# Patient Record
Sex: FEMALE | Race: WHITE | NOT HISPANIC OR LATINO | Employment: OTHER | URBAN - METROPOLITAN AREA
[De-identification: names, ages, dates, MRNs, and addresses within clinical notes are randomized per-mention and may not be internally consistent; named-entity substitution may affect disease eponyms.]

---

## 2017-01-01 ENCOUNTER — APPOINTMENT (INPATIENT)
Dept: RADIOLOGY | Facility: HOSPITAL | Age: 82
DRG: 330 | End: 2017-01-01
Payer: COMMERCIAL

## 2017-01-01 ENCOUNTER — ANESTHESIA (INPATIENT)
Dept: PERIOP | Facility: HOSPITAL | Age: 82
DRG: 330 | End: 2017-01-01
Payer: COMMERCIAL

## 2017-01-01 ENCOUNTER — HOSPITAL ENCOUNTER (INPATIENT)
Facility: HOSPITAL | Age: 82
LOS: 9 days | Discharge: HOME HEALTH | DRG: 330 | End: 2017-01-10
Attending: SURGERY | Admitting: SURGERY
Payer: COMMERCIAL

## 2017-01-01 ENCOUNTER — ANESTHESIA EVENT (INPATIENT)
Dept: PERIOP | Facility: HOSPITAL | Age: 82
DRG: 330 | End: 2017-01-01
Payer: COMMERCIAL

## 2017-01-01 DIAGNOSIS — K55.9 MESENTERIC ISCHEMIA (HCC): Primary | ICD-10-CM

## 2017-01-01 PROBLEM — K26.5 PERFORATED DUODENAL ULCER (HCC): Status: ACTIVE | Noted: 2017-01-01

## 2017-01-01 PROBLEM — K55.1 CHRONIC MESENTERIC ISCHEMIA (HCC): Status: ACTIVE | Noted: 2017-01-01

## 2017-01-01 LAB
ABO GROUP BLD: NORMAL
ALBUMIN SERPL BCP-MCNC: 3.1 G/DL (ref 3.5–5)
ALP SERPL-CCNC: 72 U/L (ref 46–116)
ALT SERPL W P-5'-P-CCNC: 22 U/L (ref 12–78)
ANION GAP SERPL CALCULATED.3IONS-SCNC: 10 MMOL/L (ref 4–13)
ANION GAP SERPL CALCULATED.3IONS-SCNC: 13 MMOL/L (ref 4–13)
ANION GAP SERPL CALCULATED.3IONS-SCNC: 9 MMOL/L (ref 4–13)
APTT PPP: 28 SECONDS (ref 24–36)
AST SERPL W P-5'-P-CCNC: 17 U/L (ref 5–45)
BASE EXCESS BLDA CALC-SCNC: -4 MMOL/L (ref -2–3)
BASE EXCESS BLDA CALC-SCNC: -4.8 MMOL/L
BASE EXCESS BLDA CALC-SCNC: -7.9 MMOL/L
BASOPHILS # BLD AUTO: 0.01 THOUSANDS/ΜL (ref 0–0.1)
BASOPHILS # BLD AUTO: 0.01 THOUSANDS/ΜL (ref 0–0.1)
BASOPHILS # BLD MANUAL: 0.15 THOUSAND/UL (ref 0–0.1)
BASOPHILS NFR BLD AUTO: 0 % (ref 0–1)
BASOPHILS NFR BLD AUTO: 0 % (ref 0–1)
BASOPHILS NFR MAR MANUAL: 1 % (ref 0–1)
BILIRUB SERPL-MCNC: 0.78 MG/DL (ref 0.2–1)
BLD GP AB SCN SERPL QL: NEGATIVE
BODY TEMPERATURE: 97.8 DEGREES FEHRENHEIT
BUN SERPL-MCNC: 13 MG/DL (ref 5–25)
BUN SERPL-MCNC: 14 MG/DL (ref 5–25)
BUN SERPL-MCNC: 16 MG/DL (ref 5–25)
CA-I BLD-SCNC: 1.12 MMOL/L (ref 1.12–1.32)
CALCIUM SERPL-MCNC: 6.6 MG/DL (ref 8.3–10.1)
CALCIUM SERPL-MCNC: 7.1 MG/DL (ref 8.3–10.1)
CALCIUM SERPL-MCNC: 7.7 MG/DL (ref 8.3–10.1)
CHLORIDE SERPL-SCNC: 105 MMOL/L (ref 100–108)
CHLORIDE SERPL-SCNC: 109 MMOL/L (ref 100–108)
CHLORIDE SERPL-SCNC: 111 MMOL/L (ref 100–108)
CO2 SERPL-SCNC: 19 MMOL/L (ref 21–32)
CO2 SERPL-SCNC: 23 MMOL/L (ref 21–32)
CO2 SERPL-SCNC: 24 MMOL/L (ref 21–32)
CREAT SERPL-MCNC: 0.58 MG/DL (ref 0.6–1.3)
CREAT SERPL-MCNC: 0.64 MG/DL (ref 0.6–1.3)
CREAT SERPL-MCNC: 0.65 MG/DL (ref 0.6–1.3)
EOSINOPHIL # BLD AUTO: 0 THOUSAND/ΜL (ref 0–0.61)
EOSINOPHIL # BLD AUTO: 0 THOUSAND/ΜL (ref 0–0.61)
EOSINOPHIL # BLD MANUAL: 0 THOUSAND/UL (ref 0–0.4)
EOSINOPHIL NFR BLD AUTO: 0 % (ref 0–6)
EOSINOPHIL NFR BLD AUTO: 0 % (ref 0–6)
EOSINOPHIL NFR BLD MANUAL: 0 % (ref 0–6)
ERYTHROCYTE [DISTWIDTH] IN BLOOD BY AUTOMATED COUNT: 13.7 % (ref 11.6–15.1)
ERYTHROCYTE [DISTWIDTH] IN BLOOD BY AUTOMATED COUNT: 13.7 % (ref 11.6–15.1)
ERYTHROCYTE [DISTWIDTH] IN BLOOD BY AUTOMATED COUNT: 13.8 % (ref 11.6–15.1)
GFR SERPL CREATININE-BSD FRML MDRD: >60 ML/MIN/1.73SQ M
GLUCOSE SERPL-MCNC: 111 MG/DL (ref 65–140)
GLUCOSE SERPL-MCNC: 113 MG/DL (ref 65–140)
GLUCOSE SERPL-MCNC: 124 MG/DL (ref 65–140)
GLUCOSE SERPL-MCNC: 127 MG/DL (ref 65–140)
GLUCOSE SERPL-MCNC: 99 MG/DL (ref 65–140)
HCO3 BLDA-SCNC: 17 MMOL/L (ref 22–28)
HCO3 BLDA-SCNC: 21.2 MMOL/L (ref 22–28)
HCO3 BLDA-SCNC: 22.3 MMOL/L (ref 24–30)
HCT VFR BLD AUTO: 30.6 % (ref 34.8–46.1)
HCT VFR BLD AUTO: 32.1 % (ref 34.8–46.1)
HCT VFR BLD AUTO: 37.3 % (ref 34.8–46.1)
HCT VFR BLD CALC: 32 % (ref 34.8–46.1)
HGB BLD-MCNC: 10.4 G/DL (ref 11.5–15.4)
HGB BLD-MCNC: 10.7 G/DL (ref 11.5–15.4)
HGB BLD-MCNC: 12.7 G/DL (ref 11.5–15.4)
HGB BLDA-MCNC: 10.9 G/DL (ref 11.5–15.4)
INR PPP: 1.11 (ref 0.86–1.16)
LACTATE SERPL-SCNC: 1.7 MMOL/L (ref 0.5–2)
LACTATE SERPL-SCNC: 1.8 MMOL/L (ref 0.5–2)
LACTATE SERPL-SCNC: 2.7 MMOL/L (ref 0.5–2)
LYMPHOCYTES # BLD AUTO: 0.15 THOUSAND/UL (ref 0.6–4.47)
LYMPHOCYTES # BLD AUTO: 0.62 THOUSANDS/ΜL (ref 0.6–4.47)
LYMPHOCYTES # BLD AUTO: 0.68 THOUSANDS/ΜL (ref 0.6–4.47)
LYMPHOCYTES # BLD AUTO: 1 % (ref 14–44)
LYMPHOCYTES NFR BLD AUTO: 6 % (ref 14–44)
LYMPHOCYTES NFR BLD AUTO: 6 % (ref 14–44)
MAGNESIUM SERPL-MCNC: 1.5 MG/DL (ref 1.6–2.6)
MAGNESIUM SERPL-MCNC: 1.5 MG/DL (ref 1.6–2.6)
MCH RBC QN AUTO: 32.7 PG (ref 26.8–34.3)
MCH RBC QN AUTO: 32.8 PG (ref 26.8–34.3)
MCH RBC QN AUTO: 33 PG (ref 26.8–34.3)
MCHC RBC AUTO-ENTMCNC: 33.3 G/DL (ref 31.4–37.4)
MCHC RBC AUTO-ENTMCNC: 34 G/DL (ref 31.4–37.4)
MCHC RBC AUTO-ENTMCNC: 34 G/DL (ref 31.4–37.4)
MCV RBC AUTO: 97 FL (ref 82–98)
MCV RBC AUTO: 97 FL (ref 82–98)
MCV RBC AUTO: 98 FL (ref 82–98)
MONOCYTES # BLD AUTO: 0.46 THOUSAND/UL (ref 0–1.22)
MONOCYTES # BLD AUTO: 0.6 THOUSAND/ΜL (ref 0.17–1.22)
MONOCYTES # BLD AUTO: 0.71 THOUSAND/ΜL (ref 0.17–1.22)
MONOCYTES NFR BLD AUTO: 6 % (ref 4–12)
MONOCYTES NFR BLD AUTO: 6 % (ref 4–12)
MONOCYTES NFR BLD: 3 % (ref 4–12)
NASAL CANNULA: 4
NEUTROPHILS # BLD AUTO: 8.65 THOUSANDS/ΜL (ref 1.85–7.62)
NEUTROPHILS # BLD AUTO: 9.99 THOUSANDS/ΜL (ref 1.85–7.62)
NEUTROPHILS # BLD MANUAL: 14.69 THOUSAND/UL (ref 1.85–7.62)
NEUTS BAND NFR BLD MANUAL: 1 % (ref 0–8)
NEUTS SEG NFR BLD AUTO: 88 % (ref 43–75)
NEUTS SEG NFR BLD AUTO: 88 % (ref 43–75)
NEUTS SEG NFR BLD AUTO: 94 % (ref 43–75)
NRBC BLD AUTO-RTO: 0 /100 WBCS
O2 CT BLDA-SCNC: 15 ML/DL (ref 16–23)
O2 CT BLDA-SCNC: 15.6 ML/DL (ref 16–23)
OTHER FIO2: ABNORMAL %
OXYHGB MFR BLDA: 94.4 % (ref 94–97)
OXYHGB MFR BLDA: 95.7 % (ref 94–97)
PCO2 BLD: 24 MMOL/L (ref 21–32)
PCO2 BLD: 42.1 MM HG (ref 42–50)
PCO2 BLDA: 32.6 MM HG (ref 36–44)
PCO2 BLDA: 42.5 MM HG (ref 36–44)
PCO2 TEMP ADJ BLDA: 41.8 MM HG (ref 36–44)
PH BLD: 7.32 [PH] (ref 7.35–7.45)
PH BLD: 7.33 [PH] (ref 7.3–7.4)
PH BLDA: 7.32 [PH] (ref 7.35–7.45)
PH BLDA: 7.33 [PH] (ref 7.35–7.45)
PHOSPHATE SERPL-MCNC: 3.7 MG/DL (ref 2.3–4.1)
PLATELET # BLD AUTO: 218 THOUSANDS/UL (ref 149–390)
PLATELET # BLD AUTO: 249 THOUSANDS/UL (ref 149–390)
PLATELET # BLD AUTO: 315 THOUSANDS/UL (ref 149–390)
PLATELET BLD QL SMEAR: ADEQUATE
PMV BLD AUTO: 10 FL (ref 8.9–12.7)
PMV BLD AUTO: 9.5 FL (ref 8.9–12.7)
PMV BLD AUTO: 9.9 FL (ref 8.9–12.7)
PO2 BLD: 433 MM HG (ref 35–45)
PO2 BLD: 86.6 MM HG (ref 75–129)
PO2 BLDA: 88.8 MM HG (ref 75–129)
PO2 BLDA: 96.8 MM HG (ref 75–129)
POTASSIUM BLD-SCNC: 3.2 MMOL/L (ref 3.5–5.3)
POTASSIUM SERPL-SCNC: 3.2 MMOL/L (ref 3.5–5.3)
POTASSIUM SERPL-SCNC: 3.3 MMOL/L (ref 3.5–5.3)
POTASSIUM SERPL-SCNC: 3.3 MMOL/L (ref 3.5–5.3)
PROT SERPL-MCNC: 6.1 G/DL (ref 6.4–8.2)
PROTHROMBIN TIME: 14.4 SECONDS (ref 12–14.3)
RBC # BLD AUTO: 3.17 MILLION/UL (ref 3.81–5.12)
RBC # BLD AUTO: 3.27 MILLION/UL (ref 3.81–5.12)
RBC # BLD AUTO: 3.85 MILLION/UL (ref 3.81–5.12)
RH BLD: POSITIVE
SAO2 % BLD FROM PO2: 100 % (ref 95–98)
SODIUM BLD-SCNC: 137 MMOL/L (ref 136–145)
SODIUM SERPL-SCNC: 138 MMOL/L (ref 136–145)
SODIUM SERPL-SCNC: 142 MMOL/L (ref 136–145)
SODIUM SERPL-SCNC: 143 MMOL/L (ref 136–145)
SPECIMEN SOURCE: ABNORMAL
WBC # BLD AUTO: 11.43 THOUSAND/UL (ref 4.31–10.16)
WBC # BLD AUTO: 15.46 THOUSAND/UL (ref 4.31–10.16)
WBC # BLD AUTO: 9.91 THOUSAND/UL (ref 4.31–10.16)

## 2017-01-01 PROCEDURE — 0DU907Z SUPPLEMENT DUODENUM WITH AUTOLOGOUS TISSUE SUBSTITUTE, OPEN APPROACH: ICD-10-PCS | Performed by: SURGERY

## 2017-01-01 PROCEDURE — B414YZZ FLUOROSCOPY OF SUPERIOR MESENTERIC ARTERY USING OTHER CONTRAST: ICD-10-PCS | Performed by: SURGERY

## 2017-01-01 PROCEDURE — 80048 BASIC METABOLIC PNL TOTAL CA: CPT | Performed by: SURGERY

## 2017-01-01 PROCEDURE — 85014 HEMATOCRIT: CPT

## 2017-01-01 PROCEDURE — 82803 BLOOD GASES ANY COMBINATION: CPT

## 2017-01-01 PROCEDURE — 04753ZZ DILATION OF SUPERIOR MESENTERIC ARTERY, PERCUTANEOUS APPROACH: ICD-10-PCS | Performed by: SURGERY

## 2017-01-01 PROCEDURE — C1725 CATH, TRANSLUMIN NON-LASER: HCPCS | Performed by: SURGERY

## 2017-01-01 PROCEDURE — C1769 GUIDE WIRE: HCPCS | Performed by: SURGERY

## 2017-01-01 PROCEDURE — 94760 N-INVAS EAR/PLS OXIMETRY 1: CPT

## 2017-01-01 PROCEDURE — 80053 COMPREHEN METABOLIC PANEL: CPT | Performed by: SURGERY

## 2017-01-01 PROCEDURE — 83735 ASSAY OF MAGNESIUM: CPT | Performed by: SURGERY

## 2017-01-01 PROCEDURE — 82805 BLOOD GASES W/O2 SATURATION: CPT | Performed by: SURGERY

## 2017-01-01 PROCEDURE — C1894 INTRO/SHEATH, NON-LASER: HCPCS | Performed by: SURGERY

## 2017-01-01 PROCEDURE — 85025 COMPLETE CBC W/AUTO DIFF WBC: CPT | Performed by: SURGERY

## 2017-01-01 PROCEDURE — 82948 REAGENT STRIP/BLOOD GLUCOSE: CPT

## 2017-01-01 PROCEDURE — 84132 ASSAY OF SERUM POTASSIUM: CPT

## 2017-01-01 PROCEDURE — A9270 NON-COVERED ITEM OR SERVICE: HCPCS | Performed by: EMERGENCY MEDICINE

## 2017-01-01 PROCEDURE — 85007 BL SMEAR W/DIFF WBC COUNT: CPT | Performed by: SURGERY

## 2017-01-01 PROCEDURE — 99285 EMERGENCY DEPT VISIT HI MDM: CPT

## 2017-01-01 PROCEDURE — A9270 NON-COVERED ITEM OR SERVICE: HCPCS | Performed by: SURGERY

## 2017-01-01 PROCEDURE — C9113 INJ PANTOPRAZOLE SODIUM, VIA: HCPCS | Performed by: EMERGENCY MEDICINE

## 2017-01-01 PROCEDURE — 83605 ASSAY OF LACTIC ACID: CPT | Performed by: SURGERY

## 2017-01-01 PROCEDURE — 82947 ASSAY GLUCOSE BLOOD QUANT: CPT

## 2017-01-01 PROCEDURE — 86901 BLOOD TYPING SEROLOGIC RH(D): CPT | Performed by: SURGERY

## 2017-01-01 PROCEDURE — 84295 ASSAY OF SERUM SODIUM: CPT

## 2017-01-01 PROCEDURE — 86850 RBC ANTIBODY SCREEN: CPT | Performed by: SURGERY

## 2017-01-01 PROCEDURE — 85610 PROTHROMBIN TIME: CPT | Performed by: SURGERY

## 2017-01-01 PROCEDURE — 82330 ASSAY OF CALCIUM: CPT

## 2017-01-01 PROCEDURE — 86920 COMPATIBILITY TEST SPIN: CPT

## 2017-01-01 PROCEDURE — 84100 ASSAY OF PHOSPHORUS: CPT | Performed by: SURGERY

## 2017-01-01 PROCEDURE — 85730 THROMBOPLASTIN TIME PARTIAL: CPT | Performed by: SURGERY

## 2017-01-01 PROCEDURE — 76937 US GUIDE VASCULAR ACCESS: CPT

## 2017-01-01 PROCEDURE — 85027 COMPLETE CBC AUTOMATED: CPT | Performed by: SURGERY

## 2017-01-01 PROCEDURE — 75726 ARTERY X-RAYS ABDOMEN: CPT

## 2017-01-01 PROCEDURE — 86900 BLOOD TYPING SEROLOGIC ABO: CPT | Performed by: SURGERY

## 2017-01-01 RX ORDER — HYDRALAZINE HYDROCHLORIDE 20 MG/ML
10 INJECTION INTRAMUSCULAR; INTRAVENOUS ONCE
Status: COMPLETED | OUTPATIENT
Start: 2017-01-01 | End: 2017-01-01

## 2017-01-01 RX ORDER — FENTANYL CITRATE/PF 50 MCG/ML
25 SYRINGE (ML) INJECTION
Status: DISCONTINUED | OUTPATIENT
Start: 2017-01-01 | End: 2017-01-01 | Stop reason: HOSPADM

## 2017-01-01 RX ORDER — PROTAMINE SULFATE 10 MG/ML
INJECTION, SOLUTION INTRAVENOUS AS NEEDED
Status: DISCONTINUED | OUTPATIENT
Start: 2017-01-01 | End: 2017-01-01 | Stop reason: SURG

## 2017-01-01 RX ORDER — HEPARIN SODIUM 5000 [USP'U]/ML
5000 INJECTION, SOLUTION INTRAVENOUS; SUBCUTANEOUS EVERY 8 HOURS SCHEDULED
Status: DISCONTINUED | OUTPATIENT
Start: 2017-01-01 | End: 2017-01-01

## 2017-01-01 RX ORDER — PROPOFOL 10 MG/ML
INJECTION, EMULSION INTRAVENOUS AS NEEDED
Status: DISCONTINUED | OUTPATIENT
Start: 2017-01-01 | End: 2017-01-01 | Stop reason: SURG

## 2017-01-01 RX ORDER — ONDANSETRON 2 MG/ML
INJECTION INTRAMUSCULAR; INTRAVENOUS AS NEEDED
Status: DISCONTINUED | OUTPATIENT
Start: 2017-01-01 | End: 2017-01-01 | Stop reason: SURG

## 2017-01-01 RX ORDER — POTASSIUM CHLORIDE 14.9 MG/ML
20 INJECTION INTRAVENOUS ONCE
Status: COMPLETED | OUTPATIENT
Start: 2017-01-01 | End: 2017-01-01

## 2017-01-01 RX ORDER — SODIUM CHLORIDE, SODIUM GLUCONATE, SODIUM ACETATE, POTASSIUM CHLORIDE, MAGNESIUM CHLORIDE, SODIUM PHOSPHATE, DIBASIC, AND POTASSIUM PHOSPHATE .53; .5; .37; .037; .03; .012; .00082 G/100ML; G/100ML; G/100ML; G/100ML; G/100ML; G/100ML; G/100ML
1000 INJECTION, SOLUTION INTRAVENOUS ONCE
Status: COMPLETED | OUTPATIENT
Start: 2017-01-01 | End: 2017-01-01

## 2017-01-01 RX ORDER — PANTOPRAZOLE SODIUM 40 MG/1
40 INJECTION, POWDER, FOR SOLUTION INTRAVENOUS
Status: DISCONTINUED | OUTPATIENT
Start: 2017-01-01 | End: 2017-01-01

## 2017-01-01 RX ORDER — CLOPIDOGREL BISULFATE 75 MG/1
75 TABLET ORAL DAILY
Status: DISCONTINUED | OUTPATIENT
Start: 2017-01-01 | End: 2017-01-01

## 2017-01-01 RX ORDER — SODIUM CHLORIDE, SODIUM LACTATE, POTASSIUM CHLORIDE, CALCIUM CHLORIDE 600; 310; 30; 20 MG/100ML; MG/100ML; MG/100ML; MG/100ML
125 INJECTION, SOLUTION INTRAVENOUS CONTINUOUS
Status: DISCONTINUED | OUTPATIENT
Start: 2017-01-01 | End: 2017-01-02

## 2017-01-01 RX ORDER — SODIUM CHLORIDE, SODIUM GLUCONATE, SODIUM ACETATE, POTASSIUM CHLORIDE, MAGNESIUM CHLORIDE, SODIUM PHOSPHATE, DIBASIC, AND POTASSIUM PHOSPHATE .53; .5; .37; .037; .03; .012; .00082 G/100ML; G/100ML; G/100ML; G/100ML; G/100ML; G/100ML; G/100ML
1000 INJECTION, SOLUTION INTRAVENOUS ONCE
Status: DISCONTINUED | OUTPATIENT
Start: 2017-01-01 | End: 2017-01-10 | Stop reason: HOSPADM

## 2017-01-01 RX ORDER — ASPIRIN 300 MG/1
300 SUPPOSITORY RECTAL DAILY
Status: DISCONTINUED | OUTPATIENT
Start: 2017-01-01 | End: 2017-01-08

## 2017-01-01 RX ORDER — EPHEDRINE SULFATE 50 MG/ML
INJECTION, SOLUTION INTRAVENOUS AS NEEDED
Status: DISCONTINUED | OUTPATIENT
Start: 2017-01-01 | End: 2017-01-01 | Stop reason: SURG

## 2017-01-01 RX ORDER — FENTANYL CITRATE 50 UG/ML
INJECTION, SOLUTION INTRAMUSCULAR; INTRAVENOUS AS NEEDED
Status: DISCONTINUED | OUTPATIENT
Start: 2017-01-01 | End: 2017-01-01 | Stop reason: SURG

## 2017-01-01 RX ORDER — SODIUM CHLORIDE, SODIUM LACTATE, POTASSIUM CHLORIDE, CALCIUM CHLORIDE 600; 310; 30; 20 MG/100ML; MG/100ML; MG/100ML; MG/100ML
INJECTION, SOLUTION INTRAVENOUS CONTINUOUS PRN
Status: DISCONTINUED | OUTPATIENT
Start: 2017-01-01 | End: 2017-01-01 | Stop reason: SURG

## 2017-01-01 RX ORDER — HEPARIN SODIUM 1000 [USP'U]/ML
INJECTION, SOLUTION INTRAVENOUS; SUBCUTANEOUS AS NEEDED
Status: DISCONTINUED | OUTPATIENT
Start: 2017-01-01 | End: 2017-01-01 | Stop reason: SURG

## 2017-01-01 RX ORDER — FENTANYL CITRATE/PF 50 MCG/ML
25 SYRINGE (ML) INJECTION
Status: DISCONTINUED | OUTPATIENT
Start: 2017-01-01 | End: 2017-01-01 | Stop reason: SDUPTHER

## 2017-01-01 RX ORDER — POTASSIUM CHLORIDE 14.9 MG/ML
20 INJECTION INTRAVENOUS
Status: DISCONTINUED | OUTPATIENT
Start: 2017-01-01 | End: 2017-01-01

## 2017-01-01 RX ORDER — CLARITHROMYCIN 250 MG/5ML
500 FOR SUSPENSION ORAL EVERY 12 HOURS SCHEDULED
Status: DISCONTINUED | OUTPATIENT
Start: 2017-01-01 | End: 2017-01-06

## 2017-01-01 RX ORDER — PANTOPRAZOLE SODIUM 40 MG/1
40 INJECTION, POWDER, FOR SOLUTION INTRAVENOUS EVERY 12 HOURS SCHEDULED
Status: DISCONTINUED | OUTPATIENT
Start: 2017-01-01 | End: 2017-01-08 | Stop reason: CLARIF

## 2017-01-01 RX ORDER — ASPIRIN 81 MG/1
81 TABLET, CHEWABLE ORAL DAILY
Status: DISCONTINUED | OUTPATIENT
Start: 2017-01-01 | End: 2017-01-01

## 2017-01-01 RX ORDER — ONDANSETRON 2 MG/ML
4 INJECTION INTRAMUSCULAR; INTRAVENOUS ONCE
Status: DISCONTINUED | OUTPATIENT
Start: 2017-01-01 | End: 2017-01-01 | Stop reason: HOSPADM

## 2017-01-01 RX ORDER — SUCCINYLCHOLINE CHLORIDE 20 MG/ML
INJECTION INTRAMUSCULAR; INTRAVENOUS AS NEEDED
Status: DISCONTINUED | OUTPATIENT
Start: 2017-01-01 | End: 2017-01-01 | Stop reason: SURG

## 2017-01-01 RX ORDER — LABETALOL HYDROCHLORIDE 5 MG/ML
INJECTION, SOLUTION INTRAVENOUS AS NEEDED
Status: DISCONTINUED | OUTPATIENT
Start: 2017-01-01 | End: 2017-01-01 | Stop reason: SURG

## 2017-01-01 RX ORDER — ONDANSETRON 2 MG/ML
4 INJECTION INTRAMUSCULAR; INTRAVENOUS EVERY 4 HOURS PRN
Status: DISCONTINUED | OUTPATIENT
Start: 2017-01-01 | End: 2017-01-10 | Stop reason: HOSPADM

## 2017-01-01 RX ORDER — MAGNESIUM HYDROXIDE 1200 MG/15ML
LIQUID ORAL AS NEEDED
Status: DISCONTINUED | OUTPATIENT
Start: 2017-01-01 | End: 2017-01-01 | Stop reason: HOSPADM

## 2017-01-01 RX ORDER — GLYCOPYRROLATE 0.2 MG/ML
INJECTION INTRAMUSCULAR; INTRAVENOUS AS NEEDED
Status: DISCONTINUED | OUTPATIENT
Start: 2017-01-01 | End: 2017-01-01 | Stop reason: SURG

## 2017-01-01 RX ORDER — LIDOCAINE HYDROCHLORIDE 10 MG/ML
INJECTION, SOLUTION INFILTRATION; PERINEURAL AS NEEDED
Status: DISCONTINUED | OUTPATIENT
Start: 2017-01-01 | End: 2017-01-01 | Stop reason: SURG

## 2017-01-01 RX ORDER — HEPARIN SODIUM 5000 [USP'U]/ML
5000 INJECTION, SOLUTION INTRAVENOUS; SUBCUTANEOUS EVERY 8 HOURS SCHEDULED
Status: DISCONTINUED | OUTPATIENT
Start: 2017-01-01 | End: 2017-01-10 | Stop reason: HOSPADM

## 2017-01-01 RX ORDER — MAGNESIUM SULFATE HEPTAHYDRATE 40 MG/ML
2 INJECTION, SOLUTION INTRAVENOUS ONCE
Status: COMPLETED | OUTPATIENT
Start: 2017-01-01 | End: 2017-01-01

## 2017-01-01 RX ORDER — SODIUM CHLORIDE 9 MG/ML
75 INJECTION, SOLUTION INTRAVENOUS CONTINUOUS
Status: DISCONTINUED | OUTPATIENT
Start: 2017-01-01 | End: 2017-01-01

## 2017-01-01 RX ADMIN — PROTAMINE SULFATE 30 MG: 10 INJECTION, SOLUTION INTRAVENOUS at 05:40

## 2017-01-01 RX ADMIN — METRONIDAZOLE 500 MG: 500 INJECTION, SOLUTION INTRAVENOUS at 12:00

## 2017-01-01 RX ADMIN — MAGNESIUM SULFATE HEPTAHYDRATE 2 G: 40 INJECTION, SOLUTION INTRAVENOUS at 10:17

## 2017-01-01 RX ADMIN — EPHEDRINE SULFATE 5 MG: 50 INJECTION, SOLUTION INTRAMUSCULAR; INTRAVENOUS; SUBCUTANEOUS at 05:09

## 2017-01-01 RX ADMIN — GLYCOPYRROLATE 0.4 MG: 0.2 INJECTION INTRAMUSCULAR; INTRAVENOUS at 05:48

## 2017-01-01 RX ADMIN — HYDRALAZINE HYDROCHLORIDE 10 MG: 20 INJECTION INTRAMUSCULAR; INTRAVENOUS at 20:57

## 2017-01-01 RX ADMIN — ASPIRIN 300 MG: 300 SUPPOSITORY RECTAL at 12:53

## 2017-01-01 RX ADMIN — PROPOFOL 70 MG: 10 INJECTION, EMULSION INTRAVENOUS at 03:20

## 2017-01-01 RX ADMIN — SODIUM CHLORIDE 4.5 G: 900 INJECTION INTRAVENOUS at 03:42

## 2017-01-01 RX ADMIN — SUCCINYLCHOLINE CHLORIDE 40 MG: 20 INJECTION, SOLUTION INTRAMUSCULAR; INTRAVENOUS at 03:30

## 2017-01-01 RX ADMIN — CLARITHROMYCIN 500 MG: 250 FOR SUSPENSION ORAL at 11:59

## 2017-01-01 RX ADMIN — FENTANYL CITRATE 25 MCG: 50 INJECTION, SOLUTION INTRAMUSCULAR; INTRAVENOUS at 06:57

## 2017-01-01 RX ADMIN — HYDROMORPHONE HYDROCHLORIDE 1 MG: 1 INJECTION, SOLUTION INTRAMUSCULAR; INTRAVENOUS; SUBCUTANEOUS at 13:10

## 2017-01-01 RX ADMIN — NEOSTIGMINE METHYLSULFATE 3 MG: 1 INJECTION INTRAMUSCULAR; INTRAVENOUS; SUBCUTANEOUS at 05:48

## 2017-01-01 RX ADMIN — HEPARIN SODIUM 5000 UNITS: 5000 INJECTION, SOLUTION INTRAVENOUS; SUBCUTANEOUS at 14:09

## 2017-01-01 RX ADMIN — LIDOCAINE HYDROCHLORIDE 30 MG: 10 INJECTION, SOLUTION INFILTRATION; PERINEURAL at 03:20

## 2017-01-01 RX ADMIN — Medication 4.5 G: at 20:58

## 2017-01-01 RX ADMIN — CLARITHROMYCIN 500 MG: 250 FOR SUSPENSION ORAL at 20:19

## 2017-01-01 RX ADMIN — PANTOPRAZOLE SODIUM 40 MG: 40 INJECTION, POWDER, FOR SOLUTION INTRAVENOUS at 20:19

## 2017-01-01 RX ADMIN — POTASSIUM CHLORIDE 20 MEQ: 200 INJECTION, SOLUTION INTRAVENOUS at 10:17

## 2017-01-01 RX ADMIN — SUCCINYLCHOLINE CHLORIDE 60 MG: 20 INJECTION, SOLUTION INTRAMUSCULAR; INTRAVENOUS at 03:20

## 2017-01-01 RX ADMIN — SODIUM CHLORIDE, SODIUM LACTATE, POTASSIUM CHLORIDE, AND CALCIUM CHLORIDE 125 ML/HR: .6; .31; .03; .02 INJECTION, SOLUTION INTRAVENOUS at 22:51

## 2017-01-01 RX ADMIN — ONDANSETRON 4 MG: 2 INJECTION INTRAMUSCULAR; INTRAVENOUS at 10:17

## 2017-01-01 RX ADMIN — HYDROMORPHONE HYDROCHLORIDE 1 MG: 1 INJECTION, SOLUTION INTRAMUSCULAR; INTRAVENOUS; SUBCUTANEOUS at 07:57

## 2017-01-01 RX ADMIN — FENTANYL CITRATE 25 MCG: 50 INJECTION, SOLUTION INTRAMUSCULAR; INTRAVENOUS at 06:38

## 2017-01-01 RX ADMIN — HEPARIN SODIUM 5000 UNITS: 5000 INJECTION, SOLUTION INTRAVENOUS; SUBCUTANEOUS at 22:50

## 2017-01-01 RX ADMIN — EPHEDRINE SULFATE 5 MG: 50 INJECTION, SOLUTION INTRAMUSCULAR; INTRAVENOUS; SUBCUTANEOUS at 05:25

## 2017-01-01 RX ADMIN — LABETALOL HYDROCHLORIDE 10 MG: 5 INJECTION, SOLUTION INTRAVENOUS at 03:53

## 2017-01-01 RX ADMIN — FENTANYL CITRATE 50 MCG: 50 INJECTION, SOLUTION INTRAMUSCULAR; INTRAVENOUS at 04:30

## 2017-01-01 RX ADMIN — EPHEDRINE SULFATE 5 MG: 50 INJECTION, SOLUTION INTRAMUSCULAR; INTRAVENOUS; SUBCUTANEOUS at 04:54

## 2017-01-01 RX ADMIN — FENTANYL CITRATE 25 MCG: 50 INJECTION, SOLUTION INTRAMUSCULAR; INTRAVENOUS at 06:53

## 2017-01-01 RX ADMIN — FENTANYL CITRATE 50 MCG: 50 INJECTION, SOLUTION INTRAMUSCULAR; INTRAVENOUS at 04:31

## 2017-01-01 RX ADMIN — FENTANYL CITRATE 25 MCG: 50 INJECTION, SOLUTION INTRAMUSCULAR; INTRAVENOUS at 06:47

## 2017-01-01 RX ADMIN — ONDANSETRON 4 MG: 2 INJECTION INTRAMUSCULAR; INTRAVENOUS at 04:35

## 2017-01-01 RX ADMIN — HEPARIN SODIUM 5000 UNITS: 1000 INJECTION INTRAVENOUS; SUBCUTANEOUS at 05:18

## 2017-01-01 RX ADMIN — METOPROLOL TARTRATE 5 MG: 5 INJECTION INTRAVENOUS at 20:18

## 2017-01-01 RX ADMIN — POTASSIUM CHLORIDE 20 MEQ: 200 INJECTION, SOLUTION INTRAVENOUS at 14:56

## 2017-01-01 RX ADMIN — SODIUM CHLORIDE, SODIUM LACTATE, POTASSIUM CHLORIDE, AND CALCIUM CHLORIDE: .6; .31; .03; .02 INJECTION, SOLUTION INTRAVENOUS at 06:16

## 2017-01-01 RX ADMIN — METRONIDAZOLE 500 MG: 500 INJECTION, SOLUTION INTRAVENOUS at 20:19

## 2017-01-01 RX ADMIN — SODIUM CHLORIDE: 0.9 INJECTION, SOLUTION INTRAVENOUS at 03:00

## 2017-01-01 RX ADMIN — SODIUM CHLORIDE, SODIUM GLUCONATE, SODIUM ACETATE, POTASSIUM CHLORIDE AND MAGNESIUM CHLORIDE 1000 ML: 526; 502; 368; 37; 30 INJECTION, SOLUTION INTRAVENOUS at 07:57

## 2017-01-01 RX ADMIN — POTASSIUM CHLORIDE 20 MEQ: 200 INJECTION, SOLUTION INTRAVENOUS at 16:21

## 2017-01-01 RX ADMIN — HEPARIN SODIUM 5000 UNITS: 5000 INJECTION, SOLUTION INTRAVENOUS; SUBCUTANEOUS at 10:16

## 2017-01-01 RX ADMIN — Medication 4.5 G: at 14:09

## 2017-01-01 RX ADMIN — FENTANYL CITRATE 50 MCG: 50 INJECTION, SOLUTION INTRAMUSCULAR; INTRAVENOUS at 03:30

## 2017-01-01 RX ADMIN — FENTANYL CITRATE 50 MCG: 50 INJECTION, SOLUTION INTRAMUSCULAR; INTRAVENOUS at 03:20

## 2017-01-01 RX ADMIN — SODIUM CHLORIDE, SODIUM LACTATE, POTASSIUM CHLORIDE, AND CALCIUM CHLORIDE: .6; .31; .03; .02 INJECTION, SOLUTION INTRAVENOUS at 03:29

## 2017-01-01 RX ADMIN — HYDROMORPHONE HYDROCHLORIDE 0.5 MG: 1 INJECTION, SOLUTION INTRAMUSCULAR; INTRAVENOUS; SUBCUTANEOUS at 10:18

## 2017-01-01 RX ADMIN — PANTOPRAZOLE SODIUM 40 MG: 40 INJECTION, POWDER, FOR SOLUTION INTRAVENOUS at 10:17

## 2017-01-02 LAB
ANION GAP SERPL CALCULATED.3IONS-SCNC: 9 MMOL/L (ref 4–13)
BASOPHILS # BLD AUTO: 0.01 THOUSANDS/ΜL (ref 0–0.1)
BASOPHILS NFR BLD AUTO: 0 % (ref 0–1)
BUN SERPL-MCNC: 14 MG/DL (ref 5–25)
CALCIUM SERPL-MCNC: 8.2 MG/DL (ref 8.3–10.1)
CHLORIDE SERPL-SCNC: 111 MMOL/L (ref 100–108)
CO2 SERPL-SCNC: 21 MMOL/L (ref 21–32)
CREAT SERPL-MCNC: 0.67 MG/DL (ref 0.6–1.3)
EOSINOPHIL # BLD AUTO: 0 THOUSAND/ΜL (ref 0–0.61)
EOSINOPHIL NFR BLD AUTO: 0 % (ref 0–6)
ERYTHROCYTE [DISTWIDTH] IN BLOOD BY AUTOMATED COUNT: 14.2 % (ref 11.6–15.1)
GFR SERPL CREATININE-BSD FRML MDRD: >60 ML/MIN/1.73SQ M
GLUCOSE SERPL-MCNC: 102 MG/DL (ref 65–140)
GLUCOSE SERPL-MCNC: 106 MG/DL (ref 65–140)
HCT VFR BLD AUTO: 33.5 % (ref 34.8–46.1)
HGB BLD-MCNC: 11.3 G/DL (ref 11.5–15.4)
LYMPHOCYTES # BLD AUTO: 0.76 THOUSANDS/ΜL (ref 0.6–4.47)
LYMPHOCYTES NFR BLD AUTO: 9 % (ref 14–44)
MAGNESIUM SERPL-MCNC: 2.5 MG/DL (ref 1.6–2.6)
MCH RBC QN AUTO: 32.7 PG (ref 26.8–34.3)
MCHC RBC AUTO-ENTMCNC: 33.7 G/DL (ref 31.4–37.4)
MCV RBC AUTO: 97 FL (ref 82–98)
MONOCYTES # BLD AUTO: 0.74 THOUSAND/ΜL (ref 0.17–1.22)
MONOCYTES NFR BLD AUTO: 9 % (ref 4–12)
NEUTROPHILS # BLD AUTO: 6.7 THOUSANDS/ΜL (ref 1.85–7.62)
NEUTS SEG NFR BLD AUTO: 82 % (ref 43–75)
NRBC BLD AUTO-RTO: 0 /100 WBCS
PHOSPHATE SERPL-MCNC: 2.7 MG/DL (ref 2.3–4.1)
PLATELET # BLD AUTO: 247 THOUSANDS/UL (ref 149–390)
PMV BLD AUTO: 9.9 FL (ref 8.9–12.7)
POTASSIUM SERPL-SCNC: 4.1 MMOL/L (ref 3.5–5.3)
PREALB SERPL-MCNC: 14.2 MG/DL (ref 18–40)
RBC # BLD AUTO: 3.46 MILLION/UL (ref 3.81–5.12)
SODIUM SERPL-SCNC: 141 MMOL/L (ref 136–145)
WBC # BLD AUTO: 8.23 THOUSAND/UL (ref 4.31–10.16)

## 2017-01-02 PROCEDURE — A9270 NON-COVERED ITEM OR SERVICE: HCPCS | Performed by: SURGERY

## 2017-01-02 PROCEDURE — 82948 REAGENT STRIP/BLOOD GLUCOSE: CPT

## 2017-01-02 PROCEDURE — 80048 BASIC METABOLIC PNL TOTAL CA: CPT | Performed by: PHYSICIAN ASSISTANT

## 2017-01-02 PROCEDURE — C9113 INJ PANTOPRAZOLE SODIUM, VIA: HCPCS | Performed by: EMERGENCY MEDICINE

## 2017-01-02 PROCEDURE — 83735 ASSAY OF MAGNESIUM: CPT | Performed by: PHYSICIAN ASSISTANT

## 2017-01-02 PROCEDURE — 84134 ASSAY OF PREALBUMIN: CPT | Performed by: PHYSICIAN ASSISTANT

## 2017-01-02 PROCEDURE — 85025 COMPLETE CBC W/AUTO DIFF WBC: CPT | Performed by: PHYSICIAN ASSISTANT

## 2017-01-02 PROCEDURE — 84100 ASSAY OF PHOSPHORUS: CPT | Performed by: PHYSICIAN ASSISTANT

## 2017-01-02 RX ORDER — OXYCODONE HCL 5 MG/5 ML
10 SOLUTION, ORAL ORAL EVERY 4 HOURS PRN
Status: DISCONTINUED | OUTPATIENT
Start: 2017-01-02 | End: 2017-01-07

## 2017-01-02 RX ORDER — OXYCODONE HCL 5 MG/5 ML
5 SOLUTION, ORAL ORAL EVERY 4 HOURS PRN
Status: DISCONTINUED | OUTPATIENT
Start: 2017-01-02 | End: 2017-01-07

## 2017-01-02 RX ORDER — LABETALOL HYDROCHLORIDE 5 MG/ML
10 INJECTION, SOLUTION INTRAVENOUS EVERY 4 HOURS PRN
Status: DISCONTINUED | OUTPATIENT
Start: 2017-01-02 | End: 2017-01-03

## 2017-01-02 RX ORDER — SODIUM CHLORIDE, SODIUM LACTATE, POTASSIUM CHLORIDE, CALCIUM CHLORIDE 600; 310; 30; 20 MG/100ML; MG/100ML; MG/100ML; MG/100ML
84 INJECTION, SOLUTION INTRAVENOUS CONTINUOUS
Status: DISCONTINUED | OUTPATIENT
Start: 2017-01-02 | End: 2017-01-03

## 2017-01-02 RX ORDER — HYDRALAZINE HYDROCHLORIDE 20 MG/ML
10 INJECTION INTRAMUSCULAR; INTRAVENOUS EVERY 6 HOURS PRN
Status: DISCONTINUED | OUTPATIENT
Start: 2017-01-02 | End: 2017-01-02

## 2017-01-02 RX ADMIN — SODIUM CHLORIDE, SODIUM LACTATE, POTASSIUM CHLORIDE, AND CALCIUM CHLORIDE 100 ML/HR: .6; .31; .03; .02 INJECTION, SOLUTION INTRAVENOUS at 08:56

## 2017-01-02 RX ADMIN — HEPARIN SODIUM 5000 UNITS: 5000 INJECTION, SOLUTION INTRAVENOUS; SUBCUTANEOUS at 14:26

## 2017-01-02 RX ADMIN — HEPARIN SODIUM 5000 UNITS: 5000 INJECTION, SOLUTION INTRAVENOUS; SUBCUTANEOUS at 21:47

## 2017-01-02 RX ADMIN — PANTOPRAZOLE SODIUM 40 MG: 40 INJECTION, POWDER, FOR SOLUTION INTRAVENOUS at 08:13

## 2017-01-02 RX ADMIN — Medication 4.5 G: at 20:38

## 2017-01-02 RX ADMIN — SODIUM CHLORIDE, SODIUM LACTATE, POTASSIUM CHLORIDE, AND CALCIUM CHLORIDE 84 ML/HR: .6; .31; .03; .02 INJECTION, SOLUTION INTRAVENOUS at 22:53

## 2017-01-02 RX ADMIN — HYDROMORPHONE HYDROCHLORIDE 1 MG: 1 INJECTION, SOLUTION INTRAMUSCULAR; INTRAVENOUS; SUBCUTANEOUS at 00:02

## 2017-01-02 RX ADMIN — Medication 1 SPRAY: at 21:47

## 2017-01-02 RX ADMIN — PANTOPRAZOLE SODIUM 40 MG: 40 INJECTION, POWDER, FOR SOLUTION INTRAVENOUS at 20:46

## 2017-01-02 RX ADMIN — CLARITHROMYCIN 500 MG: 250 FOR SUSPENSION ORAL at 20:47

## 2017-01-02 RX ADMIN — ONDANSETRON 4 MG: 2 INJECTION INTRAMUSCULAR; INTRAVENOUS at 08:14

## 2017-01-02 RX ADMIN — SODIUM CHLORIDE, SODIUM LACTATE, POTASSIUM CHLORIDE, AND CALCIUM CHLORIDE 84 ML/HR: .6; .31; .03; .02 INJECTION, SOLUTION INTRAVENOUS at 17:40

## 2017-01-02 RX ADMIN — Medication 4.5 G: at 02:00

## 2017-01-02 RX ADMIN — ASPIRIN 300 MG: 300 SUPPOSITORY RECTAL at 08:13

## 2017-01-02 RX ADMIN — HEPARIN SODIUM 5000 UNITS: 5000 INJECTION, SOLUTION INTRAVENOUS; SUBCUTANEOUS at 06:11

## 2017-01-02 RX ADMIN — HYDRALAZINE HYDROCHLORIDE 10 MG: 20 INJECTION INTRAMUSCULAR; INTRAVENOUS at 03:48

## 2017-01-02 RX ADMIN — METRONIDAZOLE 500 MG: 500 INJECTION, SOLUTION INTRAVENOUS at 12:00

## 2017-01-02 RX ADMIN — METRONIDAZOLE 500 MG: 500 INJECTION, SOLUTION INTRAVENOUS at 21:47

## 2017-01-02 RX ADMIN — HYDROMORPHONE HYDROCHLORIDE 1 MG: 1 INJECTION, SOLUTION INTRAMUSCULAR; INTRAVENOUS; SUBCUTANEOUS at 08:14

## 2017-01-02 RX ADMIN — HYDROMORPHONE HYDROCHLORIDE 1 MG: 1 INJECTION, SOLUTION INTRAMUSCULAR; INTRAVENOUS; SUBCUTANEOUS at 11:59

## 2017-01-02 RX ADMIN — Medication 4.5 G: at 14:00

## 2017-01-02 RX ADMIN — HYDRALAZINE HYDROCHLORIDE 10 MG: 20 INJECTION INTRAMUSCULAR; INTRAVENOUS at 14:39

## 2017-01-02 RX ADMIN — Medication 4.5 G: at 08:13

## 2017-01-02 RX ADMIN — METOPROLOL TARTRATE 5 MG: 5 INJECTION INTRAVENOUS at 21:47

## 2017-01-02 RX ADMIN — HYDRALAZINE HYDROCHLORIDE 10 MG: 20 INJECTION INTRAMUSCULAR; INTRAVENOUS at 08:14

## 2017-01-02 RX ADMIN — METOPROLOL TARTRATE 5 MG: 5 INJECTION INTRAVENOUS at 17:39

## 2017-01-02 RX ADMIN — CLARITHROMYCIN 500 MG: 250 FOR SUSPENSION ORAL at 08:55

## 2017-01-02 RX ADMIN — METRONIDAZOLE 500 MG: 500 INJECTION, SOLUTION INTRAVENOUS at 04:52

## 2017-01-02 RX ADMIN — METOPROLOL TARTRATE 5 MG: 5 INJECTION INTRAVENOUS at 11:57

## 2017-01-03 LAB
ANION GAP SERPL CALCULATED.3IONS-SCNC: 12 MMOL/L (ref 4–13)
BASOPHILS # BLD AUTO: 0 THOUSANDS/ΜL (ref 0–0.1)
BASOPHILS NFR BLD AUTO: 0 % (ref 0–1)
BUN SERPL-MCNC: 18 MG/DL (ref 5–25)
CALCIUM SERPL-MCNC: 8.4 MG/DL (ref 8.3–10.1)
CHLORIDE SERPL-SCNC: 112 MMOL/L (ref 100–108)
CO2 SERPL-SCNC: 20 MMOL/L (ref 21–32)
CREAT SERPL-MCNC: 0.76 MG/DL (ref 0.6–1.3)
EOSINOPHIL # BLD AUTO: 0 THOUSAND/ΜL (ref 0–0.61)
EOSINOPHIL NFR BLD AUTO: 0 % (ref 0–6)
ERYTHROCYTE [DISTWIDTH] IN BLOOD BY AUTOMATED COUNT: 14.4 % (ref 11.6–15.1)
GFR SERPL CREATININE-BSD FRML MDRD: >60 ML/MIN/1.73SQ M
GLUCOSE SERPL-MCNC: 98 MG/DL (ref 65–140)
HCT VFR BLD AUTO: 31.8 % (ref 34.8–46.1)
HGB BLD-MCNC: 10.4 G/DL (ref 11.5–15.4)
LYMPHOCYTES # BLD AUTO: 0.57 THOUSANDS/ΜL (ref 0.6–4.47)
LYMPHOCYTES NFR BLD AUTO: 7 % (ref 14–44)
MAGNESIUM SERPL-MCNC: 2.3 MG/DL (ref 1.6–2.6)
MCH RBC QN AUTO: 32.4 PG (ref 26.8–34.3)
MCHC RBC AUTO-ENTMCNC: 32.7 G/DL (ref 31.4–37.4)
MCV RBC AUTO: 99 FL (ref 82–98)
MONOCYTES # BLD AUTO: 0.65 THOUSAND/ΜL (ref 0.17–1.22)
MONOCYTES NFR BLD AUTO: 8 % (ref 4–12)
NEUTROPHILS # BLD AUTO: 6.95 THOUSANDS/ΜL (ref 1.85–7.62)
NEUTS SEG NFR BLD AUTO: 85 % (ref 43–75)
NRBC BLD AUTO-RTO: 0 /100 WBCS
PLATELET # BLD AUTO: 227 THOUSANDS/UL (ref 149–390)
PMV BLD AUTO: 9.9 FL (ref 8.9–12.7)
POTASSIUM SERPL-SCNC: 3.3 MMOL/L (ref 3.5–5.3)
RBC # BLD AUTO: 3.21 MILLION/UL (ref 3.81–5.12)
SODIUM SERPL-SCNC: 144 MMOL/L (ref 136–145)
WBC # BLD AUTO: 8.19 THOUSAND/UL (ref 4.31–10.16)

## 2017-01-03 PROCEDURE — 85025 COMPLETE CBC W/AUTO DIFF WBC: CPT | Performed by: SURGERY

## 2017-01-03 PROCEDURE — C9113 INJ PANTOPRAZOLE SODIUM, VIA: HCPCS | Performed by: EMERGENCY MEDICINE

## 2017-01-03 PROCEDURE — A9270 NON-COVERED ITEM OR SERVICE: HCPCS | Performed by: SURGERY

## 2017-01-03 PROCEDURE — A9270 NON-COVERED ITEM OR SERVICE: HCPCS | Performed by: PHYSICIAN ASSISTANT

## 2017-01-03 PROCEDURE — 80048 BASIC METABOLIC PNL TOTAL CA: CPT | Performed by: SURGERY

## 2017-01-03 PROCEDURE — 83735 ASSAY OF MAGNESIUM: CPT | Performed by: SURGERY

## 2017-01-03 RX ORDER — POTASSIUM CHLORIDE 14.9 MG/ML
20 INJECTION INTRAVENOUS
Status: COMPLETED | OUTPATIENT
Start: 2017-01-03 | End: 2017-01-03

## 2017-01-03 RX ORDER — DEXTROSE, SODIUM CHLORIDE, AND POTASSIUM CHLORIDE 5; .45; .15 G/100ML; G/100ML; G/100ML
125 INJECTION INTRAVENOUS CONTINUOUS
Status: DISCONTINUED | OUTPATIENT
Start: 2017-01-03 | End: 2017-01-06

## 2017-01-03 RX ADMIN — POTASSIUM CHLORIDE 20 MEQ: 200 INJECTION, SOLUTION INTRAVENOUS at 14:16

## 2017-01-03 RX ADMIN — Medication 4.5 G: at 09:22

## 2017-01-03 RX ADMIN — Medication 4.5 G: at 02:48

## 2017-01-03 RX ADMIN — CLARITHROMYCIN 500 MG: 250 FOR SUSPENSION ORAL at 09:22

## 2017-01-03 RX ADMIN — METRONIDAZOLE 500 MG: 500 INJECTION, SOLUTION INTRAVENOUS at 03:49

## 2017-01-03 RX ADMIN — CLARITHROMYCIN 500 MG: 250 FOR SUSPENSION ORAL at 21:15

## 2017-01-03 RX ADMIN — HEPARIN SODIUM 5000 UNITS: 5000 INJECTION, SOLUTION INTRAVENOUS; SUBCUTANEOUS at 05:49

## 2017-01-03 RX ADMIN — METRONIDAZOLE 500 MG: 500 INJECTION, SOLUTION INTRAVENOUS at 13:10

## 2017-01-03 RX ADMIN — PANTOPRAZOLE SODIUM 40 MG: 40 INJECTION, POWDER, FOR SOLUTION INTRAVENOUS at 21:14

## 2017-01-03 RX ADMIN — METRONIDAZOLE 500 MG: 500 INJECTION, SOLUTION INTRAVENOUS at 21:06

## 2017-01-03 RX ADMIN — METOPROLOL TARTRATE 5 MG: 5 INJECTION INTRAVENOUS at 08:00

## 2017-01-03 RX ADMIN — POTASSIUM CHLORIDE 20 MEQ: 200 INJECTION, SOLUTION INTRAVENOUS at 17:48

## 2017-01-03 RX ADMIN — HEPARIN SODIUM 5000 UNITS: 5000 INJECTION, SOLUTION INTRAVENOUS; SUBCUTANEOUS at 21:14

## 2017-01-03 RX ADMIN — LABETALOL HYDROCHLORIDE 10 MG: 5 INJECTION, SOLUTION INTRAVENOUS at 10:04

## 2017-01-03 RX ADMIN — METOPROLOL TARTRATE 10 MG: 5 INJECTION INTRAVENOUS at 23:52

## 2017-01-03 RX ADMIN — DEXTROSE, SODIUM CHLORIDE, AND POTASSIUM CHLORIDE 125 ML/HR: 5; .45; .15 INJECTION INTRAVENOUS at 09:32

## 2017-01-03 RX ADMIN — DEXTROSE, SODIUM CHLORIDE, AND POTASSIUM CHLORIDE 125 ML/HR: 5; .45; .15 INJECTION INTRAVENOUS at 19:23

## 2017-01-03 RX ADMIN — METOPROLOL TARTRATE 10 MG: 5 INJECTION INTRAVENOUS at 16:47

## 2017-01-03 RX ADMIN — OXYCODONE HYDROCHLORIDE 5 MG: 5 SOLUTION ORAL at 16:37

## 2017-01-03 RX ADMIN — Medication 4.5 G: at 21:06

## 2017-01-03 RX ADMIN — Medication 4.5 G: at 14:39

## 2017-01-03 RX ADMIN — METOPROLOL TARTRATE 5 MG: 5 INJECTION INTRAVENOUS at 03:47

## 2017-01-03 RX ADMIN — ASPIRIN 300 MG: 300 SUPPOSITORY RECTAL at 09:22

## 2017-01-03 RX ADMIN — HEPARIN SODIUM 5000 UNITS: 5000 INJECTION, SOLUTION INTRAVENOUS; SUBCUTANEOUS at 14:36

## 2017-01-03 RX ADMIN — METOPROLOL TARTRATE 5 MG: 5 INJECTION INTRAVENOUS at 15:26

## 2017-01-03 RX ADMIN — POTASSIUM CHLORIDE 20 MEQ: 200 INJECTION, SOLUTION INTRAVENOUS at 10:56

## 2017-01-03 RX ADMIN — PANTOPRAZOLE SODIUM 40 MG: 40 INJECTION, POWDER, FOR SOLUTION INTRAVENOUS at 09:22

## 2017-01-04 LAB
ABO GROUP BLD BPU: NORMAL
ABO GROUP BLD BPU: NORMAL
ANION GAP SERPL CALCULATED.3IONS-SCNC: 9 MMOL/L (ref 4–13)
BPU ID: NORMAL
BPU ID: NORMAL
BUN SERPL-MCNC: 17 MG/DL (ref 5–25)
CALCIUM SERPL-MCNC: 8.1 MG/DL (ref 8.3–10.1)
CHLORIDE SERPL-SCNC: 111 MMOL/L (ref 100–108)
CO2 SERPL-SCNC: 22 MMOL/L (ref 21–32)
CREAT SERPL-MCNC: 0.91 MG/DL (ref 0.6–1.3)
CROSSMATCH: NORMAL
CROSSMATCH: NORMAL
GFR SERPL CREATININE-BSD FRML MDRD: 58.6 ML/MIN/1.73SQ M
GLUCOSE SERPL-MCNC: 173 MG/DL (ref 65–140)
MAGNESIUM SERPL-MCNC: 2 MG/DL (ref 1.6–2.6)
POTASSIUM SERPL-SCNC: 3.8 MMOL/L (ref 3.5–5.3)
SODIUM SERPL-SCNC: 142 MMOL/L (ref 136–145)
UNIT DISPENSE STATUS: NORMAL
UNIT DISPENSE STATUS: NORMAL
UNIT PRODUCT CODE: NORMAL
UNIT PRODUCT CODE: NORMAL
UNIT RH: NORMAL
UNIT RH: NORMAL

## 2017-01-04 PROCEDURE — A9270 NON-COVERED ITEM OR SERVICE: HCPCS | Performed by: SURGERY

## 2017-01-04 PROCEDURE — 83014 H PYLORI DRUG ADMIN: CPT | Performed by: SURGERY

## 2017-01-04 PROCEDURE — 83735 ASSAY OF MAGNESIUM: CPT | Performed by: PHYSICIAN ASSISTANT

## 2017-01-04 PROCEDURE — 86677 HELICOBACTER PYLORI ANTIBODY: CPT | Performed by: SURGERY

## 2017-01-04 PROCEDURE — 83013 H PYLORI (C-13) BREATH: CPT | Performed by: SURGERY

## 2017-01-04 PROCEDURE — A9270 NON-COVERED ITEM OR SERVICE: HCPCS | Performed by: PHYSICIAN ASSISTANT

## 2017-01-04 PROCEDURE — C9113 INJ PANTOPRAZOLE SODIUM, VIA: HCPCS | Performed by: EMERGENCY MEDICINE

## 2017-01-04 PROCEDURE — 80048 BASIC METABOLIC PNL TOTAL CA: CPT | Performed by: SURGERY

## 2017-01-04 RX ORDER — CLOPIDOGREL BISULFATE 75 MG/1
75 TABLET ORAL DAILY
Status: DISCONTINUED | OUTPATIENT
Start: 2017-01-04 | End: 2017-01-10 | Stop reason: HOSPADM

## 2017-01-04 RX ORDER — POTASSIUM CHLORIDE 14.9 MG/ML
20 INJECTION INTRAVENOUS ONCE
Status: COMPLETED | OUTPATIENT
Start: 2017-01-04 | End: 2017-01-05

## 2017-01-04 RX ORDER — CLOPIDOGREL BISULFATE 75 MG/1
75 TABLET ORAL DAILY
Status: DISCONTINUED | OUTPATIENT
Start: 2017-01-04 | End: 2017-01-04

## 2017-01-04 RX ADMIN — Medication 4.5 G: at 15:09

## 2017-01-04 RX ADMIN — Medication 4.5 G: at 08:34

## 2017-01-04 RX ADMIN — DEXTROSE, SODIUM CHLORIDE, AND POTASSIUM CHLORIDE 125 ML/HR: 5; .45; .15 INJECTION INTRAVENOUS at 05:11

## 2017-01-04 RX ADMIN — CLARITHROMYCIN 500 MG: 250 FOR SUSPENSION ORAL at 08:34

## 2017-01-04 RX ADMIN — HEPARIN SODIUM 5000 UNITS: 5000 INJECTION, SOLUTION INTRAVENOUS; SUBCUTANEOUS at 15:08

## 2017-01-04 RX ADMIN — HEPARIN SODIUM 5000 UNITS: 5000 INJECTION, SOLUTION INTRAVENOUS; SUBCUTANEOUS at 05:12

## 2017-01-04 RX ADMIN — CLARITHROMYCIN 500 MG: 250 FOR SUSPENSION ORAL at 21:56

## 2017-01-04 RX ADMIN — CLOPIDOGREL BISULFATE 75 MG: 75 TABLET ORAL at 15:09

## 2017-01-04 RX ADMIN — METOPROLOL TARTRATE 10 MG: 5 INJECTION INTRAVENOUS at 16:22

## 2017-01-04 RX ADMIN — Medication 4.5 G: at 19:51

## 2017-01-04 RX ADMIN — ASPIRIN 300 MG: 300 SUPPOSITORY RECTAL at 08:35

## 2017-01-04 RX ADMIN — PANTOPRAZOLE SODIUM 40 MG: 40 INJECTION, POWDER, FOR SOLUTION INTRAVENOUS at 21:56

## 2017-01-04 RX ADMIN — METRONIDAZOLE 500 MG: 500 INJECTION, SOLUTION INTRAVENOUS at 12:17

## 2017-01-04 RX ADMIN — OXYCODONE HYDROCHLORIDE 5 MG: 5 SOLUTION ORAL at 22:22

## 2017-01-04 RX ADMIN — OXYCODONE HYDROCHLORIDE 10 MG: 5 SOLUTION ORAL at 03:25

## 2017-01-04 RX ADMIN — PANTOPRAZOLE SODIUM 40 MG: 40 INJECTION, POWDER, FOR SOLUTION INTRAVENOUS at 08:35

## 2017-01-04 RX ADMIN — OXYCODONE HYDROCHLORIDE 5 MG: 5 SOLUTION ORAL at 18:10

## 2017-01-04 RX ADMIN — HEPARIN SODIUM 5000 UNITS: 5000 INJECTION, SOLUTION INTRAVENOUS; SUBCUTANEOUS at 21:56

## 2017-01-04 RX ADMIN — POTASSIUM CHLORIDE 20 MEQ: 200 INJECTION, SOLUTION INTRAVENOUS at 12:16

## 2017-01-04 RX ADMIN — METRONIDAZOLE 500 MG: 500 INJECTION, SOLUTION INTRAVENOUS at 04:16

## 2017-01-04 RX ADMIN — DEXTROSE, SODIUM CHLORIDE, AND POTASSIUM CHLORIDE 125 ML/HR: 5; .45; .15 INJECTION INTRAVENOUS at 16:50

## 2017-01-04 RX ADMIN — METRONIDAZOLE 500 MG: 500 INJECTION, SOLUTION INTRAVENOUS at 20:41

## 2017-01-04 RX ADMIN — Medication 4.5 G: at 02:52

## 2017-01-05 LAB — H PYLORI IGG SER IA-ACNC: <0.9 U/ML (ref 0–0.8)

## 2017-01-05 PROCEDURE — A9270 NON-COVERED ITEM OR SERVICE: HCPCS | Performed by: SURGERY

## 2017-01-05 PROCEDURE — C9113 INJ PANTOPRAZOLE SODIUM, VIA: HCPCS | Performed by: EMERGENCY MEDICINE

## 2017-01-05 PROCEDURE — A9270 NON-COVERED ITEM OR SERVICE: HCPCS | Performed by: PHYSICIAN ASSISTANT

## 2017-01-05 RX ADMIN — OXYCODONE HYDROCHLORIDE 5 MG: 5 SOLUTION ORAL at 03:18

## 2017-01-05 RX ADMIN — DEXTROSE, SODIUM CHLORIDE, AND POTASSIUM CHLORIDE 125 ML/HR: 5; .45; .15 INJECTION INTRAVENOUS at 00:40

## 2017-01-05 RX ADMIN — HEPARIN SODIUM 5000 UNITS: 5000 INJECTION, SOLUTION INTRAVENOUS; SUBCUTANEOUS at 06:44

## 2017-01-05 RX ADMIN — ASPIRIN 300 MG: 300 SUPPOSITORY RECTAL at 09:45

## 2017-01-05 RX ADMIN — METRONIDAZOLE 500 MG: 500 INJECTION, SOLUTION INTRAVENOUS at 11:52

## 2017-01-05 RX ADMIN — METRONIDAZOLE 500 MG: 500 INJECTION, SOLUTION INTRAVENOUS at 20:25

## 2017-01-05 RX ADMIN — METRONIDAZOLE 500 MG: 500 INJECTION, SOLUTION INTRAVENOUS at 03:21

## 2017-01-05 RX ADMIN — OXYCODONE HYDROCHLORIDE 5 MG: 5 SOLUTION ORAL at 11:52

## 2017-01-05 RX ADMIN — PANTOPRAZOLE SODIUM 40 MG: 40 INJECTION, POWDER, FOR SOLUTION INTRAVENOUS at 20:25

## 2017-01-05 RX ADMIN — HEPARIN SODIUM 5000 UNITS: 5000 INJECTION, SOLUTION INTRAVENOUS; SUBCUTANEOUS at 17:45

## 2017-01-05 RX ADMIN — OXYCODONE HYDROCHLORIDE 5 MG: 5 SOLUTION ORAL at 23:39

## 2017-01-05 RX ADMIN — CLOPIDOGREL BISULFATE 75 MG: 75 TABLET ORAL at 09:46

## 2017-01-05 RX ADMIN — DEXTROSE, SODIUM CHLORIDE, AND POTASSIUM CHLORIDE 125 ML/HR: 5; .45; .15 INJECTION INTRAVENOUS at 23:45

## 2017-01-05 RX ADMIN — CLARITHROMYCIN 500 MG: 250 FOR SUSPENSION ORAL at 09:46

## 2017-01-05 RX ADMIN — Medication 4.5 G: at 17:44

## 2017-01-05 RX ADMIN — Medication 4.5 G: at 09:45

## 2017-01-05 RX ADMIN — Medication 4.5 G: at 02:16

## 2017-01-05 RX ADMIN — Medication 4.5 G: at 21:13

## 2017-01-05 RX ADMIN — PANTOPRAZOLE SODIUM 40 MG: 40 INJECTION, POWDER, FOR SOLUTION INTRAVENOUS at 09:45

## 2017-01-05 RX ADMIN — HEPARIN SODIUM 5000 UNITS: 5000 INJECTION, SOLUTION INTRAVENOUS; SUBCUTANEOUS at 21:10

## 2017-01-05 RX ADMIN — CLARITHROMYCIN 500 MG: 250 FOR SUSPENSION ORAL at 21:13

## 2017-01-06 ENCOUNTER — APPOINTMENT (INPATIENT)
Dept: RADIOLOGY | Facility: HOSPITAL | Age: 82
DRG: 330 | End: 2017-01-06
Payer: COMMERCIAL

## 2017-01-06 LAB
ANION GAP SERPL CALCULATED.3IONS-SCNC: 7 MMOL/L (ref 4–13)
BUN SERPL-MCNC: 11 MG/DL (ref 5–25)
CALCIUM SERPL-MCNC: 8.2 MG/DL (ref 8.3–10.1)
CHLORIDE SERPL-SCNC: 108 MMOL/L (ref 100–108)
CO2 SERPL-SCNC: 21 MMOL/L (ref 21–32)
CREAT SERPL-MCNC: 0.99 MG/DL (ref 0.6–1.3)
ERYTHROCYTE [DISTWIDTH] IN BLOOD BY AUTOMATED COUNT: 14 % (ref 11.6–15.1)
GFR SERPL CREATININE-BSD FRML MDRD: 53.2 ML/MIN/1.73SQ M
GLUCOSE SERPL-MCNC: 155 MG/DL (ref 65–140)
HCT VFR BLD AUTO: 37.7 % (ref 34.8–46.1)
HGB BLD-MCNC: 12.5 G/DL (ref 11.5–15.4)
MCH RBC QN AUTO: 32.3 PG (ref 26.8–34.3)
MCHC RBC AUTO-ENTMCNC: 33.2 G/DL (ref 31.4–37.4)
MCV RBC AUTO: 97 FL (ref 82–98)
PLATELET # BLD AUTO: 325 THOUSANDS/UL (ref 149–390)
PMV BLD AUTO: 9.8 FL (ref 8.9–12.7)
POTASSIUM SERPL-SCNC: 3.9 MMOL/L (ref 3.5–5.3)
RBC # BLD AUTO: 3.87 MILLION/UL (ref 3.81–5.12)
SODIUM SERPL-SCNC: 136 MMOL/L (ref 136–145)
WBC # BLD AUTO: 7.77 THOUSAND/UL (ref 4.31–10.16)

## 2017-01-06 PROCEDURE — 85027 COMPLETE CBC AUTOMATED: CPT | Performed by: PHYSICIAN ASSISTANT

## 2017-01-06 PROCEDURE — A9270 NON-COVERED ITEM OR SERVICE: HCPCS | Performed by: SURGERY

## 2017-01-06 PROCEDURE — 74240 X-RAY XM UPR GI TRC 1CNTRST: CPT

## 2017-01-06 PROCEDURE — 80048 BASIC METABOLIC PNL TOTAL CA: CPT | Performed by: PHYSICIAN ASSISTANT

## 2017-01-06 PROCEDURE — C9113 INJ PANTOPRAZOLE SODIUM, VIA: HCPCS | Performed by: EMERGENCY MEDICINE

## 2017-01-06 RX ORDER — LABETALOL HYDROCHLORIDE 5 MG/ML
INJECTION, SOLUTION INTRAVENOUS
Status: COMPLETED
Start: 2017-01-06 | End: 2017-01-06

## 2017-01-06 RX ORDER — DEXTROSE, SODIUM CHLORIDE, AND POTASSIUM CHLORIDE 5; .45; .15 G/100ML; G/100ML; G/100ML
50 INJECTION INTRAVENOUS CONTINUOUS
Status: DISCONTINUED | OUTPATIENT
Start: 2017-01-06 | End: 2017-01-07

## 2017-01-06 RX ORDER — LABETALOL HYDROCHLORIDE 5 MG/ML
10 INJECTION, SOLUTION INTRAVENOUS ONCE
Status: COMPLETED | OUTPATIENT
Start: 2017-01-06 | End: 2017-01-06

## 2017-01-06 RX ADMIN — IOHEXOL 260 ML: 350 INJECTION, SOLUTION INTRAVENOUS at 09:30

## 2017-01-06 RX ADMIN — Medication 4.5 G: at 02:13

## 2017-01-06 RX ADMIN — HEPARIN SODIUM 5000 UNITS: 5000 INJECTION, SOLUTION INTRAVENOUS; SUBCUTANEOUS at 21:30

## 2017-01-06 RX ADMIN — HEPARIN SODIUM 5000 UNITS: 5000 INJECTION, SOLUTION INTRAVENOUS; SUBCUTANEOUS at 06:04

## 2017-01-06 RX ADMIN — PANTOPRAZOLE SODIUM 40 MG: 40 INJECTION, POWDER, FOR SOLUTION INTRAVENOUS at 20:01

## 2017-01-06 RX ADMIN — PANTOPRAZOLE SODIUM 40 MG: 40 INJECTION, POWDER, FOR SOLUTION INTRAVENOUS at 08:32

## 2017-01-06 RX ADMIN — LABETALOL HYDROCHLORIDE 10 MG: 5 INJECTION, SOLUTION INTRAVENOUS at 06:31

## 2017-01-06 RX ADMIN — METOPROLOL TARTRATE 10 MG: 5 INJECTION INTRAVENOUS at 15:48

## 2017-01-06 RX ADMIN — HEPARIN SODIUM 5000 UNITS: 5000 INJECTION, SOLUTION INTRAVENOUS; SUBCUTANEOUS at 14:36

## 2017-01-06 RX ADMIN — METOPROLOL TARTRATE 10 MG: 5 INJECTION INTRAVENOUS at 04:16

## 2017-01-06 RX ADMIN — DEXTROSE, SODIUM CHLORIDE, AND POTASSIUM CHLORIDE 125 ML/HR: 5; .45; .15 INJECTION INTRAVENOUS at 08:32

## 2017-01-06 RX ADMIN — METRONIDAZOLE 500 MG: 500 INJECTION, SOLUTION INTRAVENOUS at 06:05

## 2017-01-06 RX ADMIN — CLOPIDOGREL BISULFATE 75 MG: 75 TABLET ORAL at 08:33

## 2017-01-07 LAB
ANION GAP SERPL CALCULATED.3IONS-SCNC: 8 MMOL/L (ref 4–13)
BUN SERPL-MCNC: 11 MG/DL (ref 5–25)
CALCIUM SERPL-MCNC: 8.5 MG/DL (ref 8.3–10.1)
CHLORIDE SERPL-SCNC: 107 MMOL/L (ref 100–108)
CO2 SERPL-SCNC: 23 MMOL/L (ref 21–32)
CREAT SERPL-MCNC: 0.96 MG/DL (ref 0.6–1.3)
GFR SERPL CREATININE-BSD FRML MDRD: 55.1 ML/MIN/1.73SQ M
GLUCOSE SERPL-MCNC: 113 MG/DL (ref 65–140)
POTASSIUM SERPL-SCNC: 3.3 MMOL/L (ref 3.5–5.3)
SODIUM SERPL-SCNC: 138 MMOL/L (ref 136–145)

## 2017-01-07 PROCEDURE — G8988 SELF CARE GOAL STATUS: HCPCS

## 2017-01-07 PROCEDURE — C9113 INJ PANTOPRAZOLE SODIUM, VIA: HCPCS | Performed by: EMERGENCY MEDICINE

## 2017-01-07 PROCEDURE — G8987 SELF CARE CURRENT STATUS: HCPCS

## 2017-01-07 PROCEDURE — 80048 BASIC METABOLIC PNL TOTAL CA: CPT | Performed by: SURGERY

## 2017-01-07 PROCEDURE — A9270 NON-COVERED ITEM OR SERVICE: HCPCS | Performed by: SURGERY

## 2017-01-07 PROCEDURE — 97166 OT EVAL MOD COMPLEX 45 MIN: CPT

## 2017-01-07 RX ADMIN — HEPARIN SODIUM 5000 UNITS: 5000 INJECTION, SOLUTION INTRAVENOUS; SUBCUTANEOUS at 14:47

## 2017-01-07 RX ADMIN — PANTOPRAZOLE SODIUM 40 MG: 40 INJECTION, POWDER, FOR SOLUTION INTRAVENOUS at 21:28

## 2017-01-07 RX ADMIN — PANTOPRAZOLE SODIUM 40 MG: 40 INJECTION, POWDER, FOR SOLUTION INTRAVENOUS at 09:49

## 2017-01-07 RX ADMIN — METOPROLOL TARTRATE 10 MG: 5 INJECTION INTRAVENOUS at 01:10

## 2017-01-07 RX ADMIN — HEPARIN SODIUM 5000 UNITS: 5000 INJECTION, SOLUTION INTRAVENOUS; SUBCUTANEOUS at 05:00

## 2017-01-07 RX ADMIN — METOPROLOL TARTRATE 10 MG: 5 INJECTION INTRAVENOUS at 09:49

## 2017-01-07 RX ADMIN — DEXTROSE, SODIUM CHLORIDE, AND POTASSIUM CHLORIDE 50 ML/HR: 5; .45; .15 INJECTION INTRAVENOUS at 01:14

## 2017-01-07 RX ADMIN — CLOPIDOGREL BISULFATE 75 MG: 75 TABLET ORAL at 09:49

## 2017-01-07 RX ADMIN — HEPARIN SODIUM 5000 UNITS: 5000 INJECTION, SOLUTION INTRAVENOUS; SUBCUTANEOUS at 21:27

## 2017-01-08 LAB
ANION GAP SERPL CALCULATED.3IONS-SCNC: 10 MMOL/L (ref 4–13)
BUN SERPL-MCNC: 13 MG/DL (ref 5–25)
CALCIUM SERPL-MCNC: 6.9 MG/DL (ref 8.3–10.1)
CHLORIDE SERPL-SCNC: 111 MMOL/L (ref 100–108)
CO2 SERPL-SCNC: 20 MMOL/L (ref 21–32)
CREAT SERPL-MCNC: 0.72 MG/DL (ref 0.6–1.3)
GFR SERPL CREATININE-BSD FRML MDRD: >60 ML/MIN/1.73SQ M
GLUCOSE SERPL-MCNC: 109 MG/DL (ref 65–140)
POTASSIUM SERPL-SCNC: 2.8 MMOL/L (ref 3.5–5.3)
SODIUM SERPL-SCNC: 141 MMOL/L (ref 136–145)

## 2017-01-08 PROCEDURE — A9270 NON-COVERED ITEM OR SERVICE: HCPCS | Performed by: SURGERY

## 2017-01-08 PROCEDURE — G8978 MOBILITY CURRENT STATUS: HCPCS | Performed by: PHYSICAL THERAPIST

## 2017-01-08 PROCEDURE — 97162 PT EVAL MOD COMPLEX 30 MIN: CPT | Performed by: PHYSICAL THERAPIST

## 2017-01-08 PROCEDURE — 80048 BASIC METABOLIC PNL TOTAL CA: CPT | Performed by: SURGERY

## 2017-01-08 PROCEDURE — G8979 MOBILITY GOAL STATUS: HCPCS | Performed by: PHYSICAL THERAPIST

## 2017-01-08 RX ORDER — PANTOPRAZOLE SODIUM 40 MG/1
40 TABLET, DELAYED RELEASE ORAL
Status: DISCONTINUED | OUTPATIENT
Start: 2017-01-08 | End: 2017-01-10 | Stop reason: HOSPADM

## 2017-01-08 RX ORDER — METOPROLOL TARTRATE 50 MG/1
100 TABLET, FILM COATED ORAL
Status: DISCONTINUED | OUTPATIENT
Start: 2017-01-08 | End: 2017-01-10 | Stop reason: HOSPADM

## 2017-01-08 RX ORDER — POTASSIUM CHLORIDE 20 MEQ/1
40 TABLET, EXTENDED RELEASE ORAL ONCE
Status: COMPLETED | OUTPATIENT
Start: 2017-01-08 | End: 2017-01-08

## 2017-01-08 RX ORDER — MAGNESIUM SULFATE HEPTAHYDRATE 40 MG/ML
2 INJECTION, SOLUTION INTRAVENOUS ONCE
Status: COMPLETED | OUTPATIENT
Start: 2017-01-08 | End: 2017-01-09

## 2017-01-08 RX ORDER — ASPIRIN 81 MG/1
81 TABLET, CHEWABLE ORAL DAILY
Status: DISCONTINUED | OUTPATIENT
Start: 2017-01-08 | End: 2017-01-10 | Stop reason: HOSPADM

## 2017-01-08 RX ADMIN — METOPROLOL TARTRATE 10 MG: 5 INJECTION INTRAVENOUS at 16:10

## 2017-01-08 RX ADMIN — METOPROLOL TARTRATE 100 MG: 50 TABLET ORAL at 22:38

## 2017-01-08 RX ADMIN — CLOPIDOGREL BISULFATE 75 MG: 75 TABLET ORAL at 08:24

## 2017-01-08 RX ADMIN — POTASSIUM CHLORIDE 40 MEQ: 1500 TABLET, EXTENDED RELEASE ORAL at 14:28

## 2017-01-08 RX ADMIN — HEPARIN SODIUM 5000 UNITS: 5000 INJECTION, SOLUTION INTRAVENOUS; SUBCUTANEOUS at 22:38

## 2017-01-08 RX ADMIN — ASPIRIN 81 MG 81 MG: 81 TABLET ORAL at 12:12

## 2017-01-08 RX ADMIN — POTASSIUM CHLORIDE 40 MEQ: 1500 TABLET, EXTENDED RELEASE ORAL at 12:12

## 2017-01-08 RX ADMIN — POTASSIUM CHLORIDE 40 MEQ: 1500 TABLET, EXTENDED RELEASE ORAL at 05:34

## 2017-01-08 RX ADMIN — HEPARIN SODIUM 5000 UNITS: 5000 INJECTION, SOLUTION INTRAVENOUS; SUBCUTANEOUS at 05:33

## 2017-01-08 RX ADMIN — PANTOPRAZOLE SODIUM 40 MG: 40 TABLET, DELAYED RELEASE ORAL at 08:24

## 2017-01-08 RX ADMIN — HEPARIN SODIUM 5000 UNITS: 5000 INJECTION, SOLUTION INTRAVENOUS; SUBCUTANEOUS at 14:28

## 2017-01-08 RX ADMIN — PANTOPRAZOLE SODIUM 40 MG: 40 TABLET, DELAYED RELEASE ORAL at 15:39

## 2017-01-08 RX ADMIN — MAGNESIUM SULFATE HEPTAHYDRATE 2 G: 40 INJECTION, SOLUTION INTRAVENOUS at 14:28

## 2017-01-09 LAB
ANION GAP SERPL CALCULATED.3IONS-SCNC: 9 MMOL/L (ref 4–13)
BUN SERPL-MCNC: 21 MG/DL (ref 5–25)
CALCIUM SERPL-MCNC: 8.2 MG/DL (ref 8.3–10.1)
CHLORIDE SERPL-SCNC: 108 MMOL/L (ref 100–108)
CO2 SERPL-SCNC: 22 MMOL/L (ref 21–32)
CREAT SERPL-MCNC: 1 MG/DL (ref 0.6–1.3)
GFR SERPL CREATININE-BSD FRML MDRD: 52.6 ML/MIN/1.73SQ M
GLUCOSE SERPL-MCNC: 96 MG/DL (ref 65–140)
POTASSIUM SERPL-SCNC: 4.3 MMOL/L (ref 3.5–5.3)
SODIUM SERPL-SCNC: 139 MMOL/L (ref 136–145)

## 2017-01-09 PROCEDURE — A9270 NON-COVERED ITEM OR SERVICE: HCPCS | Performed by: SURGERY

## 2017-01-09 PROCEDURE — 97535 SELF CARE MNGMENT TRAINING: CPT | Performed by: STUDENT IN AN ORGANIZED HEALTH CARE EDUCATION/TRAINING PROGRAM

## 2017-01-09 PROCEDURE — 80048 BASIC METABOLIC PNL TOTAL CA: CPT | Performed by: SURGERY

## 2017-01-09 RX ORDER — ACETAMINOPHEN 325 MG/1
650 TABLET ORAL EVERY 4 HOURS PRN
Status: DISCONTINUED | OUTPATIENT
Start: 2017-01-09 | End: 2017-01-10 | Stop reason: HOSPADM

## 2017-01-09 RX ORDER — PANTOPRAZOLE SODIUM 40 MG/1
40 TABLET, DELAYED RELEASE ORAL DAILY
Qty: 30 TABLET | Refills: 0 | Status: SHIPPED | OUTPATIENT
Start: 2017-01-17 | End: 2018-01-01

## 2017-01-09 RX ORDER — PANTOPRAZOLE SODIUM 40 MG/1
40 TABLET, DELAYED RELEASE ORAL
Qty: 14 TABLET | Refills: 0 | Status: SHIPPED | OUTPATIENT
Start: 2017-01-09 | End: 2017-01-21 | Stop reason: HOSPADM

## 2017-01-09 RX ORDER — ACETAMINOPHEN 325 MG/1
650 TABLET ORAL EVERY 4 HOURS PRN
Qty: 30 TABLET | Refills: 0
Start: 2017-01-09 | End: 2017-01-17 | Stop reason: CLARIF

## 2017-01-09 RX ORDER — ASPIRIN 81 MG/1
81 TABLET, CHEWABLE ORAL DAILY
Qty: 30 TABLET | Refills: 0 | Status: SHIPPED | OUTPATIENT
Start: 2017-01-09 | End: 2018-01-01

## 2017-01-09 RX ADMIN — HEPARIN SODIUM 5000 UNITS: 5000 INJECTION, SOLUTION INTRAVENOUS; SUBCUTANEOUS at 05:41

## 2017-01-09 RX ADMIN — METOPROLOL TARTRATE 100 MG: 50 TABLET ORAL at 22:26

## 2017-01-09 RX ADMIN — CLOPIDOGREL BISULFATE 75 MG: 75 TABLET ORAL at 09:21

## 2017-01-09 RX ADMIN — HEPARIN SODIUM 5000 UNITS: 5000 INJECTION, SOLUTION INTRAVENOUS; SUBCUTANEOUS at 22:26

## 2017-01-09 RX ADMIN — HEPARIN SODIUM 5000 UNITS: 5000 INJECTION, SOLUTION INTRAVENOUS; SUBCUTANEOUS at 14:47

## 2017-01-09 RX ADMIN — PANTOPRAZOLE SODIUM 40 MG: 40 TABLET, DELAYED RELEASE ORAL at 18:01

## 2017-01-09 RX ADMIN — PANTOPRAZOLE SODIUM 40 MG: 40 TABLET, DELAYED RELEASE ORAL at 05:42

## 2017-01-09 RX ADMIN — ASPIRIN 81 MG 81 MG: 81 TABLET ORAL at 09:21

## 2017-01-10 VITALS
TEMPERATURE: 97.8 F | OXYGEN SATURATION: 96 % | HEART RATE: 76 BPM | DIASTOLIC BLOOD PRESSURE: 78 MMHG | WEIGHT: 110.23 LBS | BODY MASS INDEX: 20.81 KG/M2 | SYSTOLIC BLOOD PRESSURE: 160 MMHG | RESPIRATION RATE: 18 BRPM | HEIGHT: 61 IN

## 2017-01-10 PROCEDURE — A9270 NON-COVERED ITEM OR SERVICE: HCPCS | Performed by: SURGERY

## 2017-01-10 PROCEDURE — 97535 SELF CARE MNGMENT TRAINING: CPT

## 2017-01-10 PROCEDURE — 97530 THERAPEUTIC ACTIVITIES: CPT

## 2017-01-10 RX ADMIN — PANTOPRAZOLE SODIUM 40 MG: 40 TABLET, DELAYED RELEASE ORAL at 06:01

## 2017-01-10 RX ADMIN — CLOPIDOGREL BISULFATE 75 MG: 75 TABLET ORAL at 08:00

## 2017-01-10 RX ADMIN — ASPIRIN 81 MG 81 MG: 81 TABLET ORAL at 08:00

## 2017-01-10 RX ADMIN — HEPARIN SODIUM 5000 UNITS: 5000 INJECTION, SOLUTION INTRAVENOUS; SUBCUTANEOUS at 06:01

## 2017-01-14 LAB — SCAN RESULT: NORMAL

## 2017-01-17 ENCOUNTER — HOSPITAL ENCOUNTER (INPATIENT)
Facility: HOSPITAL | Age: 82
LOS: 4 days | Discharge: HOME HEALTH | DRG: 602 | End: 2017-01-21
Attending: FAMILY MEDICINE | Admitting: INTERNAL MEDICINE
Payer: COMMERCIAL

## 2017-01-17 ENCOUNTER — APPOINTMENT (EMERGENCY)
Dept: RADIOLOGY | Facility: HOSPITAL | Age: 82
DRG: 602 | End: 2017-01-17
Payer: COMMERCIAL

## 2017-01-17 DIAGNOSIS — R60.9 PERIPHERAL EDEMA: Primary | ICD-10-CM

## 2017-01-17 DIAGNOSIS — E46 PROTEIN CALORIE MALNUTRITION (HCC): ICD-10-CM

## 2017-01-17 DIAGNOSIS — T14.8XXA BLISTER: ICD-10-CM

## 2017-01-17 DIAGNOSIS — L03.116 BILATERAL CELLULITIS OF LOWER LEG: ICD-10-CM

## 2017-01-17 DIAGNOSIS — J90 BILATERAL PLEURAL EFFUSION: ICD-10-CM

## 2017-01-17 DIAGNOSIS — L97.909 LEG ULCER (HCC): ICD-10-CM

## 2017-01-17 DIAGNOSIS — L03.115 BILATERAL CELLULITIS OF LOWER LEG: ICD-10-CM

## 2017-01-17 PROBLEM — E88.09 HYPOALBUMINEMIA DUE TO PROTEIN-CALORIE MALNUTRITION (HCC): Status: ACTIVE | Noted: 2017-01-17

## 2017-01-17 PROBLEM — R79.89 ELEVATED BRAIN NATRIURETIC PEPTIDE (BNP) LEVEL: Status: ACTIVE | Noted: 2017-01-17

## 2017-01-17 LAB
ALBUMIN SERPL BCP-MCNC: 2.5 G/DL (ref 3.5–5)
ALP SERPL-CCNC: 101 U/L (ref 46–116)
ALT SERPL W P-5'-P-CCNC: 24 U/L (ref 12–78)
ANION GAP SERPL CALCULATED.3IONS-SCNC: 8 MMOL/L (ref 4–13)
APTT PPP: 26 SECONDS (ref 24–36)
AST SERPL W P-5'-P-CCNC: 30 U/L (ref 5–45)
BASOPHILS # BLD AUTO: 0.1 THOUSANDS/ΜL (ref 0–0.1)
BASOPHILS NFR BLD AUTO: 2 % (ref 0–1)
BILIRUB SERPL-MCNC: 0.4 MG/DL (ref 0.2–1)
BUN SERPL-MCNC: 23 MG/DL (ref 5–25)
CALCIUM SERPL-MCNC: 7.9 MG/DL (ref 8.3–10.1)
CHLORIDE SERPL-SCNC: 105 MMOL/L (ref 100–108)
CK SERPL-CCNC: 63 U/L (ref 26–192)
CO2 SERPL-SCNC: 27 MMOL/L (ref 21–32)
CREAT SERPL-MCNC: 0.84 MG/DL (ref 0.6–1.3)
CRP SERPL HS-MCNC: 14.4 MG/L
EOSINOPHIL # BLD AUTO: 0.1 THOUSAND/ΜL (ref 0–0.61)
EOSINOPHIL NFR BLD AUTO: 2 % (ref 0–6)
ERYTHROCYTE [DISTWIDTH] IN BLOOD BY AUTOMATED COUNT: 14 % (ref 11.6–15.1)
ERYTHROCYTE [SEDIMENTATION RATE] IN BLOOD: 40 MM/HOUR (ref 2–25)
GFR SERPL CREATININE-BSD FRML MDRD: >60 ML/MIN/1.73SQ M
GLUCOSE SERPL-MCNC: 99 MG/DL (ref 65–140)
HCT VFR BLD AUTO: 34.3 % (ref 37–47)
HGB BLD-MCNC: 11.3 G/DL (ref 12–16)
INR PPP: 0.98 (ref 0.86–1.16)
LYMPHOCYTES # BLD AUTO: 1 THOUSANDS/ΜL (ref 0.6–4.47)
LYMPHOCYTES NFR BLD AUTO: 14 % (ref 14–44)
MCH RBC QN AUTO: 32 PG (ref 27–31)
MCHC RBC AUTO-ENTMCNC: 32.9 G/DL (ref 31.4–37.4)
MCV RBC AUTO: 97 FL (ref 82–98)
MONOCYTES # BLD AUTO: 0.8 THOUSAND/ΜL (ref 0.17–1.22)
MONOCYTES NFR BLD AUTO: 11 % (ref 4–12)
NEUTROPHILS # BLD AUTO: 5.2 THOUSANDS/ΜL (ref 1.85–7.62)
NEUTS SEG NFR BLD AUTO: 72 % (ref 43–75)
NT-PROBNP SERPL-MCNC: 5648 PG/ML
PLATELET # BLD AUTO: 314 THOUSANDS/UL (ref 130–400)
PLATELET # BLD AUTO: 387 THOUSANDS/UL (ref 130–400)
PMV BLD AUTO: 7.2 FL (ref 8.9–12.7)
PMV BLD AUTO: 7.7 FL (ref 8.9–12.7)
POTASSIUM SERPL-SCNC: 4 MMOL/L (ref 3.5–5.3)
PROT SERPL-MCNC: 5.8 G/DL (ref 6.4–8.2)
PROTHROMBIN TIME: 10.3 SECONDS (ref 9.4–11.7)
RBC # BLD AUTO: 3.53 MILLION/UL (ref 4.2–5.4)
SODIUM SERPL-SCNC: 140 MMOL/L (ref 136–145)
TROPONIN I SERPL-MCNC: <0.02 NG/ML
WBC # BLD AUTO: 7.2 THOUSAND/UL (ref 4.8–10.8)

## 2017-01-17 PROCEDURE — 96367 TX/PROPH/DG ADDL SEQ IV INF: CPT

## 2017-01-17 PROCEDURE — 87040 BLOOD CULTURE FOR BACTERIA: CPT | Performed by: PHYSICIAN ASSISTANT

## 2017-01-17 PROCEDURE — 87077 CULTURE AEROBIC IDENTIFY: CPT | Performed by: NURSE PRACTITIONER

## 2017-01-17 PROCEDURE — 85025 COMPLETE CBC W/AUTO DIFF WBC: CPT | Performed by: PHYSICIAN ASSISTANT

## 2017-01-17 PROCEDURE — 85652 RBC SED RATE AUTOMATED: CPT | Performed by: PHYSICIAN ASSISTANT

## 2017-01-17 PROCEDURE — 84484 ASSAY OF TROPONIN QUANT: CPT | Performed by: PHYSICIAN ASSISTANT

## 2017-01-17 PROCEDURE — 96365 THER/PROPH/DIAG IV INF INIT: CPT

## 2017-01-17 PROCEDURE — 71020 HB CHEST X-RAY 2VW FRONTAL&LATL: CPT

## 2017-01-17 PROCEDURE — 36415 COLL VENOUS BLD VENIPUNCTURE: CPT | Performed by: PHYSICIAN ASSISTANT

## 2017-01-17 PROCEDURE — 87081 CULTURE SCREEN ONLY: CPT | Performed by: FAMILY MEDICINE

## 2017-01-17 PROCEDURE — 86141 C-REACTIVE PROTEIN HS: CPT | Performed by: PHYSICIAN ASSISTANT

## 2017-01-17 PROCEDURE — 85049 AUTOMATED PLATELET COUNT: CPT | Performed by: NURSE PRACTITIONER

## 2017-01-17 PROCEDURE — 80053 COMPREHEN METABOLIC PANEL: CPT | Performed by: PHYSICIAN ASSISTANT

## 2017-01-17 PROCEDURE — 93005 ELECTROCARDIOGRAM TRACING: CPT | Performed by: PHYSICIAN ASSISTANT

## 2017-01-17 PROCEDURE — 85610 PROTHROMBIN TIME: CPT | Performed by: PHYSICIAN ASSISTANT

## 2017-01-17 PROCEDURE — 87070 CULTURE OTHR SPECIMN AEROBIC: CPT | Performed by: NURSE PRACTITIONER

## 2017-01-17 PROCEDURE — 85730 THROMBOPLASTIN TIME PARTIAL: CPT | Performed by: PHYSICIAN ASSISTANT

## 2017-01-17 PROCEDURE — 87186 SC STD MICRODIL/AGAR DIL: CPT | Performed by: NURSE PRACTITIONER

## 2017-01-17 PROCEDURE — 99285 EMERGENCY DEPT VISIT HI MDM: CPT

## 2017-01-17 PROCEDURE — 87205 SMEAR GRAM STAIN: CPT | Performed by: NURSE PRACTITIONER

## 2017-01-17 PROCEDURE — 82550 ASSAY OF CK (CPK): CPT | Performed by: PHYSICIAN ASSISTANT

## 2017-01-17 PROCEDURE — 83880 ASSAY OF NATRIURETIC PEPTIDE: CPT | Performed by: PHYSICIAN ASSISTANT

## 2017-01-17 RX ORDER — MAGNESIUM HYDROXIDE/ALUMINUM HYDROXICE/SIMETHICONE 120; 1200; 1200 MG/30ML; MG/30ML; MG/30ML
30 SUSPENSION ORAL EVERY 6 HOURS PRN
Status: DISCONTINUED | OUTPATIENT
Start: 2017-01-17 | End: 2017-01-21 | Stop reason: HOSPADM

## 2017-01-17 RX ORDER — VANCOMYCIN HYDROCHLORIDE 500 MG/100ML
15 INJECTION, SOLUTION INTRAVENOUS ONCE
Status: DISCONTINUED | OUTPATIENT
Start: 2017-01-17 | End: 2017-01-17 | Stop reason: SDUPTHER

## 2017-01-17 RX ORDER — FUROSEMIDE 10 MG/ML
20 INJECTION INTRAMUSCULAR; INTRAVENOUS DAILY
Status: DISCONTINUED | OUTPATIENT
Start: 2017-01-18 | End: 2017-01-19

## 2017-01-17 RX ORDER — CLOPIDOGREL BISULFATE 75 MG/1
75 TABLET ORAL DAILY
Status: DISCONTINUED | OUTPATIENT
Start: 2017-01-18 | End: 2017-01-21 | Stop reason: HOSPADM

## 2017-01-17 RX ORDER — METOPROLOL SUCCINATE 100 MG/1
100 TABLET, EXTENDED RELEASE ORAL
COMMUNITY
End: 2019-01-01 | Stop reason: HOSPADM

## 2017-01-17 RX ORDER — PANTOPRAZOLE SODIUM 40 MG/1
40 TABLET, DELAYED RELEASE ORAL
Status: DISCONTINUED | OUTPATIENT
Start: 2017-01-18 | End: 2017-01-18

## 2017-01-17 RX ORDER — METOPROLOL SUCCINATE 100 MG/1
100 TABLET, EXTENDED RELEASE ORAL
Status: DISCONTINUED | OUTPATIENT
Start: 2017-01-17 | End: 2017-01-21 | Stop reason: HOSPADM

## 2017-01-17 RX ORDER — ACETAMINOPHEN 325 MG/1
650 TABLET ORAL EVERY 6 HOURS PRN
Status: DISCONTINUED | OUTPATIENT
Start: 2017-01-17 | End: 2017-01-21 | Stop reason: HOSPADM

## 2017-01-17 RX ORDER — ASPIRIN 81 MG/1
81 TABLET, CHEWABLE ORAL DAILY
Status: DISCONTINUED | OUTPATIENT
Start: 2017-01-18 | End: 2017-01-21 | Stop reason: HOSPADM

## 2017-01-17 RX ORDER — MELATONIN
2000 DAILY
Status: DISCONTINUED | OUTPATIENT
Start: 2017-01-18 | End: 2017-01-21 | Stop reason: HOSPADM

## 2017-01-17 RX ORDER — VANCOMYCIN HYDROCHLORIDE 500 MG/100ML
15 INJECTION, SOLUTION INTRAVENOUS EVERY 12 HOURS
Status: DISCONTINUED | OUTPATIENT
Start: 2017-01-17 | End: 2017-01-17 | Stop reason: SDUPTHER

## 2017-01-17 RX ORDER — ONDANSETRON 2 MG/ML
4 INJECTION INTRAMUSCULAR; INTRAVENOUS EVERY 6 HOURS PRN
Status: DISCONTINUED | OUTPATIENT
Start: 2017-01-17 | End: 2017-01-21 | Stop reason: HOSPADM

## 2017-01-17 RX ORDER — MULTIVITAMIN
1 CAPSULE ORAL DAILY
Status: DISCONTINUED | OUTPATIENT
Start: 2017-01-18 | End: 2017-01-17 | Stop reason: CLARIF

## 2017-01-17 RX ADMIN — VANCOMYCIN HYDROCHLORIDE: 500 INJECTION, POWDER, LYOPHILIZED, FOR SOLUTION INTRAVENOUS at 16:07

## 2017-01-17 RX ADMIN — PIPERACILLIN SODIUM,TAZOBACTAM SODIUM 2.25 G: 2; .25 INJECTION, POWDER, FOR SOLUTION INTRAVENOUS at 22:17

## 2017-01-17 RX ADMIN — PIPERACILLIN SODIUM AND TAZOBACTAM SODIUM 4.5 G: 36; 4.5 INJECTION, POWDER, FOR SOLUTION INTRAVENOUS at 15:31

## 2017-01-17 RX ADMIN — METOPROLOL SUCCINATE 100 MG: 100 TABLET, FILM COATED, EXTENDED RELEASE ORAL at 22:17

## 2017-01-18 ENCOUNTER — APPOINTMENT (INPATIENT)
Dept: RADIOLOGY | Facility: HOSPITAL | Age: 82
DRG: 602 | End: 2017-01-18
Payer: COMMERCIAL

## 2017-01-18 ENCOUNTER — APPOINTMENT (INPATIENT)
Dept: NON INVASIVE DIAGNOSTICS | Facility: HOSPITAL | Age: 82
DRG: 602 | End: 2017-01-18
Payer: COMMERCIAL

## 2017-01-18 LAB
ANION GAP SERPL CALCULATED.3IONS-SCNC: 9 MMOL/L (ref 4–13)
BASOPHILS # BLD AUTO: 0 THOUSANDS/ΜL (ref 0–0.1)
BASOPHILS NFR BLD AUTO: 1 % (ref 0–1)
BUN SERPL-MCNC: 18 MG/DL (ref 5–25)
CALCIUM SERPL-MCNC: 8.3 MG/DL (ref 8.3–10.1)
CHLORIDE SERPL-SCNC: 106 MMOL/L (ref 100–108)
CO2 SERPL-SCNC: 27 MMOL/L (ref 21–32)
CREAT SERPL-MCNC: 0.74 MG/DL (ref 0.6–1.3)
EOSINOPHIL # BLD AUTO: 0.2 THOUSAND/ΜL (ref 0–0.61)
EOSINOPHIL NFR BLD AUTO: 3 % (ref 0–6)
ERYTHROCYTE [DISTWIDTH] IN BLOOD BY AUTOMATED COUNT: 14.8 % (ref 11.6–15.1)
FERRITIN SERPL-MCNC: 71 NG/ML (ref 8–388)
FOLATE SERPL-MCNC: 9.9 NG/ML (ref 3.1–17.5)
GFR SERPL CREATININE-BSD FRML MDRD: >60 ML/MIN/1.73SQ M
GLUCOSE SERPL-MCNC: 87 MG/DL (ref 65–140)
HCT VFR BLD AUTO: 29.3 % (ref 37–47)
HGB BLD-MCNC: 9.7 G/DL (ref 12–16)
IRON SERPL-MCNC: 37 UG/DL (ref 50–170)
LYMPHOCYTES # BLD AUTO: 0.6 THOUSANDS/ΜL (ref 0.6–4.47)
LYMPHOCYTES NFR BLD AUTO: 9 % (ref 14–44)
MAGNESIUM SERPL-MCNC: 2 MG/DL (ref 1.6–2.6)
MCH RBC QN AUTO: 32 PG (ref 27–31)
MCHC RBC AUTO-ENTMCNC: 33 G/DL (ref 31.4–37.4)
MCV RBC AUTO: 97 FL (ref 82–98)
MONOCYTES # BLD AUTO: 0.6 THOUSAND/ΜL (ref 0.17–1.22)
MONOCYTES NFR BLD AUTO: 11 % (ref 4–12)
NEUTROPHILS # BLD AUTO: 4.5 THOUSANDS/ΜL (ref 1.85–7.62)
NEUTS SEG NFR BLD AUTO: 77 % (ref 43–75)
NRBC BLD AUTO-RTO: 0 /100 WBCS
PLATELET # BLD AUTO: 293 THOUSANDS/UL (ref 130–400)
PMV BLD AUTO: 7.7 FL (ref 8.9–12.7)
POTASSIUM SERPL-SCNC: 3.5 MMOL/L (ref 3.5–5.3)
RBC # BLD AUTO: 3.02 MILLION/UL (ref 4.2–5.4)
SODIUM SERPL-SCNC: 142 MMOL/L (ref 136–145)
TIBC SERPL-MCNC: 291 UG/DL (ref 250–450)
VIT B12 SERPL-MCNC: 420 PG/ML (ref 100–900)
WBC # BLD AUTO: 5.9 THOUSAND/UL (ref 4.8–10.8)

## 2017-01-18 PROCEDURE — 85025 COMPLETE CBC W/AUTO DIFF WBC: CPT | Performed by: NURSE PRACTITIONER

## 2017-01-18 PROCEDURE — 80048 BASIC METABOLIC PNL TOTAL CA: CPT | Performed by: NURSE PRACTITIONER

## 2017-01-18 PROCEDURE — 82607 VITAMIN B-12: CPT | Performed by: FAMILY MEDICINE

## 2017-01-18 PROCEDURE — 83540 ASSAY OF IRON: CPT | Performed by: FAMILY MEDICINE

## 2017-01-18 PROCEDURE — G8988 SELF CARE GOAL STATUS: HCPCS

## 2017-01-18 PROCEDURE — 97161 PT EVAL LOW COMPLEX 20 MIN: CPT

## 2017-01-18 PROCEDURE — 82728 ASSAY OF FERRITIN: CPT | Performed by: FAMILY MEDICINE

## 2017-01-18 PROCEDURE — 93970 EXTREMITY STUDY: CPT

## 2017-01-18 PROCEDURE — 83735 ASSAY OF MAGNESIUM: CPT | Performed by: NURSE PRACTITIONER

## 2017-01-18 PROCEDURE — G8978 MOBILITY CURRENT STATUS: HCPCS

## 2017-01-18 PROCEDURE — 83550 IRON BINDING TEST: CPT | Performed by: FAMILY MEDICINE

## 2017-01-18 PROCEDURE — 82746 ASSAY OF FOLIC ACID SERUM: CPT | Performed by: FAMILY MEDICINE

## 2017-01-18 PROCEDURE — 93306 TTE W/DOPPLER COMPLETE: CPT

## 2017-01-18 PROCEDURE — 97165 OT EVAL LOW COMPLEX 30 MIN: CPT

## 2017-01-18 PROCEDURE — G8979 MOBILITY GOAL STATUS: HCPCS

## 2017-01-18 PROCEDURE — G8987 SELF CARE CURRENT STATUS: HCPCS

## 2017-01-18 RX ORDER — PANTOPRAZOLE SODIUM 40 MG/1
40 TABLET, DELAYED RELEASE ORAL
Status: DISCONTINUED | OUTPATIENT
Start: 2017-01-18 | End: 2017-01-21 | Stop reason: HOSPADM

## 2017-01-18 RX ORDER — CLOTRIMAZOLE AND BETAMETHASONE DIPROPIONATE 10; .64 MG/G; MG/G
1 CREAM TOPICAL 2 TIMES DAILY
Status: DISCONTINUED | OUTPATIENT
Start: 2017-01-18 | End: 2017-01-21 | Stop reason: HOSPADM

## 2017-01-18 RX ADMIN — ENOXAPARIN SODIUM 30 MG: 30 INJECTION SUBCUTANEOUS at 08:16

## 2017-01-18 RX ADMIN — PIPERACILLIN SODIUM,TAZOBACTAM SODIUM 2.25 G: 2; .25 INJECTION, POWDER, FOR SOLUTION INTRAVENOUS at 23:13

## 2017-01-18 RX ADMIN — VITAMIN D, TAB 1000IU (100/BT) 2000 UNITS: 25 TAB at 08:16

## 2017-01-18 RX ADMIN — CLOTRIMAZOLE AND BETAMETHASONE DIPROPIONATE 1 APPLICATION: 10; .5 CREAM TOPICAL at 15:00

## 2017-01-18 RX ADMIN — PANTOPRAZOLE SODIUM 40 MG: 40 TABLET, DELAYED RELEASE ORAL at 04:53

## 2017-01-18 RX ADMIN — PIPERACILLIN SODIUM,TAZOBACTAM SODIUM 2.25 G: 2; .25 INJECTION, POWDER, FOR SOLUTION INTRAVENOUS at 04:53

## 2017-01-18 RX ADMIN — PIPERACILLIN SODIUM,TAZOBACTAM SODIUM 2.25 G: 2; .25 INJECTION, POWDER, FOR SOLUTION INTRAVENOUS at 11:11

## 2017-01-18 RX ADMIN — Medication 1 TABLET: at 08:16

## 2017-01-18 RX ADMIN — VANCOMYCIN HYDROCHLORIDE: 500 INJECTION, POWDER, LYOPHILIZED, FOR SOLUTION INTRAVENOUS at 16:05

## 2017-01-18 RX ADMIN — CLOTRIMAZOLE AND BETAMETHASONE DIPROPIONATE 1 APPLICATION: 10; .5 CREAM TOPICAL at 21:47

## 2017-01-18 RX ADMIN — PIPERACILLIN SODIUM,TAZOBACTAM SODIUM 2.25 G: 2; .25 INJECTION, POWDER, FOR SOLUTION INTRAVENOUS at 17:19

## 2017-01-18 RX ADMIN — FUROSEMIDE 20 MG: 10 INJECTION, SOLUTION INTRAMUSCULAR; INTRAVENOUS at 08:16

## 2017-01-18 RX ADMIN — ASPIRIN 81 MG 81 MG: 81 TABLET ORAL at 08:16

## 2017-01-18 RX ADMIN — METOPROLOL SUCCINATE 100 MG: 100 TABLET, FILM COATED, EXTENDED RELEASE ORAL at 21:55

## 2017-01-18 RX ADMIN — CLOPIDOGREL BISULFATE 75 MG: 75 TABLET ORAL at 08:16

## 2017-01-19 LAB
ALBUMIN SERPL BCP-MCNC: 2.4 G/DL (ref 3.5–5)
ALP SERPL-CCNC: 89 U/L (ref 46–116)
ALT SERPL W P-5'-P-CCNC: 23 U/L (ref 12–78)
ANION GAP SERPL CALCULATED.3IONS-SCNC: 10 MMOL/L (ref 4–13)
AST SERPL W P-5'-P-CCNC: 14 U/L (ref 5–45)
BILIRUB SERPL-MCNC: 0.3 MG/DL (ref 0.2–1)
BUN SERPL-MCNC: 22 MG/DL (ref 5–25)
CALCIUM SERPL-MCNC: 8.3 MG/DL (ref 8.3–10.1)
CHLORIDE SERPL-SCNC: 106 MMOL/L (ref 100–108)
CO2 SERPL-SCNC: 26 MMOL/L (ref 21–32)
CREAT SERPL-MCNC: 0.8 MG/DL (ref 0.6–1.3)
ERYTHROCYTE [DISTWIDTH] IN BLOOD BY AUTOMATED COUNT: 14.9 % (ref 11.6–15.1)
GFR SERPL CREATININE-BSD FRML MDRD: >60 ML/MIN/1.73SQ M
GLUCOSE SERPL-MCNC: 85 MG/DL (ref 65–140)
HCT VFR BLD AUTO: 29.3 % (ref 37–47)
HGB BLD-MCNC: 9.4 G/DL (ref 12–16)
MAGNESIUM SERPL-MCNC: 2 MG/DL (ref 1.6–2.6)
MCH RBC QN AUTO: 31.3 PG (ref 27–31)
MCHC RBC AUTO-ENTMCNC: 32.2 G/DL (ref 31.4–37.4)
MCV RBC AUTO: 97 FL (ref 82–98)
MRSA NOSE QL CULT: NORMAL
PHOSPHATE SERPL-MCNC: 3.3 MG/DL (ref 2.3–4.1)
PLATELET # BLD AUTO: 257 THOUSANDS/UL (ref 130–400)
PMV BLD AUTO: 7.8 FL (ref 8.9–12.7)
POTASSIUM SERPL-SCNC: 3.4 MMOL/L (ref 3.5–5.3)
PROT SERPL-MCNC: 5.3 G/DL (ref 6.4–8.2)
RBC # BLD AUTO: 3.01 MILLION/UL (ref 4.2–5.4)
SODIUM SERPL-SCNC: 142 MMOL/L (ref 136–145)
WBC # BLD AUTO: 5 THOUSAND/UL (ref 4.8–10.8)

## 2017-01-19 PROCEDURE — 97110 THERAPEUTIC EXERCISES: CPT

## 2017-01-19 PROCEDURE — 85027 COMPLETE CBC AUTOMATED: CPT | Performed by: FAMILY MEDICINE

## 2017-01-19 PROCEDURE — 83735 ASSAY OF MAGNESIUM: CPT | Performed by: FAMILY MEDICINE

## 2017-01-19 PROCEDURE — 93005 ELECTROCARDIOGRAM TRACING: CPT | Performed by: INTERNAL MEDICINE

## 2017-01-19 PROCEDURE — 80053 COMPREHEN METABOLIC PANEL: CPT | Performed by: FAMILY MEDICINE

## 2017-01-19 PROCEDURE — 84100 ASSAY OF PHOSPHORUS: CPT | Performed by: FAMILY MEDICINE

## 2017-01-19 RX ORDER — SPIRONOLACTONE 25 MG/1
12.5 TABLET ORAL DAILY
Status: DISCONTINUED | OUTPATIENT
Start: 2017-01-19 | End: 2017-01-20

## 2017-01-19 RX ORDER — POTASSIUM CHLORIDE 20 MEQ/1
20 TABLET, EXTENDED RELEASE ORAL 2 TIMES DAILY
Status: DISCONTINUED | OUTPATIENT
Start: 2017-01-19 | End: 2017-01-20

## 2017-01-19 RX ORDER — CLINDAMYCIN PHOSPHATE 600 MG/50ML
600 INJECTION INTRAVENOUS EVERY 8 HOURS
Status: DISCONTINUED | OUTPATIENT
Start: 2017-01-19 | End: 2017-01-21 | Stop reason: HOSPADM

## 2017-01-19 RX ORDER — FUROSEMIDE 10 MG/ML
20 INJECTION INTRAMUSCULAR; INTRAVENOUS
Status: DISCONTINUED | OUTPATIENT
Start: 2017-01-19 | End: 2017-01-20

## 2017-01-19 RX ORDER — POTASSIUM CHLORIDE 20 MEQ/1
40 TABLET, EXTENDED RELEASE ORAL ONCE
Status: COMPLETED | OUTPATIENT
Start: 2017-01-19 | End: 2017-01-19

## 2017-01-19 RX ORDER — LOSARTAN POTASSIUM 25 MG/1
25 TABLET ORAL DAILY
Status: DISCONTINUED | OUTPATIENT
Start: 2017-01-19 | End: 2017-01-20

## 2017-01-19 RX ADMIN — FUROSEMIDE 20 MG: 10 INJECTION, SOLUTION INTRAMUSCULAR; INTRAVENOUS at 08:55

## 2017-01-19 RX ADMIN — POTASSIUM CHLORIDE 40 MEQ: 1500 TABLET, EXTENDED RELEASE ORAL at 18:06

## 2017-01-19 RX ADMIN — VANCOMYCIN HYDROCHLORIDE: 500 INJECTION, POWDER, LYOPHILIZED, FOR SOLUTION INTRAVENOUS at 17:09

## 2017-01-19 RX ADMIN — ENOXAPARIN SODIUM 30 MG: 30 INJECTION SUBCUTANEOUS at 08:55

## 2017-01-19 RX ADMIN — PANTOPRAZOLE SODIUM 40 MG: 40 TABLET, DELAYED RELEASE ORAL at 05:20

## 2017-01-19 RX ADMIN — SPIRONOLACTONE 12.5 MG: 25 TABLET, FILM COATED ORAL at 15:38

## 2017-01-19 RX ADMIN — LOSARTAN POTASSIUM 25 MG: 25 TABLET, FILM COATED ORAL at 15:38

## 2017-01-19 RX ADMIN — METOPROLOL SUCCINATE 100 MG: 100 TABLET, FILM COATED, EXTENDED RELEASE ORAL at 22:26

## 2017-01-19 RX ADMIN — VITAMIN D, TAB 1000IU (100/BT) 2000 UNITS: 25 TAB at 08:55

## 2017-01-19 RX ADMIN — PIPERACILLIN SODIUM,TAZOBACTAM SODIUM 2.25 G: 2; .25 INJECTION, POWDER, FOR SOLUTION INTRAVENOUS at 10:18

## 2017-01-19 RX ADMIN — CLOTRIMAZOLE AND BETAMETHASONE DIPROPIONATE 1 APPLICATION: 10; .5 CREAM TOPICAL at 20:54

## 2017-01-19 RX ADMIN — ASPIRIN 81 MG 81 MG: 81 TABLET ORAL at 08:55

## 2017-01-19 RX ADMIN — CLOTRIMAZOLE AND BETAMETHASONE DIPROPIONATE 1 APPLICATION: 10; .5 CREAM TOPICAL at 08:58

## 2017-01-19 RX ADMIN — FUROSEMIDE 20 MG: 10 INJECTION, SOLUTION INTRAMUSCULAR; INTRAVENOUS at 15:36

## 2017-01-19 RX ADMIN — Medication 1 TABLET: at 08:55

## 2017-01-19 RX ADMIN — CLINDAMYCIN PHOSPHATE 600 MG: 12 INJECTION, SOLUTION INTRAMUSCULAR; INTRAVENOUS at 20:52

## 2017-01-19 RX ADMIN — POTASSIUM CHLORIDE 20 MEQ: 1500 TABLET, EXTENDED RELEASE ORAL at 15:36

## 2017-01-19 RX ADMIN — PIPERACILLIN SODIUM,TAZOBACTAM SODIUM 2.25 G: 2; .25 INJECTION, POWDER, FOR SOLUTION INTRAVENOUS at 05:20

## 2017-01-19 RX ADMIN — CLOPIDOGREL BISULFATE 75 MG: 75 TABLET ORAL at 08:55

## 2017-01-20 LAB
ANION GAP SERPL CALCULATED.3IONS-SCNC: 9 MMOL/L (ref 4–13)
BASOPHILS # BLD AUTO: 0.1 THOUSANDS/ΜL (ref 0–0.1)
BASOPHILS NFR BLD AUTO: 1 % (ref 0–1)
BUN SERPL-MCNC: 29 MG/DL (ref 5–25)
CALCIUM SERPL-MCNC: 8.6 MG/DL (ref 8.3–10.1)
CHLORIDE SERPL-SCNC: 102 MMOL/L (ref 100–108)
CO2 SERPL-SCNC: 28 MMOL/L (ref 21–32)
CREAT SERPL-MCNC: 0.84 MG/DL (ref 0.6–1.3)
EOSINOPHIL # BLD AUTO: 0.3 THOUSAND/ΜL (ref 0–0.61)
EOSINOPHIL NFR BLD AUTO: 6 % (ref 0–6)
ERYTHROCYTE [DISTWIDTH] IN BLOOD BY AUTOMATED COUNT: 13.8 % (ref 11.6–15.1)
GFR SERPL CREATININE-BSD FRML MDRD: >60 ML/MIN/1.73SQ M
GLUCOSE SERPL-MCNC: 103 MG/DL (ref 65–140)
HCT VFR BLD AUTO: 31 % (ref 37–47)
HGB BLD-MCNC: 10 G/DL (ref 12–16)
LYMPHOCYTES # BLD AUTO: 0.9 THOUSANDS/ΜL (ref 0.6–4.47)
LYMPHOCYTES NFR BLD AUTO: 20 % (ref 14–44)
MCH RBC QN AUTO: 31.4 PG (ref 27–31)
MCHC RBC AUTO-ENTMCNC: 32.4 G/DL (ref 31.4–37.4)
MCV RBC AUTO: 97 FL (ref 82–98)
MONOCYTES # BLD AUTO: 0.6 THOUSAND/ΜL (ref 0.17–1.22)
MONOCYTES NFR BLD AUTO: 13 % (ref 4–12)
NEUTROPHILS # BLD AUTO: 2.8 THOUSANDS/ΜL (ref 1.85–7.62)
NEUTS SEG NFR BLD AUTO: 60 % (ref 43–75)
PLATELET # BLD AUTO: 244 THOUSANDS/UL (ref 130–400)
PMV BLD AUTO: 8 FL (ref 8.9–12.7)
POTASSIUM SERPL-SCNC: 4 MMOL/L (ref 3.5–5.3)
RBC # BLD AUTO: 3.19 MILLION/UL (ref 4.2–5.4)
SODIUM SERPL-SCNC: 139 MMOL/L (ref 136–145)
WBC # BLD AUTO: 4.7 THOUSAND/UL (ref 4.8–10.8)

## 2017-01-20 PROCEDURE — 80048 BASIC METABOLIC PNL TOTAL CA: CPT | Performed by: INTERNAL MEDICINE

## 2017-01-20 PROCEDURE — 85025 COMPLETE CBC W/AUTO DIFF WBC: CPT | Performed by: INTERNAL MEDICINE

## 2017-01-20 PROCEDURE — 97110 THERAPEUTIC EXERCISES: CPT

## 2017-01-20 RX ORDER — FUROSEMIDE 10 MG/ML
20 INJECTION INTRAMUSCULAR; INTRAVENOUS DAILY
Status: DISCONTINUED | OUTPATIENT
Start: 2017-01-21 | End: 2017-01-21 | Stop reason: HOSPADM

## 2017-01-20 RX ORDER — SPIRONOLACTONE 25 MG/1
25 TABLET ORAL DAILY
Status: DISCONTINUED | OUTPATIENT
Start: 2017-01-21 | End: 2017-01-21 | Stop reason: HOSPADM

## 2017-01-20 RX ORDER — LOSARTAN POTASSIUM 50 MG/1
50 TABLET ORAL DAILY
Status: DISCONTINUED | OUTPATIENT
Start: 2017-01-21 | End: 2017-01-21 | Stop reason: HOSPADM

## 2017-01-20 RX ADMIN — VITAMIN D, TAB 1000IU (100/BT) 2000 UNITS: 25 TAB at 08:18

## 2017-01-20 RX ADMIN — CLINDAMYCIN PHOSPHATE 600 MG: 12 INJECTION, SOLUTION INTRAMUSCULAR; INTRAVENOUS at 11:22

## 2017-01-20 RX ADMIN — METOPROLOL SUCCINATE 100 MG: 100 TABLET, FILM COATED, EXTENDED RELEASE ORAL at 22:26

## 2017-01-20 RX ADMIN — ONDANSETRON 4 MG: 2 INJECTION INTRAMUSCULAR; INTRAVENOUS at 17:13

## 2017-01-20 RX ADMIN — CLOTRIMAZOLE AND BETAMETHASONE DIPROPIONATE 1 APPLICATION: 10; .5 CREAM TOPICAL at 08:22

## 2017-01-20 RX ADMIN — POTASSIUM CHLORIDE 20 MEQ: 1500 TABLET, EXTENDED RELEASE ORAL at 08:19

## 2017-01-20 RX ADMIN — CLINDAMYCIN PHOSPHATE 600 MG: 12 INJECTION, SOLUTION INTRAMUSCULAR; INTRAVENOUS at 20:03

## 2017-01-20 RX ADMIN — PANTOPRAZOLE SODIUM 40 MG: 40 TABLET, DELAYED RELEASE ORAL at 05:37

## 2017-01-20 RX ADMIN — Medication 1 TABLET: at 08:19

## 2017-01-20 RX ADMIN — ALUMINUM HYDROXIDE, MAGNESIUM HYDROXIDE, AND SIMETHICONE 30 ML: 200; 200; 20 SUSPENSION ORAL at 17:13

## 2017-01-20 RX ADMIN — SPIRONOLACTONE 12.5 MG: 25 TABLET, FILM COATED ORAL at 08:19

## 2017-01-20 RX ADMIN — ASPIRIN 81 MG 81 MG: 81 TABLET ORAL at 08:19

## 2017-01-20 RX ADMIN — CLOTRIMAZOLE AND BETAMETHASONE DIPROPIONATE 1 APPLICATION: 10; .5 CREAM TOPICAL at 22:23

## 2017-01-20 RX ADMIN — CLOPIDOGREL BISULFATE 75 MG: 75 TABLET ORAL at 08:19

## 2017-01-20 RX ADMIN — ENOXAPARIN SODIUM 30 MG: 30 INJECTION SUBCUTANEOUS at 08:18

## 2017-01-20 RX ADMIN — LOSARTAN POTASSIUM 25 MG: 25 TABLET, FILM COATED ORAL at 08:19

## 2017-01-20 RX ADMIN — FUROSEMIDE 20 MG: 10 INJECTION, SOLUTION INTRAMUSCULAR; INTRAVENOUS at 08:19

## 2017-01-20 RX ADMIN — CLINDAMYCIN PHOSPHATE 600 MG: 12 INJECTION, SOLUTION INTRAMUSCULAR; INTRAVENOUS at 04:10

## 2017-01-21 VITALS
HEART RATE: 67 BPM | OXYGEN SATURATION: 94 % | BODY MASS INDEX: 16.44 KG/M2 | TEMPERATURE: 97.8 F | SYSTOLIC BLOOD PRESSURE: 150 MMHG | DIASTOLIC BLOOD PRESSURE: 68 MMHG | HEIGHT: 61 IN | RESPIRATION RATE: 18 BRPM | WEIGHT: 87.08 LBS

## 2017-01-21 LAB
ANION GAP SERPL CALCULATED.3IONS-SCNC: 6 MMOL/L (ref 4–13)
BACTERIA WND AEROBE CULT: ABNORMAL
BASOPHILS # BLD AUTO: 0 THOUSANDS/ΜL (ref 0–0.1)
BASOPHILS NFR BLD AUTO: 1 % (ref 0–1)
BUN SERPL-MCNC: 28 MG/DL (ref 5–25)
CALCIUM SERPL-MCNC: 8.7 MG/DL (ref 8.3–10.1)
CHLORIDE SERPL-SCNC: 103 MMOL/L (ref 100–108)
CO2 SERPL-SCNC: 30 MMOL/L (ref 21–32)
CREAT SERPL-MCNC: 0.88 MG/DL (ref 0.6–1.3)
EOSINOPHIL # BLD AUTO: 0.4 THOUSAND/ΜL (ref 0–0.61)
EOSINOPHIL NFR BLD AUTO: 6 % (ref 0–6)
ERYTHROCYTE [DISTWIDTH] IN BLOOD BY AUTOMATED COUNT: 13.6 % (ref 11.6–15.1)
GFR SERPL CREATININE-BSD FRML MDRD: >60 ML/MIN/1.73SQ M
GLUCOSE SERPL-MCNC: 90 MG/DL (ref 65–140)
GRAM STN SPEC: ABNORMAL
HCT VFR BLD AUTO: 31.8 % (ref 37–47)
HGB BLD-MCNC: 10.2 G/DL (ref 12–16)
LYMPHOCYTES # BLD AUTO: 0.9 THOUSANDS/ΜL (ref 0.6–4.47)
LYMPHOCYTES NFR BLD AUTO: 15 % (ref 14–44)
MCH RBC QN AUTO: 31.3 PG (ref 27–31)
MCHC RBC AUTO-ENTMCNC: 32.1 G/DL (ref 31.4–37.4)
MCV RBC AUTO: 98 FL (ref 82–98)
MONOCYTES # BLD AUTO: 0.6 THOUSAND/ΜL (ref 0.17–1.22)
MONOCYTES NFR BLD AUTO: 10 % (ref 4–12)
NEUTROPHILS # BLD AUTO: 4.2 THOUSANDS/ΜL (ref 1.85–7.62)
NEUTS SEG NFR BLD AUTO: 68 % (ref 43–75)
PLATELET # BLD AUTO: 255 THOUSANDS/UL (ref 130–400)
PMV BLD AUTO: 8 FL (ref 8.9–12.7)
POTASSIUM SERPL-SCNC: 4.3 MMOL/L (ref 3.5–5.3)
RBC # BLD AUTO: 3.26 MILLION/UL (ref 4.2–5.4)
SODIUM SERPL-SCNC: 139 MMOL/L (ref 136–145)
WBC # BLD AUTO: 6.1 THOUSAND/UL (ref 4.8–10.8)

## 2017-01-21 PROCEDURE — 85025 COMPLETE CBC W/AUTO DIFF WBC: CPT | Performed by: INTERNAL MEDICINE

## 2017-01-21 PROCEDURE — 80048 BASIC METABOLIC PNL TOTAL CA: CPT | Performed by: INTERNAL MEDICINE

## 2017-01-21 RX ORDER — LOSARTAN POTASSIUM 50 MG/1
50 TABLET ORAL DAILY
Qty: 30 TABLET | Refills: 0 | Status: SHIPPED | OUTPATIENT
Start: 2017-01-21 | End: 2019-01-01 | Stop reason: HOSPADM

## 2017-01-21 RX ORDER — LINEZOLID 600 MG/1
600 TABLET, FILM COATED ORAL 2 TIMES DAILY
Qty: 10 TABLET | Refills: 0 | Status: SHIPPED | OUTPATIENT
Start: 2017-01-21 | End: 2017-01-26

## 2017-01-21 RX ORDER — SPIRONOLACTONE 25 MG/1
25 TABLET ORAL DAILY
Qty: 30 TABLET | Refills: 0 | Status: SHIPPED | OUTPATIENT
Start: 2017-01-21 | End: 2018-01-01

## 2017-01-21 RX ORDER — FUROSEMIDE 20 MG/1
20 TABLET ORAL DAILY
Qty: 30 TABLET | Refills: 0 | Status: SHIPPED | OUTPATIENT
Start: 2017-01-21 | End: 2018-01-01

## 2017-01-21 RX ADMIN — Medication 1 TABLET: at 09:11

## 2017-01-21 RX ADMIN — PANTOPRAZOLE SODIUM 40 MG: 40 TABLET, DELAYED RELEASE ORAL at 05:26

## 2017-01-21 RX ADMIN — ASPIRIN 81 MG 81 MG: 81 TABLET ORAL at 09:11

## 2017-01-21 RX ADMIN — CLINDAMYCIN PHOSPHATE 600 MG: 12 INJECTION, SOLUTION INTRAMUSCULAR; INTRAVENOUS at 12:51

## 2017-01-21 RX ADMIN — VITAMIN D, TAB 1000IU (100/BT) 2000 UNITS: 25 TAB at 09:11

## 2017-01-21 RX ADMIN — CLOTRIMAZOLE AND BETAMETHASONE DIPROPIONATE 1 APPLICATION: 10; .5 CREAM TOPICAL at 09:12

## 2017-01-21 RX ADMIN — LOSARTAN POTASSIUM 50 MG: 50 TABLET, FILM COATED ORAL at 09:11

## 2017-01-21 RX ADMIN — SPIRONOLACTONE 25 MG: 25 TABLET, FILM COATED ORAL at 09:11

## 2017-01-21 RX ADMIN — ENOXAPARIN SODIUM 30 MG: 30 INJECTION SUBCUTANEOUS at 09:11

## 2017-01-21 RX ADMIN — FUROSEMIDE 20 MG: 10 INJECTION, SOLUTION INTRAMUSCULAR; INTRAVENOUS at 09:12

## 2017-01-21 RX ADMIN — CLOPIDOGREL BISULFATE 75 MG: 75 TABLET ORAL at 09:11

## 2017-01-21 RX ADMIN — CLINDAMYCIN PHOSPHATE 600 MG: 12 INJECTION, SOLUTION INTRAMUSCULAR; INTRAVENOUS at 04:07

## 2017-01-22 LAB
ATRIAL RATE: 76 BPM
BACTERIA BLD CULT: NORMAL
BACTERIA BLD CULT: NORMAL
P AXIS: 96 DEGREES
PR INTERVAL: 174 MS
QRS AXIS: 47 DEGREES
QRSD INTERVAL: 88 MS
QT INTERVAL: 412 MS
QTC INTERVAL: 463 MS
T WAVE AXIS: 59 DEGREES
VENTRICULAR RATE: 76 BPM

## 2017-01-24 ENCOUNTER — ALLSCRIPTS OFFICE VISIT (OUTPATIENT)
Dept: OTHER | Facility: OTHER | Age: 82
End: 2017-01-24

## 2017-01-24 LAB
ATRIAL RATE: 84 BPM
P AXIS: 82 DEGREES
PR INTERVAL: 158 MS
QRS AXIS: 58 DEGREES
QRSD INTERVAL: 86 MS
QT INTERVAL: 392 MS
QTC INTERVAL: 463 MS
T WAVE AXIS: 89 DEGREES
VENTRICULAR RATE: 84 BPM

## 2017-01-30 ENCOUNTER — APPOINTMENT (EMERGENCY)
Dept: RADIOLOGY | Facility: HOSPITAL | Age: 82
End: 2017-01-30
Payer: COMMERCIAL

## 2017-01-30 ENCOUNTER — HOSPITAL ENCOUNTER (EMERGENCY)
Facility: HOSPITAL | Age: 82
Discharge: HOME | End: 2017-01-30
Attending: EMERGENCY MEDICINE
Payer: COMMERCIAL

## 2017-01-30 ENCOUNTER — GENERIC CONVERSION - ENCOUNTER (OUTPATIENT)
Dept: OTHER | Facility: OTHER | Age: 82
End: 2017-01-30

## 2017-01-30 VITALS
TEMPERATURE: 98.8 F | OXYGEN SATURATION: 98 % | HEIGHT: 61 IN | BODY MASS INDEX: 14.54 KG/M2 | HEART RATE: 75 BPM | DIASTOLIC BLOOD PRESSURE: 70 MMHG | WEIGHT: 77 LBS | SYSTOLIC BLOOD PRESSURE: 165 MMHG | RESPIRATION RATE: 16 BRPM

## 2017-01-30 DIAGNOSIS — D64.9 ANEMIA: ICD-10-CM

## 2017-01-30 DIAGNOSIS — K57.90 DIVERTICULOSIS: ICD-10-CM

## 2017-01-30 DIAGNOSIS — E87.1 HYPONATREMIA: Primary | ICD-10-CM

## 2017-01-30 LAB
ABO GROUP BLD: NORMAL
ANION GAP SERPL CALCULATED.3IONS-SCNC: 11 MMOL/L (ref 4–13)
APTT PPP: 25 SECONDS (ref 24–36)
BASOPHILS # BLD AUTO: 0.2 THOUSANDS/ΜL (ref 0–0.1)
BASOPHILS NFR BLD AUTO: 2 % (ref 0–1)
BLD GP AB SCN SERPL QL: NEGATIVE
BUN SERPL-MCNC: 47 MG/DL (ref 5–25)
CALCIUM SERPL-MCNC: 9.3 MG/DL (ref 8.3–10.1)
CHLORIDE SERPL-SCNC: 91 MMOL/L (ref 100–108)
CO2 SERPL-SCNC: 22 MMOL/L (ref 21–32)
CREAT SERPL-MCNC: 1.55 MG/DL (ref 0.6–1.3)
EOSINOPHIL # BLD AUTO: 0.2 THOUSAND/ΜL (ref 0–0.61)
EOSINOPHIL NFR BLD AUTO: 2 % (ref 0–6)
ERYTHROCYTE [DISTWIDTH] IN BLOOD BY AUTOMATED COUNT: 14.1 % (ref 11.6–15.1)
GFR SERPL CREATININE-BSD FRML MDRD: 31.7 ML/MIN/1.73SQ M
GLUCOSE SERPL-MCNC: 113 MG/DL (ref 65–140)
HCT VFR BLD AUTO: 30.6 % (ref 37–47)
HGB BLD-MCNC: 10.1 G/DL (ref 12–16)
INR PPP: 1.02 (ref 0.86–1.16)
LYMPHOCYTES # BLD AUTO: 1.8 THOUSANDS/ΜL (ref 0.6–4.47)
LYMPHOCYTES NFR BLD AUTO: 16 % (ref 14–44)
MCH RBC QN AUTO: 31.9 PG (ref 27–31)
MCHC RBC AUTO-ENTMCNC: 33.1 G/DL (ref 31.4–37.4)
MCV RBC AUTO: 96 FL (ref 82–98)
MONOCYTES # BLD AUTO: 0.9 THOUSAND/ΜL (ref 0.17–1.22)
MONOCYTES NFR BLD AUTO: 8 % (ref 4–12)
NEUTROPHILS # BLD AUTO: 8.1 THOUSANDS/ΜL (ref 1.85–7.62)
NEUTS SEG NFR BLD AUTO: 72 % (ref 43–75)
PLATELET # BLD AUTO: 342 THOUSANDS/UL (ref 130–400)
PMV BLD AUTO: 7.2 FL (ref 8.9–12.7)
POTASSIUM SERPL-SCNC: 3.6 MMOL/L (ref 3.5–5.3)
PROTHROMBIN TIME: 10.7 SECONDS (ref 9.4–11.7)
RBC # BLD AUTO: 3.18 MILLION/UL (ref 4.2–5.4)
RH BLD: POSITIVE
SODIUM SERPL-SCNC: 124 MMOL/L (ref 136–145)
WBC # BLD AUTO: 11.2 THOUSAND/UL (ref 4.8–10.8)

## 2017-01-30 PROCEDURE — 85025 COMPLETE CBC W/AUTO DIFF WBC: CPT | Performed by: EMERGENCY MEDICINE

## 2017-01-30 PROCEDURE — 85730 THROMBOPLASTIN TIME PARTIAL: CPT | Performed by: EMERGENCY MEDICINE

## 2017-01-30 PROCEDURE — 74176 CT ABD & PELVIS W/O CONTRAST: CPT

## 2017-01-30 PROCEDURE — 86900 BLOOD TYPING SEROLOGIC ABO: CPT | Performed by: EMERGENCY MEDICINE

## 2017-01-30 PROCEDURE — 96360 HYDRATION IV INFUSION INIT: CPT

## 2017-01-30 PROCEDURE — 36415 COLL VENOUS BLD VENIPUNCTURE: CPT | Performed by: EMERGENCY MEDICINE

## 2017-01-30 PROCEDURE — 86901 BLOOD TYPING SEROLOGIC RH(D): CPT | Performed by: EMERGENCY MEDICINE

## 2017-01-30 PROCEDURE — 85610 PROTHROMBIN TIME: CPT | Performed by: EMERGENCY MEDICINE

## 2017-01-30 PROCEDURE — 99284 EMERGENCY DEPT VISIT MOD MDM: CPT

## 2017-01-30 PROCEDURE — 96361 HYDRATE IV INFUSION ADD-ON: CPT

## 2017-01-30 PROCEDURE — 86850 RBC ANTIBODY SCREEN: CPT | Performed by: EMERGENCY MEDICINE

## 2017-01-30 PROCEDURE — 80048 BASIC METABOLIC PNL TOTAL CA: CPT | Performed by: EMERGENCY MEDICINE

## 2017-01-30 RX ADMIN — SODIUM CHLORIDE 1000 ML: 0.9 INJECTION, SOLUTION INTRAVENOUS at 14:59

## 2017-02-02 ENCOUNTER — ALLSCRIPTS OFFICE VISIT (OUTPATIENT)
Dept: OTHER | Facility: OTHER | Age: 82
End: 2017-02-02

## 2017-02-21 ENCOUNTER — ALLSCRIPTS OFFICE VISIT (OUTPATIENT)
Dept: OTHER | Facility: OTHER | Age: 82
End: 2017-02-21

## 2017-04-02 DIAGNOSIS — I70.1 ATHEROSCLEROSIS OF RENAL ARTERY (HCC): ICD-10-CM

## 2017-04-19 ENCOUNTER — HOSPITAL ENCOUNTER (OUTPATIENT)
Dept: RADIOLOGY | Facility: HOSPITAL | Age: 82
Discharge: HOME | End: 2017-04-19
Attending: SURGERY
Payer: COMMERCIAL

## 2017-04-19 DIAGNOSIS — I73.9 PERIPHERAL ARTERIAL DISEASE (HCC): ICD-10-CM

## 2017-04-19 DIAGNOSIS — I70.1 ATHEROSCLEROSIS OF RENAL ARTERY (HCC): ICD-10-CM

## 2017-04-19 PROCEDURE — 93925 LOWER EXTREMITY STUDY: CPT

## 2017-04-19 PROCEDURE — 93923 UPR/LXTR ART STDY 3+ LVLS: CPT

## 2017-04-19 PROCEDURE — 93975 VASCULAR STUDY: CPT

## 2017-05-18 ENCOUNTER — ALLSCRIPTS OFFICE VISIT (OUTPATIENT)
Dept: OTHER | Facility: OTHER | Age: 82
End: 2017-05-18

## 2017-11-13 ENCOUNTER — GENERIC CONVERSION - ENCOUNTER (OUTPATIENT)
Dept: OTHER | Facility: OTHER | Age: 82
End: 2017-11-13

## 2017-11-17 ENCOUNTER — ALLSCRIPTS OFFICE VISIT (OUTPATIENT)
Dept: OTHER | Facility: OTHER | Age: 82
End: 2017-11-17

## 2018-01-01 ENCOUNTER — ANESTHESIA (INPATIENT)
Dept: PERIOP | Facility: HOSPITAL | Age: 83
DRG: 659 | End: 2018-01-01
Payer: COMMERCIAL

## 2018-01-01 ENCOUNTER — OFFICE VISIT (OUTPATIENT)
Dept: NEPHROLOGY | Facility: CLINIC | Age: 83
End: 2018-01-01
Payer: COMMERCIAL

## 2018-01-01 ENCOUNTER — TELEPHONE (OUTPATIENT)
Dept: NEPHROLOGY | Facility: CLINIC | Age: 83
End: 2018-01-01

## 2018-01-01 ENCOUNTER — APPOINTMENT (INPATIENT)
Dept: RADIOLOGY | Facility: HOSPITAL | Age: 83
DRG: 659 | End: 2018-01-01
Payer: COMMERCIAL

## 2018-01-01 ENCOUNTER — HOSPITAL ENCOUNTER (OUTPATIENT)
Facility: AMBULARY SURGERY CENTER | Age: 83
Setting detail: OUTPATIENT SURGERY
Discharge: HOME | End: 2018-11-28
Attending: INTERNAL MEDICINE | Admitting: INTERNAL MEDICINE
Payer: COMMERCIAL

## 2018-01-01 ENCOUNTER — HOSPITAL ENCOUNTER (OUTPATIENT)
Dept: RADIOLOGY | Facility: HOSPITAL | Age: 83
Discharge: HOME | End: 2018-12-26
Attending: SURGERY
Payer: COMMERCIAL

## 2018-01-01 ENCOUNTER — HOSPITAL ENCOUNTER (OUTPATIENT)
Dept: RADIOLOGY | Facility: HOSPITAL | Age: 83
Discharge: HOME | End: 2018-05-09
Attending: SURGERY
Payer: COMMERCIAL

## 2018-01-01 ENCOUNTER — HOSPITAL ENCOUNTER (OUTPATIENT)
Dept: NON INVASIVE DIAGNOSTICS | Facility: HOSPITAL | Age: 83
Discharge: HOME | End: 2018-12-17
Attending: INTERNAL MEDICINE
Payer: COMMERCIAL

## 2018-01-01 ENCOUNTER — OFFICE VISIT (OUTPATIENT)
Dept: VASCULAR SURGERY | Facility: CLINIC | Age: 83
End: 2018-01-01
Payer: COMMERCIAL

## 2018-01-01 ENCOUNTER — TELEPHONE (OUTPATIENT)
Dept: CARDIOLOGY CLINIC | Facility: CLINIC | Age: 83
End: 2018-01-01

## 2018-01-01 ENCOUNTER — ANESTHESIA EVENT (OUTPATIENT)
Dept: GASTROENTEROLOGY | Facility: AMBULARY SURGERY CENTER | Age: 83
End: 2018-01-01
Payer: COMMERCIAL

## 2018-01-01 ENCOUNTER — APPOINTMENT (INPATIENT)
Dept: NON INVASIVE DIAGNOSTICS | Facility: HOSPITAL | Age: 83
DRG: 659 | End: 2018-01-01
Payer: COMMERCIAL

## 2018-01-01 ENCOUNTER — APPOINTMENT (EMERGENCY)
Dept: RADIOLOGY | Facility: HOSPITAL | Age: 83
End: 2018-01-01
Payer: COMMERCIAL

## 2018-01-01 ENCOUNTER — HOSPITAL ENCOUNTER (OUTPATIENT)
Dept: RADIOLOGY | Facility: HOSPITAL | Age: 83
Discharge: HOME | End: 2018-06-11
Payer: COMMERCIAL

## 2018-01-01 ENCOUNTER — TRANSCRIBE ORDERS (OUTPATIENT)
Dept: ADMINISTRATIVE | Facility: HOSPITAL | Age: 83
End: 2018-01-01

## 2018-01-01 ENCOUNTER — APPOINTMENT (OUTPATIENT)
Dept: NON INVASIVE DIAGNOSTICS | Facility: HOSPITAL | Age: 83
End: 2018-01-01
Payer: COMMERCIAL

## 2018-01-01 ENCOUNTER — ANESTHESIA EVENT (INPATIENT)
Dept: PERIOP | Facility: HOSPITAL | Age: 83
DRG: 659 | End: 2018-01-01
Payer: COMMERCIAL

## 2018-01-01 ENCOUNTER — HOSPITAL ENCOUNTER (INPATIENT)
Facility: HOSPITAL | Age: 83
LOS: 8 days | Discharge: HOME | DRG: 659 | End: 2018-10-09
Attending: EMERGENCY MEDICINE | Admitting: INTERNAL MEDICINE
Payer: COMMERCIAL

## 2018-01-01 ENCOUNTER — HOSPITAL ENCOUNTER (OUTPATIENT)
Facility: HOSPITAL | Age: 83
Setting detail: OBSERVATION
Discharge: HOME | End: 2018-06-12
Attending: EMERGENCY MEDICINE | Admitting: INTERNAL MEDICINE
Payer: COMMERCIAL

## 2018-01-01 ENCOUNTER — TELEPHONE (OUTPATIENT)
Dept: VASCULAR SURGERY | Facility: CLINIC | Age: 83
End: 2018-01-01

## 2018-01-01 ENCOUNTER — HOSPITAL ENCOUNTER (INPATIENT)
Dept: RADIOLOGY | Facility: HOSPITAL | Age: 83
Discharge: HOME | DRG: 659 | End: 2018-10-02
Payer: COMMERCIAL

## 2018-01-01 ENCOUNTER — OFFICE VISIT (OUTPATIENT)
Dept: CARDIOLOGY CLINIC | Facility: CLINIC | Age: 83
End: 2018-01-01
Payer: COMMERCIAL

## 2018-01-01 ENCOUNTER — APPOINTMENT (EMERGENCY)
Dept: RADIOLOGY | Facility: HOSPITAL | Age: 83
DRG: 659 | End: 2018-01-01
Payer: COMMERCIAL

## 2018-01-01 ENCOUNTER — APPOINTMENT (OUTPATIENT)
Dept: RADIOLOGY | Facility: HOSPITAL | Age: 83
End: 2018-01-01
Payer: COMMERCIAL

## 2018-01-01 VITALS
DIASTOLIC BLOOD PRESSURE: 60 MMHG | HEART RATE: 80 BPM | RESPIRATION RATE: 18 BRPM | BODY MASS INDEX: 17.36 KG/M2 | OXYGEN SATURATION: 96 % | WEIGHT: 94.36 LBS | HEIGHT: 62 IN | SYSTOLIC BLOOD PRESSURE: 184 MMHG | TEMPERATURE: 98.1 F

## 2018-01-01 VITALS
WEIGHT: 90.17 LBS | DIASTOLIC BLOOD PRESSURE: 65 MMHG | TEMPERATURE: 98.1 F | HEIGHT: 62 IN | SYSTOLIC BLOOD PRESSURE: 146 MMHG | OXYGEN SATURATION: 97 % | HEART RATE: 71 BPM | BODY MASS INDEX: 16.59 KG/M2 | RESPIRATION RATE: 18 BRPM

## 2018-01-01 VITALS
HEIGHT: 61 IN | HEART RATE: 84 BPM | BODY MASS INDEX: 17.94 KG/M2 | DIASTOLIC BLOOD PRESSURE: 90 MMHG | WEIGHT: 95 LBS | SYSTOLIC BLOOD PRESSURE: 200 MMHG

## 2018-01-01 VITALS
BODY MASS INDEX: 17.75 KG/M2 | DIASTOLIC BLOOD PRESSURE: 80 MMHG | SYSTOLIC BLOOD PRESSURE: 180 MMHG | HEIGHT: 61 IN | WEIGHT: 94 LBS

## 2018-01-01 VITALS
OXYGEN SATURATION: 98 % | DIASTOLIC BLOOD PRESSURE: 77 MMHG | WEIGHT: 93 LBS | TEMPERATURE: 98 F | RESPIRATION RATE: 16 BRPM | BODY MASS INDEX: 17.01 KG/M2 | HEART RATE: 55 BPM | SYSTOLIC BLOOD PRESSURE: 181 MMHG

## 2018-01-01 VITALS
DIASTOLIC BLOOD PRESSURE: 72 MMHG | HEART RATE: 78 BPM | SYSTOLIC BLOOD PRESSURE: 150 MMHG | WEIGHT: 94.4 LBS | BODY MASS INDEX: 17.82 KG/M2 | OXYGEN SATURATION: 98 % | HEIGHT: 61 IN

## 2018-01-01 DIAGNOSIS — I50.43 ACUTE ON CHRONIC COMBINED SYSTOLIC AND DIASTOLIC HEART FAILURE (HCC): ICD-10-CM

## 2018-01-01 DIAGNOSIS — I73.9 PVD (PERIPHERAL VASCULAR DISEASE) (HCC): ICD-10-CM

## 2018-01-01 DIAGNOSIS — I10 ESSENTIAL HYPERTENSION: ICD-10-CM

## 2018-01-01 DIAGNOSIS — I73.9 PAD (PERIPHERAL ARTERY DISEASE) (HCC): ICD-10-CM

## 2018-01-01 DIAGNOSIS — I48.91 NEW ONSET ATRIAL FIBRILLATION (HCC): ICD-10-CM

## 2018-01-01 DIAGNOSIS — K21.9 ACID REFLUX: ICD-10-CM

## 2018-01-01 DIAGNOSIS — I65.22 OCCLUSION OF LEFT CAROTID ARTERY: ICD-10-CM

## 2018-01-01 DIAGNOSIS — I50.9 CHF (CONGESTIVE HEART FAILURE) (HCC): ICD-10-CM

## 2018-01-01 DIAGNOSIS — I73.9 PVD (PERIPHERAL VASCULAR DISEASE) (HCC): Primary | ICD-10-CM

## 2018-01-01 DIAGNOSIS — I42.0 DILATED CARDIOMYOPATHY (HCC): ICD-10-CM

## 2018-01-01 DIAGNOSIS — K26.5 PERFORATED DUODENAL ULCER (HCC): ICD-10-CM

## 2018-01-01 DIAGNOSIS — K85.00 IDIOPATHIC ACUTE PANCREATITIS, UNSPECIFIED COMPLICATION STATUS: ICD-10-CM

## 2018-01-01 DIAGNOSIS — I70.1 LEFT RENAL ARTERY STENOSIS (HCC): ICD-10-CM

## 2018-01-01 DIAGNOSIS — I65.21 OCCLUSION OF RIGHT CAROTID ARTERY: ICD-10-CM

## 2018-01-01 DIAGNOSIS — I73.9 PERIPHERAL VASCULAR DISEASE (HCC): ICD-10-CM

## 2018-01-01 DIAGNOSIS — I65.21 STENOSIS OF RIGHT INTERNAL CAROTID ARTERY: ICD-10-CM

## 2018-01-01 DIAGNOSIS — K25.3 ACUTE GASTRIC ULCER WITHOUT HEMORRHAGE OR PERFORATION: ICD-10-CM

## 2018-01-01 DIAGNOSIS — I77.9 PERIPHERAL ARTERIAL OCCLUSIVE DISEASE (HCC): ICD-10-CM

## 2018-01-01 DIAGNOSIS — Z79.01 LONG TERM (CURRENT) USE OF ANTICOAGULANTS: Primary | ICD-10-CM

## 2018-01-01 DIAGNOSIS — I10 ESSENTIAL HYPERTENSION: Primary | ICD-10-CM

## 2018-01-01 DIAGNOSIS — I16.0 HYPERTENSIVE URGENCY: Primary | ICD-10-CM

## 2018-01-01 DIAGNOSIS — K55.1 CHRONIC MESENTERIC ISCHEMIA (HCC): ICD-10-CM

## 2018-01-01 DIAGNOSIS — R10.84 GENERALIZED ABDOMINAL PAIN: ICD-10-CM

## 2018-01-01 DIAGNOSIS — D64.9 ANEMIA: ICD-10-CM

## 2018-01-01 DIAGNOSIS — R63.4 WEIGHT LOSS, UNINTENTIONAL: Primary | ICD-10-CM

## 2018-01-01 DIAGNOSIS — I15.0 RENOVASCULAR HYPERTENSION: ICD-10-CM

## 2018-01-01 DIAGNOSIS — I65.21 STENOSIS OF RIGHT INTERNAL CAROTID ARTERY: Primary | ICD-10-CM

## 2018-01-01 DIAGNOSIS — I65.21 OCCLUSION OF RIGHT CAROTID ARTERY: Primary | ICD-10-CM

## 2018-01-01 DIAGNOSIS — I65.29 CAROTID STENOSIS: ICD-10-CM

## 2018-01-01 DIAGNOSIS — E87.1 HYPONATREMIA: ICD-10-CM

## 2018-01-01 DIAGNOSIS — K25.9 GASTRIC ULCER: ICD-10-CM

## 2018-01-01 DIAGNOSIS — I34.0 MODERATE TO SEVERE MITRAL REGURGITATION: ICD-10-CM

## 2018-01-01 DIAGNOSIS — N20.1 LEFT URETERAL STONE: ICD-10-CM

## 2018-01-01 DIAGNOSIS — E87.8 LOW BICARBONATE LEVEL: Primary | ICD-10-CM

## 2018-01-01 DIAGNOSIS — R74.8 ELEVATED LIVER ENZYMES: ICD-10-CM

## 2018-01-01 LAB
ABO GROUP BLD BPU: NORMAL
ABO GROUP BLD BPU: NORMAL
ABO GROUP BLD: NORMAL
ALBUMIN SERPL BCP-MCNC: 2.7 G/DL (ref 3.5–5)
ALBUMIN SERPL BCP-MCNC: 2.7 G/DL (ref 3.5–5)
ALBUMIN SERPL BCP-MCNC: 2.8 G/DL (ref 3.5–5)
ALBUMIN SERPL BCP-MCNC: 2.9 G/DL (ref 3.5–5)
ALBUMIN SERPL BCP-MCNC: 2.9 G/DL (ref 3.5–5)
ALBUMIN SERPL BCP-MCNC: 3.3 G/DL (ref 3.5–5)
ALBUMIN SERPL BCP-MCNC: 3.9 G/DL (ref 3.5–5)
ALBUMIN SERPL BCP-MCNC: 4.2 G/DL (ref 3.5–5)
ALP SERPL-CCNC: 57 U/L (ref 46–116)
ALP SERPL-CCNC: 57 U/L (ref 46–116)
ALP SERPL-CCNC: 64 U/L (ref 46–116)
ALP SERPL-CCNC: 65 U/L (ref 46–116)
ALP SERPL-CCNC: 68 U/L (ref 46–116)
ALP SERPL-CCNC: 69 U/L (ref 46–116)
ALP SERPL-CCNC: 69 U/L (ref 46–116)
ALP SERPL-CCNC: 70 U/L (ref 46–116)
ALT SERPL W P-5'-P-CCNC: 1124 U/L (ref 12–78)
ALT SERPL W P-5'-P-CCNC: 22 U/L (ref 12–78)
ALT SERPL W P-5'-P-CCNC: 28 U/L (ref 12–78)
ALT SERPL W P-5'-P-CCNC: 334 U/L (ref 12–78)
ALT SERPL W P-5'-P-CCNC: 478 U/L (ref 12–78)
ALT SERPL W P-5'-P-CCNC: 553 U/L (ref 12–78)
ALT SERPL W P-5'-P-CCNC: 741 U/L (ref 12–78)
ALT SERPL W P-5'-P-CCNC: 754 U/L (ref 12–78)
AMYLASE SERPL-CCNC: 91 IU/L (ref 25–115)
ANION GAP SERPL CALCULATED.3IONS-SCNC: 10 MMOL/L (ref 4–13)
ANION GAP SERPL CALCULATED.3IONS-SCNC: 11 MMOL/L (ref 4–13)
ANION GAP SERPL CALCULATED.3IONS-SCNC: 11 MMOL/L (ref 4–13)
ANION GAP SERPL CALCULATED.3IONS-SCNC: 12 MMOL/L (ref 4–13)
ANION GAP SERPL CALCULATED.3IONS-SCNC: 13 MMOL/L (ref 4–13)
ANION GAP SERPL CALCULATED.3IONS-SCNC: 13 MMOL/L (ref 4–13)
ANION GAP SERPL CALCULATED.3IONS-SCNC: 14 MMOL/L (ref 4–13)
ANION GAP SERPL CALCULATED.3IONS-SCNC: 15 MMOL/L (ref 4–13)
APAP SERPL-MCNC: <2 UG/ML (ref 10–30)
APTT PPP: 24 SECONDS (ref 24–33)
APTT PPP: 26 SECONDS (ref 24–33)
AST SERPL W P-5'-P-CCNC: 1129 U/L (ref 5–45)
AST SERPL W P-5'-P-CCNC: 1788 U/L (ref 5–45)
AST SERPL W P-5'-P-CCNC: 18 U/L (ref 5–45)
AST SERPL W P-5'-P-CCNC: 194 U/L (ref 5–45)
AST SERPL W P-5'-P-CCNC: 22 U/L (ref 5–45)
AST SERPL W P-5'-P-CCNC: 303 U/L (ref 5–45)
AST SERPL W P-5'-P-CCNC: 708 U/L (ref 5–45)
AST SERPL W P-5'-P-CCNC: 86 U/L (ref 5–45)
ATRIAL RATE: 138 BPM
ATRIAL RATE: 147 BPM
ATRIAL RATE: 69 BPM
ATRIAL RATE: 85 BPM
BACTERIA BLD CULT: NORMAL
BACTERIA BLD CULT: NORMAL
BACTERIA UR QL AUTO: ABNORMAL /HPF
BACTERIA UR QL AUTO: ABNORMAL /HPF
BASE EXCESS BLDA CALC-SCNC: -2.1 MMOL/L
BASOPHILS # BLD AUTO: 0.01 THOUSANDS/ΜL (ref 0–0.1)
BASOPHILS # BLD AUTO: 0.02 THOUSANDS/ΜL (ref 0–0.1)
BASOPHILS # BLD AUTO: 0.03 THOUSANDS/ΜL (ref 0–0.1)
BASOPHILS # BLD AUTO: 0.03 THOUSANDS/ΜL (ref 0–0.1)
BASOPHILS # BLD AUTO: 0.04 THOUSANDS/ΜL (ref 0–0.1)
BASOPHILS # BLD AUTO: 0.05 THOUSANDS/ΜL (ref 0–0.1)
BASOPHILS NFR BLD AUTO: 0 % (ref 0–1)
BASOPHILS NFR BLD AUTO: 1 % (ref 0–1)
BASOPHILS NFR BLD AUTO: 1 % (ref 0–1)
BILIRUB DIRECT SERPL-MCNC: 0.5 MG/DL (ref 0–0.2)
BILIRUB SERPL-MCNC: 0.4 MG/DL (ref 0.2–1)
BILIRUB SERPL-MCNC: 0.5 MG/DL (ref 0.2–1)
BILIRUB SERPL-MCNC: 0.5 MG/DL (ref 0.2–1)
BILIRUB SERPL-MCNC: 0.9 MG/DL (ref 0.2–1)
BILIRUB SERPL-MCNC: 0.9 MG/DL (ref 0.2–1)
BILIRUB SERPL-MCNC: 1 MG/DL (ref 0.2–1)
BILIRUB SERPL-MCNC: 1.9 MG/DL (ref 0.2–1)
BILIRUB SERPL-MCNC: 2 MG/DL (ref 0.2–1)
BILIRUB UR QL STRIP: NEGATIVE
BILIRUB UR QL STRIP: NEGATIVE
BLD GP AB SCN SERPL QL: NEGATIVE
BODY TEMPERATURE: 98.6 DEGREES FEHRENHEIT
BPU ID: NORMAL
BPU ID: NORMAL
BUN SERPL-MCNC: 20 MG/DL (ref 5–25)
BUN SERPL-MCNC: 20 MG/DL (ref 5–25)
BUN SERPL-MCNC: 21 MG/DL (ref 5–25)
BUN SERPL-MCNC: 22 MG/DL (ref 5–25)
BUN SERPL-MCNC: 22 MG/DL (ref 5–25)
BUN SERPL-MCNC: 23 MG/DL (ref 5–25)
BUN SERPL-MCNC: 24 MG/DL (ref 5–25)
BUN SERPL-MCNC: 26 MG/DL (ref 5–25)
BUN SERPL-MCNC: 28 MG/DL (ref 5–25)
BUN SERPL-MCNC: 28 MG/DL (ref 5–25)
BUN SERPL-MCNC: 29 MG/DL (ref 5–25)
BUN SERPL-MCNC: 30 MG/DL (ref 5–25)
BUN SERPL-MCNC: 33 MG/DL (ref 5–25)
CA PHOS MFR STONE: 5 %
CALCIUM SERPL-MCNC: 8.1 MG/DL (ref 8.3–10.1)
CALCIUM SERPL-MCNC: 8.2 MG/DL (ref 8.3–10.1)
CALCIUM SERPL-MCNC: 8.4 MG/DL (ref 8.3–10.1)
CALCIUM SERPL-MCNC: 8.5 MG/DL (ref 8.3–10.1)
CALCIUM SERPL-MCNC: 8.5 MG/DL (ref 8.3–10.1)
CALCIUM SERPL-MCNC: 8.8 MG/DL (ref 8.3–10.1)
CALCIUM SERPL-MCNC: 9.1 MG/DL (ref 8.3–10.1)
CALCIUM SERPL-MCNC: 9.2 MG/DL (ref 8.3–10.1)
CALCIUM SERPL-MCNC: 9.2 MG/DL (ref 8.3–10.1)
CHEST PAIN STATEMENT: NORMAL
CHLORIDE SERPL-SCNC: 100 MMOL/L (ref 100–108)
CHLORIDE SERPL-SCNC: 101 MMOL/L (ref 100–108)
CHLORIDE SERPL-SCNC: 103 MMOL/L (ref 100–108)
CHLORIDE SERPL-SCNC: 104 MMOL/L (ref 100–108)
CHLORIDE SERPL-SCNC: 104 MMOL/L (ref 100–108)
CHLORIDE SERPL-SCNC: 105 MMOL/L (ref 100–108)
CHLORIDE SERPL-SCNC: 95 MMOL/L (ref 100–108)
CHLORIDE SERPL-SCNC: 98 MMOL/L (ref 100–108)
CHOLEST SERPL-MCNC: 234 MG/DL (ref 50–200)
CLARITY UR: CLEAR
CLARITY UR: CLEAR
CO2 SERPL-SCNC: 15 MMOL/L (ref 21–32)
CO2 SERPL-SCNC: 16 MMOL/L (ref 21–32)
CO2 SERPL-SCNC: 18 MMOL/L (ref 21–32)
CO2 SERPL-SCNC: 20 MMOL/L (ref 21–32)
CO2 SERPL-SCNC: 21 MMOL/L (ref 21–32)
CO2 SERPL-SCNC: 22 MMOL/L (ref 21–32)
CO2 SERPL-SCNC: 23 MMOL/L (ref 21–32)
CO2 SERPL-SCNC: 24 MMOL/L (ref 21–32)
COLOR STONE: NORMAL
COLOR UR: ABNORMAL
COLOR UR: YELLOW
COM MFR STONE: 30 %
COMMENT-STONE3: NORMAL
COMPOSITION: NORMAL
CREAT SERPL-MCNC: 0.86 MG/DL (ref 0.6–1.3)
CREAT SERPL-MCNC: 0.89 MG/DL (ref 0.6–1.3)
CREAT SERPL-MCNC: 0.95 MG/DL (ref 0.6–1.3)
CREAT SERPL-MCNC: 0.97 MG/DL (ref 0.6–1.3)
CREAT SERPL-MCNC: 0.99 MG/DL (ref 0.6–1.3)
CREAT SERPL-MCNC: 1.1 MG/DL (ref 0.6–1.3)
CREAT SERPL-MCNC: 1.1 MG/DL (ref 0.6–1.3)
CREAT SERPL-MCNC: 1.12 MG/DL (ref 0.6–1.3)
CREAT SERPL-MCNC: 1.26 MG/DL (ref 0.6–1.3)
CREAT SERPL-MCNC: 1.37 MG/DL (ref 0.6–1.3)
CREAT SERPL-MCNC: 1.52 MG/DL (ref 0.6–1.3)
CREAT SERPL-MCNC: 1.53 MG/DL (ref 0.6–1.3)
CREAT SERPL-MCNC: 1.65 MG/DL (ref 0.6–1.3)
CROSSMATCH: NORMAL
CROSSMATCH: NORMAL
EOSINOPHIL # BLD AUTO: 0 THOUSAND/ΜL (ref 0–0.61)
EOSINOPHIL # BLD AUTO: 0.01 THOUSAND/ΜL (ref 0–0.61)
EOSINOPHIL # BLD AUTO: 0.01 THOUSAND/ΜL (ref 0–0.61)
EOSINOPHIL # BLD AUTO: 0.05 THOUSAND/ΜL (ref 0–0.61)
EOSINOPHIL # BLD AUTO: 0.07 THOUSAND/ΜL (ref 0–0.61)
EOSINOPHIL # BLD AUTO: 0.11 THOUSAND/ΜL (ref 0–0.61)
EOSINOPHIL # BLD AUTO: 0.13 THOUSAND/ΜL (ref 0–0.61)
EOSINOPHIL # BLD AUTO: 0.23 THOUSAND/ΜL (ref 0–0.61)
EOSINOPHIL NFR BLD AUTO: 0 % (ref 0–6)
EOSINOPHIL NFR BLD AUTO: 1 % (ref 0–6)
EOSINOPHIL NFR BLD AUTO: 2 % (ref 0–6)
EOSINOPHIL NFR BLD AUTO: 3 % (ref 0–6)
ERYTHROCYTE [DISTWIDTH] IN BLOOD BY AUTOMATED COUNT: 13.6 % (ref 11.6–15.1)
ERYTHROCYTE [DISTWIDTH] IN BLOOD BY AUTOMATED COUNT: 13.7 % (ref 11.6–15.1)
ERYTHROCYTE [DISTWIDTH] IN BLOOD BY AUTOMATED COUNT: 17.8 % (ref 11.6–15.1)
ERYTHROCYTE [DISTWIDTH] IN BLOOD BY AUTOMATED COUNT: 17.8 % (ref 11.6–15.1)
ERYTHROCYTE [DISTWIDTH] IN BLOOD BY AUTOMATED COUNT: 17.9 % (ref 11.6–15.1)
ERYTHROCYTE [DISTWIDTH] IN BLOOD BY AUTOMATED COUNT: 18.2 % (ref 11.6–15.1)
ERYTHROCYTE [DISTWIDTH] IN BLOOD BY AUTOMATED COUNT: 18.4 % (ref 11.6–15.1)
ERYTHROCYTE [DISTWIDTH] IN BLOOD BY AUTOMATED COUNT: 18.7 % (ref 11.6–15.1)
ERYTHROCYTE [DISTWIDTH] IN BLOOD BY AUTOMATED COUNT: 18.8 % (ref 11.6–15.1)
ERYTHROCYTE [DISTWIDTH] IN BLOOD BY AUTOMATED COUNT: 20.3 % (ref 11.6–15.1)
ERYTHROCYTE [DISTWIDTH] IN BLOOD BY AUTOMATED COUNT: 21 % (ref 11.6–15.1)
ERYTHROCYTE [DISTWIDTH] IN BLOOD BY AUTOMATED COUNT: 21.4 % (ref 11.6–15.1)
EST. AVERAGE GLUCOSE BLD GHB EST-MCNC: 120 MG/DL
EXT GLUCOSE BLD: 98
EXTERNAL ALBUMIN: 4.6
EXTERNAL ALK PHOS: 78
EXTERNAL ALT: 22
EXTERNAL AST: 25
EXTERNAL BICARBONATE: 19
EXTERNAL BUN: 27
EXTERNAL CALCIUM: 9.8
EXTERNAL CHLORIDE: 101
EXTERNAL CREATININE: 1.02
EXTERNAL EGFR: 50
EXTERNAL POTASSIUM: 4.4
EXTERNAL SODIUM: 139
FERRITIN SERPL-MCNC: 15 NG/ML (ref 8–388)
FOLATE SERPL-MCNC: >20 NG/ML (ref 3.1–17.5)
GFR SERPL CREATININE-BSD FRML MDRD: 28 ML/MIN/1.73SQ M
GFR SERPL CREATININE-BSD FRML MDRD: 30 ML/MIN/1.73SQ M
GFR SERPL CREATININE-BSD FRML MDRD: 30 ML/MIN/1.73SQ M
GFR SERPL CREATININE-BSD FRML MDRD: 34 ML/MIN/1.73SQ M
GFR SERPL CREATININE-BSD FRML MDRD: 38 ML/MIN/1.73SQ M
GFR SERPL CREATININE-BSD FRML MDRD: 44 ML/MIN/1.73SQ M
GFR SERPL CREATININE-BSD FRML MDRD: 45 ML/MIN/1.73SQ M
GFR SERPL CREATININE-BSD FRML MDRD: 45 ML/MIN/1.73SQ M
GFR SERPL CREATININE-BSD FRML MDRD: 51 ML/MIN/1.73SQ M
GFR SERPL CREATININE-BSD FRML MDRD: 52 ML/MIN/1.73SQ M
GFR SERPL CREATININE-BSD FRML MDRD: 54 ML/MIN/1.73SQ M
GFR SERPL CREATININE-BSD FRML MDRD: 58 ML/MIN/1.73SQ M
GFR SERPL CREATININE-BSD FRML MDRD: 61 ML/MIN/1.73SQ M
GLUCOSE P FAST SERPL-MCNC: 113 MG/DL (ref 65–99)
GLUCOSE SERPL-MCNC: 102 MG/DL (ref 65–140)
GLUCOSE SERPL-MCNC: 106 MG/DL (ref 65–140)
GLUCOSE SERPL-MCNC: 109 MG/DL (ref 65–140)
GLUCOSE SERPL-MCNC: 112 MG/DL (ref 65–140)
GLUCOSE SERPL-MCNC: 113 MG/DL (ref 65–140)
GLUCOSE SERPL-MCNC: 122 MG/DL (ref 65–140)
GLUCOSE SERPL-MCNC: 129 MG/DL (ref 65–140)
GLUCOSE SERPL-MCNC: 129 MG/DL (ref 65–140)
GLUCOSE SERPL-MCNC: 148 MG/DL (ref 65–140)
GLUCOSE SERPL-MCNC: 74 MG/DL (ref 65–140)
GLUCOSE SERPL-MCNC: 75 MG/DL (ref 65–140)
GLUCOSE SERPL-MCNC: 89 MG/DL (ref 65–140)
GLUCOSE SERPL-MCNC: 92 MG/DL (ref 65–140)
GLUCOSE SERPL-MCNC: 95 MG/DL (ref 65–140)
GLUCOSE UR STRIP-MCNC: NEGATIVE MG/DL
GLUCOSE UR STRIP-MCNC: NEGATIVE MG/DL
HBA1C MFR BLD: 5.8 % (ref 4.2–6.3)
HCO3 BLDA-SCNC: 21 MMOL/L (ref 22–28)
HCT VFR BLD AUTO: 24.4 % (ref 34.8–46.1)
HCT VFR BLD AUTO: 26 % (ref 34.8–46.1)
HCT VFR BLD AUTO: 26.1 % (ref 34.8–46.1)
HCT VFR BLD AUTO: 26.5 % (ref 34.8–46.1)
HCT VFR BLD AUTO: 27.5 % (ref 34.8–46.1)
HCT VFR BLD AUTO: 30.1 % (ref 34.8–46.1)
HCT VFR BLD AUTO: 30.3 % (ref 34.8–46.1)
HCT VFR BLD AUTO: 31.5 % (ref 34.8–46.1)
HCT VFR BLD AUTO: 32.5 % (ref 34.8–46.1)
HCT VFR BLD AUTO: 32.7 % (ref 34.8–46.1)
HCT VFR BLD AUTO: 34.1 % (ref 34.8–46.1)
HCT VFR BLD AUTO: 36.1 % (ref 34.8–46.1)
HCT VFR BLD AUTO: 38.9 % (ref 34.8–46.1)
HCT VFR BLD AUTO: 41.2 % (ref 34.8–46.1)
HDLC SERPL-MCNC: 86 MG/DL (ref 40–60)
HGB BLD-MCNC: 10.1 G/DL (ref 11.5–15.4)
HGB BLD-MCNC: 10.4 G/DL (ref 11.5–15.4)
HGB BLD-MCNC: 10.5 G/DL (ref 11.5–15.4)
HGB BLD-MCNC: 11.2 G/DL (ref 11.5–15.4)
HGB BLD-MCNC: 12.4 G/DL (ref 11.5–15.4)
HGB BLD-MCNC: 13 G/DL (ref 11.5–15.4)
HGB BLD-MCNC: 6.9 G/DL (ref 11.5–15.4)
HGB BLD-MCNC: 7.7 G/DL (ref 11.5–15.4)
HGB BLD-MCNC: 8 G/DL (ref 11.5–15.4)
HGB BLD-MCNC: 8.2 G/DL (ref 11.5–15.4)
HGB BLD-MCNC: 8.6 G/DL (ref 11.5–15.4)
HGB BLD-MCNC: 9.1 G/DL (ref 11.5–15.4)
HGB BLD-MCNC: 9.5 G/DL (ref 11.5–15.4)
HGB BLD-MCNC: 9.8 G/DL (ref 11.5–15.4)
HGB UR QL STRIP.AUTO: ABNORMAL
HGB UR QL STRIP.AUTO: NEGATIVE
HYALINE CASTS #/AREA URNS LPF: ABNORMAL /LPF
IMM GRANULOCYTES # BLD AUTO: 0.02 THOUSAND/UL (ref 0–0.2)
IMM GRANULOCYTES # BLD AUTO: 0.03 THOUSAND/UL (ref 0–0.2)
IMM GRANULOCYTES # BLD AUTO: 0.04 THOUSAND/UL (ref 0–0.2)
IMM GRANULOCYTES # BLD AUTO: 0.05 THOUSAND/UL (ref 0–0.2)
IMM GRANULOCYTES # BLD AUTO: 0.06 THOUSAND/UL (ref 0–0.2)
IMM GRANULOCYTES # BLD AUTO: 0.08 THOUSAND/UL (ref 0–0.2)
IMM GRANULOCYTES # BLD AUTO: 0.1 THOUSAND/UL (ref 0–0.2)
IMM GRANULOCYTES NFR BLD AUTO: 0 % (ref 0–2)
IMM GRANULOCYTES NFR BLD AUTO: 1 % (ref 0–2)
INR PPP: 1.03 (ref 0.86–1.16)
INR PPP: 1.11 (ref 0.86–1.16)
INR PPP: 1.22 (ref 0.86–1.16)
INR PPP: 1.3 (ref 0.86–1.16)
INR PPP: 1.33 (ref 0.86–1.16)
IRON SATN MFR SERPL: 3 %
IRON SERPL-MCNC: 13 UG/DL (ref 50–170)
KETONES UR STRIP-MCNC: ABNORMAL MG/DL
KETONES UR STRIP-MCNC: NEGATIVE MG/DL
LABORATORY COMMENT REPORT: NORMAL
LACTATE SERPL-SCNC: 0.7 MMOL/L (ref 0.5–2)
LACTATE SERPL-SCNC: 1.1 MMOL/L (ref 0.5–2)
LACTATE SERPL-SCNC: 1.5 MMOL/L (ref 0.5–2)
LACTATE SERPL-SCNC: 1.5 MMOL/L (ref 0.5–2)
LDH SERPL-CCNC: 950 U/L (ref 81–234)
LDLC SERPL CALC-MCNC: 134 MG/DL (ref 0–100)
LEUKOCYTE ESTERASE UR QL STRIP: ABNORMAL
LEUKOCYTE ESTERASE UR QL STRIP: NEGATIVE
LIPASE SERPL-CCNC: 350 U/L (ref 73–393)
LIPASE SERPL-CCNC: 596 U/L (ref 73–393)
LIPASE SERPL-CCNC: 617 U/L (ref 73–393)
LYMPHOCYTES # BLD AUTO: 0.65 THOUSAND/UL (ref 0.6–4.47)
LYMPHOCYTES # BLD AUTO: 0.69 THOUSANDS/ΜL (ref 0.6–4.47)
LYMPHOCYTES # BLD AUTO: 0.73 THOUSANDS/ΜL (ref 0.6–4.47)
LYMPHOCYTES # BLD AUTO: 0.81 THOUSANDS/ΜL (ref 0.6–4.47)
LYMPHOCYTES # BLD AUTO: 0.94 THOUSANDS/ΜL (ref 0.6–4.47)
LYMPHOCYTES # BLD AUTO: 1.05 THOUSANDS/ΜL (ref 0.6–4.47)
LYMPHOCYTES # BLD AUTO: 1.09 THOUSANDS/ΜL (ref 0.6–4.47)
LYMPHOCYTES # BLD AUTO: 1.24 THOUSANDS/ΜL (ref 0.6–4.47)
LYMPHOCYTES # BLD AUTO: 1.52 THOUSANDS/ΜL (ref 0.6–4.47)
LYMPHOCYTES # BLD AUTO: 3 %
LYMPHOCYTES NFR BLD AUTO: 12 % (ref 14–44)
LYMPHOCYTES NFR BLD AUTO: 12 % (ref 14–44)
LYMPHOCYTES NFR BLD AUTO: 13 % (ref 14–44)
LYMPHOCYTES NFR BLD AUTO: 15 % (ref 14–44)
LYMPHOCYTES NFR BLD AUTO: 17 % (ref 14–44)
LYMPHOCYTES NFR BLD AUTO: 6 % (ref 14–44)
LYMPHOCYTES NFR BLD AUTO: 6 % (ref 14–44)
LYMPHOCYTES NFR BLD AUTO: 8 % (ref 14–44)
MAGNESIUM SERPL-MCNC: 1.5 MG/DL (ref 1.6–2.6)
MAGNESIUM SERPL-MCNC: 1.6 MG/DL (ref 1.6–2.6)
MAGNESIUM SERPL-MCNC: 1.7 MG/DL (ref 1.6–2.6)
MAGNESIUM SERPL-MCNC: 2 MG/DL (ref 1.6–2.6)
MAGNESIUM SERPL-MCNC: 2 MG/DL (ref 1.6–2.6)
MAGNESIUM SERPL-MCNC: 2.1 MG/DL (ref 1.6–2.6)
MAX DIASTOLIC BP: 70 MMHG
MAX HEART RATE: 93 BPM
MAX PREDICTED HEART RATE: 132 BPM
MAX. SYSTOLIC BP: 180 MMHG
MCH RBC QN AUTO: 22.3 PG (ref 26.8–34.3)
MCH RBC QN AUTO: 22.4 PG (ref 26.8–34.3)
MCH RBC QN AUTO: 22.8 PG (ref 26.8–34.3)
MCH RBC QN AUTO: 22.8 PG (ref 26.8–34.3)
MCH RBC QN AUTO: 23.5 PG (ref 26.8–34.3)
MCH RBC QN AUTO: 23.8 PG (ref 26.8–34.3)
MCH RBC QN AUTO: 24 PG (ref 26.8–34.3)
MCH RBC QN AUTO: 24.3 PG (ref 26.8–34.3)
MCH RBC QN AUTO: 24.5 PG (ref 26.8–34.3)
MCH RBC QN AUTO: 24.6 PG (ref 26.8–34.3)
MCH RBC QN AUTO: 29.6 PG (ref 26.8–34.3)
MCH RBC QN AUTO: 29.7 PG (ref 26.8–34.3)
MCHC RBC AUTO-ENTMCNC: 28.3 G/DL (ref 31.4–37.4)
MCHC RBC AUTO-ENTMCNC: 29.5 G/DL (ref 31.4–37.4)
MCHC RBC AUTO-ENTMCNC: 29.8 G/DL (ref 31.4–37.4)
MCHC RBC AUTO-ENTMCNC: 30.2 G/DL (ref 31.4–37.4)
MCHC RBC AUTO-ENTMCNC: 30.2 G/DL (ref 31.4–37.4)
MCHC RBC AUTO-ENTMCNC: 30.5 G/DL (ref 31.4–37.4)
MCHC RBC AUTO-ENTMCNC: 30.8 G/DL (ref 31.4–37.4)
MCHC RBC AUTO-ENTMCNC: 31 G/DL (ref 31.4–37.4)
MCHC RBC AUTO-ENTMCNC: 31.6 G/DL (ref 31.4–37.4)
MCHC RBC AUTO-ENTMCNC: 31.9 G/DL (ref 31.4–37.4)
MCHC RBC AUTO-ENTMCNC: 32.1 G/DL (ref 31.4–37.4)
MCHC RBC AUTO-ENTMCNC: 32.5 G/DL (ref 31.4–37.4)
MCV RBC AUTO: 73 FL (ref 82–98)
MCV RBC AUTO: 75 FL (ref 82–98)
MCV RBC AUTO: 76 FL (ref 82–98)
MCV RBC AUTO: 76 FL (ref 82–98)
MCV RBC AUTO: 77 FL (ref 82–98)
MCV RBC AUTO: 79 FL (ref 82–98)
MCV RBC AUTO: 80 FL (ref 82–98)
MCV RBC AUTO: 81 FL (ref 82–98)
MCV RBC AUTO: 93 FL (ref 82–98)
MCV RBC AUTO: 94 FL (ref 82–98)
MONOCYTES # BLD AUTO: 0.71 THOUSAND/ΜL (ref 0.17–1.22)
MONOCYTES # BLD AUTO: 0.76 THOUSAND/ΜL (ref 0.17–1.22)
MONOCYTES # BLD AUTO: 0.78 THOUSAND/ΜL (ref 0.17–1.22)
MONOCYTES # BLD AUTO: 0.84 THOUSAND/ΜL (ref 0.17–1.22)
MONOCYTES # BLD AUTO: 0.87 THOUSAND/UL (ref 0–1.22)
MONOCYTES # BLD AUTO: 1 THOUSAND/ΜL (ref 0.17–1.22)
MONOCYTES # BLD AUTO: 1.03 THOUSAND/ΜL (ref 0.17–1.22)
MONOCYTES # BLD AUTO: 1.04 THOUSAND/ΜL (ref 0.17–1.22)
MONOCYTES # BLD AUTO: 1.28 THOUSAND/ΜL (ref 0.17–1.22)
MONOCYTES NFR BLD AUTO: 10 % (ref 4–12)
MONOCYTES NFR BLD AUTO: 12 % (ref 4–12)
MONOCYTES NFR BLD AUTO: 4 % (ref 4–12)
MONOCYTES NFR BLD AUTO: 7 % (ref 4–12)
MONOCYTES NFR BLD AUTO: 8 % (ref 4–12)
MONOCYTES NFR BLD AUTO: 9 % (ref 4–12)
MONOCYTES NFR BLD AUTO: 9 % (ref 4–12)
MRSA NOSE QL CULT: NORMAL
MRSA NOSE QL CULT: NORMAL
NASAL CANNULA: 3
NEUTROPHILS # BLD AUTO: 10.7 THOUSANDS/ΜL (ref 1.85–7.62)
NEUTROPHILS # BLD AUTO: 11.01 THOUSANDS/ΜL (ref 1.85–7.62)
NEUTROPHILS # BLD AUTO: 5.67 THOUSANDS/ΜL (ref 1.85–7.62)
NEUTROPHILS # BLD AUTO: 6.29 THOUSANDS/ΜL (ref 1.85–7.62)
NEUTROPHILS # BLD AUTO: 6.33 THOUSANDS/ΜL (ref 1.85–7.62)
NEUTROPHILS # BLD AUTO: 6.34 THOUSANDS/ΜL (ref 1.85–7.62)
NEUTROPHILS # BLD AUTO: 6.38 THOUSANDS/ΜL (ref 1.85–7.62)
NEUTROPHILS # BLD AUTO: 7.44 THOUSANDS/ΜL (ref 1.85–7.62)
NEUTS BAND NFR BLD MANUAL: 4 % (ref 0–8)
NEUTS SEG # BLD: 20.26 THOUSAND/UL (ref 1.81–6.82)
NEUTS SEG NFR BLD AUTO: 70 % (ref 43–75)
NEUTS SEG NFR BLD AUTO: 71 % (ref 43–75)
NEUTS SEG NFR BLD AUTO: 73 % (ref 43–75)
NEUTS SEG NFR BLD AUTO: 73 % (ref 43–75)
NEUTS SEG NFR BLD AUTO: 78 % (ref 43–75)
NEUTS SEG NFR BLD AUTO: 82 % (ref 43–75)
NEUTS SEG NFR BLD AUTO: 84 % (ref 43–75)
NEUTS SEG NFR BLD AUTO: 87 % (ref 43–75)
NEUTS SEG NFR BLD AUTO: 89 %
NIDUS STONE QL: NORMAL
NITRITE UR QL STRIP: NEGATIVE
NITRITE UR QL STRIP: NEGATIVE
NON-SQ EPI CELLS URNS QL MICRO: ABNORMAL /HPF
NON-SQ EPI CELLS URNS QL MICRO: ABNORMAL /HPF
NRBC BLD AUTO-RTO: 0 /100 WBCS
NT-PROBNP SERPL-MCNC: ABNORMAL PG/ML
NT-PROBNP SERPL-MCNC: ABNORMAL PG/ML
O2 CT BLDA-SCNC: 16.4 ML/DL (ref 16–23)
OSMOLALITY UR/SERPL-RTO: 273 MMOL/KG (ref 282–298)
OSMOLALITY UR: 404 MMOL/KG
OXYHGB MFR BLDA: 98 % (ref 94–97)
P AXIS: 102 DEGREES
P AXIS: 71 DEGREES
PCO2 BLDA: 30.5 MM HG (ref 36–44)
PCO2 TEMP ADJ BLDA: 30.5 MM HG (ref 36–44)
PH BLD: 7.46 [PH] (ref 7.35–7.45)
PH BLDA: 7.46 [PH] (ref 7.35–7.45)
PH UR STRIP.AUTO: 5 [PH] (ref 5–9)
PH UR STRIP.AUTO: 5.5 [PH] (ref 5–9)
PHOSPHATE SERPL-MCNC: 2.9 MG/DL (ref 2.3–4.1)
PHOSPHATE SERPL-MCNC: 3.8 MG/DL (ref 2.3–4.1)
PHOSPHATE SERPL-MCNC: 4 MG/DL (ref 2.3–4.1)
PHOTO: NORMAL
PLATELET # BLD AUTO: 255 THOUSANDS/UL (ref 149–390)
PLATELET # BLD AUTO: 260 THOUSANDS/UL (ref 149–390)
PLATELET # BLD AUTO: 265 THOUSANDS/UL (ref 149–390)
PLATELET # BLD AUTO: 277 THOUSANDS/UL (ref 149–390)
PLATELET # BLD AUTO: 280 THOUSANDS/UL (ref 149–390)
PLATELET # BLD AUTO: 294 THOUSANDS/UL (ref 149–390)
PLATELET # BLD AUTO: 300 THOUSANDS/UL (ref 149–390)
PLATELET # BLD AUTO: 309 THOUSANDS/UL (ref 149–390)
PLATELET # BLD AUTO: 316 THOUSANDS/UL (ref 149–390)
PLATELET # BLD AUTO: 318 THOUSANDS/UL (ref 149–390)
PLATELET # BLD AUTO: 347 THOUSANDS/UL (ref 149–390)
PLATELET # BLD AUTO: 370 THOUSANDS/UL (ref 149–390)
PLATELET # BLD AUTO: 404 THOUSANDS/UL (ref 149–390)
PLATELET # BLD AUTO: 426 THOUSANDS/UL (ref 149–390)
PMV BLD AUTO: 10 FL (ref 8.9–12.7)
PMV BLD AUTO: 10.2 FL (ref 8.9–12.7)
PMV BLD AUTO: 10.2 FL (ref 8.9–12.7)
PMV BLD AUTO: 9.2 FL (ref 8.9–12.7)
PMV BLD AUTO: 9.2 FL (ref 8.9–12.7)
PMV BLD AUTO: 9.4 FL (ref 8.9–12.7)
PMV BLD AUTO: 9.5 FL (ref 8.9–12.7)
PMV BLD AUTO: 9.6 FL (ref 8.9–12.7)
PMV BLD AUTO: 9.6 FL (ref 8.9–12.7)
PMV BLD AUTO: 9.9 FL (ref 8.9–12.7)
PMV BLD AUTO: 9.9 FL (ref 8.9–12.7)
PO2 BLD: 162.8 MM HG (ref 75–129)
PO2 BLDA: 162.8 MM HG (ref 75–129)
POTASSIUM SERPL-SCNC: 2.8 MMOL/L (ref 3.5–5.3)
POTASSIUM SERPL-SCNC: 3.7 MMOL/L (ref 3.5–5.3)
POTASSIUM SERPL-SCNC: 4 MMOL/L (ref 3.5–5.3)
POTASSIUM SERPL-SCNC: 4 MMOL/L (ref 3.5–5.3)
POTASSIUM SERPL-SCNC: 4.2 MMOL/L (ref 3.5–5.3)
POTASSIUM SERPL-SCNC: 4.4 MMOL/L (ref 3.5–5.3)
POTASSIUM SERPL-SCNC: 4.5 MMOL/L (ref 3.5–5.3)
POTASSIUM SERPL-SCNC: 4.6 MMOL/L (ref 3.5–5.3)
POTASSIUM SERPL-SCNC: 5.2 MMOL/L (ref 3.5–5.3)
PR INTERVAL: 152 MS
PR INTERVAL: 216 MS
PROCALCITONIN SERPL-MCNC: 0.15 NG/ML
PROCALCITONIN SERPL-MCNC: 0.24 NG/ML
PROCALCITONIN SERPL-MCNC: 0.27 NG/ML
PROCALCITONIN SERPL-MCNC: 0.43 NG/ML
PROCALCITONIN SERPL-MCNC: 0.44 NG/ML
PROT SERPL-MCNC: 5.3 G/DL (ref 6.4–8.2)
PROT SERPL-MCNC: 5.4 G/DL (ref 6.4–8.2)
PROT SERPL-MCNC: 5.5 G/DL (ref 6.4–8.2)
PROT SERPL-MCNC: 5.6 G/DL (ref 6.4–8.2)
PROT SERPL-MCNC: 5.7 G/DL (ref 6.4–8.2)
PROT SERPL-MCNC: 6.5 G/DL (ref 6.4–8.2)
PROT SERPL-MCNC: 7.1 G/DL (ref 6.4–8.2)
PROT SERPL-MCNC: 7.7 G/DL (ref 6.4–8.2)
PROT UR STRIP-MCNC: ABNORMAL MG/DL
PROT UR STRIP-MCNC: NEGATIVE MG/DL
PROTHROMBIN TIME: 10.8 SECONDS (ref 9.4–11.7)
PROTHROMBIN TIME: 11.6 SECONDS (ref 9.4–11.7)
PROTHROMBIN TIME: 12.7 SECONDS (ref 9.4–11.7)
PROTHROMBIN TIME: 13.4 SECONDS (ref 9.4–11.7)
PROTHROMBIN TIME: 13.7 SECONDS (ref 9.4–11.7)
PROTOCOL NAME: NORMAL
QRS AXIS: 38 DEGREES
QRS AXIS: 61 DEGREES
QRS AXIS: 62 DEGREES
QRS AXIS: 75 DEGREES
QRSD INTERVAL: 104 MS
QRSD INTERVAL: 90 MS
QRSD INTERVAL: 92 MS
QRSD INTERVAL: 98 MS
QT INTERVAL: 326 MS
QT INTERVAL: 366 MS
QT INTERVAL: 406 MS
QT INTERVAL: 454 MS
QTC INTERVAL: 472 MS
QTC INTERVAL: 483 MS
QTC INTERVAL: 486 MS
QTC INTERVAL: 513 MS
RBC # BLD AUTO: 3.1 MILLION/UL (ref 3.81–5.12)
RBC # BLD AUTO: 3.4 MILLION/UL (ref 3.81–5.12)
RBC # BLD AUTO: 3.43 MILLION/UL (ref 3.81–5.12)
RBC # BLD AUTO: 3.6 MILLION/UL (ref 3.81–5.12)
RBC # BLD AUTO: 3.62 MILLION/UL (ref 3.81–5.12)
RBC # BLD AUTO: 4 MILLION/UL (ref 3.81–5.12)
RBC # BLD AUTO: 4.03 MILLION/UL (ref 3.81–5.12)
RBC # BLD AUTO: 4.11 MILLION/UL (ref 3.81–5.12)
RBC # BLD AUTO: 4.17 MILLION/UL (ref 3.81–5.12)
RBC # BLD AUTO: 4.24 MILLION/UL (ref 3.81–5.12)
RBC # BLD AUTO: 4.39 MILLION/UL (ref 3.81–5.12)
RBC # BLD AUTO: 4.67 MILLION/UL (ref 3.81–5.12)
RBC #/AREA URNS AUTO: ABNORMAL /HPF
RBC #/AREA URNS AUTO: ABNORMAL /HPF
REASON FOR TERMINATION: NORMAL
RH BLD: POSITIVE
SIZE STONE: NORMAL MM
SODIUM 24H UR-SCNC: 108 MOL/L
SODIUM SERPL-SCNC: 128 MMOL/L (ref 136–145)
SODIUM SERPL-SCNC: 128 MMOL/L (ref 136–145)
SODIUM SERPL-SCNC: 130 MMOL/L (ref 136–145)
SODIUM SERPL-SCNC: 131 MMOL/L (ref 136–145)
SODIUM SERPL-SCNC: 132 MMOL/L (ref 136–145)
SODIUM SERPL-SCNC: 132 MMOL/L (ref 136–145)
SODIUM SERPL-SCNC: 135 MMOL/L (ref 136–145)
SODIUM SERPL-SCNC: 136 MMOL/L (ref 136–145)
SODIUM SERPL-SCNC: 136 MMOL/L (ref 136–145)
SODIUM SERPL-SCNC: 137 MMOL/L (ref 136–145)
SODIUM SERPL-SCNC: 138 MMOL/L (ref 136–145)
SODIUM SERPL-SCNC: 140 MMOL/L (ref 136–145)
SP GR UR STRIP.AUTO: 1.01 (ref 1–1.03)
SP GR UR STRIP.AUTO: 1.02 (ref 1–1.03)
SPECIMEN EXPIRATION DATE: NORMAL
SPECIMEN SOURCE: ABNORMAL
STONE ANALYSIS-IMP: NORMAL
STONE ANALYSIS-IMP: NORMAL
T WAVE AXIS: -66 DEGREES
T WAVE AXIS: 0 DEGREES
T WAVE AXIS: 266 DEGREES
T WAVE AXIS: 32 DEGREES
T4 FREE SERPL-MCNC: 1.59 NG/DL (ref 0.76–1.46)
TARGET HR FORMULA: NORMAL
TEST INDICATION: NORMAL
TIBC SERPL-MCNC: 406 UG/DL (ref 250–450)
TIME IN EXERCISE PHASE: NORMAL
TOTAL CELLS COUNTED SPEC: 100
TRIGL SERPL-MCNC: 69 MG/DL
TROPONIN I SERPL-MCNC: 1.27 NG/ML
TROPONIN I SERPL-MCNC: 1.46 NG/ML
TROPONIN I SERPL-MCNC: 1.59 NG/ML
TROPONIN I SERPL-MCNC: <0.02 NG/ML
TSH SERPL DL<=0.05 MIU/L-ACNC: 2.23 UIU/ML (ref 0.36–3.74)
TSH SERPL DL<=0.05 MIU/L-ACNC: 4.46 UIU/ML (ref 0.36–3.74)
UNIT DISPENSE STATUS: NORMAL
UNIT DISPENSE STATUS: NORMAL
UNIT PRODUCT CODE: NORMAL
UNIT PRODUCT CODE: NORMAL
UNIT RH: NORMAL
UNIT RH: NORMAL
URATE MFR STONE: 65 %
URATE SERPL-MCNC: 3.2 MG/DL (ref 2–6.8)
UROBILINOGEN UR QL STRIP.AUTO: 0.2 E.U./DL
UROBILINOGEN UR QL STRIP.AUTO: 0.2 E.U./DL
VENTRICULAR RATE: 118 BPM
VENTRICULAR RATE: 126 BPM
VENTRICULAR RATE: 69 BPM
VENTRICULAR RATE: 85 BPM
VIT B12 SERPL-MCNC: 966 PG/ML (ref 100–900)
WBC # BLD AUTO: 12.26 THOUSAND/UL (ref 4.31–10.16)
WBC # BLD AUTO: 13.17 THOUSAND/UL (ref 4.31–10.16)
WBC # BLD AUTO: 21.79 THOUSAND/UL (ref 4.31–10.16)
WBC # BLD AUTO: 6.8 THOUSAND/UL
WBC # BLD AUTO: 7.3 THOUSAND/UL (ref 4.31–10.16)
WBC # BLD AUTO: 8.17 THOUSAND/UL (ref 4.31–10.16)
WBC # BLD AUTO: 8.2 THOUSAND/UL (ref 4.31–10.16)
WBC # BLD AUTO: 8.53 THOUSAND/UL (ref 4.31–10.16)
WBC # BLD AUTO: 8.53 THOUSAND/UL (ref 4.31–10.16)
WBC # BLD AUTO: 8.76 THOUSAND/UL (ref 4.31–10.16)
WBC # BLD AUTO: 8.76 THOUSAND/UL (ref 4.31–10.16)
WBC # BLD AUTO: 9.03 THOUSAND/UL (ref 4.31–10.16)
WBC # BLD AUTO: 9.77 THOUSAND/UL (ref 4.31–10.16)
WBC #/AREA URNS AUTO: ABNORMAL /HPF
WBC #/AREA URNS AUTO: ABNORMAL /HPF
WT STONE: 8.8 MG

## 2018-01-01 PROCEDURE — 83735 ASSAY OF MAGNESIUM: CPT | Performed by: NURSE PRACTITIONER

## 2018-01-01 PROCEDURE — 93010 ELECTROCARDIOGRAM REPORT: CPT | Performed by: INTERNAL MEDICINE

## 2018-01-01 PROCEDURE — 74176 CT ABD & PELVIS W/O CONTRAST: CPT

## 2018-01-01 PROCEDURE — 80053 COMPREHEN METABOLIC PANEL: CPT | Performed by: NURSE PRACTITIONER

## 2018-01-01 PROCEDURE — 85049 AUTOMATED PLATELET COUNT: CPT | Performed by: NURSE PRACTITIONER

## 2018-01-01 PROCEDURE — 99285 EMERGENCY DEPT VISIT HI MDM: CPT

## 2018-01-01 PROCEDURE — 99233 SBSQ HOSP IP/OBS HIGH 50: CPT | Performed by: NURSE PRACTITIONER

## 2018-01-01 PROCEDURE — 82607 VITAMIN B-12: CPT | Performed by: NURSE PRACTITIONER

## 2018-01-01 PROCEDURE — 80329 ANALGESICS NON-OPIOID 1 OR 2: CPT | Performed by: NURSE PRACTITIONER

## 2018-01-01 PROCEDURE — 99232 SBSQ HOSP IP/OBS MODERATE 35: CPT | Performed by: INTERNAL MEDICINE

## 2018-01-01 PROCEDURE — 0DJ08ZZ INSPECTION OF UPPER INTESTINAL TRACT, VIA NATURAL OR ARTIFICIAL OPENING ENDOSCOPIC: ICD-10-PCS | Performed by: INTERNAL MEDICINE

## 2018-01-01 PROCEDURE — 93923 UPR/LXTR ART STDY 3+ LVLS: CPT

## 2018-01-01 PROCEDURE — 96361 HYDRATE IV INFUSION ADD-ON: CPT

## 2018-01-01 PROCEDURE — 99217 PR OBSERVATION CARE DISCHARGE MANAGEMENT: CPT | Performed by: INTERNAL MEDICINE

## 2018-01-01 PROCEDURE — 93000 ELECTROCARDIOGRAM COMPLETE: CPT | Performed by: INTERNAL MEDICINE

## 2018-01-01 PROCEDURE — 84145 PROCALCITONIN (PCT): CPT | Performed by: NURSE PRACTITIONER

## 2018-01-01 PROCEDURE — 36415 COLL VENOUS BLD VENIPUNCTURE: CPT | Performed by: EMERGENCY MEDICINE

## 2018-01-01 PROCEDURE — 99220 PR INITIAL OBSERVATION CARE/DAY 70 MINUTES: CPT | Performed by: NURSE PRACTITIONER

## 2018-01-01 PROCEDURE — 84484 ASSAY OF TROPONIN QUANT: CPT | Performed by: NURSE PRACTITIONER

## 2018-01-01 PROCEDURE — 85027 COMPLETE CBC AUTOMATED: CPT | Performed by: NURSE PRACTITIONER

## 2018-01-01 PROCEDURE — 78452 HT MUSCLE IMAGE SPECT MULT: CPT | Performed by: INTERNAL MEDICINE

## 2018-01-01 PROCEDURE — 83036 HEMOGLOBIN GLYCOSYLATED A1C: CPT | Performed by: NURSE PRACTITIONER

## 2018-01-01 PROCEDURE — 86900 BLOOD TYPING SEROLOGIC ABO: CPT | Performed by: NURSE PRACTITIONER

## 2018-01-01 PROCEDURE — 93975 VASCULAR STUDY: CPT

## 2018-01-01 PROCEDURE — 93225 XTRNL ECG REC<48 HRS REC: CPT

## 2018-01-01 PROCEDURE — 36600 WITHDRAWAL OF ARTERIAL BLOOD: CPT

## 2018-01-01 PROCEDURE — 82728 ASSAY OF FERRITIN: CPT | Performed by: NURSE PRACTITIONER

## 2018-01-01 PROCEDURE — 85025 COMPLETE CBC W/AUTO DIFF WBC: CPT | Performed by: EMERGENCY MEDICINE

## 2018-01-01 PROCEDURE — 88313 SPECIAL STAINS GROUP 2: CPT | Performed by: PATHOLOGY

## 2018-01-01 PROCEDURE — C1769 GUIDE WIRE: HCPCS | Performed by: UROLOGY

## 2018-01-01 PROCEDURE — 93925 LOWER EXTREMITY STUDY: CPT | Performed by: SURGERY

## 2018-01-01 PROCEDURE — 99214 OFFICE O/P EST MOD 30 MIN: CPT | Performed by: INTERNAL MEDICINE

## 2018-01-01 PROCEDURE — 83690 ASSAY OF LIPASE: CPT | Performed by: NURSE PRACTITIONER

## 2018-01-01 PROCEDURE — C9113 INJ PANTOPRAZOLE SODIUM, VIA: HCPCS | Performed by: NURSE PRACTITIONER

## 2018-01-01 PROCEDURE — 83605 ASSAY OF LACTIC ACID: CPT | Performed by: NURSE PRACTITIONER

## 2018-01-01 PROCEDURE — 82150 ASSAY OF AMYLASE: CPT | Performed by: EMERGENCY MEDICINE

## 2018-01-01 PROCEDURE — 70496 CT ANGIOGRAPHY HEAD: CPT

## 2018-01-01 PROCEDURE — 84550 ASSAY OF BLOOD/URIC ACID: CPT | Performed by: NURSE PRACTITIONER

## 2018-01-01 PROCEDURE — 88342 IMHCHEM/IMCYTCHM 1ST ANTB: CPT | Performed by: PATHOLOGY

## 2018-01-01 PROCEDURE — 88300 SURGICAL PATH GROSS: CPT | Performed by: PATHOLOGY

## 2018-01-01 PROCEDURE — 80048 BASIC METABOLIC PNL TOTAL CA: CPT | Performed by: NURSE PRACTITIONER

## 2018-01-01 PROCEDURE — 78452 HT MUSCLE IMAGE SPECT MULT: CPT

## 2018-01-01 PROCEDURE — 99239 HOSP IP/OBS DSCHRG MGMT >30: CPT | Performed by: INTERNAL MEDICINE

## 2018-01-01 PROCEDURE — 82805 BLOOD GASES W/O2 SATURATION: CPT | Performed by: NURSE PRACTITIONER

## 2018-01-01 PROCEDURE — 83615 LACTATE (LD) (LDH) ENZYME: CPT | Performed by: NURSE PRACTITIONER

## 2018-01-01 PROCEDURE — 99222 1ST HOSP IP/OBS MODERATE 55: CPT | Performed by: INTERNAL MEDICINE

## 2018-01-01 PROCEDURE — 97162 PT EVAL MOD COMPLEX 30 MIN: CPT

## 2018-01-01 PROCEDURE — 86901 BLOOD TYPING SEROLOGIC RH(D): CPT | Performed by: NURSE PRACTITIONER

## 2018-01-01 PROCEDURE — G8979 MOBILITY GOAL STATUS: HCPCS

## 2018-01-01 PROCEDURE — 87081 CULTURE SCREEN ONLY: CPT | Performed by: INTERNAL MEDICINE

## 2018-01-01 PROCEDURE — 88305 TISSUE EXAM BY PATHOLOGIST: CPT | Performed by: PATHOLOGY

## 2018-01-01 PROCEDURE — 97161 PT EVAL LOW COMPLEX 20 MIN: CPT

## 2018-01-01 PROCEDURE — 83880 ASSAY OF NATRIURETIC PEPTIDE: CPT | Performed by: NURSE PRACTITIONER

## 2018-01-01 PROCEDURE — 83690 ASSAY OF LIPASE: CPT | Performed by: EMERGENCY MEDICINE

## 2018-01-01 PROCEDURE — 71045 X-RAY EXAM CHEST 1 VIEW: CPT

## 2018-01-01 PROCEDURE — BT1F1ZZ FLUOROSCOPY OF LEFT KIDNEY, URETER AND BLADDER USING LOW OSMOLAR CONTRAST: ICD-10-PCS | Performed by: UROLOGY

## 2018-01-01 PROCEDURE — 87081 CULTURE SCREEN ONLY: CPT | Performed by: NURSE PRACTITIONER

## 2018-01-01 PROCEDURE — G0480 DRUG TEST DEF 1-7 CLASSES: HCPCS | Performed by: NURSE PRACTITIONER

## 2018-01-01 PROCEDURE — 85730 THROMBOPLASTIN TIME PARTIAL: CPT | Performed by: EMERGENCY MEDICINE

## 2018-01-01 PROCEDURE — 93306 TTE W/DOPPLER COMPLETE: CPT | Performed by: INTERNAL MEDICINE

## 2018-01-01 PROCEDURE — 93017 CV STRESS TEST TRACING ONLY: CPT

## 2018-01-01 PROCEDURE — 81001 URINALYSIS AUTO W/SCOPE: CPT | Performed by: EMERGENCY MEDICINE

## 2018-01-01 PROCEDURE — 76705 ECHO EXAM OF ABDOMEN: CPT

## 2018-01-01 PROCEDURE — 85025 COMPLETE CBC W/AUTO DIFF WBC: CPT | Performed by: NURSE PRACTITIONER

## 2018-01-01 PROCEDURE — 99214 OFFICE O/P EST MOD 30 MIN: CPT | Performed by: SURGERY

## 2018-01-01 PROCEDURE — 70498 CT ANGIOGRAPHY NECK: CPT

## 2018-01-01 PROCEDURE — 96374 THER/PROPH/DIAG INJ IV PUSH: CPT

## 2018-01-01 PROCEDURE — 84443 ASSAY THYROID STIM HORMONE: CPT | Performed by: NURSE PRACTITIONER

## 2018-01-01 PROCEDURE — 84484 ASSAY OF TROPONIN QUANT: CPT | Performed by: EMERGENCY MEDICINE

## 2018-01-01 PROCEDURE — 82360 CALCULUS ASSAY QUANT: CPT | Performed by: UROLOGY

## 2018-01-01 PROCEDURE — 85610 PROTHROMBIN TIME: CPT | Performed by: NURSE PRACTITIONER

## 2018-01-01 PROCEDURE — 99233 SBSQ HOSP IP/OBS HIGH 50: CPT | Performed by: STUDENT IN AN ORGANIZED HEALTH CARE EDUCATION/TRAINING PROGRAM

## 2018-01-01 PROCEDURE — P9021 RED BLOOD CELLS UNIT: HCPCS

## 2018-01-01 PROCEDURE — 83935 ASSAY OF URINE OSMOLALITY: CPT | Performed by: NURSE PRACTITIONER

## 2018-01-01 PROCEDURE — 97110 THERAPEUTIC EXERCISES: CPT

## 2018-01-01 PROCEDURE — 86920 COMPATIBILITY TEST SPIN: CPT

## 2018-01-01 PROCEDURE — 99232 SBSQ HOSP IP/OBS MODERATE 35: CPT | Performed by: NURSE PRACTITIONER

## 2018-01-01 PROCEDURE — 85610 PROTHROMBIN TIME: CPT | Performed by: EMERGENCY MEDICINE

## 2018-01-01 PROCEDURE — 93880 EXTRACRANIAL BILAT STUDY: CPT | Performed by: SURGERY

## 2018-01-01 PROCEDURE — A9502 TC99M TETROFOSMIN: HCPCS

## 2018-01-01 PROCEDURE — 80053 COMPREHEN METABOLIC PANEL: CPT | Performed by: EMERGENCY MEDICINE

## 2018-01-01 PROCEDURE — 93880 EXTRACRANIAL BILAT STUDY: CPT

## 2018-01-01 PROCEDURE — 93005 ELECTROCARDIOGRAM TRACING: CPT

## 2018-01-01 PROCEDURE — 84439 ASSAY OF FREE THYROXINE: CPT | Performed by: NURSE PRACTITIONER

## 2018-01-01 PROCEDURE — 82948 REAGENT STRIP/BLOOD GLUCOSE: CPT

## 2018-01-01 PROCEDURE — 85007 BL SMEAR W/DIFF WBC COUNT: CPT | Performed by: NURSE PRACTITIONER

## 2018-01-01 PROCEDURE — C2617 STENT, NON-COR, TEM W/O DEL: HCPCS | Performed by: UROLOGY

## 2018-01-01 PROCEDURE — 99291 CRITICAL CARE FIRST HOUR: CPT | Performed by: NURSE PRACTITIONER

## 2018-01-01 PROCEDURE — 93306 TTE W/DOPPLER COMPLETE: CPT

## 2018-01-01 PROCEDURE — 0TC78ZZ EXTIRPATION OF MATTER FROM LEFT URETER, VIA NATURAL OR ARTIFICIAL OPENING ENDOSCOPIC: ICD-10-PCS | Performed by: UROLOGY

## 2018-01-01 PROCEDURE — 80061 LIPID PANEL: CPT | Performed by: INTERNAL MEDICINE

## 2018-01-01 PROCEDURE — 84295 ASSAY OF SERUM SODIUM: CPT | Performed by: NURSE PRACTITIONER

## 2018-01-01 PROCEDURE — G8978 MOBILITY CURRENT STATUS: HCPCS

## 2018-01-01 PROCEDURE — 30233N1 TRANSFUSION OF NONAUTOLOGOUS RED BLOOD CELLS INTO PERIPHERAL VEIN, PERCUTANEOUS APPROACH: ICD-10-PCS | Performed by: STUDENT IN AN ORGANIZED HEALTH CARE EDUCATION/TRAINING PROGRAM

## 2018-01-01 PROCEDURE — 99223 1ST HOSP IP/OBS HIGH 75: CPT | Performed by: INTERNAL MEDICINE

## 2018-01-01 PROCEDURE — 74181 MRI ABDOMEN W/O CONTRAST: CPT

## 2018-01-01 PROCEDURE — 93925 LOWER EXTREMITY STUDY: CPT

## 2018-01-01 PROCEDURE — 93975 VASCULAR STUDY: CPT | Performed by: SURGERY

## 2018-01-01 PROCEDURE — 85730 THROMBOPLASTIN TIME PARTIAL: CPT | Performed by: NURSE PRACTITIONER

## 2018-01-01 PROCEDURE — 85014 HEMATOCRIT: CPT | Performed by: NURSE PRACTITIONER

## 2018-01-01 PROCEDURE — 94760 N-INVAS EAR/PLS OXIMETRY 1: CPT

## 2018-01-01 PROCEDURE — 93018 CV STRESS TEST I&R ONLY: CPT | Performed by: INTERNAL MEDICINE

## 2018-01-01 PROCEDURE — 84100 ASSAY OF PHOSPHORUS: CPT | Performed by: NURSE PRACTITIONER

## 2018-01-01 PROCEDURE — 87040 BLOOD CULTURE FOR BACTERIA: CPT | Performed by: NURSE PRACTITIONER

## 2018-01-01 PROCEDURE — 86850 RBC ANTIBODY SCREEN: CPT | Performed by: NURSE PRACTITIONER

## 2018-01-01 PROCEDURE — 99222 1ST HOSP IP/OBS MODERATE 55: CPT | Performed by: NURSE PRACTITIONER

## 2018-01-01 PROCEDURE — 93922 UPR/L XTREMITY ART 2 LEVELS: CPT | Performed by: SURGERY

## 2018-01-01 PROCEDURE — 99233 SBSQ HOSP IP/OBS HIGH 50: CPT | Performed by: INTERNAL MEDICINE

## 2018-01-01 PROCEDURE — 82746 ASSAY OF FOLIC ACID SERUM: CPT | Performed by: NURSE PRACTITIONER

## 2018-01-01 PROCEDURE — 85018 HEMOGLOBIN: CPT | Performed by: NURSE PRACTITIONER

## 2018-01-01 PROCEDURE — G8980 MOBILITY D/C STATUS: HCPCS

## 2018-01-01 PROCEDURE — 74450 X-RAY URETHRA/BLADDER: CPT

## 2018-01-01 PROCEDURE — 83550 IRON BINDING TEST: CPT | Performed by: NURSE PRACTITIONER

## 2018-01-01 PROCEDURE — 93226 XTRNL ECG REC<48 HR SCAN A/R: CPT

## 2018-01-01 PROCEDURE — 80076 HEPATIC FUNCTION PANEL: CPT | Performed by: NURSE PRACTITIONER

## 2018-01-01 PROCEDURE — 0T778DZ DILATION OF LEFT URETER WITH INTRALUMINAL DEVICE, VIA NATURAL OR ARTIFICIAL OPENING ENDOSCOPIC: ICD-10-PCS | Performed by: UROLOGY

## 2018-01-01 PROCEDURE — 84300 ASSAY OF URINE SODIUM: CPT | Performed by: NURSE PRACTITIONER

## 2018-01-01 PROCEDURE — 70450 CT HEAD/BRAIN W/O DYE: CPT

## 2018-01-01 PROCEDURE — 96375 TX/PRO/DX INJ NEW DRUG ADDON: CPT

## 2018-01-01 PROCEDURE — 83930 ASSAY OF BLOOD OSMOLALITY: CPT | Performed by: NURSE PRACTITIONER

## 2018-01-01 PROCEDURE — 83540 ASSAY OF IRON: CPT | Performed by: NURSE PRACTITIONER

## 2018-01-01 PROCEDURE — 93016 CV STRESS TEST SUPVJ ONLY: CPT | Performed by: INTERNAL MEDICINE

## 2018-01-01 DEVICE — INLAY URETERAL STENT W/O GUIDEWIRE
Type: IMPLANTABLE DEVICE | Site: URETER | Status: FUNCTIONAL
Brand: BARD® INLAY® URETERAL STENT

## 2018-01-01 RX ORDER — LEVOFLOXACIN 250 MG/1
250 TABLET ORAL EVERY 24 HOURS
Status: DISCONTINUED | OUTPATIENT
Start: 2018-01-01 | End: 2018-01-01

## 2018-01-01 RX ORDER — PANTOPRAZOLE SODIUM 40 MG/1
40 INJECTION, POWDER, FOR SOLUTION INTRAVENOUS EVERY 12 HOURS SCHEDULED
Status: DISCONTINUED | OUTPATIENT
Start: 2018-01-01 | End: 2018-01-01 | Stop reason: SDUPTHER

## 2018-01-01 RX ORDER — SODIUM CHLORIDE, SODIUM LACTATE, POTASSIUM CHLORIDE, CALCIUM CHLORIDE 600; 310; 30; 20 MG/100ML; MG/100ML; MG/100ML; MG/100ML
100 INJECTION, SOLUTION INTRAVENOUS CONTINUOUS
Status: DISCONTINUED | OUTPATIENT
Start: 2018-01-01 | End: 2018-01-01 | Stop reason: HOSPADM

## 2018-01-01 RX ORDER — AMIODARONE HYDROCHLORIDE 200 MG/1
200 TABLET ORAL
Status: DISCONTINUED | OUTPATIENT
Start: 2018-01-01 | End: 2018-01-01 | Stop reason: HOSPADM

## 2018-01-01 RX ORDER — CLOPIDOGREL BISULFATE 75 MG/1
75 TABLET ORAL DAILY
Qty: 30 TABLET | Refills: 2 | Status: SHIPPED | OUTPATIENT
Start: 2018-01-01 | End: 2019-01-01 | Stop reason: HOSPADM

## 2018-01-01 RX ORDER — CLOPIDOGREL BISULFATE 75 MG/1
75 TABLET ORAL DAILY
Status: DISCONTINUED | OUTPATIENT
Start: 2018-01-01 | End: 2018-01-01 | Stop reason: HOSPADM

## 2018-01-01 RX ORDER — ATORVASTATIN CALCIUM 20 MG/1
20 TABLET, FILM COATED ORAL
Qty: 30 TABLET | Refills: 0 | Status: SHIPPED | OUTPATIENT
Start: 2018-01-01 | End: 2019-01-01

## 2018-01-01 RX ORDER — POTASSIUM CHLORIDE 14.9 MG/ML
20 INJECTION INTRAVENOUS ONCE
Status: COMPLETED | OUTPATIENT
Start: 2018-01-01 | End: 2018-01-01

## 2018-01-01 RX ORDER — PROPOFOL 10 MG/ML
INJECTION, EMULSION INTRAVENOUS AS NEEDED
Status: DISCONTINUED | OUTPATIENT
Start: 2018-01-01 | End: 2018-01-01 | Stop reason: SURG

## 2018-01-01 RX ORDER — METOPROLOL SUCCINATE 50 MG/1
50 TABLET, EXTENDED RELEASE ORAL
Status: DISCONTINUED | OUTPATIENT
Start: 2018-01-01 | End: 2018-01-01

## 2018-01-01 RX ORDER — HYDRALAZINE HYDROCHLORIDE 20 MG/ML
5 INJECTION INTRAMUSCULAR; INTRAVENOUS ONCE
Status: DISCONTINUED | OUTPATIENT
Start: 2018-01-01 | End: 2018-01-01 | Stop reason: SDUPTHER

## 2018-01-01 RX ORDER — ISOSORBIDE MONONITRATE 30 MG/1
30 TABLET, EXTENDED RELEASE ORAL DAILY
Status: DISCONTINUED | OUTPATIENT
Start: 2018-01-01 | End: 2018-01-01 | Stop reason: HOSPADM

## 2018-01-01 RX ORDER — POLYETHYLENE GLYCOL 3350 17 G/17G
17 POWDER, FOR SOLUTION ORAL DAILY PRN
Status: DISCONTINUED | OUTPATIENT
Start: 2018-01-01 | End: 2018-01-01 | Stop reason: HOSPADM

## 2018-01-01 RX ORDER — HYDRALAZINE HYDROCHLORIDE 25 MG/1
25 TABLET, FILM COATED ORAL 3 TIMES DAILY
Status: DISCONTINUED | OUTPATIENT
Start: 2018-01-01 | End: 2018-01-01

## 2018-01-01 RX ORDER — DILTIAZEM HYDROCHLORIDE 5 MG/ML
7.5 INJECTION INTRAVENOUS ONCE
Status: DISCONTINUED | OUTPATIENT
Start: 2018-01-01 | End: 2018-01-01

## 2018-01-01 RX ORDER — MORPHINE SULFATE 4 MG/ML
2 INJECTION, SOLUTION INTRAMUSCULAR; INTRAVENOUS
Status: DISCONTINUED | OUTPATIENT
Start: 2018-01-01 | End: 2018-01-01

## 2018-01-01 RX ORDER — DILTIAZEM HYDROCHLORIDE 5 MG/ML
INJECTION INTRAVENOUS
Status: DISPENSED
Start: 2018-01-01 | End: 2018-01-01

## 2018-01-01 RX ORDER — LIDOCAINE HYDROCHLORIDE 10 MG/ML
INJECTION, SOLUTION INFILTRATION; PERINEURAL AS NEEDED
Status: DISCONTINUED | OUTPATIENT
Start: 2018-01-01 | End: 2018-01-01 | Stop reason: SURG

## 2018-01-01 RX ORDER — AMIODARONE HYDROCHLORIDE 200 MG/1
TABLET ORAL
Qty: 60 TABLET | Refills: 0 | Status: SHIPPED | OUTPATIENT
Start: 2018-01-01 | End: 2018-01-01

## 2018-01-01 RX ORDER — ASPIRIN 81 MG/1
81 TABLET, CHEWABLE ORAL DAILY
Status: DISCONTINUED | OUTPATIENT
Start: 2018-01-01 | End: 2018-01-01 | Stop reason: HOSPADM

## 2018-01-01 RX ORDER — LEVOFLOXACIN 5 MG/ML
250 INJECTION, SOLUTION INTRAVENOUS
Status: DISCONTINUED | OUTPATIENT
Start: 2018-01-01 | End: 2018-01-01

## 2018-01-01 RX ORDER — MORPHINE SULFATE 4 MG/ML
4 INJECTION, SOLUTION INTRAMUSCULAR; INTRAVENOUS ONCE
Status: COMPLETED | OUTPATIENT
Start: 2018-01-01 | End: 2018-01-01

## 2018-01-01 RX ORDER — AMLODIPINE BESYLATE 5 MG/1
5 TABLET ORAL DAILY
Status: DISCONTINUED | OUTPATIENT
Start: 2018-01-01 | End: 2018-01-01

## 2018-01-01 RX ORDER — METOPROLOL TARTRATE 5 MG/5ML
5 INJECTION INTRAVENOUS ONCE
Status: COMPLETED | OUTPATIENT
Start: 2018-01-01 | End: 2018-01-01

## 2018-01-01 RX ORDER — ATORVASTATIN CALCIUM 20 MG/1
20 TABLET, FILM COATED ORAL
Status: DISCONTINUED | OUTPATIENT
Start: 2018-01-01 | End: 2018-01-01 | Stop reason: HOSPADM

## 2018-01-01 RX ORDER — EPHEDRINE SULFATE 50 MG/ML
INJECTION, SOLUTION INTRAVENOUS AS NEEDED
Status: DISCONTINUED | OUTPATIENT
Start: 2018-01-01 | End: 2018-01-01 | Stop reason: SURG

## 2018-01-01 RX ORDER — HYDROMORPHONE HCL/PF 1 MG/ML
0.2 SYRINGE (ML) INJECTION
Status: DISCONTINUED | OUTPATIENT
Start: 2018-01-01 | End: 2018-01-01

## 2018-01-01 RX ORDER — PANTOPRAZOLE SODIUM 40 MG/1
40 TABLET, DELAYED RELEASE ORAL
Qty: 56 TABLET | Refills: 0 | Status: SHIPPED | OUTPATIENT
Start: 2018-01-01 | End: 2019-01-01

## 2018-01-01 RX ORDER — OXYCODONE HYDROCHLORIDE 5 MG/1
5 TABLET ORAL EVERY 4 HOURS PRN
Status: DISCONTINUED | OUTPATIENT
Start: 2018-01-01 | End: 2018-01-01 | Stop reason: HOSPADM

## 2018-01-01 RX ORDER — CLINDAMYCIN PHOSPHATE 150 MG/ML
INJECTION, SOLUTION INTRAVENOUS AS NEEDED
Status: DISCONTINUED | OUTPATIENT
Start: 2018-01-01 | End: 2018-01-01 | Stop reason: SURG

## 2018-01-01 RX ORDER — FUROSEMIDE 10 MG/ML
40 INJECTION INTRAMUSCULAR; INTRAVENOUS ONCE
Status: COMPLETED | OUTPATIENT
Start: 2018-01-01 | End: 2018-01-01

## 2018-01-01 RX ORDER — OXYCODONE HYDROCHLORIDE 5 MG/1
2.5 TABLET ORAL EVERY 4 HOURS PRN
Status: DISCONTINUED | OUTPATIENT
Start: 2018-01-01 | End: 2018-01-01 | Stop reason: HOSPADM

## 2018-01-01 RX ORDER — HYDRALAZINE HYDROCHLORIDE 20 MG/ML
10 INJECTION INTRAMUSCULAR; INTRAVENOUS EVERY 6 HOURS PRN
Status: DISCONTINUED | OUTPATIENT
Start: 2018-01-01 | End: 2018-01-01 | Stop reason: HOSPADM

## 2018-01-01 RX ORDER — OMEPRAZOLE 40 MG/1
40 CAPSULE, DELAYED RELEASE ORAL DAILY
COMMUNITY
End: 2018-01-01 | Stop reason: HOSPADM

## 2018-01-01 RX ORDER — HYDRALAZINE HYDROCHLORIDE 20 MG/ML
10 INJECTION INTRAMUSCULAR; INTRAVENOUS ONCE
Status: COMPLETED | OUTPATIENT
Start: 2018-01-01 | End: 2018-01-01

## 2018-01-01 RX ORDER — LEVOFLOXACIN 5 MG/ML
500 INJECTION, SOLUTION INTRAVENOUS ONCE
Status: COMPLETED | OUTPATIENT
Start: 2018-01-01 | End: 2018-01-01

## 2018-01-01 RX ORDER — HYDRALAZINE HYDROCHLORIDE 25 MG/1
50 TABLET, FILM COATED ORAL 3 TIMES DAILY
Status: DISCONTINUED | OUTPATIENT
Start: 2018-01-01 | End: 2018-01-01

## 2018-01-01 RX ORDER — LOSARTAN POTASSIUM 50 MG/1
50 TABLET ORAL DAILY
Status: DISCONTINUED | OUTPATIENT
Start: 2018-01-01 | End: 2018-01-01

## 2018-01-01 RX ORDER — HYDRALAZINE HYDROCHLORIDE 20 MG/ML
5 INJECTION INTRAMUSCULAR; INTRAVENOUS ONCE
Status: DISCONTINUED | OUTPATIENT
Start: 2018-01-01 | End: 2018-01-01

## 2018-01-01 RX ORDER — SODIUM CHLORIDE, SODIUM LACTATE, POTASSIUM CHLORIDE, CALCIUM CHLORIDE 600; 310; 30; 20 MG/100ML; MG/100ML; MG/100ML; MG/100ML
75 INJECTION, SOLUTION INTRAVENOUS CONTINUOUS
Status: DISCONTINUED | OUTPATIENT
Start: 2018-01-01 | End: 2018-01-01 | Stop reason: SDUPTHER

## 2018-01-01 RX ORDER — METOPROLOL SUCCINATE 100 MG/1
100 TABLET, EXTENDED RELEASE ORAL
Status: DISCONTINUED | OUTPATIENT
Start: 2018-01-01 | End: 2018-01-01

## 2018-01-01 RX ORDER — LANOLIN ALCOHOL/MO/W.PET/CERES
3 CREAM (GRAM) TOPICAL
Status: DISCONTINUED | OUTPATIENT
Start: 2018-01-01 | End: 2018-01-01 | Stop reason: HOSPADM

## 2018-01-01 RX ORDER — AMIODARONE HYDROCHLORIDE 200 MG/1
200 TABLET ORAL 2 TIMES DAILY WITH MEALS
Status: DISCONTINUED | OUTPATIENT
Start: 2018-01-01 | End: 2018-01-01 | Stop reason: HOSPADM

## 2018-01-01 RX ORDER — PROPOFOL 10 MG/ML
INJECTION, EMULSION INTRAVENOUS CONTINUOUS PRN
Status: DISCONTINUED | OUTPATIENT
Start: 2018-01-01 | End: 2018-01-01 | Stop reason: SURG

## 2018-01-01 RX ORDER — POTASSIUM CHLORIDE 20 MEQ/1
40 TABLET, EXTENDED RELEASE ORAL ONCE
Status: COMPLETED | OUTPATIENT
Start: 2018-01-01 | End: 2018-01-01

## 2018-01-01 RX ORDER — MORPHINE SULFATE 4 MG/ML
2 INJECTION, SOLUTION INTRAMUSCULAR; INTRAVENOUS EVERY 4 HOURS PRN
Status: DISCONTINUED | OUTPATIENT
Start: 2018-01-01 | End: 2018-01-01

## 2018-01-01 RX ORDER — SODIUM CHLORIDE 9 MG/ML
75 INJECTION, SOLUTION INTRAVENOUS CONTINUOUS
Status: DISCONTINUED | OUTPATIENT
Start: 2018-01-01 | End: 2018-01-01

## 2018-01-01 RX ORDER — METOPROLOL TARTRATE 50 MG/1
50 TABLET, FILM COATED ORAL EVERY 12 HOURS SCHEDULED
Status: DISCONTINUED | OUTPATIENT
Start: 2018-01-01 | End: 2018-01-01 | Stop reason: HOSPADM

## 2018-01-01 RX ORDER — FENTANYL CITRATE/PF 50 MCG/ML
25 SYRINGE (ML) INJECTION
Status: DISCONTINUED | OUTPATIENT
Start: 2018-01-01 | End: 2018-01-01 | Stop reason: HOSPADM

## 2018-01-01 RX ORDER — MAGNESIUM SULFATE HEPTAHYDRATE 40 MG/ML
2 INJECTION, SOLUTION INTRAVENOUS ONCE
Status: COMPLETED | OUTPATIENT
Start: 2018-01-01 | End: 2018-01-01

## 2018-01-01 RX ORDER — SPIRONOLACTONE 25 MG/1
12.5 TABLET ORAL DAILY
Status: DISCONTINUED | OUTPATIENT
Start: 2018-01-01 | End: 2018-01-01

## 2018-01-01 RX ORDER — ACETAMINOPHEN 325 MG/1
650 TABLET ORAL EVERY 6 HOURS PRN
Status: DISCONTINUED | OUTPATIENT
Start: 2018-01-01 | End: 2018-01-01

## 2018-01-01 RX ORDER — METOPROLOL TARTRATE 5 MG/5ML
2.5 INJECTION INTRAVENOUS ONCE
Status: COMPLETED | OUTPATIENT
Start: 2018-01-01 | End: 2018-01-01

## 2018-01-01 RX ORDER — DOCUSATE SODIUM 100 MG/1
100 CAPSULE, LIQUID FILLED ORAL DAILY
Status: DISCONTINUED | OUTPATIENT
Start: 2018-01-01 | End: 2018-01-01 | Stop reason: HOSPADM

## 2018-01-01 RX ORDER — FENTANYL CITRATE 50 UG/ML
INJECTION, SOLUTION INTRAMUSCULAR; INTRAVENOUS AS NEEDED
Status: DISCONTINUED | OUTPATIENT
Start: 2018-01-01 | End: 2018-01-01 | Stop reason: SURG

## 2018-01-01 RX ORDER — ONDANSETRON 2 MG/ML
4 INJECTION INTRAMUSCULAR; INTRAVENOUS ONCE
Status: COMPLETED | OUTPATIENT
Start: 2018-01-01 | End: 2018-01-01

## 2018-01-01 RX ORDER — ONDANSETRON 2 MG/ML
4 INJECTION INTRAMUSCULAR; INTRAVENOUS ONCE AS NEEDED
Status: DISCONTINUED | OUTPATIENT
Start: 2018-01-01 | End: 2018-01-01 | Stop reason: HOSPADM

## 2018-01-01 RX ORDER — ASPIRIN 81 MG/1
81 TABLET, CHEWABLE ORAL DAILY
Qty: 100 TABLET | Refills: 0 | Status: SHIPPED | OUTPATIENT
Start: 2018-01-01 | End: 2018-01-01 | Stop reason: HOSPADM

## 2018-01-01 RX ORDER — MAGNESIUM HYDROXIDE 1200 MG/15ML
LIQUID ORAL AS NEEDED
Status: DISCONTINUED | OUTPATIENT
Start: 2018-01-01 | End: 2018-01-01 | Stop reason: HOSPADM

## 2018-01-01 RX ORDER — LIDOCAINE HYDROCHLORIDE 10 MG/ML
0.5 INJECTION, SOLUTION INFILTRATION; PERINEURAL ONCE AS NEEDED
Status: DISCONTINUED | OUTPATIENT
Start: 2018-01-01 | End: 2018-01-01

## 2018-01-01 RX ORDER — ONDANSETRON 2 MG/ML
4 INJECTION INTRAMUSCULAR; INTRAVENOUS EVERY 6 HOURS PRN
Status: DISCONTINUED | OUTPATIENT
Start: 2018-01-01 | End: 2018-01-01 | Stop reason: HOSPADM

## 2018-01-01 RX ORDER — PANTOPRAZOLE SODIUM 40 MG/1
40 TABLET, DELAYED RELEASE ORAL
Status: DISCONTINUED | OUTPATIENT
Start: 2018-01-01 | End: 2018-01-01 | Stop reason: HOSPADM

## 2018-01-01 RX ORDER — ACETAMINOPHEN 325 MG/1
650 TABLET ORAL EVERY 6 HOURS PRN
Status: DISCONTINUED | OUTPATIENT
Start: 2018-01-01 | End: 2018-01-01 | Stop reason: HOSPADM

## 2018-01-01 RX ORDER — METOPROLOL SUCCINATE 100 MG/1
100 TABLET, EXTENDED RELEASE ORAL
Status: DISCONTINUED | OUTPATIENT
Start: 2018-01-01 | End: 2018-01-01 | Stop reason: HOSPADM

## 2018-01-01 RX ORDER — HYDRALAZINE HYDROCHLORIDE 25 MG/1
25 TABLET, FILM COATED ORAL 3 TIMES DAILY
Qty: 90 TABLET | Refills: 0 | Status: SHIPPED | OUTPATIENT
Start: 2018-01-01 | End: 2018-01-01 | Stop reason: HOSPADM

## 2018-01-01 RX ORDER — FUROSEMIDE 10 MG/ML
20 INJECTION INTRAMUSCULAR; INTRAVENOUS ONCE
Status: COMPLETED | OUTPATIENT
Start: 2018-01-01 | End: 2018-01-01

## 2018-01-01 RX ORDER — SUCRALFATE 1 G/1
1 TABLET ORAL 4 TIMES DAILY
Qty: 120 TABLET | Refills: 0 | Status: SHIPPED | OUTPATIENT
Start: 2018-01-01 | End: 2019-01-01

## 2018-01-01 RX ORDER — LOSARTAN POTASSIUM 50 MG/1
50 TABLET ORAL 2 TIMES DAILY
Status: DISCONTINUED | OUTPATIENT
Start: 2018-01-01 | End: 2018-01-01 | Stop reason: HOSPADM

## 2018-01-01 RX ORDER — ISOSORBIDE MONONITRATE 30 MG/1
30 TABLET, EXTENDED RELEASE ORAL DAILY
Qty: 30 TABLET | Refills: 0 | Status: SHIPPED | OUTPATIENT
Start: 2018-01-01 | End: 2019-01-01 | Stop reason: HOSPADM

## 2018-01-01 RX ORDER — LABETALOL HYDROCHLORIDE 5 MG/ML
10 INJECTION, SOLUTION INTRAVENOUS ONCE
Status: DISCONTINUED | OUTPATIENT
Start: 2018-01-01 | End: 2018-01-01

## 2018-01-01 RX ORDER — HEPARIN SODIUM 5000 [USP'U]/ML
5000 INJECTION, SOLUTION INTRAVENOUS; SUBCUTANEOUS EVERY 8 HOURS SCHEDULED
Status: DISCONTINUED | OUTPATIENT
Start: 2018-01-01 | End: 2018-01-01

## 2018-01-01 RX ORDER — SODIUM CHLORIDE, SODIUM LACTATE, POTASSIUM CHLORIDE, CALCIUM CHLORIDE 600; 310; 30; 20 MG/100ML; MG/100ML; MG/100ML; MG/100ML
75 INJECTION, SOLUTION INTRAVENOUS CONTINUOUS
Status: DISCONTINUED | OUTPATIENT
Start: 2018-01-01 | End: 2018-01-01

## 2018-01-01 RX ORDER — LOSARTAN POTASSIUM 50 MG/1
50 TABLET ORAL DAILY
Status: DISCONTINUED | OUTPATIENT
Start: 2018-01-01 | End: 2018-01-01 | Stop reason: HOSPADM

## 2018-01-01 RX ORDER — SPIRONOLACTONE 25 MG/1
25 TABLET ORAL DAILY
Qty: 30 TABLET | Refills: 3 | Status: SHIPPED | OUTPATIENT
Start: 2018-01-01 | End: 2019-01-01 | Stop reason: HOSPADM

## 2018-01-01 RX ORDER — METOPROLOL TARTRATE 5 MG/5ML
INJECTION INTRAVENOUS CODE/TRAUMA/SEDATION MEDICATION
Status: COMPLETED | OUTPATIENT
Start: 2018-01-01 | End: 2018-01-01

## 2018-01-01 RX ORDER — POTASSIUM CHLORIDE 29.8 MG/ML
40 INJECTION INTRAVENOUS ONCE
Status: DISCONTINUED | OUTPATIENT
Start: 2018-01-01 | End: 2018-01-01

## 2018-01-01 RX ORDER — DIPHENHYDRAMINE HCL 25 MG
12.5 TABLET ORAL ONCE
Status: COMPLETED | OUTPATIENT
Start: 2018-01-01 | End: 2018-01-01

## 2018-01-01 RX ADMIN — CLOPIDOGREL 75 MG: 75 TABLET, FILM COATED ORAL at 09:14

## 2018-01-01 RX ADMIN — HYDRALAZINE HYDROCHLORIDE 25 MG: 25 TABLET, FILM COATED ORAL at 21:53

## 2018-01-01 RX ADMIN — SODIUM CHLORIDE, SODIUM LACTATE, POTASSIUM CHLORIDE, AND CALCIUM CHLORIDE 75 ML/HR: .6; .31; .03; .02 INJECTION, SOLUTION INTRAVENOUS at 20:12

## 2018-01-01 RX ADMIN — METRONIDAZOLE 500 MG: 500 SOLUTION INTRAVENOUS at 17:16

## 2018-01-01 RX ADMIN — DOCUSATE SODIUM 100 MG: 100 CAPSULE, LIQUID FILLED ORAL at 08:03

## 2018-01-01 RX ADMIN — DOCUSATE SODIUM 100 MG: 100 CAPSULE, LIQUID FILLED ORAL at 08:58

## 2018-01-01 RX ADMIN — SODIUM CHLORIDE 50 ML/HR: 0.9 INJECTION, SOLUTION INTRAVENOUS at 01:23

## 2018-01-01 RX ADMIN — LOSARTAN POTASSIUM 50 MG: 50 TABLET, FILM COATED ORAL at 19:24

## 2018-01-01 RX ADMIN — HEPARIN SODIUM 5000 UNITS: 5000 INJECTION, SOLUTION INTRAVENOUS; SUBCUTANEOUS at 05:27

## 2018-01-01 RX ADMIN — METOROPROLOL TARTRATE 5 MG: 5 INJECTION, SOLUTION INTRAVENOUS at 14:46

## 2018-01-01 RX ADMIN — AMIODARONE HYDROCHLORIDE 200 MG: 200 TABLET ORAL at 08:25

## 2018-01-01 RX ADMIN — METOPROLOL TARTRATE 50 MG: 50 TABLET ORAL at 20:25

## 2018-01-01 RX ADMIN — METOPROLOL TARTRATE 2.5 MG: 5 INJECTION INTRAVENOUS at 04:02

## 2018-01-01 RX ADMIN — FUROSEMIDE 20 MG: 10 INJECTION, SOLUTION INTRAMUSCULAR; INTRAVENOUS at 15:14

## 2018-01-01 RX ADMIN — LEVOFLOXACIN 250 MG: 5 INJECTION, SOLUTION INTRAVENOUS at 22:41

## 2018-01-01 RX ADMIN — ISOSORBIDE MONONITRATE 30 MG: 30 TABLET, EXTENDED RELEASE ORAL at 16:15

## 2018-01-01 RX ADMIN — FENTANYL CITRATE 50 MCG: 50 INJECTION, SOLUTION INTRAMUSCULAR; INTRAVENOUS at 18:16

## 2018-01-01 RX ADMIN — HYDRALAZINE HYDROCHLORIDE 10 MG: 20 INJECTION INTRAMUSCULAR; INTRAVENOUS at 04:20

## 2018-01-01 RX ADMIN — PANTOPRAZOLE SODIUM 40 MG: 40 INJECTION, POWDER, FOR SOLUTION INTRAVENOUS at 08:26

## 2018-01-01 RX ADMIN — DOCUSATE SODIUM 100 MG: 100 CAPSULE, LIQUID FILLED ORAL at 09:39

## 2018-01-01 RX ADMIN — IOHEXOL 85 ML: 350 INJECTION, SOLUTION INTRAVENOUS at 15:46

## 2018-01-01 RX ADMIN — HYDRALAZINE HYDROCHLORIDE 10 MG: 20 INJECTION INTRAMUSCULAR; INTRAVENOUS at 23:20

## 2018-01-01 RX ADMIN — PANTOPRAZOLE SODIUM 40 MG: 40 INJECTION, POWDER, FOR SOLUTION INTRAVENOUS at 22:12

## 2018-01-01 RX ADMIN — HYDRALAZINE HYDROCHLORIDE 50 MG: 25 TABLET, FILM COATED ORAL at 09:51

## 2018-01-01 RX ADMIN — PANTOPRAZOLE SODIUM 40 MG: 40 INJECTION, POWDER, FOR SOLUTION INTRAVENOUS at 21:26

## 2018-01-01 RX ADMIN — HYDRALAZINE HYDROCHLORIDE 10 MG: 20 INJECTION INTRAMUSCULAR; INTRAVENOUS at 16:17

## 2018-01-01 RX ADMIN — Medication 1 TABLET: at 09:14

## 2018-01-01 RX ADMIN — PROPOFOL 50 MG: 10 INJECTION, EMULSION INTRAVENOUS at 14:17

## 2018-01-01 RX ADMIN — METOPROLOL TARTRATE 2.5 MG: 1 INJECTION, SOLUTION INTRAVENOUS at 03:58

## 2018-01-01 RX ADMIN — METOPROLOL TARTRATE 25 MG: 25 TABLET ORAL at 09:14

## 2018-01-01 RX ADMIN — HYDRALAZINE HYDROCHLORIDE 50 MG: 25 TABLET, FILM COATED ORAL at 22:17

## 2018-01-01 RX ADMIN — PANTOPRAZOLE SODIUM 40 MG: 40 TABLET, DELAYED RELEASE ORAL at 06:09

## 2018-01-01 RX ADMIN — DOCUSATE SODIUM 100 MG: 100 CAPSULE, LIQUID FILLED ORAL at 09:50

## 2018-01-01 RX ADMIN — METOPROLOL SUCCINATE 100 MG: 100 TABLET, FILM COATED, EXTENDED RELEASE ORAL at 21:54

## 2018-01-01 RX ADMIN — METOPROLOL TARTRATE 50 MG: 50 TABLET ORAL at 09:34

## 2018-01-01 RX ADMIN — ISOSORBIDE MONONITRATE 30 MG: 30 TABLET, EXTENDED RELEASE ORAL at 09:50

## 2018-01-01 RX ADMIN — AMIODARONE HYDROCHLORIDE 200 MG: 200 TABLET ORAL at 09:34

## 2018-01-01 RX ADMIN — MAGNESIUM SULFATE HEPTAHYDRATE 2 G: 40 INJECTION, SOLUTION INTRAVENOUS at 08:57

## 2018-01-01 RX ADMIN — LOSARTAN POTASSIUM 50 MG: 50 TABLET, FILM COATED ORAL at 09:50

## 2018-01-01 RX ADMIN — HEPARIN SODIUM 5000 UNITS: 5000 INJECTION, SOLUTION INTRAVENOUS; SUBCUTANEOUS at 23:07

## 2018-01-01 RX ADMIN — MORPHINE SULFATE 2 MG: 4 INJECTION INTRAVENOUS at 00:38

## 2018-01-01 RX ADMIN — SODIUM CHLORIDE 1000 ML: 0.9 INJECTION, SOLUTION INTRAVENOUS at 19:05

## 2018-01-01 RX ADMIN — METRONIDAZOLE 500 MG: 500 SOLUTION INTRAVENOUS at 00:51

## 2018-01-01 RX ADMIN — SODIUM CHLORIDE 100 ML/HR: 0.9 INJECTION, SOLUTION INTRAVENOUS at 20:58

## 2018-01-01 RX ADMIN — POTASSIUM CHLORIDE 20 MEQ: 200 INJECTION, SOLUTION INTRAVENOUS at 08:57

## 2018-01-01 RX ADMIN — MORPHINE SULFATE 4 MG: 4 INJECTION INTRAVENOUS at 20:16

## 2018-01-01 RX ADMIN — DOCUSATE SODIUM 100 MG: 100 CAPSULE, LIQUID FILLED ORAL at 09:14

## 2018-01-01 RX ADMIN — Medication 1 TABLET: at 09:50

## 2018-01-01 RX ADMIN — SODIUM CHLORIDE: 0.9 INJECTION, SOLUTION INTRAVENOUS at 18:33

## 2018-01-01 RX ADMIN — ISOSORBIDE MONONITRATE 30 MG: 30 TABLET, EXTENDED RELEASE ORAL at 08:03

## 2018-01-01 RX ADMIN — LOSARTAN POTASSIUM 50 MG: 50 TABLET ORAL at 10:41

## 2018-01-01 RX ADMIN — DOCUSATE SODIUM 100 MG: 100 CAPSULE, LIQUID FILLED ORAL at 09:34

## 2018-01-01 RX ADMIN — AMIODARONE HYDROCHLORIDE 200 MG: 200 TABLET ORAL at 17:09

## 2018-01-01 RX ADMIN — POTASSIUM CHLORIDE 40 MEQ: 1500 TABLET, EXTENDED RELEASE ORAL at 17:50

## 2018-01-01 RX ADMIN — LEVOFLOXACIN 500 MG: 5 INJECTION, SOLUTION INTRAVENOUS at 22:19

## 2018-01-01 RX ADMIN — FUROSEMIDE 40 MG: 10 INJECTION, SOLUTION INTRAMUSCULAR; INTRAVENOUS at 07:38

## 2018-01-01 RX ADMIN — PROPOFOL 50 MG: 10 INJECTION, EMULSION INTRAVENOUS at 13:02

## 2018-01-01 RX ADMIN — METOPROLOL SUCCINATE 100 MG: 100 TABLET, FILM COATED, EXTENDED RELEASE ORAL at 21:57

## 2018-01-01 RX ADMIN — DIPHENHYDRAMINE HCL 12.5 MG: 25 TABLET ORAL at 09:40

## 2018-01-01 RX ADMIN — MORPHINE SULFATE 2 MG: 4 INJECTION INTRAVENOUS at 21:24

## 2018-01-01 RX ADMIN — HYDRALAZINE HYDROCHLORIDE 25 MG: 25 TABLET, FILM COATED ORAL at 23:06

## 2018-01-01 RX ADMIN — Medication 1 TABLET: at 09:34

## 2018-01-01 RX ADMIN — CLOPIDOGREL 75 MG: 75 TABLET, FILM COATED ORAL at 09:51

## 2018-01-01 RX ADMIN — PANTOPRAZOLE SODIUM 40 MG: 40 INJECTION, POWDER, FOR SOLUTION INTRAVENOUS at 09:35

## 2018-01-01 RX ADMIN — METOPROLOL TARTRATE 25 MG: 25 TABLET ORAL at 21:26

## 2018-01-01 RX ADMIN — FENTANYL CITRATE 25 MCG: 50 INJECTION, SOLUTION INTRAMUSCULAR; INTRAVENOUS at 18:21

## 2018-01-01 RX ADMIN — PANTOPRAZOLE SODIUM 40 MG: 40 INJECTION, POWDER, FOR SOLUTION INTRAVENOUS at 08:03

## 2018-01-01 RX ADMIN — AMIODARONE HYDROCHLORIDE 200 MG: 200 TABLET ORAL at 12:10

## 2018-01-01 RX ADMIN — Medication 1 TABLET: at 09:39

## 2018-01-01 RX ADMIN — AMIODARONE HYDROCHLORIDE 200 MG: 200 TABLET ORAL at 16:41

## 2018-01-01 RX ADMIN — PANTOPRAZOLE SODIUM 40 MG: 40 INJECTION, POWDER, FOR SOLUTION INTRAVENOUS at 20:25

## 2018-01-01 RX ADMIN — PANTOPRAZOLE SODIUM 40 MG: 40 INJECTION, POWDER, FOR SOLUTION INTRAVENOUS at 20:05

## 2018-01-01 RX ADMIN — HYDRALAZINE HYDROCHLORIDE 25 MG: 25 TABLET, FILM COATED ORAL at 16:15

## 2018-01-01 RX ADMIN — AMIODARONE HYDROCHLORIDE 200 MG: 200 TABLET ORAL at 16:26

## 2018-01-01 RX ADMIN — ONDANSETRON 4 MG: 2 INJECTION INTRAMUSCULAR; INTRAVENOUS at 21:24

## 2018-01-01 RX ADMIN — ATORVASTATIN CALCIUM 20 MG: 20 TABLET, FILM COATED ORAL at 21:55

## 2018-01-01 RX ADMIN — AMIODARONE HYDROCHLORIDE 200 MG: 200 TABLET ORAL at 12:16

## 2018-01-01 RX ADMIN — LEVOFLOXACIN 250 MG: 250 TABLET, FILM COATED ORAL at 11:10

## 2018-01-01 RX ADMIN — METOPROLOL TARTRATE 50 MG: 50 TABLET ORAL at 09:50

## 2018-01-01 RX ADMIN — Medication 1 TABLET: at 08:02

## 2018-01-01 RX ADMIN — METOPROLOL TARTRATE 50 MG: 50 TABLET ORAL at 08:03

## 2018-01-01 RX ADMIN — Medication 1 TABLET: at 08:58

## 2018-01-01 RX ADMIN — HYDRALAZINE HYDROCHLORIDE 25 MG: 25 TABLET, FILM COATED ORAL at 22:11

## 2018-01-01 RX ADMIN — PANTOPRAZOLE SODIUM 40 MG: 40 INJECTION, POWDER, FOR SOLUTION INTRAVENOUS at 09:15

## 2018-01-01 RX ADMIN — SODIUM CHLORIDE: 0.9 INJECTION, SOLUTION INTRAVENOUS at 12:59

## 2018-01-01 RX ADMIN — METOROPROLOL TARTRATE 5 MG: 5 INJECTION, SOLUTION INTRAVENOUS at 11:05

## 2018-01-01 RX ADMIN — PROPOFOL 100 MG: 10 INJECTION, EMULSION INTRAVENOUS at 18:16

## 2018-01-01 RX ADMIN — DOCUSATE SODIUM 100 MG: 100 CAPSULE, LIQUID FILLED ORAL at 09:52

## 2018-01-01 RX ADMIN — LIDOCAINE HYDROCHLORIDE 100 MG: 10 INJECTION, SOLUTION INFILTRATION; PERINEURAL at 13:02

## 2018-01-01 RX ADMIN — FUROSEMIDE 40 MG: 10 INJECTION, SOLUTION INTRAMUSCULAR; INTRAVENOUS at 09:38

## 2018-01-01 RX ADMIN — METOPROLOL SUCCINATE 100 MG: 100 TABLET, FILM COATED, EXTENDED RELEASE ORAL at 22:17

## 2018-01-01 RX ADMIN — ISOSORBIDE MONONITRATE 30 MG: 30 TABLET, EXTENDED RELEASE ORAL at 09:35

## 2018-01-01 RX ADMIN — REGADENOSON 0.4 MG: 0.08 INJECTION, SOLUTION INTRAVENOUS at 14:16

## 2018-01-01 RX ADMIN — CLOPIDOGREL 75 MG: 75 TABLET, FILM COATED ORAL at 08:03

## 2018-01-01 RX ADMIN — ISOSORBIDE MONONITRATE 30 MG: 30 TABLET, EXTENDED RELEASE ORAL at 09:14

## 2018-01-01 RX ADMIN — METOPROLOL TARTRATE 50 MG: 50 TABLET ORAL at 20:05

## 2018-01-01 RX ADMIN — PANTOPRAZOLE SODIUM 40 MG: 40 INJECTION, POWDER, FOR SOLUTION INTRAVENOUS at 21:47

## 2018-01-01 RX ADMIN — PANTOPRAZOLE SODIUM 40 MG: 40 INJECTION, POWDER, FOR SOLUTION INTRAVENOUS at 08:59

## 2018-01-01 RX ADMIN — PROPOFOL 120 MCG/KG/MIN: 10 INJECTION, EMULSION INTRAVENOUS at 14:17

## 2018-01-01 RX ADMIN — AMIODARONE HYDROCHLORIDE 200 MG: 200 TABLET ORAL at 11:29

## 2018-01-01 RX ADMIN — METOPROLOL SUCCINATE 50 MG: 50 TABLET, EXTENDED RELEASE ORAL at 22:12

## 2018-01-01 RX ADMIN — EPHEDRINE SULFATE 10 MG: 50 INJECTION, SOLUTION INTRAMUSCULAR; INTRAVENOUS; SUBCUTANEOUS at 18:33

## 2018-01-01 RX ADMIN — FAMOTIDINE 20 MG: 10 INJECTION, SOLUTION INTRAVENOUS at 19:05

## 2018-01-01 RX ADMIN — CLOPIDOGREL 75 MG: 75 TABLET, FILM COATED ORAL at 10:41

## 2018-01-01 RX ADMIN — MELATONIN 3 MG: at 00:07

## 2018-01-01 RX ADMIN — Medication 1 TABLET: at 09:51

## 2018-01-01 RX ADMIN — FENTANYL CITRATE 25 MCG: 50 INJECTION, SOLUTION INTRAMUSCULAR; INTRAVENOUS at 18:13

## 2018-01-01 RX ADMIN — Medication 2 MG/HR: at 04:55

## 2018-01-01 RX ADMIN — LOSARTAN POTASSIUM 50 MG: 50 TABLET, FILM COATED ORAL at 09:35

## 2018-01-01 RX ADMIN — ATORVASTATIN CALCIUM 20 MG: 20 TABLET, FILM COATED ORAL at 16:17

## 2018-01-01 RX ADMIN — SODIUM CHLORIDE 1000 ML: 0.9 INJECTION, SOLUTION INTRAVENOUS at 04:04

## 2018-01-01 RX ADMIN — LEVOFLOXACIN 250 MG: 250 TABLET, FILM COATED ORAL at 11:07

## 2018-01-01 RX ADMIN — POTASSIUM CHLORIDE 40 MEQ: 1500 TABLET, EXTENDED RELEASE ORAL at 07:38

## 2018-01-01 RX ADMIN — HYDRALAZINE HYDROCHLORIDE 25 MG: 25 TABLET, FILM COATED ORAL at 17:13

## 2018-01-01 RX ADMIN — AMIODARONE HYDROCHLORIDE 200 MG: 200 TABLET ORAL at 08:03

## 2018-01-01 RX ADMIN — CLINDAMYCIN PHOSPHATE 600 MG: 150 INJECTION, SOLUTION INTRAMUSCULAR; INTRAVENOUS at 18:19

## 2018-01-01 RX ADMIN — HYDRALAZINE HYDROCHLORIDE 50 MG: 25 TABLET, FILM COATED ORAL at 15:15

## 2018-01-01 RX ADMIN — AMIODARONE HYDROCHLORIDE 200 MG: 200 TABLET ORAL at 11:18

## 2018-01-01 RX ADMIN — CLOPIDOGREL 75 MG: 75 TABLET, FILM COATED ORAL at 09:50

## 2018-01-01 RX ADMIN — LOSARTAN POTASSIUM 50 MG: 50 TABLET, FILM COATED ORAL at 11:09

## 2018-01-01 RX ADMIN — AMLODIPINE BESYLATE 5 MG: 5 TABLET ORAL at 09:52

## 2018-01-01 RX ADMIN — METRONIDAZOLE 500 MG: 500 SOLUTION INTRAVENOUS at 09:51

## 2018-01-01 RX ADMIN — HYDRALAZINE HYDROCHLORIDE 25 MG: 25 TABLET, FILM COATED ORAL at 08:58

## 2018-01-01 RX ADMIN — MORPHINE SULFATE 2 MG: 4 INJECTION INTRAVENOUS at 04:15

## 2018-01-01 RX ADMIN — ENOXAPARIN SODIUM 30 MG: 30 INJECTION SUBCUTANEOUS at 10:18

## 2018-01-01 RX ADMIN — SODIUM CHLORIDE, SODIUM LACTATE, POTASSIUM CHLORIDE, AND CALCIUM CHLORIDE 100 ML/HR: .6; .31; .03; .02 INJECTION, SOLUTION INTRAVENOUS at 14:00

## 2018-01-01 RX ADMIN — CLOPIDOGREL BISULFATE 75 MG: 75 TABLET ORAL at 10:18

## 2018-01-01 RX ADMIN — HYDRALAZINE HYDROCHLORIDE 10 MG: 20 INJECTION INTRAMUSCULAR; INTRAVENOUS at 16:47

## 2018-01-01 RX ADMIN — MORPHINE SULFATE 2 MG: 4 INJECTION INTRAVENOUS at 07:47

## 2018-01-01 RX ADMIN — ASPIRIN 81 MG 81 MG: 81 TABLET ORAL at 17:06

## 2018-01-01 RX ADMIN — METOPROLOL SUCCINATE 100 MG: 100 TABLET, FILM COATED, EXTENDED RELEASE ORAL at 23:06

## 2018-01-01 RX ADMIN — LEVOFLOXACIN 250 MG: 5 INJECTION, SOLUTION INTRAVENOUS at 21:26

## 2018-01-01 RX ADMIN — CLOPIDOGREL 75 MG: 75 TABLET, FILM COATED ORAL at 09:34

## 2018-01-01 RX ADMIN — POTASSIUM CHLORIDE 20 MEQ: 200 INJECTION, SOLUTION INTRAVENOUS at 16:28

## 2018-01-01 RX ADMIN — HYDRALAZINE HYDROCHLORIDE 25 MG: 25 TABLET, FILM COATED ORAL at 10:41

## 2018-01-01 RX ADMIN — LOSARTAN POTASSIUM 50 MG: 50 TABLET, FILM COATED ORAL at 10:18

## 2018-01-01 RX ADMIN — CLOPIDOGREL 75 MG: 75 TABLET, FILM COATED ORAL at 08:58

## 2018-01-01 RX ADMIN — CLOPIDOGREL 75 MG: 75 TABLET, FILM COATED ORAL at 09:41

## 2018-01-01 RX ADMIN — PANTOPRAZOLE SODIUM 40 MG: 40 INJECTION, POWDER, FOR SOLUTION INTRAVENOUS at 09:41

## 2018-01-01 RX ADMIN — FAMOTIDINE 20 MG: 10 INJECTION, SOLUTION INTRAVENOUS at 15:14

## 2018-01-10 NOTE — CONSULTS
I am referring Garret James to you for further evaluation  My most recent evaluation follows:      Reason For Visit  Hypertension      History of Present Illness  I had the pleasure of seeing Savanna today in the renal clinic for the continued management of her resistant hypertension  She was last seen by me back in November of 2016  Her hypertension had been at that time very difficult to manage due to her her intolerance to many medications at the time I was seeing her she could only tolerate metoprolol after I attempted to give her many different medications which she said gave her upset stomach  Since our last visit in January she had developed a perforated duodenal ulcer and underwent surgery for it  Since that time her blood pressure appears to be at better control with the addition of losartan for which she is currently tolerating well  She reports her blood pressure when she goes to her primary care physician is around 760 systolic which given her chronic mesenteric ischemia would be goal blood pressure for her  She would also been on spironolactone which was discontinued but she supported she tolerated just fine  She has unilateral renal artery stenosis and peripheral vascular disease for which she follows with vascular surgery  Her most recent blood work from November 8 was reviewed with the patient  Her creatinine was 0 8 which is her baseline with a normal serum sodium level  Review of Systems    Constitutional: no fever and no chills  Integumentary: no rashes  Gastrointestinal: no nausea, no diarrhea and no vomiting  Respiratory: no shortness of breath  Cardiovascular: no chest pain and no lower extremity edema  Musculoskeletal: no joint pain and no joint swelling  Neurological: no headache, no lightheadedness and no dizziness  Genitourinary: no dysuria  ROS reviewed  Past Medical History    The active problems and past medical history were reviewed and updated today  Surgical History    The surgical history was reviewed and updated today  Family History    The family history was reviewed and updated today  Social History  The social history was reviewed and updated today  The social history was reviewed and is unchanged  Current Meds   1  Aspirin 81 MG Oral Tablet Delayed Release; Therapy: (Recorded:24Jan2017) to Recorded   2  Clopidogrel Bisulfate 75 MG Oral Tablet; TAKE ONE TABLET BY MOUTH ONCE DAILY; Therapy: 93Maj8271 to (Denita Goltz)  Requested for: 09KXH2629; Last   Rx:13Nov2017; Status: ACTIVE - Retrospective By Protocol Authorization Ordered   3  Cozaar 100 MG Oral Tablet; TAKE 1 TABLET DAILY; Therapy: (Recorded:17Nov2017) to Recorded   4  Metoprolol Succinate  MG Oral Tablet Extended Release 24 Hour; TAKE 1 TABLET   DAILY; Therapy: 86MVL9587 to (Pippa Chinchilla)  Requested for: 53PIC4806; Last   Rx:07Nov2016 Ordered   5  Multivitamins TABS; Therapy: (Recorded:11Jun2015) to Recorded   6  Vitamin D3 2000 UNIT Oral Capsule; PATIENT TAKES VITAMIN D DROPS DAILY; Therapy: ([phone]) to Recorded    The medication list was reviewed and updated today  Allergies    1  Cipro TABS   2  Penicillins   3  Sulfa Drugs    Vitals   Recorded: 15EON3377 02:42PM Recorded: 00FRP8456 33:77LC   Systolic 530    Diastolic 80    Height  5 ft 1 in   Weight  91 lb    BMI Calculated  17 19   BSA Calculated  1 35     Physical Exam    Constitutional: General appearance: No acute distress, well appearing and well nourished  ENT: External ears and nose appear normal      Eyes: Anicteric sclerae  Neck: No bruit heard over either carotid  JVD:  No JVD present  Pulmonary: Respiratory effort: No increased work of breathing or signs of respiratory distress  Auscultation of lungs: Clear to auscultation  Cardiovascular: Auscultation of heart: Normal rate and rhythm, normal S1 and S2, without murmurs      Abdomen: Non-tender, no masses  Extremities: No cyanosis, clubbing or edema  Pulses: Dorsalis Pedis and Posterior Tibial pulses normal    Rash: No rash present  Neurologic: Non Focal      Psychiatric: Orientation to person, place, and time: Normal   and Mood and affect: Normal     Back: No CVA tenderness  Results/Data  I personally reviewed the films/images/results in the office today  My interpretation follows  Assessment    1  Hyponatremia (276 1) (E87 1)   2  Renal artery stenosis (440 1) (I70 1)   3  Essential hypertension (401 9) (I10)   4  Chronic mesenteric ischemia (557 1) (K55 1)    Plan  Essential hypertension    · (1) CBC/ PLT (NO DIFF); Status:Active; Requested NPJ:32YEI8537; Perform:[facility]; DOE:66PNP5026;EGQOXYV;  For:Essential hypertension; Ordered By:Anastacia Argueta;   · (1) COMPREHENSIVE METABOLIC PANEL; Status:Active; Requested JMA:27GND3036; Perform:[facility]; BKH:86RGS4419;VPJQVLA;  For:Essential hypertension; Ordered By:Anastacia Argueta;   · Do not take anti-inflammatory medicines other than aspirin ; Status:Complete;   Done:  03PHA4422   Ordered;  For:Essential hypertension; Ordered By:Anastacia Argueta;   · Restrict the salt in your diet by avoiding highly salted foods ; Status:Complete;   Done:  09NKV5078   Ordered;  For:Essential hypertension; Ordered By:Anastacia Argueta; Discussion/Summary    1 ) Hypertension  - Secondary to left renal artery -high LISA state  - Blood pressure has been especially difficult to control given her intolerance to multiple medications  - continue metoprolol 100 mg daily, and increase losartan to 100 mg daily for better blood pressure control  -aim to keep her systolic blood pressure around 140-160 given her chronic mesenteric ischemia  -Angiotensin II dependent  - Blood pressure is not at goal  -if blood pressure still elevated with the increase in losartan, can start spironolactone 25 mg    2 ) Renal artery stenosis  -left   No evidence of significant renal artery stenosis on the right side  -no contraindications to Ace inhibitors or arbs given that this is a unilateral renal artery stenosis    3 ) Chronic mesenteric ischemia  - Status post SMA stent  - Follows with vascular surgery  - We'll discuss with vascular surgery if CAT scan would be helpful in this situation    4 ) Hyponatremia  -resolved  The patient was counseled regarding diagnostic results, instructions for management, risk factor reductions, prognosis, patient and family education, impressions, risks and benefits of treatment options, importance of compliance with treatment  total time of encounter was 30 minutes  Possible side effects of new medications were reviewed with the patient/guardian today  The treatment plan was reviewed with the patient/guardian  The patient/guardian understands and agrees with the treatment plan   She has no barriers to learning  CKD Teaching includes NFK Guidelines, hypertension management, Avoid nephrotoxic medication, dietician counseling and sodium restriction  Current Patient Status: no worsening of renal function  She needs a follow up session in Six months   Discussed with the patient      Thank you very much for seeing this patient  If you have any questions, please do not hesitate to contact me        Signatures   Electronically signed by : MERE Coats ; Nov 17 2017  2:46PM EST                       (Author)

## 2018-01-11 NOTE — MISCELLANEOUS
Message  Patient was by Dr Dejuan Sparrow for duodenal ulcer f/u  The visiting nurse called today from [facility] regarding her having more frequent black stools  Patient is c/o of weakness and acute abdominal pain  The VN called the PCP and instructed patient to go to ED  The patient refused  Per Dr Dejuan Sparrow the patient needs to go to the ED  (The daughter also called stating that her mother is bleeding, She expressed dissatifaction with the services her mother was given after being discharged from the hospital )      Active Problems    1  Chronic mesenteric ischemia (557 1) (K55 1)   2  Edema (782 3) (R60 9)   3  Essential hypertension (401 9) (I10)   4  Hyponatremia (276 1) (E87 1)   5  Peripheral arterial disease (443 9) (I73 9)   6  Postoperative examination (V67 00) (Z09)   7  Renal artery stenosis (440 1) (I70 1)    Current Meds   1  Aldactone 25 MG Oral Tablet; Therapy: (Recorded:24Jan2017) to Recorded   2  Aspirin 81 MG Oral Tablet Delayed Release; Therapy: (Recorded:24Jan2017) to Recorded   3  Calcium TABS; TAKE 3 TABLET Twice daily; Therapy: (LDQEQRLN:71BEG4356) to Recorded   4  Clopidogrel Bisulfate 75 MG Oral Tablet; TAKE 1 TABLET BY MOUTH DAILY; Therapy: 14Fdg8212 to (Evaluate:23Apr2017)  Requested for: 25Oct2016; Last   Rx:25Oct2016 Ordered   5  Cozaar 50 MG Oral Tablet; Therapy: (BWVKAMDF:69CMB0616) to Recorded   6  Furosemide 20 MG Oral Tablet; Therapy: (YIAVDLXE:76EHD0109) to Recorded   7  Metoprolol Succinate  MG Oral Tablet Extended Release 24 Hour; TAKE 1   TABLET DAILY; Therapy: 71VLT0952 to (Janki Puls)  Requested for: 89PVE8090; Last   Rx:07Nov2016 Ordered   8  Multivitamins TABS; Therapy: (Recorded:11Jun2015) to Recorded   9  Vitamin D3 2000 UNIT Oral Capsule; PATIENT TAKES VITAMIN D DROPS DAILY; Therapy: (Recorded:07Oct2015) to Recorded    Allergies    1  Cipro TABS   2  Penicillins   3   Sulfa Drugs    Signatures   Electronically signed by : Jeremy Dolan OM; Jan 30 2017  2:37PM EST                       (Author)

## 2018-01-11 NOTE — MISCELLANEOUS
Message   Recorded as Task   Date: 12/09/2015 02:24 PM, Created By: Hillary Álvarez   Task Name: Med Renewal   Assigned To: Josie Buckner   Regarding Patient: LORI WATERS, Status: Complete   CommentDanniel Said - 09 Dec 2015 2:24 PM     TASK CREATED  pt is calling for a refill on Plavix  Ok to refill? How long should she remain on it? Cecelia Dash - 10 Dec 2015 3:42 PM     TASK REPLIED TO: Previously Assigned To Cecelia Dash  ok to refill- she should remain on it indefintely    Thanks  Chanell Seth - 10 Dec 2015 4:47 PM     TASK COMPLETED        Active Problems    1  Chronic mesenteric ischemia (557 1) (K55 1)   2  Edema (782 3) (R60 9)   3  Essential hypertension (401 9) (I10)   4  Hyponatremia (276 1) (E87 1)   5  Peripheral arterial disease (443 9) (I73 9)   6  Postoperative examination (V67 00) (Z09)   7  Renal artery stenosis (440 1) (I70 1)    Current Meds   1  Aldactone 25 MG Oral Tablet (Spironolactone); Therapy: (Recorded:24Jan2017) to Recorded   2  Aspirin 81 MG Oral Tablet Delayed Release; Therapy: (Recorded:24Jan2017) to Recorded   3  Calcium TABS; TAKE 3 TABLET Twice daily; Therapy: (CASEOEMN:23SNJ0766) to Recorded   4  Clopidogrel Bisulfate 75 MG Oral Tablet; TAKE 1 TABLET BY MOUTH DAILY; Therapy: 06Xtz4792 to (Evaluate:14Nov2017)  Requested for: 94DKV6131; Last   Rx:97Avd8206 Ordered   5  Cozaar 50 MG Oral Tablet (Losartan Potassium); Therapy: (XYPRRJYQ:30LOA1197) to Recorded   6  Furosemide 20 MG Oral Tablet; Therapy: (HYVHFUPQ:70YDD8127) to Recorded   7  Metoprolol Succinate  MG Oral Tablet Extended Release 24 Hour; TAKE 1   TABLET DAILY; Therapy: 84TVF7499 to (Fransisco Bolanos)  Requested for: 40HFQ9183; Last   Rx:07Nov2016 Ordered   8  Multivitamins TABS; Therapy: (Recorded:11Jun2015) to Recorded   9  Vitamin D3 2000 UNIT Oral Capsule; PATIENT TAKES VITAMIN D DROPS DAILY; Therapy: (Recorded:07Oct2015) to Recorded    Allergies    1   Cipro TABS   2  Penicillins   3   Sulfa Drugs    Signatures   Electronically signed by : Renetta Sommers, ; Nov 13 2017 11:15AM EST                       (Author)

## 2018-01-11 NOTE — MISCELLANEOUS
Message  patient called, needs to hold plavix for Mohs procedure, ok with Dr Carmen Tan  Active Problems    1  Chronic mesenteric ischemia (557 1) (K55 1)   2  Edema (782 3) (R60 9)   3  Essential hypertension (401 9) (I10)   4  Hyponatremia (276 1) (E87 1)    Current Meds   1  Calcium TABS; TAKE 3 TABLET Twice daily; Therapy: (Recorded:23Jun2015) to Recorded   2  Chlorthalidone 25 MG Oral Tablet; 1/2 tablet daily; Therapy: 66Ahx5964 to (Evaluate:19Nov2015)  Requested for: 32Srx8461; Last   Rx:16Iqc3472; Status: ACTIVE - Retrospective Authorization Ordered   3  CloNIDine HCl - 0 3 MG/24HR Transdermal Patch Weekly; APPLY 1 PATCH WEEKLY AS   DIRECTED; Therapy: 31MMS1102 to (Last Rx:07Oct2015)  Requested for: 17ZXK5558 Ordered   4  Clopidogrel Bisulfate 75 MG Oral Tablet; TAKE 1 TABLET BY MOUTH DAILY; Therapy: 80Cic3246 to (Osvaldo Raphael)  Requested for: 47Lmg9626; Last   Rx:67Wux1238 Ordered   5  Magnesium TABS; Therapy: (Recorded:10Xra1706) to Recorded   6  Metoprolol Succinate ER 50 MG Oral Tablet Extended Release 24 Hour; TAKE ONE AND   ONE HALF TABLETS HS; Therapy: 28Apr2015 to Recorded   7  Multivitamins TABS; Therapy: (Recorded:11Jun2015) to Recorded   8  Vitamin D3 2000 UNIT Oral Capsule; PATIENT TAKES VITAMIN D DROPS DAILY; Therapy: (988 4055) to Recorded   9  Vitamin E CAPS; Therapy: (Recorded:11Jun2015) to Recorded   10  Vitamin K TABS; Therapy: (Recorded:11Jun2015) to Recorded    Allergies    1  Cipro TABS   2  Penicillins   3  Sulfa Drugs    Signatures   Electronically signed by :  Giselle Chow, ; Jan 27 2016  2:16PM EST                       (Author)

## 2018-01-12 VITALS
HEIGHT: 61 IN | SYSTOLIC BLOOD PRESSURE: 134 MMHG | HEART RATE: 84 BPM | WEIGHT: 79.5 LBS | RESPIRATION RATE: 16 BRPM | BODY MASS INDEX: 15.01 KG/M2 | DIASTOLIC BLOOD PRESSURE: 70 MMHG

## 2018-01-12 NOTE — MISCELLANEOUS
Message  pt cannot tolerate Clonidine patch, even with changing sites  Will change to Clonidine tabs 1-TID      Active Problems    1  Chronic mesenteric ischemia (557 1) (K55 1)   2  Edema (782 3) (R60 9)   3  Essential hypertension (401 9) (I10)   4  Hyponatremia (276 1) (E87 1)    Current Meds   1  Calcium TABS; TAKE 3 TABLET Twice daily; Therapy: (Recorded:23Jun2015) to Recorded   2  Chlorthalidone 25 MG Oral Tablet; 1/2 tablet daily; Therapy: 68Zmy9041 to (Evaluate:19Nov2015)  Requested for: 75Tvp0340; Last   Rx:03Jvv9600; Status: ACTIVE - Retrospective Authorization Ordered   3  CloNIDine HCl - 0 3 MG/24HR Transdermal Patch Weekly; APPLY 1 PATCH WEEKLY AS   DIRECTED; Therapy: 46RDC8199 to (Last Rx:07Oct2015)  Requested for: 16QHY3290 Ordered   4  Clopidogrel Bisulfate 75 MG Oral Tablet; TAKE 1 TABLET BY MOUTH DAILY; Therapy: 16Myi5630 to (Tash Guevara)  Requested for: 89Mja7457; Last   Rx:66Zvs8060 Ordered   5  Magnesium TABS; Therapy: (Recorded:02Huh7657) to Recorded   6  Metoprolol Succinate ER 50 MG Oral Tablet Extended Release 24 Hour; TAKE ONE AND   ONE HALF TABLETS HS; Therapy: 28Apr2015 to Recorded   7  Multivitamins TABS; Therapy: (Recorded:11Jun2015) to Recorded   8  Vitamin D3 2000 UNIT Oral Capsule; PATIENT TAKES VITAMIN D DROPS DAILY; Therapy: (781 1751) to Recorded   9  Vitamin E CAPS; Therapy: (Recorded:11Jun2015) to Recorded   10  Vitamin K TABS; Therapy: (Recorded:11Jun2015) to Recorded    Allergies    1  Cipro TABS   2  Penicillins   3   Sulfa Drugs    Plan  Essential hypertension    · CloNIDine HCl - 0 3 MG/24HR Transdermal Patch Weekly   · CloNIDine HCl - 0 3 MG Oral Tablet; TAKE 1 TABLET 3 TIMES DAILY    Signatures   Electronically signed by : Jeffrey Ross, ; Feb 1 2016 12:06PM EST                       (Author)

## 2018-01-13 NOTE — MISCELLANEOUS
Message   Recorded as Task   Date: 12/09/2015 02:24 PM, Created By: Leslie Blum   Task Name: Med Renewal   Assigned To: Josie Buckner   Regarding Patient: LORI WATERS, Status: Complete   Rukhsana Drake - 09 Dec 2015 2:24 PM     TASK CREATED  pt is calling for a refill on Plavix  Ok to refill? How long should she remain on it? Cecelia Dash - 10 Dec 2015 3:42 PM     TASK REPLIED TO: Previously Assigned To Cecelia Dash  ok to refill- she should remain on it indefintely    Thanks  Neisha Ramos - 10 Dec 2015 4:47 PM     TASK COMPLETED        Active Problems    1  Chronic mesenteric ischemia (557 1) (K55 1)   2  Edema (782 3) (R60 9)   3  Essential hypertension (401 9) (I10)   4  Hyponatremia (276 1) (E87 1)   5  Peripheral arterial disease (443 9) (I73 9)   6  Renal artery stenosis (440 1) (I70 1)    Current Meds   1  Calcium TABS; TAKE 3 TABLET Twice daily; Therapy: (Recorded:23Jun2015) to Recorded   2  Clopidogrel Bisulfate 75 MG Oral Tablet (Plavix); TAKE (1) TABLET DAILY; Therapy: 24QIM6038 to (Last Rx:22Mar2016)  Requested for: 22Mar2016 Ordered   3  Clopidogrel Bisulfate 75 MG Oral Tablet; TAKE 1 TABLET BY MOUTH DAILY; Therapy: 53Twg5889 to (Hilario Wood)  Requested for: 20Iew5411; Last   Rx:10Aug2016 Ordered   4  Magnesium TABS; Therapy: (Recorded:99Brm5099) to Recorded   5  Metoprolol Succinate ER 50 MG Oral Tablet Extended Release 24 Hour; TAKE ONE AND   ONE HALF TABLETS HS; Therapy: 28Apr2015 to Recorded   6  Multivitamins TABS; Therapy: (Recorded:11Jun2015) to Recorded   7  Nifedical XL 30 MG Oral Tablet Extended Release 24 Hour; TAKE 1 TABLET DAILY; Therapy: 87RQQ4795 to (Shanice Oats)  Requested for: 15NKU4271; Last   Rx:53Sbr5090 Ordered   8  Vitamin D3 2000 UNIT Oral Capsule; PATIENT TAKES VITAMIN D DROPS DAILY; Therapy: (782 9762) to Recorded   9  Vitamin E CAPS; Therapy: (Recorded:11Jun2015) to Recorded   10   Vitamin K TABS; Therapy: (Recorded:11Jun2015) to Recorded    Allergies    1  Cipro TABS   2  Penicillins   3   Sulfa Drugs    Signatures   Electronically signed by : Lexy Duncan, ; Oct 25 2016 11:59AM EST                       (Author)

## 2018-01-14 VITALS
WEIGHT: 91 LBS | BODY MASS INDEX: 17.18 KG/M2 | HEIGHT: 61 IN | DIASTOLIC BLOOD PRESSURE: 80 MMHG | SYSTOLIC BLOOD PRESSURE: 200 MMHG

## 2018-01-14 VITALS
BODY MASS INDEX: 15.48 KG/M2 | WEIGHT: 82 LBS | DIASTOLIC BLOOD PRESSURE: 64 MMHG | HEIGHT: 61 IN | SYSTOLIC BLOOD PRESSURE: 220 MMHG | TEMPERATURE: 97.1 F

## 2018-01-14 NOTE — MISCELLANEOUS
Message  C/O skin redness Clonidiine patch, even with changing sites  Lasts for weeks after patch is removed  Per Dr Payam Greene, if it is bothersome, can change to po Clonidine  Pt will try putting patch on her thigh to see how this will work  Will call back if persistent problem/lr      Active Problems    1  Chronic mesenteric ischemia (557 1) (K55 1)   2  Edema (782 3) (R60 9)   3  Essential hypertension (401 9) (I10)   4  Hyponatremia (276 1) (E87 1)    Current Meds   1  Calcium TABS; TAKE 3 TABLET Twice daily; Therapy: (Recorded:23Jun2015) to Recorded   2  Chlorthalidone 25 MG Oral Tablet; 1/2 tablet daily; Therapy: 71Cez8090 to (Evaluate:19Nov2015)  Requested for: 34Tbt6000; Last   Rx:08Cim2268; Status: ACTIVE - Retrospective Authorization Ordered   3  CloNIDine HCl - 0 3 MG/24HR Transdermal Patch Weekly; APPLY 1 PATCH WEEKLY AS   DIRECTED; Therapy: 08OBV1836 to (Last Rx:07Oct2015)  Requested for: 75BOQ7730 Ordered   4  Clopidogrel Bisulfate 75 MG Oral Tablet; TAKE 1 TABLET BY MOUTH DAILY; Therapy: 02Gun1162 to (Amy Sandifer)  Requested for: 81Wbn0374; Last   Rx:90Wun4701 Ordered   5  Magnesium TABS; Therapy: (Recorded:45Qsk0036) to Recorded   6  Metoprolol Succinate ER 50 MG Oral Tablet Extended Release 24 Hour; TAKE ONE AND   ONE HALF TABLETS HS; Therapy: 28Apr2015 to Recorded   7  Multivitamins TABS; Therapy: (Recorded:11Jun2015) to Recorded   8  Vitamin D3 2000 UNIT Oral Capsule; PATIENT TAKES VITAMIN D DROPS DAILY; Therapy: [phone]) to Recorded   9  Vitamin E CAPS; Therapy: (Recorded:11Jun2015) to Recorded   10  Vitamin K TABS; Therapy: (Recorded:11Jun2015) to Recorded    Allergies    1  Cipro TABS   2  Penicillins   3   Sulfa Drugs    Signatures   Electronically signed by : Sesar Trejo, ; Jan 19 2016  5:31PM EST                       (Author)

## 2018-01-15 VITALS
TEMPERATURE: 97.8 F | WEIGHT: 79 LBS | HEIGHT: 61 IN | BODY MASS INDEX: 14.91 KG/M2 | DIASTOLIC BLOOD PRESSURE: 74 MMHG | SYSTOLIC BLOOD PRESSURE: 150 MMHG

## 2018-01-15 VITALS
RESPIRATION RATE: 16 BRPM | WEIGHT: 91.38 LBS | BODY MASS INDEX: 17.25 KG/M2 | DIASTOLIC BLOOD PRESSURE: 92 MMHG | SYSTOLIC BLOOD PRESSURE: 240 MMHG | HEIGHT: 61 IN | HEART RATE: 80 BPM

## 2018-06-11 PROBLEM — I50.32 CHRONIC DIASTOLIC HEART FAILURE (HCC): Status: ACTIVE | Noted: 2018-01-01

## 2018-06-11 PROBLEM — I65.21 STENOSIS OF RIGHT INTERNAL CAROTID ARTERY: Status: ACTIVE | Noted: 2018-01-01

## 2018-06-11 PROBLEM — I16.0 HYPERTENSIVE URGENCY: Status: ACTIVE | Noted: 2018-01-01

## 2018-06-11 NOTE — ASSESSMENT & PLAN NOTE
· She denies any neurological changes  · VAS bilateral carotids, showing PAULO with 12-84% stenosis and LICA <01% stenosis  · Consult vascular surgery  · Neuro checks q 4 hours

## 2018-06-11 NOTE — ED PROVIDER NOTES
History  Chief Complaint   Patient presents with    Evaluation of Abnormal Diagnostic Test     sent in for abnormal  carotid ultrasound today, prob admission       History provided by:  Patient   used: No    Evaluation of Abnormal Diagnostic Test   Time since result:  Hours  Patient referred by:  PCP  Resulting agency:  [facility]  Result type: radiology    Radiology:     Other abnormal imaging result:  Abnormal carotid ultrasound      Prior to Admission Medications   Prescriptions Last Dose Informant Patient Reported? Taking? Multiple Vitamin (MULTIVITAMIN) capsule   Yes No   Sig: Take 1 capsule by mouth daily   VITAMIN D, ERGOCALCIFEROL, PO   Yes No   Sig: Take 2,000 Units by mouth daily     clopidogrel (PLAVIX) 75 mg tablet  Self Yes No   Sig: Take 75 mg by mouth daily     losartan (COZAAR) 50 mg tablet   No No   Sig: Take 1 tablet by mouth daily for 30 days   Patient taking differently: Take 50 mg by mouth 2 (two) times a day     metoprolol succinate (TOPROL-XL) 100 mg 24 hr tablet   Yes No   Sig: Take 100 mg by mouth daily at bedtime      Facility-Administered Medications: None       Past Medical History:   Diagnosis Date    Acid reflux     Arthritis     Stewart's esophagus     Duodenal ulcer     Hypertension     [facility]     Periodontal disease     Peripheral vascular disease ([facility] [address] [facility]     Pulmonary emphysema ([facility] [address] )     Scoliosis        Past Surgical History:   Procedure Laterality Date    ANAL FISSURECTOMY      AORTA - SUPERIOR MESENTERIC ARTERY BYPASS GRAFT Bilateral     BREAST BIOPSY Right     FEMORAL ARTERY STENT Bilateral     HEMORROIDECTOMY      HYSTERECTOMY      INSERTION STENT ARTERY Left 1/1/2017    Procedure:  mesenteric arteriogram, balloon angioplasty of SMA stent;  Surgeon: Aramis Spain MD;  Location: [facility];  Service:    [facility] LAPAROTOMY N/A 1/1/2017    Procedure: LAPAROTOMY EXPLORATORY,  abdominal washout, repair of duodenal ulcer; Surgeon: Nikki Bertrand MD;  Location:  MAIN OR;  Service:      [address] CATARACT EXTRACAP,INSERT LENS Right 1/11/2016    Procedure: EXTRACTION INTRACAPSULAR CATARACT PHACO INTRAOCULAR LENS (IOL); Surgeon: Bernie Olivarez MD;  Location: [facility] MAIN OR;  Service: Ophthalmology    TONSILECTOMY AND ADNOIDECTOMY         History reviewed  No pertinent family history  I have reviewed and agree with the history as documented  Social History   Substance Use Topics    Smoking status: Former Smoker    Smokeless tobacco: Current User      Comment: smoked for 25 years   Alcohol use Yes      Comment: a glass wine daily   last drink 12/30/16  Review of Systems   Constitutional: Negative for chills, diaphoresis, fatigue and fever  HENT: Negative for trouble swallowing and voice change  Eyes: Negative for pain and visual disturbance  Respiratory: Negative for cough, chest tightness and shortness of breath  Cardiovascular: Negative for chest pain, palpitations and leg swelling  Gastrointestinal: Negative for abdominal distention, abdominal pain, nausea and vomiting  Genitourinary: Negative for difficulty urinating, dysuria and flank pain  Musculoskeletal: Negative for back pain, neck pain and neck stiffness  Skin: Negative for pallor, rash and wound  Allergic/Immunologic: Negative for immunocompromised state  Neurological: Negative for dizziness, syncope, facial asymmetry, speech difficulty, weakness, light-headedness, numbness and headaches  Psychiatric/Behavioral: The patient is nervous/anxious  Physical Exam  Physical Exam   Constitutional: She is oriented to person, place, and time  She appears well-developed and well-nourished  No distress  HENT:   Head: Normocephalic and atraumatic  Mouth/Throat: Oropharynx is clear and moist    Eyes: Conjunctivae and EOM are normal  Pupils are equal, round, and reactive to light  Neck: Normal range of motion  Neck supple   No JVD present  No tracheal deviation present  Cardiovascular: Normal rate, regular rhythm and intact distal pulses  Hypertensive   Pulmonary/Chest: Effort normal and breath sounds normal  No stridor  Abdominal: Soft  She exhibits no distension  There is no tenderness  Musculoskeletal: Normal range of motion  Neurological: She is alert and oriented to person, place, and time  No cranial nerve deficit  Coordination normal    Skin: Skin is warm and dry  Capillary refill takes less than 2 seconds  She is not diaphoretic  Psychiatric: Her behavior is normal    Mildly anxious   Nursing note and vitals reviewed        Vital Signs  ED Triage Vitals [06/11/18 1354]   Temperature Pulse Respirations Blood Pressure SpO2   98 8 °F (37 1 °C) 99 (!) 24 (!) 268/110 95 %      Temp Source Heart Rate Source Patient Position - Orthostatic VS BP Location FiO2 (%)   Tympanic Monitor Sitting Left arm --      Pain Score       No Pain           Vitals:    06/11/18 1354 06/11/18 1441 06/11/18 1455 06/11/18 1600   BP: (!) 268/110 (!) 260/108 (!) 240/104 (!) 260/100   Pulse: 99 78 71 72   Patient Position - Orthostatic VS: Sitting Lying         Visual Acuity      ED Medications  Medications   metoprolol (LOPRESSOR) injection 5 mg (5 mg Intravenous Given 6/11/18 1446)       Diagnostic Studies  Results Reviewed     Procedure Component Value Units Date/Time    Comprehensive metabolic panel [[id number]]  (Abnormal) Collected:  06/11/18 1452    Lab Status:  Final result Specimen:  Blood from Arm, Left Updated:  06/11/18 1530     Sodium 140 mmol/L      Potassium 4 0 mmol/L      Chloride 104 mmol/L      CO2 24 mmol/L      Anion Gap 12 mmol/L      BUN 28 (H) mg/dL      Creatinine 0 89 mg/dL      Glucose 95 mg/dL      Calcium 9 2 mg/dL      AST 22 U/L      ALT 28 U/L      Alkaline Phosphatase 70 U/L      Total Protein 7 7 g/dL      Albumin 4 2 g/dL      Total Bilirubin 0 50 mg/dL      eGFR 58 ml/min/1 73sq m     Narrative:         National Kidney Disease Education Program recommendations are as follows:  GFR calculation is accurate only with a steady state creatinine  Chronic Kidney disease less than 60 ml/min/1 73 sq  meters  Kidney failure less than 15 ml/min/1 73 sq  meters  Troponin I [[id number]]  (Normal) Collected:  06/11/18 1452    Lab Status:  Final result Specimen:  Blood from Arm, Left Updated:  06/11/18 1528     Troponin I <0 02 ng/mL     Narrative:         [employer] Chemistry analyzer 99% cutoff is > 0 04 ng/mL in network labs    o cTnI 99% cutoff is useful only when applied to patients in the clinical setting of myocardial ischemia  o cTnI 99% cutoff should be interpreted in the context of clinical history, ECG findings and possibly cardiac imaging to establish correct diagnosis  o cTnI 99% cutoff may be suggestive but clearly not indicative of a coronary event without the clinical setting of myocardial ischemia      APTT [[id number]]  (Normal) Collected:  06/11/18 1452    Lab Status:  Final result Specimen:  Blood from Arm, Left Updated:  06/11/18 1521     PTT 26 seconds     Protime-INR [[id number]]  (Normal) Collected:  06/11/18 1452    Lab Status:  Final result Specimen:  Blood from Arm, Left Updated:  06/11/18 1521     Protime 10 8 seconds      INR 1 03    CBC and differential [[id number]] Collected:  06/11/18 1452    Lab Status:  Final result Specimen:  Blood from Arm, Left Updated:  06/11/18 1516     WBC 8 76 Thousand/uL      RBC 4 39 Million/uL      Hemoglobin 13 0 g/dL      Hematocrit 41 2 %      MCV 94 fL      MCH 29 6 pg      MCHC 31 6 g/dL      RDW 13 6 %      MPV 10 2 fL      Platelets 703 Thousands/uL      nRBC 0 /100 WBCs      Neutrophils Relative 73 %      Immat GRANS % 0 %      Lymphocytes Relative 17 %      Monocytes Relative 8 %      Eosinophils Relative 1 %      Basophils Relative 1 %      Neutrophils Absolute 6 34 Thousands/µL      Immature Grans Absolute 0 03 Thousand/uL      Lymphocytes Absolute 1 52 Thousands/µL      Monocytes Absolute 0 71 Thousand/µL      Eosinophils Absolute 0 11 Thousand/µL      Basophils Absolute 0 05 Thousands/µL     UA w Reflex to Microscopic w Reflex to Culture [[id number]]     Lab Status:  No result Specimen:  Urine                  XR chest 1 view portable   Final Result by Kellie Leon DO (06/11 1531)      COPD/emphysematous changes are suspected  No acute pulmonary process is seen        Workstation performed: [id number]                    Procedures  Procedures       Phone Contacts  ED Phone Contact    ED Course                               MDM  Number of Diagnoses or Management Options  Carotid stenosis: new and does not require workup  Hypertensive urgency: new and requires workup  Peripheral arterial occlusive disease ([address] ): established and worsening  Diagnosis management comments: Rule out hypertensive emergency, ACS, TIA, renal insufficiency  - EKG  - Labs, including Nadia  - UA  - Antihypertensives  - Symptomatic management   - Adm per PCP request for MRI r/o TIA, and vascular consultation       Amount and/or Complexity of Data Reviewed  Clinical lab tests: ordered and reviewed  Tests in the radiology section of CPT®: ordered and reviewed  Tests in the medicine section of CPT®: reviewed and ordered  Decide to obtain previous medical records or to obtain history from someone other than the patient: yes  Obtain history from someone other than the patient: (  Dr Samra Wei, PCP - concerned that pt has been having 'TIA' like symptoms, sent her for oupt carotid doppler study which showed RICARDA 70-99% stenosed, so sent pt hospital for adm, MRI and vascular surgery consult    Pt denies any symptoms)  Review and summarize past medical records: yes  Discuss the patient with other providers: yes (Dr Samra Wei - requesting adm to hospitalist team as above    Dr Aline Michel - case discussed, agree with consult as inpt if pt is adm, if not will f/u as outpt as there is no emergency intervention needed at this time        Lauren - case discussed, care transferred)  Independent visualization of images, tracings, or specimens: yes    Risk of Complications, Morbidity, and/or Mortality  Presenting problems: moderate  Diagnostic procedures: low  Management options: moderate    Patient Progress  Patient progress: stable    CritCare Time    Disposition  Final diagnoses:   Hypertensive urgency   Carotid stenosis   Peripheral arterial occlusive disease ([facility] [address] )     Time reflects when diagnosis was documented in both MDM as applicable and the Disposition within this note     Time User Action Codes Description Comment    6/11/2018  4:25 PM Trellis Hides Add [I16 0] Hypertensive urgency     6/11/2018  4:25 PM Trellis Hides Add [I65 29] Carotid stenosis     6/11/2018  4:25 PM Trellis Hides Add [I77 9] Peripheral arterial occlusive disease [facility])       ED Disposition     ED Disposition Condition Comment    Admit  Case was discussed with Dr Miguel Lino and Dr Faina Ricci and the patient's admission status was agreed to be Obs to the service of Dr Julian Cobian    None         Patient's Medications   Discharge Prescriptions    No medications on file     No discharge procedures on file      ED Provider  Electronically Signed by           Alexus Valdez MD  06/12/18 666 MD Nikki  06/12/18 4327

## 2018-06-11 NOTE — ASSESSMENT & PLAN NOTE
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

## 2018-06-11 NOTE — H&P
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  · Management her nephrologist, Dr Yoan Brown  · In the ED, she received hydralazine 10 mg IV x1 dose and metoprolol 5 mg IV x1 dose  · Continue home medications of metoprolol  mg PO HS and losartan 50 mg p o  b i d  · Target -160  Stenosis of right internal carotid artery   Assessment & Plan    · She denies any neurological changes  · VAS bilateral carotids, showing PAULO with 07-85% stenosis and LICA <52% stenosis  · Consult vascular surgery  · Neuro checks q 4 hours  Chronic diastolic heart failure (HCC)   Assessment & Plan    · Continue metoprolol  She is currently not on a statin  · 1/18/2017 LEFT VENTRICLE: EF 55 %  There were no regional wall motion abnormalities  Wall thickness was mildly increased  Grade 2 diastolic dysfunction  Doppler parameters were consistent with elevated mean left atrial filling pressure  MITRAL VALVE:  Transmitral velocity was increased due to increased transvalvular flow  There was moderate to severe regurgitation  Regurgitation grade was 3-4+ on a scale of 0 to 4+  TRICUSPID VALVE: There was trace regurgitation  PULMONIC VALVE: There was mild regurgitation  IVC, HEPATIC VEINS: The inferior vena cava was dilated  Chronic mesenteric ischemia (HCC)   Assessment & Plan    · s/p SMA stent          PAD (peripheral artery disease) (HCC)   Assessment & Plan    · Continue Plavix          VTE Prophylaxis: Enoxaparin (Lovenox)  / sequential compression device   Code Status: Full code  POLST: POLST form is not discussed and not completed at this time      Anticipated Length of Stay:  Patient will be admitted on an Observation basis with an anticipated length of stay of  <2 midnights  Justification for Hospital Stay:  Hypertensive urgency requiring medical management    Total Time for Visit, including Counseling / Coordination of Care: 30 minutes  Greater than 50% of this total time spent on direct patient counseling and coordination of care  Chief Complaint:   Abnormal ultrasound    History of Present Illness:    Tommy Julio is a 80 y o  female with a PMH of chronic diastolic CHF, 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, chronic mesenteric ischemia s/p SMA stent, PID, PVD, Stewart's esophagus, and s/p repair of ruptured duodenal ulcer who was sent to the ED for abnormal bilateral carotid US, showing right ICA with 70-99% stenosis  In the ED she was noted to have a BP of  268/110 in which she received hydralazine 10 mg IV x1 dose and metoprolol 5 mg IV x1 dose  She denies chest pain, dizziness, or lightheadedness  She denies any difficulty with movement of extremities  Denies any difficulty with speech  Review of Systems:    Review of Systems   Constitutional: Negative for activity change, appetite change, chills, diaphoresis, fatigue and fever  HENT: Negative for congestion, drooling, ear discharge, ear pain, hearing loss, nosebleeds, postnasal drip, rhinorrhea, sneezing, trouble swallowing and voice change  Eyes: Negative for pain and redness  Respiratory: Negative for apnea, cough, choking, chest tightness, shortness of breath and wheezing  Cardiovascular: Negative for chest pain, palpitations and leg swelling  Gastrointestinal: Negative for abdominal distention, abdominal pain, blood in stool, constipation, nausea and vomiting  Genitourinary: Negative for difficulty urinating, dysuria, flank pain, hematuria and urgency  Musculoskeletal: Negative for gait problem, neck pain and neck stiffness  Skin: Negative for color change, pallor, rash and wound     Neurological: Negative for dizziness, syncope, facial asymmetry, speech difficulty, weakness, light-headedness, numbness and headaches  Psychiatric/Behavioral: Negative for agitation, behavioral problems, confusion, sleep disturbance and suicidal ideas  Past Medical and Surgical History:     Past Medical History:   Diagnosis Date    Acid reflux     Arthritis     Stewart's esophagus     COPD (chronic obstructive pulmonary disease) (HCC)     Duodenal ulcer     Hypertension     PAD (peripheral artery disease) (HCC)     Periodontal disease     Peripheral vascular disease (HCC)     Scoliosis        Past Surgical History:   Procedure Laterality Date    ANAL FISSURECTOMY      AORTA - SUPERIOR MESENTERIC ARTERY BYPASS GRAFT Bilateral     BREAST BIOPSY Right     FEMORAL ARTERY STENT Bilateral     HEMORROIDECTOMY      HYSTERECTOMY      INSERTION STENT ARTERY Left 1/1/2017    Procedure:  mesenteric arteriogram, balloon angioplasty of SMA stent;  Surgeon: Rodney Spain MD;  Location:  MAIN OR;  Service:    [employer] LAPAROTOMY N/A 1/1/2017    Procedure: LAPAROTOMY EXPLORATORY,  abdominal washout, repair of duodenal ulcer;  Surgeon: Chino Yuan MD;  Location:  MAIN OR;  Service:     [address] CATARACT EXTRACAP,INSERT LENS Right 1/11/2016    Procedure: EXTRACTION INTRACAPSULAR CATARACT PHACO INTRAOCULAR LENS (IOL); Surgeon: Quintin Barrera MD;  Location: [facility] MAIN OR;  Service: Ophthalmology    TONSILECTOMY AND ADNOIDECTOMY         Meds/Allergies:    Prior to Admission medications    Medication Sig Start Date End Date Taking? Authorizing Provider   clopidogrel (PLAVIX) 75 mg tablet Take 75 mg by mouth daily     Yes Historical Provider, MD   losartan (COZAAR) 50 mg tablet Take 1 tablet by mouth daily for 30 days  Patient taking differently: Take 50 mg by mouth 2 (two) times a day   1/21/17 6/11/18 Yes Kandace Boswell MD   metoprolol succinate (TOPROL-XL) 100 mg 24 hr tablet Take 100 mg by mouth daily at bedtime   Yes Historical Provider, MD   Multiple Vitamin (MULTIVITAMIN) capsule Take 1 capsule by mouth daily   Yes Historical Provider, MD   VITAMIN D, ERGOCALCIFEROL, PO Take 2,000 Units by mouth daily     Yes Historical Provider, MD   aspirin 81 mg chewable tablet Chew 1 tablet daily for 30 days 1/9/17 6/11/18  Gaudencio Bradford,    furosemide (LASIX) 20 mg tablet Take 1 tablet by mouth daily for 30 days 1/21/17 6/11/18  Selma Heimlich, MD   pantoprazole (PROTONIX) 40 mg tablet Take 1 tablet by mouth daily for 30 days 1/17/17 6/11/18  Gaudencio Bradford,    spironolactone (ALDACTONE) 25 mg tablet Take 1 tablet by mouth daily for 30 days 1/21/17 6/11/18  Selma Heimlich, MD     I have reviewed home medications with patient personally  Allergies: Allergies   Allergen Reactions    Chlorthalidone Itching    Penicillins Itching     Tolerates zosyn    Sulfa Antibiotics Rash    Bacitracin Rash    Ciprofloxacin GI Intolerance       Social History:     Marital Status: /Civil Union   Occupation: [employer]  Patient Pre-hospital Living Situation: lives with   Patient Pre-hospital Level of Mobility: ambulatory  Patient Pre-hospital Diet Restrictions: none  Substance Use History:   History   Alcohol Use    Yes     Comment: a glass wine daily   last drink 6/10/18     History   Smoking Status    Former Smoker    Packs/day: 1 00    Years: 25 00    Quit date: [facility] Never Used     Comment: smoked for 25 years  History   Drug Use No       Family History:    History reviewed  No pertinent family history  Physical Exam:     Vitals:   Blood Pressure: (!) 190/80 (06/11/18 1724)  Pulse: 84 (06/11/18 1736)  Temperature: 98 2 °F (36 8 °C) (06/11/18 1736)  Temp Source: Oral (06/11/18 1736)  Respirations: 18 (06/11/18 1736)  Height: 5' 2" (157 5 cm) (06/11/18 1736)  Weight - Scale: 43 1 kg (95 lb) (06/11/18 1736)  SpO2: 99 % (06/11/18 1736)    Physical Exam   Constitutional: She is oriented to person, place, and time  She appears well-developed and well-nourished  No distress     HENT: Head: Normocephalic and atraumatic  Neck: Normal range of motion  Neck supple  Cardiovascular: Normal rate and regular rhythm  Exam reveals no gallop and no friction rub  Murmur heard  Pulmonary/Chest: Effort normal and breath sounds normal  No respiratory distress  She has no wheezes  She has no rales  She exhibits no tenderness  Abdominal: Soft  Bowel sounds are normal  She exhibits no distension  There is no tenderness  There is no rebound and no guarding  Musculoskeletal: Normal range of motion  She exhibits no edema, tenderness or deformity  Neurological: She is alert and oriented to person, place, and time  No cranial nerve deficit  Coordination normal    Skin: Skin is warm and dry  No rash noted  She is not diaphoretic  No erythema  No pallor  Psychiatric: She has a normal mood and affect  Her behavior is normal  Judgment and thought content normal        Additional Data:     Lab Results: I have personally reviewed pertinent reports  Results from last 7 days  Lab Units 06/11/18  1452   WBC Thousand/uL 8 76   HEMOGLOBIN g/dL 13 0   HEMATOCRIT % 41 2   PLATELETS Thousands/uL 294   NEUTROS PCT % 73   LYMPHS PCT % 17   MONOS PCT % 8   EOS PCT % 1       Results from last 7 days  Lab Units 06/11/18  1452   SODIUM mmol/L 140   POTASSIUM mmol/L 4 0   CHLORIDE mmol/L 104   CO2 mmol/L 24   BUN mg/dL 28*   CREATININE mg/dL 0 89   CALCIUM mg/dL 9 2   TOTAL PROTEIN g/dL 7 7   BILIRUBIN TOTAL mg/dL 0 50   ALK PHOS U/L 70   ALT U/L 28   AST U/L 22   GLUCOSE RANDOM mg/dL 95       Results from last 7 days  Lab Units 06/11/18  1452   INR  1 03               Imaging: I have personally reviewed pertinent reports  XR chest 1 view portable   Final Result by Sp Traore DO (06/11 1531)      COPD/emphysematous changes are suspected  No acute pulmonary process is seen        Workstation performed: [id number]             EKG, Pathology, and Other Studies Reviewed on Admission:   · EKG: NSR with nonspecific ST    Allscripts / Epic Records Reviewed: Yes     ** Please Note: This note has been constructed using a voice recognition system   **

## 2018-06-11 NOTE — ASSESSMENT & PLAN NOTE
· Continue metoprolol  She is currently not on a statin  · 1/18/2017 LEFT VENTRICLE: EF 55 %  There were no regional wall motion abnormalities  Wall thickness was mildly increased  Grade 2 diastolic dysfunction  Doppler parameters were consistent with elevated mean left atrial filling pressure  MITRAL VALVE:  Transmitral velocity was increased due to increased transvalvular flow  There was moderate to severe regurgitation  Regurgitation grade was 3-4+ on a scale of 0 to 4+  TRICUSPID VALVE: There was trace regurgitation  PULMONIC VALVE: There was mild regurgitation  IVC, HEPATIC VEINS: The inferior vena cava was dilated

## 2018-06-12 NOTE — DISCHARGE SUMMARY
Discharge Summary - [facility] Internal Medicine  Patient Information: [place] [employer] Race 80 y o  female MRN: [id number]  Unit/Bed#: [address] [place] [address]01 Encounter: [id number]    Admission Date: 6/11/2018  Discharge Date: 6/12/2018    Admitting Physician: Joleen Villatoro MD  Discharging Physician/Practitioner: Chata Ramirez MD  PCP: Itzel Haynes MD    Reason for Admission: Evaluation of Abnormal Diagnostic Test (sent in for abnormal  carotid ultrasound today, prob admission)    Admission Diagnoses:  Peripheral arterial occlusive disease ([address] ) [I77 9]  Carotid stenosis [I65 29]  Hypertensive urgency [I16 0]  Vascular abnormality [I99 9]    Discharge Diagnoses:  Principal Problem:    Hypertensive urgency  Active Problems:    Stenosis of right internal carotid artery    PAD (peripheral artery disease) ([address] )    Peripheral vascular disease ([address] )    Chronic mesenteric ischemia ([facility] [address] )    Chronic diastolic heart failure ([facility] )    Consultations During Hospital Stay:  IP CONSULT TO Mike Genao Course:   80year old female with a history of diffuse peripheral vascular disease, uncontrolled hypertension, chronic diastolic CHF (EF 97%, moderate to severe mitral regurgitation), left renal artery stenosis, chronic mesenteric ischemia s/p SMA stenting, ex-smoker, emphysema, Stewart's esophagus, history of ruptured duodenal ulcer s/p repair, osteoarthritis/scoliosis who was referred to emergency room when she was noted to have abnormal carotid duplex  Hypertensive urgency  HTN 2/2 left artery high LISA, managed by her nephrologist, Dr Michael Hart, Target -160  BP better controlled with hydralazine 10mg iv prn  Continue add-on hydralazine 25mg po tid with holding parameters  Continue home metoprolol XL 100mg po daily, losartan 50mg po bid     Stenosis of right internal carotid artery  She denies any neurological changes    VAS bilateral carotids, showing PAULO with 73-32% stenosis and LICA <07% stenosis  Vascular surgery consulted  Add asa 81mg po daily, Lipitor 20mg po daily  Continue home Plavix 75mg po daily  CTA neck pending  Patient can follow up with vascular surgery Dr Jenny Reaves, as outpatient      Chronic diastolic heart failure ([address] )  No acute exacerbation  Continue home meds      VTE Prophylaxis: Enoxaparin (Lovenox)  / sequential compression device   Code Status: Full code      Imaging while in hospital:  Xr Chest 1 View Portable    Result Date: 6/11/2018  Narrative: CHEST INDICATION:   cp   "ABNOMRMAL CAROTID US, NO CHEST COMPLAINTS COMPARISON:  Chest x-ray dated 1/17/2017 EXAM PERFORMED/VIEWS:  XR CHEST PORTABLE FINDINGS: No pneumothorax is seen  Lungs appear hyperinflated  There is also paucity of lung markings in the bilateral mid and upper lung fields, suspect COPD/emphysema  Minimal blunting of the costophrenic sulci bilaterally, probably related to pleural scarring  Lungs otherwise appear grossly clear  The aorta is calcified but the cardiac and mediastinal contours otherwise appear unremarkable  Mild S-shaped curvature of the thoracolumbar spine, upper thoracic curve apex to the left  The visualized bones appear intact  Impression: COPD/emphysematous changes are suspected  No acute pulmonary process is seen  Workstation performed: [id number]     Ct Head Wo Contrast    Result Date: 6/12/2018  Narrative: CT BRAIN - WITHOUT CONTRAST INDICATION:   Carotid stenosis, known or suspected  COMPARISON:  None  TECHNIQUE:  CT examination of the brain was performed  In addition to axial images, coronal 2D reformatted images were created and submitted for interpretation  Radiation dose length product (DLP) for this visit:  1160 6 mGy-cm   This examination, like all CT scans performed in the [facility], was performed utilizing techniques to minimize radiation dose exposure, including the use of iterative  reconstruction and automated exposure control  IMAGE QUALITY:  Diagnostic   FINDINGS: PARENCHYMA:  Small area of encephalomalacia suspected in the posterior right cerebellar hemisphere  No intracranial mass, mass effect or midline shift  No CT signs of acute territorial infarction  No acute parenchymal hemorrhage  Old small areas of low-density in the periventricular and subcortical white matter, as well as in the right basal ganglia, thalamus, and posterior limb of the internal capsule, nonspecific but suspect sequela of chronic microvascular angiopathy  VENTRICLES AND EXTRA-AXIAL SPACES:  Normal for the patient's age  VISUALIZED ORBITS AND PARANASAL SINUSES:  Small polyps/mucous retention cysts are seen in the left maxillary sinus  Paranasal sinuses otherwise grossly clear  The orbits appear unremarkable  CALVARIUM AND EXTRACRANIAL SOFT TISSUES:     Impression: No acute intracranial abnormality is seen  Other nonacute findings as above  Workstation performed: [id number]     Vas Carotid Complete Study    Result Date: 6/11/2018  Narrative:  [facility] REPORT CLINICAL: Indications:  I65 21 Occlusion and stenosis of right carotid artery  I65 22 Occlusion and stenosis of left carotid artery Operative History 1/1/2017 Angioplasty of the SMA: re-dilation of stent 1/1/2017 Duodenal ulcer repair 8/17/2015 SMA stents 1/1/1997 B/L Common Iliac stents Risk Factors The patient has history of HTN and PAD  The patient current Height (in) is 61 in  Clinical Right Pressure:  180/100 mm Hg, Left Pressure:  220/98 mm Hg  FINDINGS:  Right                Impression  PSV  EDV (cm/s)  Direction of Flow  Dist  ICA                        105          25                     Mid  ICA                         118          29                     Prox   ICA            70 - 99%    503          87                     Carotid Bifurcation               37                                 Dist CCA                          72                                 Mid CCA                           88 Prox CCA                          89                                 Ext Carotid                      141                                 Prox Vert                        125              Antegrade          Subclavian                       143                                 Innominate                       125                                  Left                 Impression  PSV  EDV (cm/s)  Direction of Flow  Dist  ICA                         94          23                     Mid  ICA                         108          24                     Prox  ICA            1 - 49%     115          28                     Carotid Bifurcation               86                                 Dist CCA                          66                                 Mid CCA                           94                                 Prox CCA                         111                                 Ext Carotid                      145                                 Prox Vert                         87              Antegrade          Subclavian           BE  Severe   544                                    CONCLUSION: Impression RIGHT: There is 70-99% stenosis noted in the internal carotid artery  Plaque is homogenous and smooth  Vertebral artery flow is antegrade  There is no significant subclavian artery disease  LEFT: There is <50% stenosis noted in the internal carotid artery  Plaque is heterogenous and smooth  Vertebral artery flow is antegrade  There is no significant subclavian artery disease  No prior studies are available for comparison  Recommend repeat testing in 6month as per protocol unless otherwise indicated  Internal carotid artery stenosis determination by consensus criteria from: Flaco Whitehead et al  Carotid Artery Stenosis: Gray-Scale and Doppler US Diagnosis - Society of Radiologists in [address] [place] [address], Radiology 2003; 146:551-200    SIGNATURE: Electronically Signed by: Lashonda Sandy MD, RPVI on 2018-06-11 03:17:43 PM      Allergies: Allergies   Allergen Reactions    Chlorthalidone Itching    Penicillins Itching     Tolerates zosyn    Sulfa Antibiotics Rash    Bacitracin Rash    Ciprofloxacin GI Intolerance       Discharge Medications:  Current Discharge Medication List      START taking these medications    Details   aspirin 81 mg chewable tablet Chew 1 tablet (81 mg total) daily for 100 days  Qty: 100 tablet, Refills: 0    Associated Diagnoses: PAD (peripheral artery disease) ([facility] [address] ); Stenosis of right internal carotid artery      atorvastatin (LIPITOR) 20 mg tablet Take 1 tablet (20 mg total) by mouth daily with dinner for 30 days  Qty: 30 tablet, Refills: 0    Associated Diagnoses: PAD (peripheral artery disease) ([facility] [address] ); Stenosis of right internal carotid artery      hydrALAZINE (APRESOLINE) 25 mg tablet Take 1 tablet (25 mg total) by mouth 3 (three) times a day for 30 days   Hold if systolic blood pressure is < 120mmHg  Qty: 90 tablet, Refills: 0    Associated Diagnoses: Hypertensive urgency           Current Discharge Medication List        Current Discharge Medication List      CONTINUE these medications which have NOT CHANGED    Details   clopidogrel (PLAVIX) 75 mg tablet Take 75 mg by mouth daily  losartan (COZAAR) 50 mg tablet Take 1 tablet by mouth daily for 30 days  Qty: 30 tablet, Refills: 0      metoprolol succinate (TOPROL-XL) 100 mg 24 hr tablet Take 100 mg by mouth daily at bedtime      Multiple Vitamin (MULTIVITAMIN) capsule Take 1 capsule by mouth daily      VITAMIN D, ERGOCALCIFEROL, PO Take 2,000 Units by mouth daily             Current Discharge Medication List        Discharge Day Visit/Exam:   Subjective:  BP (!) 184/60 (BP Location: Left arm)   Pulse 80   Temp 98 1 °F (36 7 °C) (Oral)   Resp 18   Ht 5' 2" (1 575 m)   Wt 42 8 kg (94 lb 5 7 oz)   SpO2 96%   BMI 17 26 kg/m²     Patient Instructions:    Activity: activity as tolerated  Diet: cardiac diet and low fat, low cholesterol diet  Wound Care: none needed    Discharge instructions/Information to patient and family:(Discharge Medications and Follow up):  See after visit summary for information provided to patient and family  Provisions for Follow-Up Care:  See after visit summary for information related to follow-up care and any pertinent home health orders  Follow-up Informations:  Shin Olea MD  [address] [facility]  [phone]    Follow up in 1 week(s)  HTN, PAD    Cecelia Kent,   [facility]  [phone]    Follow up in 2 week(s)  PAD, call for appt    Disposition: Home    Planned Readmission: No     Discharge Statement:  I spent 20 minutes discharging the patient  This time was spent on the day of discharge  I had direct contact with the patient on the day of discharge  Greater than 50% of the total time was spent examining patient, answering all patient questions, arranging and discussing plan of care with patient as well as directly providing post-discharge instructions  Additional time then spent on discharge activities  Discharge Medications:  See after visit summary for reconciled discharge medications provided to patient and family

## 2018-06-12 NOTE — PLAN OF CARE
Problem: PHYSICAL THERAPY ADULT  Goal: Performs mobility at highest level of function for planned discharge setting  See evaluation for individualized goals  Outcome: Adequate for Discharge  Prognosis: Good     Assessment: Patient seen for Physical Therapy evaluation  Patient admitted with Hypertensive urgency  Comorbidities affecting patient's physical performance include: PVD, PAD, cellulitis BLE, CHF, HTN  Personal factors affecting patient at time of initial evaluation include: lives in one story house and stairs to enter home  Prior to admission, patient was independent with functional mobility without assistive device, independent with ADLS, independent with IADLS, living with spouse in a one level home with 2 steps to enter, ambulating household distance and ambulating community distances  Please find objective findings from Physical Therapy assessment regarding body systems outlined above with impairments and limitations including none  The Barthel Index was used as a functional outcome tool presenting with a score of 100 today indicating no limitations of functional mobility and ADLS  Patient's clinical presentation is currently stable   Pt is not in need of further skilled PT/OT as pt is independent with all functional mobility and ADLs  Pt is safe for dc home with spouse  As demonstrated by objective findings, the assigned level of complexity for this evaluation is low  Recommendation: Home with family support     PT - OK to Discharge: Yes    See flowsheet documentation for full assessment

## 2018-06-12 NOTE — SOCIAL WORK
Met with pt  Resides with  Krishna   Home is one floor with 2 steps to enter  Has RW, cane, shower chair, and BSC, but does not use them  No hhc  Has been independent and drives  Explained role of cm, no d/c needs anticipated  CM reviewed d/c planning process including the following: identifying help at home, patient preference for d/c planning needs, and availability of the treatment team to discuss questions or concerns patient and/or family may have regarding understanding of medications and recognizing signs and symptoms once discharged  CM also encouraged patient to follow up with all recommended appointments after discharge  Patient advised of importance for patient and family to participate in managing patient's medical well being

## 2018-06-12 NOTE — MALNUTRITION/BMI
This medical record reflects one or more clinical indicators suggestive of malnutrition and/or morbid obesity  Malnutrition Findings:      Degree of Malnutrition: Malnutrition of mild degree (related to advanced age)  Malnutrition Characteristics: Muscle loss, Other (comment) (as evidenced by muscle loss (clavicle protrusion), BMI <18 5  Pt declined all offered treatment options by RD, will continue to offer  Continue liberalized cardiac diet order )    BMI Findings:  BMI Classifications: Underweight < 18 5     Body mass index is 17 26 kg/m²  See Nutrition note dated 6/12/18 for additional details  Completed nutrition assessment is viewable in the nutrition documentation

## 2018-06-12 NOTE — PHYSICAL THERAPY NOTE
PT EVALUATION       06/12/18 1112   Note Type   Note type Eval only   Pain Assessment   Pain Assessment No/denies pain   Home Living   Type of Home House  (2 ANTONY;14 ANTONY from basement)   Home Layout One level; Laundry in basement   [address]  (RW)   Additional Comments Pt does not use any DME in her home  Prior Function   Level of [place] Independent with ADLs and functional mobility  (amb without AD PTA)   Lives With Spouse   Receives Help From Family   ADL Assistance Independent   IADLs Independent   General   Additional Pertinent History Pt to ED for abnormal bilateral carotid US with 70-99% stenosis and BP of 268/110  Family/Caregiver Present No   Cognition   Overall Cognitive Status WFL   Arousal/Participation Cooperative   Orientation Level Oriented X4   Following Commands Follows all commands and directions without difficulty   RUE Assessment   RUE Assessment WFL  (4+/5)   LUE Assessment   LUE Assessment WFL  (4+/5)   RLE Assessment   RLE Assessment WFL  (4+/5)   LLE Assessment   LLE Assessment WFL  (4+/5)   Light Touch   RLE Light Touch Grossly intact   LLE Light Touch Grossly intact   Bed Mobility   Supine to Sit 7  Independent   Sit to Supine 7  Independent   Additional Comments Pt Independent don/doffing socks and sneakers   Transfers   Sit to Stand 7  Independent   Stand to Sit 7  Independent   Ambulation/Elevation   Gait pattern WNL   Gait Assistance 7  Independent   Assistive Device None   Distance 200 feet   Stair Management Assistance 7  Independent   Stair Management Technique Alternating pattern   Number of Stairs 3   Balance   Static Sitting Good   Dynamic Sitting Good   Static Standing Good   Dynamic Standing Good   Ambulatory Good   Activity Tolerance   Activity Tolerance Patient tolerated treatment well   Assessment   Prognosis Good   Assessment Patient seen for Physical Therapy evaluation  Patient admitted with Hypertensive urgency  Comorbidities affecting patient's physical performance include: PVD, PAD, cellulitis BLE, CHF, HTN  Personal factors affecting patient at time of initial evaluation include: lives in one story house and stairs to enter home  Prior to admission, patient was independent with functional mobility without assistive device, independent with ADLS, independent with IADLS, living with spouse in a one level home with 2 steps to enter, ambulating household distance and ambulating community distances  Please find objective findings from Physical Therapy assessment regarding body systems outlined above with impairments and limitations including none  The Barthel Index was used as a functional outcome tool presenting with a score of 100 today indicating no limitations of functional mobility and ADLS  Patient's clinical presentation is currently stable   Pt is not in need of further skilled PT/OT as pt is independent with all functional mobility and ADLs  Pt is safe for dc home with spouse  As demonstrated by objective findings, the assigned level of complexity for this evaluation is low  Goals   Patient Goals "home today"   Treatment Day 0   Plan   Treatment/Interventions (Eval only;pt independent)   PT Frequency One time visit   Recommendation   Recommendation Home with family support   PT - OK to Discharge Yes   Additional Comments Pt is not in need of further skilled PT or OT services as pt is independent with all functional mobility and ADLs     Barthel Index   Feeding 10   Bathing 5   Grooming Score 5   Dressing Score 10   Bladder Score 10   Bowels Score 10   Toilet Use Score 10   Transfers (Bed/Chair) Score 15   Mobility (Level Surface) Score 15   Stairs Score 10   Barthel Index Score 953   Licensure   [place] License Number  [place] [id number]

## 2018-06-12 NOTE — CONSULTS
Consultation - Vascular Surgery   Tyler Barclay Race 80 y o  female MRN: [id number]  Unit/Bed#: [address] [place] [address] Encounter: [id number]      Assessment/Plan      Assessment:  1) bilateral internal carotid artery stenosis - right is more significant than left at somewhere approximately between 70 and 99% stenosis, asymptomatic, no neurologic symptoms, neurologic exam was completely within normal limits, patient is ambulatory and has relatively good quality of life, patient would consider surgery, former smoker, significant peripheral arterial disease, family history of significant coronary artery disease  2) significant peripheral arterial disease - stable currently, claudication with walking far distances, no history of lower extremity wounds, ambulatory and sustaining activity, significant stenosis of right renal artery, right common iliac artery, proximal left common and external iliac artery, chronic celiac artery occlusion, distal/proximal right SFA, left common femoral/ proximal-mid SFA  3) History of mesenteric ischemia - per patient had to have stents placed due to pain with eating too much and blood in stool, since intervention no problems    Plan:  1-3)   - in light of the fact that patient is considering surgery for internal carotid stenosis, will perform CT angiogram of head and neck while still in patient  - follow-up in 2 weeks in outpatient office with vascular team  - possibly will plan on elective right internal carotid endarterectomy  - follow-up in same 2 week appointment to address peripheral arterial disease, currently no surgical intervention is recommended in light of the fact that there is no life/limb threatening stenosis, patient's symptoms appear controlled and patient does not appear to be having too many problems from her peripheral arterial disease  - start 81 mg aspirin  - continue Plavix  - upon CT angiogram being performed patient can be discharged  - no acute vascular intervention      History of Present Illness   Physician Requesting Consult: Mishel Hernandez MD  Reason for Consult / Principal Problem: internal carotid artery stenosis  HPI: iNck Long is a 80y o  year old female with a past medical history pertinent for superior mesenteric artery bypass graft, femoral artery stent bilateral, insertion of mesenteric artery stent, significant peripheral arterial disease, at least 30 pack year history, hypertension, COPD, hyperlipidemia presenting to the acute vascular surgery service due to bilateral internal carotid artery stenosis  Patient reports that she has been known to the vascular practice with Dr Steff Alvares placing stents in her mesenteric artery due to mesenteric ischemia  Patient since then has not had any other problems with her dietary intake or any blood in her stools  Patient also has significant history of peripheral arterial disease with stent placements in her lower extremities bilaterally  Patient does report that she has claudication however normally due to her scoliosis and spinal stenosis her lower back ends up causing her to reduce her activity before her claudication does  Patient indicates that her claudication only is exacerbated with long distance walking  Patient reports that the major reason that she is here today is because she was at her family doctor, because of her history of significant peripheral arterial disease, ordered a bilateral internal carotid ultrasound  Patient had this ultrasound performed and the family doctor reviewed approximately 2 and half days ago  Family doctor insisted that she go to the emergency department because her degree of stenosis  Patient reports that she was fairly nervous and presented to the emergency department at which point she was found to have 2 isolated incidences of hypertension  Patient consequently was admitted for hypertensive emergency    After being treated for her isolated incidences of hypertension patient has not had significantly elevated blood pressures above her typical baseline  Patient does have a history of essential hypertension  Patient believes that the major reason she had elevated blood pressures was because that she gets very anxiety ridden and she is seems the worst in every test  When asking with regards to her internal carotid stenosis patient reports that it appears to be asymptomatic  Patient denies any sort of cerebrovascular accident type symptoms such as:  Numbness, tingling, lack of motor movement, disturbance in gait, hemiparesis, facial drooping, weakness, lightheadedness, impairment in vision, impairment in hearing, altered mental status, inability to communicate, impairment in fine motor and sensory components in extremities  Patient denies any sort of symptoms related to her hypertension also such as lightheadedness, dizziness, headache  Patient would like to know if and when she can go home  Patient was a former smoker with a 30 pack year history  Patient reports that she would be considering a procedure if it was clinically indicated to get a carotid endarterectomy  Inpatient consult to Vascular Surgery  Consult performed by: Tari Simeon ordered by: Messi Jay          Review of Systems   Constitutional: Negative for appetite change, chills, diaphoresis and fever  HENT: Negative for tinnitus  Respiratory: Negative for cough, chest tightness and shortness of breath  Cardiovascular: Negative for chest pain and palpitations  Gastrointestinal: Negative for abdominal distention, abdominal pain, diarrhea, nausea and vomiting  Genitourinary: Negative for difficulty urinating and dysuria  Musculoskeletal: Negative for gait problem  Skin: Negative for color change and wound  Neurological: Negative for dizziness, seizures, syncope, weakness, light-headedness and numbness         Historical Information   Past Medical History:   Diagnosis Date    Acid reflux  Arthritis     Stewart's esophagus     COPD (chronic obstructive pulmonary disease) (HCC)     Duodenal ulcer     Hypertension     PAD (peripheral artery disease) (HCC)     Periodontal disease     Peripheral vascular disease ([address] )     Scoliosis      Past Surgical History:   Procedure Laterality Date    ANAL FISSURECTOMY      AORTA - SUPERIOR MESENTERIC ARTERY BYPASS GRAFT Bilateral     BREAST BIOPSY Right     FEMORAL ARTERY STENT Bilateral     HEMORROIDECTOMY      HYSTERECTOMY      INSERTION STENT ARTERY Left 1/1/2017    Procedure:  mesenteric arteriogram, balloon angioplasty of SMA stent;  Surgeon: Sara Spain MD;  Location:  MAIN OR;  Service:    [facility] LAPAROTOMY N/A 1/1/2017    Procedure: LAPAROTOMY EXPLORATORY,  abdominal washout, repair of duodenal ulcer;  Surgeon: Dong Haney MD;  Location:  MAIN OR;  Service:      [facility] CATARACT EXTRACAP,INSERT LENS Right 1/11/2016    Procedure: EXTRACTION INTRACAPSULAR CATARACT PHACO INTRAOCULAR LENS (IOL); Surgeon: Ivonne Benton MD;  Location: [facility] MAIN OR;  Service: Ophthalmology    TONSILECTOMY AND ADNOIDECTOMY       Social History   History   Alcohol Use    Yes     Comment: a glass wine daily   last drink 6/10/18     History   Drug Use No     History   Smoking Status    Former Smoker    Packs/day: 1 00    Years: 25 00    Quit date: [facility] Never Used     Comment: smoked for 25 years       Family History: non-contributory}    Meds/Allergies   all current active meds have been reviewed and current meds:   Current Facility-Administered Medications   Medication Dose Route Frequency    acetaminophen (TYLENOL) tablet 650 mg  650 mg Oral Q6H PRN    atorvastatin (LIPITOR) tablet 20 mg  20 mg Oral Daily With Dinner    clopidogrel (PLAVIX) tablet 75 mg  75 mg Oral Daily    enoxaparin (LOVENOX) subcutaneous injection 30 mg  30 mg Subcutaneous Daily    hydrALAZINE (APRESOLINE) injection 10 mg  10 mg Intravenous Q6H PRN    losartan (COZAAR) tablet 50 mg  50 mg Oral BID    melatonin tablet 3 mg  3 mg Oral HS    metoprolol succinate (TOPROL-XL) 24 hr tablet 100 mg  100 mg Oral HS    ondansetron (ZOFRAN) injection 4 mg  4 mg Intravenous Q6H PRN    pantoprazole (PROTONIX) EC tablet 40 mg  40 mg Oral Early Morning     Allergies   Allergen Reactions    Chlorthalidone Itching    Penicillins Itching     Tolerates zosyn    Sulfa Antibiotics Rash    Bacitracin Rash    Ciprofloxacin GI Intolerance       Objective   Vitals: Blood pressure (!) 184/60, pulse 80, temperature 98 1 °F (36 7 °C), temperature source Oral, resp  rate 18, height 5' 2" (1 575 m), weight 42 8 kg (94 lb 5 7 oz), SpO2 96 %  ,Body mass index is 17 26 kg/m²  Intake/Output Summary (Last 24 hours) at 06/12/18 1635  Last data filed at 06/12/18 0601   Gross per 24 hour   Intake              250 ml   Output             1100 ml   Net             -850 ml     Invasive Devices     Peripheral Intravenous Line            Peripheral IV 01/30/17 Left Arm 498 days    Peripheral IV 06/12/18 Right Antecubital less than 1 day                Physical Exam   Constitutional: She is oriented to person, place, and time  Vital signs are normal  She appears well-developed and well-nourished  She is cooperative  She does not have a sickly appearance  She does not appear ill  No distress  She is not intubated  HENT:   Head: Normocephalic and atraumatic  Right Ear: Hearing and external ear normal    Left Ear: Hearing and external ear normal    Nose: Nose normal    Eyes: Conjunctivae, EOM and lids are normal  Right conjunctiva is not injected  Right conjunctiva has no hemorrhage  Left conjunctiva is not injected  Left conjunctiva has no hemorrhage  Right eye exhibits normal extraocular motion and no nystagmus  Left eye exhibits normal extraocular motion and no nystagmus  Right pupil is round and reactive  Left pupil is round and reactive   Pupils are equal    Neck: Normal range of motion and full passive range of motion without pain  Neck supple  No hepatojugular reflux and no JVD present  Carotid bruit is not present  No edema, no erythema and normal range of motion present  Cardiovascular: Normal rate, regular rhythm, S1 normal and S2 normal     Murmur heard  Crescendo systolic murmur is present with a grade of 3/6   Pulses:       Carotid pulses are 2+ on the right side, and 2+ on the left side  Radial pulses are 2+ on the right side, and 2+ on the left side  Femoral pulses are 1+ on the right side, and 1+ on the left side  Popliteal pulses are 2+ on the right side, and 2+ on the left side  Dorsalis pedis pulses are 1+ on the right side, and 0 on the left side  Posterior tibial pulses are 1+ on the right side, and 1+ on the left side  Aortic stenosis  Left DP was dopplerable   All pulses were dopplerable   Pulmonary/Chest: No accessory muscle usage  No apnea, no tachypnea and no bradypnea  She is not intubated  No respiratory distress  She has decreased breath sounds  She has no wheezes  She has no rhonchi  She has no rales  Abdominal: Normal appearance and bowel sounds are normal  She exhibits no distension  There is no tenderness  Neurological: She is alert and oriented to person, place, and time  She has normal strength  No cranial nerve deficit or sensory deficit  She displays a negative Romberg sign  Coordination and gait normal  GCS eye subscore is 4  GCS verbal subscore is 5  GCS motor subscore is 6  Skin: Skin is warm, dry and intact  Lab Results:   I have personally reviewed pertinent reports      Creatinine:   Lab Results   Component Value Date    CREATININE 0 86 06/12/2018   , Lipid Panel:   Lab Results   Component Value Date    CHOL 234 (H) 06/12/2018   , CBC with diff:   Lab Results   Component Value Date    WBC 7 30 06/12/2018    HGB 12 4 06/12/2018    HCT 38 9 06/12/2018    MCV 93 06/12/2018     06/12/2018    MCH 29 7 06/12/2018 MCHC 31 9 06/12/2018    RDW 13 7 06/12/2018    MPV 9 9 06/12/2018   , BMP/CMP:   Lab Results   Component Value Date     06/12/2018    K 4 2 06/12/2018     06/12/2018    CO2 22 06/12/2018    ANIONGAP 12 06/12/2018    BUN 28 (H) 06/12/2018    CREATININE 0 86 06/12/2018    GLUCOSE 113 06/12/2018    CALCIUM 9 1 06/12/2018    EGFR 61 06/12/2018     Imaging Studies: I have personally reviewed pertinent reports  EKG, Pathology, and Other Studies: I have personally reviewed pertinent reports  VTE Prophylaxis: Sequential compression device (Venodyne)  and Enoxaparin (Lovenox)     Code Status: Level 1 - Full Code    Counseling / Coordination of Care  Counseling/Coordination of Care: Total floor / unit time spent today 65 minutes  Greater than 50% of total time was spent with the patient and / or family counseling and / or coordination of care   A description of the counseling / coordination of care: Developing plan, reviewing with attending, coordinating with the Internal Medicine, patient education, reviewing labs, reviewing imaging, history taking, extensive physical exam involving neurologic exam

## 2018-06-12 NOTE — PLAN OF CARE
CARDIOVASCULAR - ADULT     Maintains optimal cardiac output and hemodynamic stability Completed     Absence of cardiac dysrhythmias or at baseline rhythm Completed        DISCHARGE PLANNING     Discharge to home or other facility with appropriate resources Completed        DISCHARGE PLANNING - CARE MANAGEMENT     Discharge to post-acute care or home with appropriate resources Completed        Knowledge Deficit     Patient/family/caregiver demonstrates understanding of disease process, treatment plan, medications, and discharge instructions Completed        Nutrition/Hydration-ADULT     Nutrient/Hydration intake appropriate for improving, restoring or maintaining nutritional needs Completed        PAIN - ADULT     Verbalizes/displays adequate comfort level or baseline comfort level Completed        Potential for Falls     Patient will remain free of falls Completed        SAFETY ADULT     Maintain or return to baseline ADL function Completed     Maintain or return mobility status to optimal level Completed

## 2018-06-12 NOTE — CASE MANAGEMENT
Initial Clinical Review    Admission: Date/Time/Statement: 6/11/18 1627    Orders Placed This Encounter   Procedures    Place in Observation (expected length of stay for this patient is less than two midnights)     Standing Status:   Standing     Number of Occurrences:   1     Order Specific Question:   Admitting Physician     Answer:   Jimmy Alba     Order Specific Question:   Level of Care     Answer:   Med Surg [16]         ED: Date/Time/Mode of Arrival:   ED Arrival Information     Expected Arrival Acuity Means of Arrival Escorted By Service Admission Type    - 6/11/2018 13:43 Emergent Walk-In Spouse General Medicine Emergency    Arrival Complaint    abnormal labs, sent by dr loza          Chief Complaint:   Chief Complaint   Patient presents with    Evaluation of Abnormal Diagnostic Test     sent in for abnormal  carotid ultrasound today, prob admission       History of Illness: Eldora Schaumann is a 80 y o  female with a PMH of chronic diastolic CHF, 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, chronic mesenteric ischemia s/p SMA stent, PID, PVD, Stewart's esophagus, and s/p repair of ruptured duodenal ulcer who was sent to the ED for abnormal bilateral carotid US, showing right ICA with 70-99% stenosis  In the ED she was noted to have a BP of  268/110 in which she received hydralazine 10 mg IV x1 dose and metoprolol 5 mg IV x1 dose  She denies chest pain, dizziness, or lightheadedness  She denies any difficulty with movement of extremities    Denies any difficulty with speech    ED Vital Signs:   ED Triage Vitals [06/11/18 1354]   Temperature Pulse Respirations Blood Pressure SpO2   98 8 °F (37 1 °C) 99 (!) 24 (!) 268/110 95 %      Temp Source Heart Rate Source Patient Position - Orthostatic VS BP Location FiO2 (%)   Tympanic Monitor Sitting Left arm --      Pain Score       No Pain        Wt Readings from Last 1 Encounters:   06/12/18 42 8 kg (94 lb 5 7 oz)       Abnormal Labs/Diagnostic Test Results: BUN 28 GLUCOSE 113 TROPONIN <0 02 X3 CHOL 234 HDL 86   VASCULAR CAROTID RIGHT 70-99% stenosis noted in the internal carotid artery  LEFT <50%stenosis noted in the internal carotid artery  CXR  COPD/emphysematous changes are suspected  No acute pulmonary process is seen  · EKG: NSR with nonspecific ST      ED Treatment:   Medication Administration from 06/11/2018 1343 to 06/11/2018 1719       Date/Time Order Dose Route Action Action by Comments     06/11/2018 1446 metoprolol (LOPRESSOR) injection 5 mg 5 mg Intravenous Given Hernesto Louise RN      06/11/2018 1647 hydrALAZINE (APRESOLINE) injection 10 mg 10 mg Intravenous Given Jose Hawthorne RN           Past Medical/Surgical History:    Active Ambulatory Problems     Diagnosis Date Noted    Acid reflux     Periodontal disease     Stewart's esophagus     Peripheral vascular disease (HCC)     [facility]per[facility]) [facility])     Perforated duodenal ulcer ([facility] [address] ) 01/01/2017    Chronic mesenteric ischemia ([facility]) 01/01/2017    Bilateral cellulitis of lower leg 01/17/2017    Elevated brain natriuretic peptide (BNP) level 01/17/2017    Pleural effusion, bilateral 01/17/2017    Hypoalbuminemia due to protein-calorie malnutrition ([facility] ) 01/17/2017     Resolved Ambulatory Problems     Diagnosis Date Noted    No Resolved Ambulatory Problems     Past Medical History:   Diagnosis Date    Acid reflux     Arthritis     Stewart's esophagus     COPD (chronic obstructive pulmonary disease) (HCC)     Duodenal ulcer     Hypertension     PAD (peripheral artery disease) (HCC)     Periodontal disease     Peripheral vascular disease ([facility] )     Scoliosis        Admitting Diagnosis: Peripheral arterial occlusive disease ([facility] [address] ) [I77 9]  Carotid stenosis [I65 29]  Hypertensive urgency [I16 0]  Vascular abnormality [I99 9]    Age/Sex: 80 y o  female    Assessment/Plan:   Hypertensive urgency   Assessment & Plan     · HTN 2/2 left artery high LISA  · Management her nephrologist, Dr Ana Luisa Peralta  · In the ED, she received hydralazine 10 mg IV x1 dose and metoprolol 5 mg IV x1 dose  · Continue home medications of metoprolol  mg PO HS and losartan 50 mg p o  b i d  · Target -160           Stenosis of right internal carotid artery   Assessment & Plan     · She denies any neurological changes  · VAS bilateral carotids, showing PAULO with 71-08% stenosis and LICA <85% stenosis  · Consult vascular surgery  · Neuro checks q 4 hours            Chronic diastolic heart failure (HCC)   Assessment & Plan     · Continue metoprolol  She is currently not on a statin  · 1/18/2017 LEFT VENTRICLE: EF 55 %  There were no regional wall motion abnormalities  Wall thickness was mildly increased  Grade 2 diastolic dysfunction  Doppler parameters were consistent with elevated mean left atrial filling pressure  MITRAL VALVE:  Transmitral velocity was increased due to increased transvalvular flow  There was moderate to severe regurgitation  Regurgitation grade was 3-4+ on a scale of 0 to 4+  TRICUSPID VALVE: There was trace regurgitation  PULMONIC VALVE: There was mild regurgitation  IVC, HEPATIC VEINS: The inferior vena cava was dilated           Chronic mesenteric ischemia (HCC)   Assessment & Plan     · s/p SMA stent             PAD (peripheral artery disease) ([facility])   Assessment & Plan     · Continue Plavix             VTE Prophylaxis: Enoxaparin (Lovenox)  / sequential compression device   Code Status: Full code  POLST: POLST form is not discussed and not completed at this time      Anticipated Length of Stay:  Patient will be admitted on an Observation basis with an anticipated length of stay of  <2 midnights     Justification for Hospital Stay:  Hypertensive urgency requiring medical management       Admission Orders:  OBSERVATION  TELE MON  CONSULT VASCULAR SURGERY  NEURO CHECKS  CT HEAD  ECHO  PT OT    Scheduled Meds:   Current Facility-Administered Medications:  acetaminophen 650 mg Oral Q6H PRN STEVEN Anand   atorvastatin 20 mg Oral Daily With Dinner STEVEN Anand   clopidogrel 75 mg Oral Daily STEVEN Anand   enoxaparin 30 mg Subcutaneous Daily STEVEN Brown   hydrALAZINE 10 mg Intravenous Q6H PRN Yoli Benitez, STEVEN   losartan 50 mg Oral BID STEVEN Anand   melatonin 3 mg Oral HS STEVEN Marin   metoprolol succinate 100 mg Oral HS STEVEN Anand   ondansetron 4 mg Intravenous Q6H PRN STEVEN Anand   pantoprazole 40 mg Oral Early Morning Osman Saini MD     Continuous Infusions:    PRN Meds:   acetaminophen    hydrALAZINE    ondansetron

## 2018-06-12 NOTE — PLAN OF CARE
CARDIOVASCULAR - ADULT     Maintains optimal cardiac output and hemodynamic stability Progressing     Absence of cardiac dysrhythmias or at baseline rhythm Progressing        DISCHARGE PLANNING     Discharge to home or other facility with appropriate resources Progressing        DISCHARGE PLANNING - CARE MANAGEMENT     Discharge to post-acute care or home with appropriate resources Progressing        Knowledge Deficit     Patient/family/caregiver demonstrates understanding of disease process, treatment plan, medications, and discharge instructions Progressing        Nutrition/Hydration-ADULT     Nutrient/Hydration intake appropriate for improving, restoring or maintaining nutritional needs Progressing        PAIN - ADULT     Verbalizes/displays adequate comfort level or baseline comfort level Progressing        Potential for Falls     Patient will remain free of falls Progressing        SAFETY ADULT     Maintain or return to baseline ADL function Progressing     Maintain or return mobility status to optimal level Progressing

## 2018-06-26 NOTE — PROGRESS NOTES
No problem-specific Assessment & Plan notes found for this encounter  Assessment/Plan   {Assess/PlanSmartLinks:87507}    No chief complaint on file  Subjective I am here for follow up     Patient ID: Zion Sommer is a 80 y o  female  Pt is here for a results of a CTA head and neck done 6/12/18  Pt was in the ED on 6/11/18 for abnormal diagnotic test      HPI    {History Review (Optional):02485}    Review of Systems   Constitutional: Negative  HENT: Negative  Eyes: Negative  Respiratory: Negative  Cardiovascular: Negative  Gastrointestinal: Negative  Endocrine: Negative  Genitourinary: Negative  Musculoskeletal: Negative  Skin: Negative  Allergic/Immunologic: Negative  Neurological: Negative  Hematological: Negative  Psychiatric/Behavioral: Negative          Patient Active Problem List   Diagnosis    Acid reflux    Periodontal disease    Stewart's esophagus    Peripheral vascular disease ([facility] [address] )    PAD (peripheral artery disease) (HCC)    Perforated duodenal ulcer ([facility] [address] )    Chronic mesenteric ischemia (HCC)    Bilateral cellulitis of lower leg    Elevated brain natriuretic peptide (BNP) level    Pleural effusion, bilateral    Hypoalbuminemia due to protein-calorie malnutrition (HCC)    Hypertensive urgency    Stenosis of right internal carotid artery    Chronic diastolic heart failure (HCC)       Past Surgical History:   Procedure Laterality Date    ANAL FISSURECTOMY      AORTA - SUPERIOR MESENTERIC ARTERY BYPASS GRAFT Bilateral     BREAST BIOPSY Right     FEMORAL ARTERY STENT Bilateral     HEMORROIDECTOMY      HYSTERECTOMY      INSERTION STENT ARTERY Left 1/1/2017    Procedure:  mesenteric arteriogram, balloon angioplasty of SMA stent;  Surgeon: Dave Villareal MD;  [facility]: [facility];  Service:    Milay Lenawee LAPAROTOMY N/A 1/1/2017    Procedure: LAPAROTOMY EXPLORATORY,  abdominal washout, repair of duodenal ulcer;  Surgeon: Mark Galarza MD;  Location: [facility];  Service:     AK REMV CATARACT EXTRACAP,INSERT LENS Right 1/11/2016    Procedure: EXTRACTION INTRACAPSULAR CATARACT PHACO INTRAOCULAR LENS (IOL); Surgeon: Bernie Olivarez MD;  Location: [facility] MAIN OR;  Service: Ophthalmology    TONSILECTOMY AND ADNOIDECTOMY         No family history on file  Social History     Social History    Marital status: /Civil Union     Spouse name: N/A    Number of children: N/A    Years of education: N/A     Occupational History    Not on file  Social History Main Topics    Smoking status: Former Smoker     Packs/day: 1 00     Years: 25 00     Quit date: 1985    Smokeless tobacco: Never Used      Comment: smoked for 25 years   Alcohol use Yes      Comment: a glass wine daily   last drink 6/10/18    Drug use: No    Sexual activity: No     Other Topics Concern    Not on file     Social History Narrative    Lives with   Steady on feet  Allergies   Allergen Reactions    Chlorthalidone Itching    Penicillins Itching     Tolerates zosyn    Sulfa Antibiotics Rash    Bacitracin Rash    Ciprofloxacin GI Intolerance         Current Outpatient Prescriptions:     aspirin 81 mg chewable tablet, Chew 1 tablet (81 mg total) daily for 100 days, Disp: 100 tablet, Rfl: 0    atorvastatin (LIPITOR) 20 mg tablet, Take 1 tablet (20 mg total) by mouth daily with dinner for 30 days, Disp: 30 tablet, Rfl: 0    clopidogrel (PLAVIX) 75 mg tablet, Take 75 mg by mouth daily  , Disp: , Rfl:     hydrALAZINE (APRESOLINE) 25 mg tablet, Take 1 tablet (25 mg total) by mouth 3 (three) times a day for 30 days    Hold if systolic blood pressure is < 120mmHg, Disp: 90 tablet, Rfl: 0    losartan (COZAAR) 50 mg tablet, Take 1 tablet by mouth daily for 30 days (Patient taking differently: Take 50 mg by mouth 2 (two) times a day  ), Disp: 30 tablet, Rfl: 0    metoprolol succinate (TOPROL-XL) 100 mg 24 hr tablet, Take 100 mg by mouth daily at bedtime, Disp: , Rfl:    Multiple Vitamin (MULTIVITAMIN) capsule, Take 1 capsule by mouth daily, Disp: , Rfl:     VITAMIN D, ERGOCALCIFEROL, PO, Take 2,000 Units by mouth daily  , Disp: , Rfl:     Objective     Physical Exam:    General appearance: {general exam:75427}  Skin: {skin exam:93996::"Skin color, texture, turgor normal  No rashes or lesions"}  Neurologic: {neuro exam:03295::"Grossly normal"}  Head: {head exam:11197::"Normocephalic, without obvious abnormality","atraumatic"}  Eyes: {eye exam:201::"conjunctivae/corneas clear  PERRL, EOM's intact  Fundi benign  "}  Throat: {throat exam:52678::"lips, mucosa, and tongue normal; teeth and gums normal"}  Neck: {neck exam:74239::"no adenopathy","no carotid bruit","no JVD","supple, symmetrical, trachea midline","thyroid not enlarged, symmetric, no tenderness/mass/nodules"}  Back: {back exam:801::"symmetric, no curvature   ROM normal  No CVA tenderness "}  Lungs: {lung exam:14160::"clear to auscultation bilaterally"}  Chest wall: {chest exam:83649::"no tenderness"}  Heart: {heart exam:5510::"regular rate and rhythm, S1, S2 normal, no murmur, click, rub or gallop"}  Abdomen: {abdominal exam:10495::"soft, non-tender; bowel sounds normal; no masses,  no organomegaly"}  Extremities: {extremity exam:5109::"extremities normal, warm and well-perfused; no cyanosis, clubbing, or edema"}    Pulse exam:  Radial: Right: {Vascular Pulse Exam:21087} Left[de-identified] {Vascular Pulse Exam:21087}  Ulnar: Right: {Vascular Pulse Exam:21087} Left[de-identified] {Vascular Pulse Exam:21087}  Femoral: Right: {Vascular Pulse Exam:21087} Left: {Vascular Pulse Exam:21087}  Popliteal: Right: {Vascular Pulse Exam:21087} Left: {Vascular Pulse Exam:21087}  DP: Right: {Vascular Pulse Exam:34587} Left: {Vascular Pulse Exam:21087}  PT: Right: {Vascular Pulse Exam:21087} Left: {Vascular Pulse Exam:21087}

## 2018-06-26 NOTE — ASSESSMENT & PLAN NOTE
Patient recently was hospitalized after carotid duplex demonstrated a right 70-99% stenosis  By velocity criteria  Patient is asymptomatic  Patient has extensive peripheral arterial disease/ chronic mesenteric ischemia status post SMA stenting in the past   She has history of aortoiliac occlusive disease with stenting in the past    Patient has  History of known renal artery stenosis/ hypertension    CTA neck  Suggest approximately 50% bilateral ICA stenosis  CTA neck reviewed personally reviewed by me and does appear to be more like greater than 70% smoothed narrowing without evidence of ulcerated plaque  discuss the results with patient today in the office  The pros and cons of intervention versus medical therapy reviewed  In light of her age and comorbidities as well as asymptomatic nature of right ICA stenosis recommend continued medical therapy  We discussed the importance of compliance with aspirin/ Plavix and statin therapy  Will continue surveillance carotid duplex in 6 months  The signs and symptoms of stroke were reviewed  If any symptoms should arise she should seek immediate medical attention

## 2018-06-26 NOTE — PATIENT INSTRUCTIONS
Carotid Artery Disease   AMBULATORY CARE:   Carotid artery disease  is a condition that causes narrow or blocked carotid arteries  Your carotid arteries are the blood vessels that supply your brain with most of the blood it needs to work  You have 2 carotid arteries, one on each side of your neck  Call 911 for any of the following:   · You have any of the following signs of a stroke:      ¨ Numbness or drooping on one side of your face     ¨ Weakness in an arm or leg    ¨ Confusion or difficulty speaking    ¨ Dizziness, a severe headache, or vision loss    · You have any of the following signs of a heart attack:      ¨ Squeezing, pressure, or pain in your chest that lasts longer than 5 minutes or returns    ¨ Discomfort or pain in your back, neck, jaw, stomach, or arm     ¨ Trouble breathing    ¨ Nausea or vomiting    ¨ Lightheadedness or a sudden cold sweat, especially with chest pain or trouble breathing  Contact your healthcare provider if:   · You have questions or concerns about your condition or care  Signs and symptoms of carotid artery disease: You may have no signs or symptoms  Most commonly, carotid artery disease causes transient ischemic attacks (TIAs), or mini-strokes  You may have numbness, weakness, lack of movement, or vision or speech problems  A TIA goes away quickly and does not cause permanent damage  A TIA may be a warning sign that you are about to have a stroke  If you have any symptoms of a TIA or stroke, seek care immediately  Warning signs of a stroke: The word F A S T  can help you remember and recognize warning signs of a stroke  · F = Face:  One side of the face droops  · A = Arms:  One arm starts to drop when both arms are raised  · S = Speech:  Speech is slurred or sounds different than usual     · T = Time:  A person who is having a stroke needs to be seen immediately  A stroke is a medical emergency that needs immediate treatment   Some medicines and treatments work best if given within a few hours of a stroke  Treatment  for carotid artery disease depends on how narrow your arteries have become, your symptoms, and your general health  The goal of treatment is to lower your risk for a stroke  You may need any of the following:  · Take aspirin if directed  Your healthcare provider may suggest that you take an aspirin a day to prevent blood clots from forming in the carotid arteries  If your healthcare provider wants you to take aspirin daily, do not take acetaminophen or ibuprofen instead  · Control risk factors  High blood pressure, high cholesterol, heart disease, diabetes, and being overweight increase your risk for atherosclerosis  · Procedures can help open blocked arteries  A carotid endarterectomy is used to cut plaque out of the artery  An angioplasty is used to push the plaque against the artery wall with a balloon device  Sometimes a stent is placed during an angioplasty  A stent is a metal mesh tube that is placed in the artery to keep it open  Manage carotid artery disease:   · Eat a variety of healthy foods  Healthy foods include fruit, vegetables, whole-grain breads, low-fat dairy products, lean meat, and fish  Choose fish that are high in omega-3 fatty acids, such as salmon and fresh tuna  Ask your healthcare provider for more information on a heart healthy diet and the DASH eating plan  · Limit sodium (salt)  Sodium may increase your blood pressure  Add less table salt to your foods  Read food labels and choose foods that are low in sodium  Your healthcare provider may suggest you follow a low sodium diet  · Reach or maintain a healthy weight  Extra weight makes your heart work harder  Ask your healthcare provider how much you should weight  He can help you create a safe weight loss plan  Even a weight loss of 10% of your body weight can help your heart function better  · Exercise as directed    Exercise helps improve heart function and can help you manage your weight  Exercise can also help lower your cholesterol and blood sugar levels  Try to get at least 30 minutes of exercise at least 5 times each week  Try to be active every day  Your healthcare provider can help you create an exercise plan that works best for you  · Limit alcohol  Alcohol can increase your blood pressure and triglyceride levels  Men should limit alcohol to 2 drinks per day  Women should limit alcohol to 1 drink per day  A drink of alcohol is 12 ounces of beer, 5 ounces of wine, or 1½ ounces of liquor  · Do not smoke  Nicotine and other chemicals in cigarettes and cigars can cause heart and lung damage  Ask your healthcare provider for information if you currently smoke and need help to quit  E-cigarettes or smokeless tobacco still contain nicotine  Talk to your healthcare provider before you use these products  Follow up with your healthcare provider as directed:  Write down your questions so you remember to ask them during your visits  © 2017 [employer] Information is for End User's use only and may not be sold, redistributed or otherwise used for commercial purposes  All illustrations and images included in CareNotes® are the copyrighted property of [employer]  or [employer]keon  The above information is an  only  It is not intended as medical advice for individual conditions or treatments  Talk to your doctor, nurse or pharmacist before following any medical regimen to see if it is safe and effective for you

## 2018-06-26 NOTE — PROGRESS NOTES
Assessment/Plan:    Stenosis of right internal carotid artery  Patient recently was hospitalized after carotid duplex demonstrated a right 70-99% stenosis  By velocity criteria  Patient is asymptomatic  Patient has extensive peripheral arterial disease/ chronic mesenteric ischemia status post SMA stenting in the past   She has history of aortoiliac occlusive disease with stenting in the past    Patient has  History of known renal artery stenosis/ hypertension    CTA neck  Suggest approximately 50% bilateral ICA stenosis  CTA neck reviewed personally reviewed by me and does appear to be more like greater than 70% smoothed narrowing without evidence of ulcerated plaque  discuss the results with patient today in the office  The pros and cons of intervention versus medical therapy reviewed  In light of her age and comorbidities as well as asymptomatic nature of right ICA stenosis recommend continued medical therapy  We discussed the importance of compliance with aspirin/ Plavix and statin therapy  Will continue surveillance carotid duplex in 6 months  The signs and symptoms of stroke were reviewed  If any symptoms should arise she should seek immediate medical attention  Diagnoses and all orders for this visit:    Stenosis of right internal carotid artery  -     VAS carotid complete study; Future    Peripheral vascular disease (HCC)    Chronic mesenteric ischemia (HCC)    PAD (peripheral artery disease) (HCC)          Subjective: "I am here to go over my test results "     Patient ID: [place]ia Kirkpatrick is a 80 y o  female  Patient had a CTA of the head and neck on 6/12/18  She denies any TIA or stroke symptoms   She denies any one sided weakness,     Marianna Zaidi is an 59-year-old woman well known to me with history of chronic mesenteric ischemia status post SMA/stent,  Renal artery stenosis, significant peripheral arterial disease who was recently found to have high-grade right ICA asymptomatic stenosis by duplex velocity criteria  A CTA of the neck was performed and official read reports bilateral approximately 50% ICA stenosis  She denies any amaurosis fugax  He denies any focal neurologic deficits  Denies any facial asymmetry  Denies dysarthria/dysphagia  Denies any postprandial abdominal pain  Reports weight has been steady  She reports her blood pressure is always better controlled at home  The following portions of the patient's history were reviewed and updated as appropriate: allergies, current medications, past family history, past medical history, past social history, past surgical history and problem list     Review of Systems   Constitutional: Negative  HENT: Negative  Eyes: Negative  Respiratory: Negative  Cardiovascular: Negative  Gastrointestinal: Negative  Endocrine: Negative  Genitourinary: Negative  Musculoskeletal: Negative  Allergic/Immunologic: Negative  Neurological: Negative for dizziness, seizures, syncope, weakness, numbness and headaches  Hematological: Negative  Psychiatric/Behavioral: Negative  I have personally reviewed the ROS entered by MA and agree as documented      Vitals:    06/26/18 0849 06/26/18 0850   BP: (!) 200/110 (!) 200/90   BP Location: Left arm Right arm   Patient Position: Sitting Sitting   Pulse: 84    Weight: 43 1 kg (95 lb)    Height: 5' 1" (1 549 m)        Patient Active Problem List   Diagnosis    Acid reflux    Periodontal disease    Stewart's esophagus    Peripheral vascular disease (HCC)    PAD (peripheral artery disease) (HCC)    Perforated duodenal ulcer (HCC)    Chronic mesenteric ischemia (HCC)    Bilateral cellulitis of lower leg    Elevated brain natriuretic peptide (BNP) level    Pleural effusion, bilateral    Hypoalbuminemia due to protein-calorie malnutrition (HCC)    Hypertensive urgency    Stenosis of right internal carotid artery    Chronic diastolic heart failure (HCC)       Past Surgical History:   Procedure Laterality Date    ANAL FISSURECTOMY      AORTA - SUPERIOR MESENTERIC ARTERY BYPASS GRAFT Bilateral     BREAST BIOPSY Right     FEMORAL ARTERY STENT Bilateral     HEMORROIDECTOMY      HYSTERECTOMY      INSERTION STENT ARTERY Left 1/1/2017    Procedure:  mesenteric arteriogram, balloon angioplasty of SMA stent;  Surgeon: Joanna Alford MD;  Location:  MAIN OR;  Service:    Mayme Barney LAPAROTOMY N/A 1/1/2017    Procedure: LAPAROTOMY EXPLORATORY,  abdominal washout, repair of duodenal ulcer;  Surgeon: Bill Richardson MD;  Location:  MAIN OR;  Service:      [facility] CATARACT EXTRACAP,INSERT LENS Right 1/11/2016    Procedure: EXTRACTION INTRACAPSULAR CATARACT PHACO INTRAOCULAR LENS (IOL); Surgeon: Kaia Patel MD;  Location: [facility] MAIN OR;  Service: Ophthalmology    TONSILECTOMY AND ADNOIDECTOMY         History reviewed  No pertinent family history  Social History     Social History    Marital status: /Civil Union     Spouse name: N/A    Number of children: N/A    Years of education: N/A     Occupational History    Not on file  Social History Main Topics    Smoking status: Former Smoker     Packs/day: 1 00     Years: 25 00     Quit date: 1985    Smokeless tobacco: Never Used      Comment: smoked for 25 years   Alcohol use Yes      Comment: a glass wine daily   last drink 6/10/18    Drug use: No    Sexual activity: No     Other Topics Concern    Not on file     Social History Narrative    Lives with   Steady on feet          Allergies   Allergen Reactions    Chlorthalidone Itching    Penicillins Itching     Tolerates zosyn    Sulfa Antibiotics Rash    Bacitracin Rash    Ciprofloxacin GI Intolerance         Current Outpatient Prescriptions:     aspirin 81 mg chewable tablet, Chew 1 tablet (81 mg total) daily for 100 days, Disp: 100 tablet, Rfl: 0    atorvastatin (LIPITOR) 20 mg tablet, Take 1 tablet (20 mg total) by mouth daily with dinner for 30 days, Disp: 30 tablet, Rfl: 0    clopidogrel (PLAVIX) 75 mg tablet, Take 75 mg by mouth daily  , Disp: , Rfl:     hydrALAZINE (APRESOLINE) 25 mg tablet, Take 1 tablet (25 mg total) by mouth 3 (three) times a day for 30 days   Hold if systolic blood pressure is < 120mmHg, Disp: 90 tablet, Rfl: 0    losartan (COZAAR) 50 mg tablet, Take 1 tablet by mouth daily for 30 days (Patient taking differently: Take 50 mg by mouth 2 (two) times a day  ), Disp: 30 tablet, Rfl: 0    metoprolol succinate (TOPROL-XL) 100 mg 24 hr tablet, Take 100 mg by mouth daily at bedtime, Disp: , Rfl:     Multiple Vitamin (MULTIVITAMIN) capsule, Take 1 capsule by mouth daily, Disp: , Rfl:     VITAMIN D, ERGOCALCIFEROL, PO, Take 2,000 Units by mouth daily  , Disp: , Rfl:     Objective:      BP (!) 200/90 (BP Location: Right arm, Patient Position: Sitting)   Pulse 84   Ht 5' 1" (1 549 m)   Wt 43 1 kg (95 lb)   BMI 17 95 kg/m²          Physical Exam   Constitutional: She is oriented to person, place, and time  She appears well-developed and well-nourished  No distress  HENT:   Head: Normocephalic and atraumatic  Eyes: EOM are normal    Neck: No JVD present  Carotid bruit is not present  No tracheal deviation and no edema present  Cardiovascular: Regular rhythm  Pulses:       Radial pulses are 2+ on the right side, and 2+ on the left side  Abdominal: Soft  She exhibits no distension  There is no tenderness  There is no rebound  Musculoskeletal: She exhibits no edema  Neurological: She is alert and oriented to person, place, and time  No cranial nerve deficit  Coordination normal    Skin: Skin is warm  Psychiatric: She has a normal mood and affect   Her behavior is normal  Judgment and thought content normal

## 2018-07-30 PROBLEM — I70.1 LEFT RENAL ARTERY STENOSIS (HCC): Status: ACTIVE | Noted: 2018-01-01

## 2018-07-30 PROBLEM — I10 ESSENTIAL HYPERTENSION: Status: ACTIVE | Noted: 2018-01-01

## 2018-07-30 PROBLEM — I15.0 RENOVASCULAR HYPERTENSION: Status: ACTIVE | Noted: 2018-01-01

## 2018-07-30 PROBLEM — I10 ESSENTIAL HYPERTENSION: Status: RESOLVED | Noted: 2018-01-01 | Resolved: 2018-01-01

## 2018-07-30 NOTE — PROGRESS NOTES
NEPHROLOGY OFFICE VISIT   Capri Gerard Race 80 y o  female MRN: [id number]  7/30/2018    Reason for Visit: Hypertension    ASSESSMENT and PLAN:    I had the pleasure of seeing Clark Coyle today in the renal clinic for the continued management of HTN  Since our last visit, she was at [facility] for abnormal carotid ultrasound  She was seen by vascular surgery and was found to have right ICA with the 70-99% stenosis with a left ICA of less than 50% stenosis  She recently follow with vascular surgery at the end of June and the plan is to the repeat ultrasound in 6 months  Since that time she has also been started on hydralazine and losartan for blood pressure  In the past medications have been very difficult to put on her due to her mesenteric ischemia which at this time she says she is tolerating the hydralazine and the losartan fine at this time  1 ) Hypertension  · Secondary to left renal artery -high LISA state  · Blood pressure has been especially difficult to control given her intolerance to multiple medications  · continue metoprolol 100 mg daily, and increase losartan to 100 mg daily for better blood pressure control  · Continue hydralazine 25 mg 3 times a day  · aim to keep her systolic blood pressure around 140-160 given her chronic mesenteric ischemia  · Angiotensin II dependent  · Blood pressure is not at goal     2 ) Renal artery stenosis  · left   No evidence of significant renal artery stenosis on the right side  · no contraindications to Ace inhibitors or arbs given that this is a unilateral renal artery stenosis     3 ) Chronic mesenteric ischemia  · Status post SMA stent  · Follows with vascular surgery  · We'll discuss with vascular surgery if CAT scan would be helpful in this situation    PATIENT INSTRUCTIONS:    Patient Instructions   1 )  Low 2 g sodium diet    2 )  Monitor weights at home    3 )  Avoid NSAIDs    4 )  Monitor blood pressure at home, call if blood pressure greater than 150/90 persistently    5 ) Increase Losartan to 100 mg daily, can do 50 mg twice or 100 mg once a day          Orders Placed This Encounter   Procedures    Basic metabolic panel     This is a patient instruction: Patient fasting for 8 hours or longer recommended  Standing Status:   Future     Standing Expiration Date:   7/30/2019    CBC     Standing Status:   Future     Standing Expiration Date:   7/30/2019       OBJECTIVE:  Current Weight: Weight - Scale: 42 6 kg (94 lb)  Vitals:    07/30/18 1259 07/30/18 1305   BP:  (!) 180/80   Weight: 42 6 kg (94 lb)    Height: 5' 1" (1 549 m)     Body mass index is 17 76 kg/m²  REVIEW OF SYSTEMS:    Review of Systems   Constitutional: Negative for activity change and fever  Respiratory: Negative for cough, chest tightness, shortness of breath and wheezing  Cardiovascular: Negative for chest pain and leg swelling  Gastrointestinal: Negative for abdominal pain, diarrhea, nausea and vomiting  Endocrine: Negative for polyuria  Genitourinary: Negative for difficulty urinating, dysuria, flank pain, frequency and urgency  Skin: Negative for rash  Neurological: Negative for dizziness, syncope, light-headedness and headaches  PHYSICAL EXAM:      Physical Exam   Constitutional: She is oriented to person, place, and time  She appears well-developed and well-nourished  No distress  HENT:   Head: Normocephalic and atraumatic  Eyes: Pupils are equal, round, and reactive to light  No scleral icterus  Neck: Normal range of motion  Neck supple  Cardiovascular: Normal rate, regular rhythm and normal heart sounds  Exam reveals no gallop and no friction rub  No murmur heard  Pulmonary/Chest: Effort normal and breath sounds normal  No respiratory distress  She has no wheezes  She has no rales  She exhibits no tenderness  Abdominal: Soft  Bowel sounds are normal  She exhibits no distension  There is no tenderness  There is no rebound     Musculoskeletal: Normal range of motion  She exhibits no edema  Neurological: She is alert and oriented to person, place, and time  Skin: No rash noted  She is not diaphoretic  Psychiatric: She has a normal mood and affect  Nursing note and vitals reviewed  Medications:    Current Outpatient Prescriptions:     aspirin 81 mg chewable tablet, Chew 1 tablet (81 mg total) daily for 100 days, Disp: 100 tablet, Rfl: 0    atorvastatin (LIPITOR) 20 mg tablet, Take 1 tablet (20 mg total) by mouth daily with dinner for 30 days, Disp: 30 tablet, Rfl: 0    clopidogrel (PLAVIX) 75 mg tablet, Take 75 mg by mouth daily  , Disp: , Rfl:     hydrALAZINE (APRESOLINE) 25 mg tablet, Take 1 tablet (25 mg total) by mouth 3 (three) times a day for 30 days    Hold if systolic blood pressure is < 120mmHg, Disp: 90 tablet, Rfl: 0    losartan (COZAAR) 50 mg tablet, Take 1 tablet by mouth daily for 30 days (Patient taking differently: Take 50 mg by mouth 2 (two) times a day  ), Disp: 30 tablet, Rfl: 0    metoprolol succinate (TOPROL-XL) 100 mg 24 hr tablet, Take 100 mg by mouth daily at bedtime, Disp: , Rfl:     Multiple Vitamin (MULTIVITAMIN) capsule, Take 1 capsule by mouth daily, Disp: , Rfl:     VITAMIN D, ERGOCALCIFEROL, PO, Take 2,000 Units by mouth daily  , Disp: , Rfl:     Laboratory Results:        Results for orders placed or performed during the hospital encounter of 06/11/18   MRSA culture   Result Value Ref Range    MRSA Culture Only       No Methicillin Resistant Staphlyococcus aureus (MRSA) isolated   Protime-INR   Result Value Ref Range    Protime 10 8 9 4 - 11 7 seconds    INR 1 03 0 86 - 1 16   APTT   Result Value Ref Range    PTT 26 24 - 33 seconds   CBC and differential   Result Value Ref Range    WBC 8 76 4 31 - 10 16 Thousand/uL    RBC 4 39 3 81 - 5 12 Million/uL    Hemoglobin 13 0 11 5 - 15 4 g/dL    Hematocrit 41 2 34 8 - 46 1 %    MCV 94 82 - 98 fL    MCH 29 6 26 8 - 34 3 pg    MCHC 31 6 31 4 - 37 4 g/dL    RDW 13 6 11 6 - 15 1 %    MPV 10 2 8 9 - 12 7 fL    Platelets 955 370 - 515 Thousands/uL    nRBC 0 /100 WBCs    Neutrophils Relative 73 43 - 75 %    Immat GRANS % 0 0 - 2 %    Lymphocytes Relative 17 14 - 44 %    Monocytes Relative 8 4 - 12 %    Eosinophils Relative 1 0 - 6 %    Basophils Relative 1 0 - 1 %    Neutrophils Absolute 6 34 1 85 - 7 62 Thousands/µL    Immature Grans Absolute 0 03 0 00 - 0 20 Thousand/uL    Lymphocytes Absolute 1 52 0 60 - 4 47 Thousands/µL    Monocytes Absolute 0 71 0 17 - 1 22 Thousand/µL    Eosinophils Absolute 0 11 0 00 - 0 61 Thousand/µL    Basophils Absolute 0 05 0 00 - 0 10 Thousands/µL   Comprehensive metabolic panel   Result Value Ref Range    Sodium 140 136 - 145 mmol/L    Potassium 4 0 3 5 - 5 3 mmol/L    Chloride 104 100 - 108 mmol/L    CO2 24 21 - 32 mmol/L    Anion Gap 12 4 - 13 mmol/L    BUN 28 (H) 5 - 25 mg/dL    Creatinine 0 89 0 60 - 1 30 mg/dL    Glucose 95 65 - 140 mg/dL    Calcium 9 2 8 3 - 10 1 mg/dL    AST 22 5 - 45 U/L    ALT 28 12 - 78 U/L    Alkaline Phosphatase 70 46 - 116 U/L    Total Protein 7 7 6 4 - 8 2 g/dL    Albumin 4 2 3 5 - 5 0 g/dL    Total Bilirubin 0 50 0 20 - 1 00 mg/dL    eGFR 58 ml/min/1 73sq m   UA w Reflex to Microscopic w Reflex to Culture   Result Value Ref Range    Color, UA Light Yellow     Clarity, UA Clear     Specific [place], UA 1 010 1 000 - 1 030    pH, UA 5 5 5 0 - 9 0    Leukocytes, UA Negative Negative    Nitrite, UA Negative Negative    Protein, UA Trace (A) Negative mg/dl    Glucose, UA Negative Negative mg/dl    Ketones, UA Negative Negative mg/dl    Urobilinogen, UA 0 2 0 2, 1 0 E U /dl E U /dl    Bilirubin, UA Negative Negative    Blood, UA Negative Negative   Troponin I   Result Value Ref Range    Troponin I <0 02 <=0 04 ng/mL   Urine Microscopic   Result Value Ref Range    RBC, UA None Seen None Seen, 0-5 /hpf    WBC, UA 0-1 (A) None Seen, 0-5, 5-55, 5-65 /hpf    Epithelial Cells Occasional None Seen, Occasional /hpf    Bacteria, UA None Seen None Seen, Occasional /hpf    Hyaline Casts, UA 0-1 (A) (none) /lpf   Troponin I   Result Value Ref Range    Troponin I <0 02 <=0 04 ng/mL   Hemoglobin A1C w/ EAG Estimation   Result Value Ref Range    Hemoglobin A1C 5 8 4 2 - 6 3 %     mg/dl   TSH, 3rd generation   Result Value Ref Range    TSH 3RD GENERATON 4 456 (H) 0 358 - 3 740 uIU/mL   Platelet count   Result Value Ref Range    Platelets 557 581 - 149 Thousands/uL    MPV 10 0 8 9 - 12 7 fL   Troponin I   Result Value Ref Range    Troponin I <0 02 <=0 04 ng/mL   Basic metabolic panel   Result Value Ref Range    Sodium 137 136 - 145 mmol/L    Potassium 4 2 3 5 - 5 3 mmol/L    Chloride 103 100 - 108 mmol/L    CO2 22 21 - 32 mmol/L    Anion Gap 12 4 - 13 mmol/L    BUN 28 (H) 5 - 25 mg/dL    Creatinine 0 86 0 60 - 1 30 mg/dL    Glucose 113 65 - 140 mg/dL    Glucose, Fasting 113 (H) 65 - 99 mg/dL    Calcium 9 1 8 3 - 10 1 mg/dL    eGFR 61 ml/min/1 73sq m   Magnesium   Result Value Ref Range    Magnesium 2 1 1 6 - 2 6 mg/dL   Phosphorus   Result Value Ref Range    Phosphorus 4 0 2 3 - 4 1 mg/dL   CBC (With Platelets)   Result Value Ref Range    WBC 7 30 4 31 - 10 16 Thousand/uL    RBC 4 17 3 81 - 5 12 Million/uL    Hemoglobin 12 4 11 5 - 15 4 g/dL    Hematocrit 38 9 34 8 - 46 1 %    MCV 93 82 - 98 fL    MCH 29 7 26 8 - 34 3 pg    MCHC 31 9 31 4 - 37 4 g/dL    RDW 13 7 11 6 - 15 1 %    Platelets 728 825 - 472 Thousands/uL    MPV 9 9 8 9 - 12 7 fL   Lipid Panel with Direct LDL reflex   Result Value Ref Range    Cholesterol 234 (H) 50 - 200 mg/dL    Triglycerides 69 <=150 mg/dL    HDL, Direct 86 (H) 40 - 60 mg/dL    LDL Calculated 134 (H) 0 - 100 mg/dL   ECG 12 lead   Result Value Ref Range    Ventricular Rate 69 BPM    Atrial Rate 69 BPM    IA Interval 152 ms    QRSD Interval 104 ms    QT Interval 454 ms    QTC Interval 486 ms    P Axis 102 degrees    QRS Axis 38 degrees    T Wave Axis 32 degrees

## 2018-07-30 NOTE — PATIENT INSTRUCTIONS
1  )  Low 2 g sodium diet    2 )  Monitor weights at home    3 )  Avoid NSAIDs    4 )  Monitor blood pressure at home, call if blood pressure greater than 150/90 persistently    5 ) Increase Losartan to 100 mg daily, can do 50 mg twice or 100 mg once a day

## 2018-07-31 NOTE — TELEPHONE ENCOUNTER
Nephrology    Spoke with the patient over the phone  Renal function stable  Bicarbonate level slightly low at 19  In the past her bicarbonate level has never been low  Will repeat a BMP in a few weeks  Discussed with the patient over the phone

## 2018-10-01 PROBLEM — R10.84 GENERALIZED ABDOMINAL PAIN: Status: ACTIVE | Noted: 2018-01-01

## 2018-10-01 PROBLEM — E87.1 HYPONATREMIA: Status: ACTIVE | Noted: 2018-01-01

## 2018-10-01 PROBLEM — N20.0 NEPHROLITHIASIS: Status: ACTIVE | Noted: 2018-01-01

## 2018-10-01 PROBLEM — R63.4 WEIGHT LOSS, UNINTENTIONAL: Status: ACTIVE | Noted: 2018-01-01

## 2018-10-01 NOTE — ED PROVIDER NOTES
History  Chief Complaint   Patient presents with    Abdominal Pain     states started with left lower back pain around 4pm and it radiated to right lower abd  Hx HTN, PERF DUODENAL ULCER, CHRONIC MESENTERIC ISCHEMIA W/ SMA STENT THAT WAS OCCLUDED AND REPAIRED 1/1/2017  C/O SUDDEN LT FLNK, LUQ AND EPIGAT PAIN X 3 HR  TENDER LT FLANK, EPIGAST AND LUQ  PT DENIED PAIN MEDS  236/84        History provided by:  Patient  Abdominal Pain   Pain location:  L flank, epigastric and LUQ  Pain quality: sharp    Pain severity:  Moderate  Onset quality:  Sudden  Duration:  3 hours  Timing:  Constant  Chronicity:  New  Relieved by:  None tried  Worsened by:  Belching  Associated symptoms: no chest pain, no constipation, no diarrhea, no nausea, no shortness of breath and no vomiting        Prior to Admission Medications   Prescriptions Last Dose Informant Patient Reported? Taking? Multiple Vitamin (MULTIVITAMIN) capsule 9/30/2018 at Unknown time Self Yes Yes   Sig: Take 1 capsule by mouth daily   VITAMIN D, ERGOCALCIFEROL, PO 10/1/2018 at 0800 Self Yes Yes   Sig: Take 2,000 Units by mouth daily     aspirin 81 mg chewable tablet  Self No No   Sig: Chew 1 tablet (81 mg total) daily for 100 days   atorvastatin (LIPITOR) 20 mg tablet  Self No No   Sig: Take 1 tablet (20 mg total) by mouth daily with dinner for 30 days   clopidogrel (PLAVIX) 75 mg tablet 10/1/2018 at 0800 Self Yes Yes   Sig: Take 75 mg by mouth daily  hydrALAZINE (APRESOLINE) 25 mg tablet 10/1/2018 at Unknown time Self No Yes   Sig: Take 1 tablet (25 mg total) by mouth 3 (three) times a day for 30 days    Hold if systolic blood pressure is < 120mmHg   losartan (COZAAR) 50 mg tablet 10/1/2018 at 0800 Self No Yes   Sig: Take 1 tablet by mouth daily for 30 days   Patient taking differently: Take 50 mg by mouth 2 (two) times a day     metoprolol succinate (TOPROL-XL) 100 mg 24 hr tablet 9/30/2018 at Unknown time Self Yes Yes   Sig: Take 100 mg by mouth daily at bedtime   omeprazole (PriLOSEC) 40 MG capsule 9/30/2018 at Unknown time  Yes Yes   Sig: Take 40 mg by mouth daily      Facility-Administered Medications: None       Past Medical History:   Diagnosis Date    Acid reflux     Arthritis     Stewart's esophagus     COPD (chronic obstructive pulmonary disease) (HCC)     Duodenal ulcer     Hypertension     PAD (peripheral artery disease) (HCC)     Periodontal disease     Peripheral vascular disease ([address] )     Scoliosis        Past Surgical History:   Procedure Laterality Date    ANAL FISSURECTOMY      AORTA - SUPERIOR MESENTERIC ARTERY BYPASS GRAFT Bilateral     BREAST BIOPSY Right     FEMORAL ARTERY STENT Bilateral     HEMORROIDECTOMY      HYSTERECTOMY      INSERTION STENT ARTERY Left 1/1/2017    Procedure:  mesenteric arteriogram, balloon angioplasty of SMA stent;  Surgeon: Chelo Spain MD;  Location: [facility];  Service:    [place] LAPAROTOMY N/A 1/1/2017    Procedure: LAPAROTOMY EXPLORATORY,  abdominal washout, repair of duodenal ulcer;  Surgeon: Mortimer Keeling, MD;  Location:  MAIN OR;  Service:      [facility] CATARACT EXTRACAP,INSERT LENS Right 1/11/2016    Procedure: EXTRACTION INTRACAPSULAR CATARACT PHACO INTRAOCULAR LENS (IOL); Surgeon: Lebron Millan MD;  Location: [facility] MAIN OR;  Service: Ophthalmology    TONSILECTOMY AND ADNOIDECTOMY         History reviewed  No pertinent family history  I have reviewed and agree with the history as documented  Social History   Substance Use Topics    Smoking status: Former Smoker     Packs/day: 1 00     Years: 25 00     Quit date: 1985    Smokeless tobacco: Never Used      Comment: smoked for 25 years   Alcohol use Yes      Comment: a glass wine daily   last drink 6/10/18        Review of Systems   Respiratory: Negative for shortness of breath  Cardiovascular: Negative for chest pain  Gastrointestinal: Positive for abdominal pain  Negative for constipation, diarrhea, nausea and vomiting     All other systems reviewed and are negative  Physical Exam  Physical Exam   Constitutional: She appears well-developed and well-nourished  No distress  HENT:   Head: Normocephalic and atraumatic  Eyes: Conjunctivae are normal    Cardiovascular: Normal rate and regular rhythm  Pulmonary/Chest: Effort normal and breath sounds normal    Abdominal: Soft  She exhibits no distension and no mass  There is tenderness  Neurological: She is alert  Skin: Skin is dry  No rash noted  She is not diaphoretic  Nursing note reviewed        Vital Signs  ED Triage Vitals [10/01/18 1822]   Temperature Pulse Respirations Blood Pressure SpO2   98 1 °F (36 7 °C) 79 18 (!) 236/84 99 %      Temp Source Heart Rate Source Patient Position - Orthostatic VS BP Location FiO2 (%)   Oral Monitor Lying Right arm --      Pain Score       Worst Possible Pain           Vitals:    10/01/18 1822 10/01/18 2006 10/01/18 2030   BP: (!) 236/84 (!) 233/95    Pulse: 79 84 86   Patient Position - Orthostatic VS: Lying Lying        Visual Acuity      ED Medications  Medications   sodium chloride 0 9 % bolus 1,000 mL (1,000 mL Intravenous New Bag 10/1/18 1905)   famotidine (PEPCID) injection 20 mg (20 mg Intravenous Given 10/1/18 1905)   morphine (PF) 4 mg/mL injection 4 mg (4 mg Intravenous Given 10/1/18 2016)       Diagnostic Studies  Results Reviewed     Procedure Component Value Units Date/Time    Lactic acid, plasma [[id number]]     Lab Status:  No result Specimen:  Blood     Comprehensive metabolic panel [[id number]]  (Abnormal) Collected:  10/01/18 1904    Lab Status:  Final result Specimen:  Blood from Arm, Right Updated:  10/01/18 1955     Sodium 128 (L) mmol/L      Potassium 4 4 mmol/L      Chloride 95 (L) mmol/L      CO2 20 (L) mmol/L      ANION GAP 13 mmol/L      BUN 22 mg/dL      Creatinine 1 12 mg/dL      Glucose 122 mg/dL      Calcium 9 2 mg/dL      AST 18 U/L      ALT 22 U/L      Alkaline Phosphatase 69 U/L      Total Protein 7 1 g/dL Albumin 3 9 g/dL      Total Bilirubin 0 40 mg/dL      eGFR 44 ml/min/1 73sq m     Narrative:         National Kidney Disease Education Program recommendations are as follows:  GFR calculation is accurate only with a steady state creatinine  Chronic Kidney disease less than 60 ml/min/1 73 sq  meters  Kidney failure less than 15 ml/min/1 73 sq  meters  Amylase [[id number]]  (Normal) Collected:  10/01/18 1904    Lab Status:  Final result Specimen:  Blood from Arm, Right Updated:  10/01/18 1955     Amylase 91 IU/L     Lipase [[id number]]  (Abnormal) Collected:  10/01/18 1904    Lab Status:  Final result Specimen:  Blood from Arm, Right Updated:  10/01/18 1955     Lipase 596 (H) u/L     CBC and differential [[id number]]  (Abnormal) Collected:  10/01/18 1904    Lab Status:  Final result Specimen:  Blood from Arm, Right Updated:  10/01/18 1913     WBC 8 20 Thousand/uL      RBC 4 00 Million/uL      Hemoglobin 9 1 (L) g/dL      Hematocrit 30 1 (L) %      MCV 75 (L) fL      MCH 22 8 (L) pg      MCHC 30 2 (L) g/dL      RDW 17 9 (H) %      MPV 9 4 fL      Platelets 729 (H) Thousands/uL      nRBC 0 /100 WBCs      Neutrophils Relative 78 (H) %      Immat GRANS % 0 %      Lymphocytes Relative 12 (L) %      Monocytes Relative 9 %      Eosinophils Relative 1 %      Basophils Relative 0 %      Neutrophils Absolute 6 38 Thousands/µL      Immature Grans Absolute 0 02 Thousand/uL      Lymphocytes Absolute 0 94 Thousands/µL      Monocytes Absolute 0 76 Thousand/µL      Eosinophils Absolute 0 07 Thousand/µL      Basophils Absolute 0 03 Thousands/µL     Urinalysis with reflex to microscopic [[id number]]     Lab Status:  No result Specimen:  Urine from Urine, Clean Catch                  CT renal stone study abdomen pelvis wo contrast   Final Result by Cornel 6, DO (10/01 2020)      Distal left ureteral stone measures approximately 7 x 5 x 4 mm resulting in mild to moderate left hydronephrosis  Colonic diverticulosis    No evidence for acute diverticulitis  Workstation performed: [id number]                    Procedures  Procedures       Phone Contacts  ED Phone Contact    ED Course                               MDM  Number of Diagnoses or Management Options  Diagnosis management comments: PT W/ ACUTE PANCREATITIS, LEFT OBSTRUCTIVE URETERIC STONE AND HYPO-Na    CritCare Time    Disposition  Final diagnoses:   Left ureteral stone   Idiopathic acute pancreatitis, unspecified complication status   Hyponatremia     Time reflects when diagnosis was documented in both MDM as applicable and the Disposition within this note     Time User Action Codes Description Comment    10/1/2018  8:40 PM Barsoom, Haresh Add [N20 1] Left ureteral stone     10/1/2018  8:40 PM Barsoom, Haresh Add [K85 00] Idiopathic acute pancreatitis, unspecified complication status     83/6/5047  8:40 PM Barsoom, Haresh Add [E87 1] Hyponatremia       ED Disposition     ED Disposition Condition Comment    Admit  Case was discussed with HOSPITALIST and the patient's admission status was agreed to be Admission Status: inpatient status to the service of Dr Kaley Gurrola   Follow-up Information    None         Patient's Medications   Discharge Prescriptions    No medications on file     No discharge procedures on file      ED Provider  Electronically Signed by           Qasim Wu MD  10/01/18 4962

## 2018-10-02 PROBLEM — D72.829 LEUKOCYTOSIS: Status: ACTIVE | Noted: 2018-01-01

## 2018-10-02 PROBLEM — N18.9 ACUTE KIDNEY INJURY SUPERIMPOSED ON CHRONIC KIDNEY DISEASE (HCC): Status: ACTIVE | Noted: 2018-01-01

## 2018-10-02 PROBLEM — N17.9 ACUTE KIDNEY INJURY SUPERIMPOSED ON CHRONIC KIDNEY DISEASE (HCC): Status: ACTIVE | Noted: 2018-01-01

## 2018-10-02 PROBLEM — D64.9 ANEMIA: Status: ACTIVE | Noted: 2018-01-01

## 2018-10-02 PROBLEM — N20.1 LEFT URETERAL STONE: Status: ACTIVE | Noted: 2018-01-01

## 2018-10-02 NOTE — ASSESSMENT & PLAN NOTE
Cr 1 12-> 1 37-> 1 65  today  Likely 2/2 # 1,+/- dehydration  Increase IV fluids to 100 ml/hr  Hold losartan  Check PVR stat  Avoid nephrotoxins  Consult Nephrology

## 2018-10-02 NOTE — NURSING NOTE
Report received from [facility] in ED  Patient arrived on unit awake and alert without signs of distress  Oriented to unit and room

## 2018-10-02 NOTE — ASSESSMENT & PLAN NOTE
Initially hypertensive in the ED likely induced pain  Patient on Hydralazine, Losartan and Metoprolol  · Will increase hydralazine to 50 mg p o  T i d  · Continue metoprolol  · Hold losartan due to [facility]  · Monitor

## 2018-10-02 NOTE — CASE MANAGEMENT
Initial Clinical Review    Admission: Date/Time/Statement: 10/1/18 @ 2041     Orders Placed This Encounter   Procedures    Inpatient Admission (expected length of stay for this patient is greater than two midnights)     Standing Status:   Standing     Number of Occurrences:   1     Order Specific Question:   Admitting Physician     Answer:   Iadnia Awan     Order Specific Question:   Level of Care     Answer:   Med Surg [16]     Order Specific Question:   Estimated length of stay     Answer:   Not Applicable         ED: Date/Time/Mode of Arrival:   ED Arrival Information     Expected Arrival [address] of Arrival Escorted By Service Admission Type    - 10/1/2018 18:07 Urgent Walk-In Family Member Hospitalist Urgent    Arrival Complaint    ABDOMINAL PAIN          Chief Complaint:   Chief Complaint   Patient presents with    Abdominal Pain     states started with left lower back pain around 4pm and it radiated to right lower abd  History of Illness: Nini Pagan is a 80 y o  female with a PMH of HTN, chronic diastolic CHF, chronic mesenteric ischemia, Stewart's esophagus, history of ruptured duodenal ulcer with repair who presents with abdominal pain and left-sided/flank pain started around 4:00 p m  today  Patient described the left flank pain as dull to sharp, came sudden while she was sitting and resting at home associated with nausea  drove herself to the ED  She saw Dr Yaa Griffin on the 24th for this abdominal pain and was advised to modify her diet  She was then given an Rx for Omeprazole  She claimed that none of these helped her abdominal discomfort  She also admits to 6-8 lb weight loss attributed to her poor appetite secondary to abdominal discomfort  In the ED, her lipase was elevated, CT abdomen and pelvis without contrast showed left ureteral stone with mild to moderate left hydronephrosis    In addition, her sodium level was 128 likely from her poor oral intake secondary to abdominal pain  She is then admitted to our service  Diffuse abdominal tenderness on palpation  Hypoactive bowel sounds on 4 quads There is tenderness  Positive L sided CVA tenderness   ED Vital Signs:   ED Triage Vitals [10/01/18 1822]   Temperature Pulse Respirations Blood Pressure SpO2   98 1 °F (36 7 °C) 79 18 (!) 236/84 99 %      Temp Source Heart Rate Source Patient Position - Orthostatic VS BP Location FiO2 (%)   Oral Monitor Lying Right arm --      Pain Score       Worst Possible Pain        Wt Readings from Last 1 Encounters:   10/02/18 40 4 kg (89 lb 1 1 oz)     VS  SATS 88%    Abnormal Labs/Diagnostic Test Results:  CL 95 CO2 20 LIPASE 596 H/H 9 1/30 1 ,    CT ABDOMEN Distal left ureteral stone measures approximately 7 x 5 x 4 mm resulting in mild to moderate left hydronephrosis    Colonic diverticulosis  No evidence for acute diverticulitis  CXR  CHF  Cardiomegaly and chronic changes    ED Treatment:   Medication Administration from 10/01/2018 1807 to 10/01/2018 8539       Date/Time Order Dose Route Action Action by Comments     10/01/2018 2115 sodium chloride 0 9 % bolus 1,000 mL 0 mL Intravenous Stopped Anthony Delatorre RN      10/01/2018 1905 sodium chloride 0 9 % bolus 1,000 mL 1,000 mL Intravenous New Bag Gladis Spencer RN      10/01/2018 1905 famotidine (PEPCID) injection 20 mg 20 mg Intravenous Given Gladis Spencer RN      10/01/2018 2016 morphine (PF) 4 mg/mL injection 4 mg 4 mg Intravenous Given Gladis Spencer RN      10/01/2018 2124 morphine (PF) 4 mg/mL injection 2 mg 2 mg Intravenous Given Anthony Delatorre RN      10/01/2018 2124 ondansetron (ZOFRAN) injection 4 mg 4 mg Intravenous Given Anthony Delatorre RN      10/01/2018 2140 hydrALAZINE (APRESOLINE) injection 5 mg 5 mg Intravenous Not Given Anthony Delatorre RN Patyient /77  Hold medication as per hospitalist Maricel Delarosa  Past Medical/Surgical History:    Active Ambulatory Problems     Diagnosis Date Noted  Acid reflux     Periodontal disease     Stewart's esophagus     Peripheral vascular disease (HCC)     PAD (peripheral artery disease) (HCC)     Perforated duodenal ulcer ([facility] [address] ) 01/01/2017    Chronic mesenteric ischemia (HCC) 01/01/2017    Bilateral cellulitis of lower leg 01/17/2017    Elevated brain natriuretic peptide (BNP) level 01/17/2017    Pleural effusion, bilateral 01/17/2017    Hypoalbuminemia due to protein-calorie malnutrition ([facility] [address] ) 01/17/2017    Hypertensive urgency 06/11/2018    Stenosis of right internal carotid artery 06/11/2018    Chronic diastolic heart failure ([address] ) 06/11/2018    Essential hypertension 07/30/2018    Left renal artery stenosis ([facility] [address] ) 07/30/2018    Renovascular hypertension 07/30/2018     Resolved Ambulatory Problems     Diagnosis Date Noted    No Resolved Ambulatory Problems     Past Medical History:   Diagnosis Date    Acid reflux     Arthritis     Stewart's esophagus     COPD (chronic obstructive pulmonary disease) (HCC)     Duodenal ulcer     Hypertension     PAD (peripheral artery disease) (HCC)     Periodontal disease     Peripheral vascular disease ([address] )     Scoliosis        Admitting Diagnosis: Hyponatremia [E87 1]  Abdominal pain [R10 9]  Generalized abdominal pain [R10 84]  Left ureteral stone [N20 1]  Idiopathic acute pancreatitis, unspecified complication status [O53 71]    Age/Sex: 80 y o  female    Assessment/Plan:   Nephrolithiasis   Assessment & Plan     Reports to left flank pain today associated with nausea  CT scan of the abdomen and pelvis showed distal left ureteral stone measures approximately 7 x 5 x 4 mm resulting in mild to moderate left hydronephrosis  UA pending   Patient was given IVF, Zofran and morphine for pain in the ED  · Continue IVF  · Continue anti emetics  · Continue pain control p r n   · NPO  · Consult Urology   Hyponatremia   Assessment & Plan     As evidenced by NA level of 128 likely from GI losses versus poor oral intake  139-140 in the past   1 L normal saline given in the ED  Repeated NA level was 128  · Gentle IV hydration  · UA for NA, osmolality, and serum osmolality pending  · Repeat BMP in a m  · Will consider Nephrology consult if not improving  Patient had seen Dr Breanne Brandt in the past for BP management   Weight loss, unintentional   Assessment & Plan     Admits to poor appetite secondary to generalized abdominal pain associated with nausea for months and weight loss of 6-8 lbs  Patient with history of mesenteric ischemia  CT scan showed ureteral stone with hydronephrosis  · Monitor weight  · Nutritional consult   Generalized abdominal pain   Assessment & Plan     · Reports to generalized abdominal pain for months now and a weight loss of 6-8 lbs  Pt is supposed to see Dr Earline Kulkarni but unable to make an appointment  She has nausea but no vomiting  CT abdomen and pelvis without contrast  with 7x5x4  ureteral stone with mild to moderate hydronephrosis  Monitor abdominal exam  · Will consider GI consult if not improving  · Anti emetic/PPI  · Trend lipase   Essential hypertension   Assessment & Plan     Initially hypertensive in the ED likely induced pain  Patient on Hydralazine, Losartan and Metoprolol  · Continue  · Monitor BP   Chronic diastolic heart failure ([address] )   Assessment & Plan     Echo on 06/12/2018 showed 55 % to 60 % with no regional wall motion abnormalities  Patient euvolemic  Patient on beta-blocker  · Continue  · CXR- pending   Stenosis of right internal carotid artery   Assessment & Plan      History of 70-99% stenosis and was seen here with no vascular intervention  Patient on Plavix and S/P ASA  · Continue Plavix   Chronic mesenteric ischemia (HCC)   Assessment & Plan     History of chronic mesenteric ischemia  Reports to generalized abdominal pain for months and weight loss of 6-8 lbs  CT abdomen and pelvic left ureteral stone with hydronephrosis      · Monitor abdominal exam   Acid reflux   Assessment & Plan     Patient on Omeprazole  Pepcid IV given in the ED  · Continue      VTE Prophylaxis: Heparin  / sequential compression device   Code Status: Level 1 - Full Code  Anticipated Length of Stay:  Patient will be admitted on an Inpatient basis with an anticipated length of stay of  > 2 midnights     Justification for Hospital Stay:  IV fluid, Urology consult    Admission Orders:  GMF  NPO  CONSULT UROLOGY  NA   NA URINE RANDOM, OSMOLALITY URINE  OSMOLALITY SERUM  MEASURE PVR   CBC BMP LIPASE   IV MORPHINE X2  IV ZOFRAN  Scheduled Meds:   Current Facility-Administered Medications:  acetaminophen 650 mg Oral Q6H PRN Yolanda S GarciaNURISNP    clopidogrel 75 mg Oral Daily Yolanda S GarciaNURISNP    heparin (porcine) 5,000 Units Subcutaneous Q8H [facility] Yolanda S STEVEN Garcia    hydrALAZINE 5 mg Intravenous Once Yolanda S Garcia, STEVEN    hydrALAZINE 25 mg Oral TID Yolanda S STEVEN Garcia    losartan 50 mg Oral Daily Yolanda S STEVEN Garcia    metoprolol succinate 100 mg Oral HS Yolanda S STEVEN Garcia    morphine injection 2 mg Intravenous Q3H PRN STEVEN Gonzalez    multivitamin-minerals 1 tablet Oral Daily Yolanda S GarciaNURISNP    polyethylene glycol 17 g Oral Daily PRN Yolanda S NURIS GarciaNP    sodium chloride 50 mL/hr Intravenous Continuous Yolanda S NURIS GarciaNP Last Rate: 50 mL/hr (10/02/18 0123)     Continuous Infusions:   sodium chloride 50 mL/hr Last Rate: 50 mL/hr (10/02/18 0123)     PRN Meds:   acetaminophen    morphine injection    polyethylene glycol

## 2018-10-02 NOTE — ASSESSMENT & PLAN NOTE
Reports to generalized abdominal pain for months now and a weight loss of 6-8 lbs  Pt is supposed to see Dr Reilyl Worthington but unable to make an appointment  She has nausea but no vomiting  CT abdomen and pelvis without contrast negative for acute findings but with 7x5x4  ureteral stone with mild to moderate hydronephrosis  LA 0 7, Amylase WNL, Lipase elevated  · Monitor abdominal exam  · Will consider GI consult if not improving after stent placement  · Anti emetic/PPI  · Trend lipase  · IV hydration

## 2018-10-02 NOTE — ASSESSMENT & PLAN NOTE
H&H 9 1/30 1  Microcytic hypochromic  Hb 7 7-> 8 2  Check B12, folate, TSH, iron study, stool occult blood x 3  Monitor

## 2018-10-02 NOTE — ANESTHESIA PREPROCEDURE EVALUATION
Acid reflux   Periodontal disease      Peripheral vascular disease (HCC)   PAD (peripheral artery disease) (HCC)   Perforated duodenal ulcer (HCC)   Chronic mesenteric ischemia (HCC)   Bilateral cellulitis of lower leg   Elevated brain natriuretic peptide (BNP) level   Pleural effusion, bilateral   Hypoalbuminemia due to protein-calorie malnutrition (HCC)   Hypertensive urgency   Stenosis of right internal carotid artery   Chronic diastolic heart failure (HCC)   Essential hypertension   Left renal artery stenosis (HCC)   Renovascular hypertension   Nephrolithiasis   Hyponatremia   Generalized abdominal pain   Weight loss, unintentional   Left ureteral stone       Review of Systems/Medical History  Patient summary reviewed  Chart reviewed      Cardiovascular  Hypertension , PVD,   Comment: Echo complete with contrast if indicated   Status: Final result  PACS Images     Show images for Echo complete with contrast if indicated  Order Report      Order Details   Study Result     [address]  [address] [facility]  [phone]     Transthoracic Echocardiogram  2D, M-mode, Doppler, and Color Doppler     Study date:  2018     Patient: Elina BROWN RACE  MR number: [id number]  Account number: [de-identified]  : 10-Sep-1930  Age: 80 years  Gender: Female  Status: Routine  Location: Bedside  Height: 62 in  Weight: 94 lb  BP: 165/ 55 mmHg     Indications: CHF     Diagnoses: I50 9 - Heart failure, unspecified     Sonographer:  ABDIRAHMAN Smith  Primary Physician:  Jackie Gomez MD  Referring Physician:  Rio Miller MD  Group:  [facility]  Interpreting Physician:  Rena Molina MD     SUMMARY     SUMMARY:  Compared with prior echo mitral regurgitation is reduced in severity to mild     LEFT VENTRICLE:  Systolic function was normal  Ejection fraction was estimated in the range of 55 % to 60 %  There were no regional wall motion abnormalities    Wall thickness was mildly increased  Doppler parameters were consistent with abnormal left ventricular relaxation (grade 1 diastolic dysfunction)  Doppler parameters were consistent with elevated mean left atrial filling pressure      MITRAL VALVE:  There was mild annular calcification  There was mild regurgitation      HISTORY: PRIOR HISTORY: Mitral Regurgitation, COPD, CHF, Hypertension     PROCEDURE: The procedure was performed at the bedside  This was a routine study  The transthoracic approach was used  The study included complete 2D imaging, M-mode, complete spectral Doppler, and color Doppler  The heart rate was 70 bpm,  at the start of the study  Images were obtained from the parasternal, apical, subcostal, and suprasternal notch acoustic windows  Image quality was adequate      LEFT VENTRICLE: Size was normal  Systolic function was normal  Ejection fraction was estimated in the range of 55 % to 60 %  There were no regional wall motion abnormalities  Wall thickness was mildly increased  No evidence of apical  thrombus  DOPPLER: Doppler parameters were consistent with abnormal left ventricular relaxation (grade 1 diastolic dysfunction)  Doppler parameters were consistent with elevated mean left atrial filling pressure      RIGHT VENTRICLE: The size was normal  Systolic function was normal  Wall thickness was increased      LEFT ATRIUM: The atrium was mildly dilated      RIGHT ATRIUM: Size was normal      MITRAL VALVE: There was mild annular calcification  There was mild thickening  There was normal leaflet separation  DOPPLER: The transmitral velocity was within the normal range  There was no evidence for stenosis  There was mild  regurgitation      AORTIC VALVE: The valve was trileaflet  Leaflets exhibited normal thickness and normal cuspal separation  DOPPLER: Transaortic velocity was within the normal range  There was no evidence for stenosis   There was no significant  regurgitation      TRICUSPID VALVE: The valve structure was normal  There was normal leaflet separation  DOPPLER: The transtricuspid velocity was within the normal range  There was no evidence for stenosis  There was no significant regurgitation  The  tricuspid jet envelope definition was inadequate for estimation of RV systolic pressure  There are no indirect findings (abnormal RV volume or geometry, altered pulmonary flow velocity profile, or leftward septal displacement) which would  suggest moderate or severe pulmonary hypertension      PULMONIC VALVE: Leaflets exhibited normal thickness, no calcification, and normal cuspal separation  DOPPLER: The transpulmonic velocity was within the normal range  There was trace regurgitation      PERICARDIUM: There was no pericardial effusion  The pericardium was normal in appearance      AORTA: The root exhibited normal size      SYSTEMIC VEINS: IVC: The inferior vena cava was normal in size      SYSTEM MEASUREMENT TABLES     2D mode  AV Diam 2D: 2 9 cm  AoR Diam 2D: 2 9 cm  LA Diam (2D): 3 2 cm  LA/Ao (2D): 1 1  Left Atrium Papa-posterior Systolic Dimension; Mean; Mean value chosen; 2D mode;: 3 2 cm  FS (2D Teich): 32 3 %  IVSd (2D): 1 04 cm  IVSd2D: 1 04 cm  Interventricular Septum to Posterior Wall Thickness Ratio; 2D mode;: 1  LVDEV: 93 4 cm³  LVED Volume; Cube Method; 2D mode;: 92 3 cm³  LVESV: 36 7 cm³  LVIDd(2D): 4 52 cm  LVISd (2D): 3 06 cm  LVPWd (2D): 1 04 cm  Left Ventricle Internal End Diastolic Dimension; Mean; Mean value chosen; 2D mode;: 4 52 cm  Left Ventricle Internal Systolic Dimension; Mean; Mean value chosen; 2D mode;: 3 06 cm  Left Ventricle Posterior Wall Diastolic Thickness; Mean; Mean value chosen; 2D mode;: 1 04 cm  Left Ventricular Ejection Fraction; Cube Method; 2D mode;: 68 9 %  Left Ventricular Ejection Fraction;  Teichholz; 2D mode;: 60 7 %  Left Ventricular End Systolic Volume; Cube Method; 2D mode;: 28 7 cm³  Left Ventricular Fractional Shortening; Cube Method; 2D mode;: 32 3 %  SV (Maryann Hubbard): 56 7 cm³  Stroke Volume; Cube Method; 2D mode;: 63 6 cm³     Apical four chamber  LVED Volume, Single Plane; 2D A4C: 91 cm³  LVEF A4C: 57 %  LVd Area; Single Plane; 2D; A4C: 26 cm squared  LVd major axis; Single Plane; 2D; A4C: 6 07 cm  Left Ventricle MOD Diam; Most recent value chosen; End Diastole; Apical four chamber;: 1 82 cm  Left Ventricle MOD Diam; Most recent value chosen; End Diastole; Apical four chamber;: 2 56 cm  Left Ventricle MOD Diam; Most recent value chosen; End Diastole; Apical four chamber;: 3 06 cm  Left Ventricle MOD Diam; Most recent value chosen; End Diastole; Apical four chamber;: 3 48 cm  Left Ventricle MOD Diam; Most recent value chosen; End Diastole; Apical four chamber;: 3 8 cm  Left Ventricle MOD Diam; Most recent value chosen; End Diastole; Apical four chamber;: 4 07 cm  Left Ventricle MOD Diam; Most recent value chosen; End Diastole; Apical four chamber;: 4 27 cm  Left Ventricle MOD Diam; Most recent value chosen; End Diastole; Apical four chamber;: 4 41 cm  Left Ventricle MOD Diam; Most recent value chosen; End Diastole; Apical four chamber;: 4 5 cm  Left Ventricle MOD Diam; Most recent value chosen; End Diastole; Apical four chamber;: 4 61 cm  Left Ventricle MOD Diam; Most recent value chosen; End Diastole; Apical four chamber;: 4 77 cm  Left Ventricle MOD Diam; Most recent value chosen; End Diastole; Apical four chamber;: 4 97 cm  Left Ventricle MOD Diam; Most recent value chosen; End Diastole; Apical four chamber;: 5 11 cm  Left Ventricle MOD Diam; Most recent value chosen; End Diastole; Apical four chamber;: 5 29 cm  Left Ventricle MOD Diam; Most recent value chosen; End Diastole; Apical four chamber;: 5 33 cm  Left Ventricle MOD Diam; Most recent value chosen; End Diastole; Apical four chamber;: 5 35 cm  Left Ventricle MOD Diam; Most recent value chosen; End Diastole; Apical four chamber;: 5 24 cm  Left Ventricle MOD Diam; Most recent value chosen; End Diastole;  Apical four chamber;: 5 06 cm  Left Ventricle MOD Diam; Most recent value chosen; End Diastole; Apical four chamber;: 4 7 cm  Left Ventricle MOD Diam; Most recent value chosen; End Diastole; Apical four chamber;: 2 4 cm  Left Ventricle MOD Diam; Most recent value chosen; End Systole; Apical four chamber;: 1 03 cm  Left Ventricle MOD Diam; Most recent value chosen; End Systole; Apical four chamber;: 1 44 cm  Left Ventricle MOD Diam; Most recent value chosen; End Systole; Apical four chamber;: 1 77 cm  Left Ventricle MOD Diam; Most recent value chosen; End Systole; Apical four chamber;: 2 11 cm  Left Ventricle MOD Diam; Most recent value chosen; End Systole; Apical four chamber;: 2 36 cm  Left Ventricle MOD Diam; Most recent value chosen; End Systole; Apical four chamber;: 2 56 cm  Left Ventricle MOD Diam; Most recent value chosen; End Systole; Apical four chamber;: 2 74 cm  Left Ventricle MOD Diam; Most recent value chosen; End Systole; Apical four chamber;: 2 85 cm  Left Ventricle MOD Diam; Most recent value chosen; End Systole; Apical four chamber;: 3 03 cm  Left Ventricle MOD Diam; Most recent value chosen; End Systole; Apical four chamber;: 3 26 cm  Left Ventricle MOD Diam; Most recent value chosen; End Systole; Apical four chamber;: 3 55 cm  Left Ventricle MOD Diam; Most recent value chosen; End Systole; Apical four chamber;: 3 79 cm  Left Ventricle MOD Diam; Most recent value chosen; End Systole; Apical four chamber;: 4 04 cm  Left Ventricle MOD Diam; Most recent value chosen; End Systole; Apical four chamber;: 4 24 cm  Left Ventricle MOD Diam; Most recent value chosen; End Systole; Apical four chamber;: 4 4 cm  Left Ventricle MOD Diam; Most recent value chosen; End Systole; Apical four chamber;: 4 26 cm  Left Ventricle MOD Diam; Most recent value chosen; End Systole; Apical four chamber;: 4 04 cm  Left Ventricle MOD Diam; Most recent value chosen; End Systole;  Apical four chamber;: 3 84 cm  Left Ventricle MOD Diam; Most recent value chosen; End Systole; Apical four chamber;: 3 73 cm  Left Ventricle MOD Diam; Most recent value chosen; End Systole; Apical four chamber;: 2 cm  Left Ventricle systolic major axis; Most recent value chosen; Method of Disks, Single Plane; 2D mode; Apical four chamber;: 4 78 cm  Left Ventricular End Systolic Volume; Most recent value chosen; Method of Disks, Single Plane; 2D mode; Apical four chamber;: 39 cm³  Left Ventricular Systolic Area; Most recent value chosen; Method of Disks, Single Plane; 2D mode; Apical four chamber;: 14 6 cm squared  SV A4C: 52 cm³     Tissue Doppler Imaging  LV Peak Early Bhatia Tissue Manuel; Medial MA (TDI): 31 2 mm/s  Left Ventricular Peak Early Diastolic Tissue Velocity; Mean; Mean value chosen; Medial Mitral Annulus; Tissue Doppler Imaging;: 31 2 mm/s     Unspecified Scan Mode  MV Peak Manuel/LV Peak Tissue Manuel E-Wave; Medial MA: 27 5  Deceleration Time; Antegrade Flow;: 359 ms  Deceleration Time; Mean; Mean value chosen; Antegrade Flow;: 359 ms  MV E/A Ratio: 0 7  MV Peak A Manuel: 1280 mm/s  MV Peak E Manuel  Mean: 859 mm/s  Mitral Valve A-Wave Peak Velocity; Mean; Mean value chosen; Antegrade Flow;: 1280 mm/s  Mitral Valve E-Wave Peak Velocity;  Antegrade Flow;: 859 mm/s  TAPSE: 1 8 cm  TAPSE: 2 7 cm  TAPSE: 1 8 cm  TAPSE: 2 7 cm     [facility] Accredited Echocardiography Laboratory     Prepared and electronically signed by  Hiram Ibrahim MD  Signed 13-Jun-2018 10:37:20     Linked Documents     View Image  Imaging     Echo complete with contrast if indicated (Order #[id number]) on 6/11/2018 - Imaging Information   Result History     Echo complete with contrast if indicated (Order #[id number]) on 6/13/2018 - Order Result History Report   Show result history  Result Comparison   Echo complete with contrast if indicated (Order [id number])   Newer Version Older Version  Final result   6/13/2018 10:42 AM   Interface, Radiology Results In       This is the newest version No older versions exist  Narrative     [address]   [address] [facility]   [place] [address]   [phone]     Transthoracic Echocardiogram   2D, M-mode, Doppler, and Color Doppler     Study date:       Patient: Graciela WATERS   MR number: [id number]   Account number: [de-identified]   : 10-Sep-1930   Age: 80 years   Gender: Female   Status: Routine   Location: Bedside   Height: 62 in   Weight: 94 lb   BP: 165/ 55 mmHg     Indications: CHF     Diagnoses: I50 9 - Heart failure, unspecified     Sonographer: ABDIRAHMAN Brandt   Primary Physician: Ayla Daugherty MD   Referring Physician: Itzel Garcia MD   Group: [facility]   Interpreting Physician: Ilan Stoddard MD     SUMMARY     SUMMARY:   Compared with prior echo mitral regurgitation is reduced in severity to mild     LEFT VENTRICLE:   Systolic function was normal  Ejection fraction was estimated in the range of 55 % to 60 %  There were no regional wall motion abnormalities  Wall thickness was mildly increased  Doppler parameters were consistent with abnormal left ventricular relaxation (grade 1 diastolic dysfunction)  Doppler parameters were consistent with elevated mean left atrial filling pressure  MITRAL VALVE:   There was mild annular calcification  There was mild regurgitation  HISTORY: PRIOR HISTORY: Mitral Regurgitation, COPD, CHF, Hypertension     PROCEDURE: The procedure was performed at the bedside  This was a routine study  The transthoracic approach was used  The study included complete 2D imaging, M-mode, complete spectral Doppler, and color Doppler  The heart rate was 70 bpm,   at the start of the study  Images were obtained from the parasternal, apical, subcostal, and suprasternal notch acoustic windows  Image quality was adequate  LEFT VENTRICLE: Size was normal  Systolic function was normal  Ejection fraction was estimated in the range of 55 % to 60 %   There were no regional wall motion abnormalities  Wall thickness was mildly increased  No evidence of apical   thrombus  DOPPLER: Doppler parameters were consistent with abnormal left ventricular relaxation (grade 1 diastolic dysfunction)  Doppler parameters were consistent with elevated mean left atrial filling pressure  RIGHT VENTRICLE: The size was normal  Systolic function was normal  Wall thickness was increased  LEFT ATRIUM: The atrium was mildly dilated  RIGHT ATRIUM: Size was normal      MITRAL VALVE: There was mild annular calcification  There was mild thickening  There was normal leaflet separation  DOPPLER: The transmitral velocity was within the normal range  There was no evidence for stenosis  There was mild   regurgitation  AORTIC VALVE: The valve was trileaflet  Leaflets exhibited normal thickness and normal cuspal separation  DOPPLER: Transaortic velocity was within the normal range  There was no evidence for stenosis  There was no significant   regurgitation  TRICUSPID VALVE: The valve structure was normal  There was normal leaflet separation  DOPPLER: The transtricuspid velocity was within the normal range  There was no evidence for stenosis  There was no significant regurgitation  The   tricuspid jet envelope definition was inadequate for estimation of RV systolic pressure  There are no indirect findings (abnormal RV volume or geometry, altered pulmonary flow velocity profile, or leftward septal displacement) which would   suggest moderate or severe pulmonary hypertension  PULMONIC VALVE: Leaflets exhibited normal thickness, no calcification, and normal cuspal separation  DOPPLER: The transpulmonic velocity was within the normal range  There was trace regurgitation  PERICARDIUM: There was no pericardial effusion  The pericardium was normal in appearance  AORTA: The root exhibited normal size  SYSTEMIC VEINS: IVC: The inferior vena cava was normal in size       SYSTEM MEASUREMENT TABLES     2D mode   AV Diam 2D: 2 9 cm   AoR Diam 2D: 2 9 cm   LA Diam (2D): 3 2 cm   LA/Ao (2D): 1 1   Left Atrium Papa-posterior Systolic Dimension; Mean; Mean value chosen; 2D mode;: 3 2 cm   FS (2D Teich): 32 3 %   IVSd (2D): 1 04 cm   IVSd2D: 1 04 cm   Interventricular Septum to Posterior Wall Thickness Ratio; 2D mode;: 1   LVDEV: 93 4 cm³   LVED Volume; Cube Method; 2D mode;: 92 3 cm³   LVESV: 36 7 cm³   LVIDd(2D): 4 52 cm   LVISd (2D): 3 06 cm   LVPWd (2D): 1 04 cm   Left Ventricle Internal End Diastolic Dimension; Mean; Mean value chosen; 2D mode;: 4 52 cm   Left Ventricle Internal Systolic Dimension; Mean; Mean value chosen; 2D mode;: 3 06 cm   Left Ventricle Posterior Wall Diastolic Thickness; Mean; Mean value chosen; 2D mode;: 1 04 cm   Left Ventricular Ejection Fraction; Cube Method; 2D mode;: 68 9 %   Left Ventricular Ejection Fraction; Teichholz; 2D mode;: 60 7 %   Left Ventricular End Systolic Volume; Cube Method; 2D mode;: 28 7 cm³   Left Ventricular Fractional Shortening; Cube Method; 2D mode;: 32 3 %   SV (Teich): 56 7 cm³   Stroke Volume; Cube Method; 2D mode;: 63 6 cm³     Apical four chamber   LVED Volume, Single Plane; 2D A4C: 91 cm³   LVEF A4C: 57 %   LVd Area; Single Plane; 2D; A4C: 26 cm squared   LVd major axis; Single Plane; 2D; A4C: 6 07 cm   Left Ventricle MOD Diam; Most recent value chosen; End Diastole; Apical four chamber;: 1 82 cm   Left Ventricle MOD Diam; Most recent value chosen; End Diastole; Apical four chamber;: 2 56 cm   Left Ventricle MOD Diam; Most recent value chosen; End Diastole; Apical four chamber;: 3 06 cm   Left Ventricle MOD Diam; Most recent value chosen; End Diastole; Apical four chamber;: 3 48 cm   Left Ventricle MOD Diam; Most recent value chosen; End Diastole; Apical four chamber;: 3 8 cm   Left Ventricle MOD Diam; Most recent value chosen; End Diastole; Apical four chamber;: 4 07 cm   Left Ventricle MOD Diam; Most recent value chosen;  End Diastole; Apical four chamber;: 4 27 cm   Left Ventricle MOD Diam; Most recent value chosen; End Diastole; Apical four chamber;: 4 41 cm   Left Ventricle MOD Diam; Most recent value chosen; End Diastole; Apical four chamber;: 4 5 cm   Left Ventricle MOD Diam; Most recent value chosen; End Diastole; Apical four chamber;: 4 61 cm   Left Ventricle MOD Diam; Most recent value chosen; End Diastole; Apical four chamber;: 4 77 cm   Left Ventricle MOD Diam; Most recent value chosen; End Diastole; Apical four chamber;: 4 97 cm   Left Ventricle MOD Diam; Most recent value chosen; End Diastole; Apical four chamber;: 5 11 cm   Left Ventricle MOD Diam; Most recent value chosen; End Diastole; Apical four chamber;: 5 29 cm   Left Ventricle MOD Diam; Most recent value chosen; End Diastole; Apical four chamber;: 5 33 cm   Left Ventricle MOD Diam; Most recent value chosen; End Diastole; Apical four chamber;: 5 35 cm   Left Ventricle MOD Diam; Most recent value chosen; End Diastole; Apical four chamber;: 5 24 cm   Left Ventricle MOD Diam; Most recent value chosen; End Diastole; Apical four chamber;: 5 06 cm   Left Ventricle MOD Diam; Most recent value chosen; End Diastole; Apical four chamber;: 4 7 cm   Left Ventricle MOD Diam; Most recent value chosen; End Diastole; Apical four chamber;: 2 4 cm   Left Ventricle MOD Diam; Most recent value chosen; End Systole; Apical four chamber;: 1 03 cm   Left Ventricle MOD Diam; Most recent value chosen; End Systole; Apical four chamber;: 1 44 cm   Left Ventricle MOD Diam; Most recent value chosen; End Systole; Apical four chamber;: 1 77 cm   Left Ventricle MOD Diam; Most recent value chosen; End Systole; Apical four chamber;: 2 11 cm   Left Ventricle MOD Diam; Most recent value chosen; End Systole; Apical four chamber;: 2 36 cm   Left Ventricle MOD Diam; Most recent value chosen; End Systole; Apical four chamber;: 2 56 cm   Left Ventricle MOD Diam; Most recent value chosen; End Systole;  Apical four chamber;: 2 74 cm   Left Ventricle MOD Diam; Most recent value chosen; End Systole; Apical four chamber;: 2 85 cm   Left Ventricle MOD Diam; Most recent value chosen; End Systole; Apical four chamber;: 3 03 cm   Left Ventricle MOD Diam; Most recent value chosen; End Systole; Apical four chamber;: 3 26 cm   Left Ventricle MOD Diam; Most recent value chosen; End Systole; Apical four chamber;: 3 55 cm   Left Ventricle MOD Diam; Most recent value chosen; End Systole; Apical four chamber;: 3 79 cm   Left Ventricle MOD Diam; Most recent value chosen; End Systole; Apical four chamber;: 4 04 cm   Left Ventricle MOD Diam; Most recent value chosen; End Systole; Apical four chamber;: 4 24 cm   Left Ventricle MOD Diam; Most recent value chosen; End Systole; Apical four chamber;: 4 4 cm   Left Ventricle MOD Diam; Most recent value chosen; End Systole; Apical four chamber;: 4 26 cm   Left Ventricle MOD Diam; Most recent value chosen; End Systole; Apical four chamber;: 4 04 cm   Left Ventricle MOD Diam; Most recent value chosen; End Systole; Apical four chamber;: 3 84 cm   Left Ventricle MOD Diam; Most recent value chosen; End Systole; Apical four chamber;: 3 73 cm   Left Ventricle MOD Diam; Most recent value chosen; End Systole; Apical four chamber;: 2 cm   Left Ventricle systolic major axis; Most recent value chosen; Method of Disks, Single Plane; 2D mode; Apical four chamber;: 4 78 cm   Left Ventricular End Systolic Volume; Most recent value chosen; Method of Disks, Single Plane; 2D mode; Apical four chamber;: 39 cm³   Left Ventricular Systolic Area; Most recent value chosen; Method of Disks, Single Plane; 2D mode; Apical four chamber;: 14 6 cm squared   SV A4C: 52 cm³     Tissue Doppler Imaging   LV Peak Early Bhatia Tissue Manuel; Medial MA (TDI): 31 2 mm/s   Left Ventricular Peak Early Diastolic Tissue Velocity; Mean; Mean value chosen; Medial Mitral Annulus;  Tissue Doppler Imaging;: 31 2 mm/s     Unspecified Scan Mode   MV Peak Manuel/LV Peak Tissue Manuel E-Wave; Medial MA: 27 5   Deceleration Time; Antegrade Flow;: 359 ms   Deceleration Time; Mean; Mean value chosen; Antegrade Flow;: 359 ms   MV E/A Ratio: 0 7   MV Peak A Manuel: 1280 mm/s   MV Peak E Manuel  Mean: 859 mm/s   Mitral Valve A-Wave Peak Velocity; Mean; Mean value chosen; Antegrade Flow;: 1280 mm/s   Mitral Valve E-Wave Peak Velocity; Antegrade Flow;: 859 mm/s   TAPSE: 1 8 cm   TAPSE: 2 7 cm   TAPSE: 1 8 cm   TAPSE: 2 7 cm     [facility] Accredited Echocardiography Laboratory     Prepared and electronically signed by     Richy Marinelli MD   Signed 13-Jun-2018 10:37:20     Signed by     Signed Date/Time  Phone Pager  TEA YAN 6/13/2018 10:37 [phone]     ,  Pulmonary  COPD ,        GI/Hepatic    PUD, GERD ,   Comment: Stewart's esophagus     Kidney stones,        Endo/Other  Negative endo/other ROS      GYN  Negative gynecology ROS          Hematology  Anemia ,     Musculoskeletal    Arthritis     Neurology  Negative neurology ROS      Psychology   Negative psychology ROS              Physical Exam    Airway    Mallampati score: II  TM Distance: >3 FB  Neck ROM: full     Dental       Cardiovascular  Rhythm: regular, Rate: normal,     Pulmonary  Breath sounds clear to auscultation,     Other Findings        Anesthesia Plan  ASA Score- 3 Emergent    Anesthesia Type- general with ASA Monitors  Additional Monitors:   Airway Plan: LMA  Plan Factors-    Induction- intravenous  Postoperative Plan-   Planned trial extubation    Informed Consent- Anesthetic plan and risks discussed with patient

## 2018-10-02 NOTE — ASSESSMENT & PLAN NOTE
History of 70-99% stenosis and was seen here with no vascular intervention  Patient on Plavix and S/P ASA    · Continue Plavix

## 2018-10-02 NOTE — CONSULTS
Consultation - [facility]  Ac Amos Race 80 y o  female MRN: [id number]  Unit/Bed#: [place] [address] Encounter: [id number]    Requesting Physician: Kristi Cárdenas MD    Assessment/Plan:    Ureteral stone  7 x 5 x 4 mm distal left ureteral stone with hydronephrosis  Left sided renal colic requiring morphine  Acute kidney injury with creatinine 1 12 increased to 1 37  Plavix and Heparin given this AM    · Left ureteral stent and possible ureteroscopy and stone extraction   · Hold today dose at 1400 of heparin injection until after surgery  · Keep NPO     Hyponatremia, HTN, CHF, Stenosis of right internal carotid artery, Chronic mesenteric ischemia,  · Medicine following     HISTORY OF PRESENT ILLNESS:    Crystal Alvarado is a 80y o  year old female who we are asked to see for obstructing left ureteral stone  Pt developed left sided pain that has been intermittent for months  Yesterday the pain became very sharp  Upon evaluation in the ER was noted to have an obstructing ureteral stone that is not likely to pass on its own  Pt notes continued left sided discomfort despite pain medication         PAST MEDICAL HISTORY:  Past Medical History:   Diagnosis Date    Acid reflux     Arthritis     Stewart's esophagus     COPD (chronic obstructive pulmonary disease) (HCC)     Duodenal ulcer     Hypertension     PAD (peripheral artery disease) (HCC)     Periodontal disease     Peripheral vascular disease (HCC)     Scoliosis        PAST SURGICAL HISTORY:  Past Surgical History:   Procedure Laterality Date    ANAL FISSURECTOMY      AORTA - SUPERIOR MESENTERIC ARTERY BYPASS GRAFT Bilateral     BREAST BIOPSY Right     FEMORAL ARTERY STENT Bilateral     HEMORROIDECTOMY      HYSTERECTOMY      INSERTION STENT ARTERY Left 1/1/2017    Procedure:  mesenteric arteriogram, balloon angioplasty of SMA stent;  Surgeon: Briana Spain MD;  Location: [facility];  Service:    [facility] LAPAROTOMY N/A 1/1/2017    Procedure: Yenny Parker EXPLORATORY,  abdominal washout, repair of duodenal ulcer;  Surgeon: Nesha Langley MD;  Location:  MAIN OR;  Service:      [address] CATARACT EXTRACAP,INSERT LENS Right 1/11/2016    Procedure: EXTRACTION INTRACAPSULAR CATARACT PHACO INTRAOCULAR LENS (IOL); Surgeon: Siri Bergeron MD;  Location: [facility] MAIN OR;  Service: Ophthalmology    TONSILECTOMY AND ADNOIDECTOMY         ALLERGIES:  Allergies   Allergen Reactions    Chlorthalidone Itching    Penicillins Itching     Tolerates zosyn    Sulfa Antibiotics Rash    Bacitracin Rash    Ciprofloxacin GI Intolerance       SOCIAL HISTORY:  History   Alcohol Use    0 6 oz/week    1 Glasses of wine per week     Comment: a glass wine daily   last drink 6/10/18     History   Drug Use No     History   Smoking Status    Former Smoker    Packs/day: 1 00    Years: 25 00    Quit date: [address] [facility] Never Used     Comment: smoked for 25 years  FAMILY HISTORY:  History reviewed  No pertinent family history      MEDICATIONS:    Current Facility-Administered Medications:     acetaminophen (TYLENOL) tablet 650 mg, 650 mg, Oral, Q6H PRN, Yolanda S Garcia, CRNP    clopidogrel (PLAVIX) tablet 75 mg, 75 mg, Oral, Daily, Yolanda S Garcia, CRNP    heparin (porcine) subcutaneous injection 5,000 Units, 5,000 Units, Subcutaneous, Q8H [facility], 5,000 Units at 10/02/18 0527 **AND** Platelet count, , , Once, Yolanda S Garcia, CRNP    hydrALAZINE (APRESOLINE) injection 5 mg, 5 mg, Intravenous, Once, Yolanda S Garcia, CRNP    hydrALAZINE (APRESOLINE) tablet 25 mg, 25 mg, Oral, TID, Yolanda S Garcia, CRNP, 25 mg at 10/01/18 2306    losartan (COZAAR) tablet 50 mg, 50 mg, Oral, Daily, Yolanda S Garcia, CRNP    metoprolol succinate (TOPROL-XL) 24 hr tablet 100 mg, 100 mg, Oral, HS, Yolanda S Garcia, CRNP, 100 mg at 10/01/18 2306    morphine (PF) 4 mg/mL injection 2 mg, 2 mg, Intravenous, Q3H PRN, NURIS MooreNP, 2 mg at 10/02/18 3925   multivitamin-minerals (CENTRUM) tablet 1 tablet, 1 tablet, Oral, Daily, Yolanda Robersona, CRNP    polyethylene glycol (MIRALAX) packet 17 g, 17 g, Oral, Daily PRN, Yolanda Robersona, CRNP    sodium chloride 0 9 % infusion, 50 mL/hr, Intravenous, Continuous, Yolanda Robersona CRNP, Last Rate: 50 mL/hr at 10/02/18 0123, 50 mL/hr at 10/02/18 0123    REVIEW OF SYMPTOMS:  Review of Systems   Constitutional: Negative for chills and fever  Respiratory: Negative for shortness of breath  Cardiovascular: Negative for chest pain  Gastrointestinal: Positive for abdominal distention (left lateral lower abdominal )  Genitourinary: Positive for flank pain  Negative for frequency and hematuria  PHYSICAL EXAM:  Vitals:    10/02/18 0323   BP: (!) 174/74   Pulse: 77   Resp: 18   Temp: 98 1 °F (36 7 °C)   SpO2: 98%     Body mass index is 16 83 kg/m²  Physical Exam   Constitutional:   thin   HENT:   Head: Normocephalic  Pulmonary/Chest: Effort normal    Abdominal: Soft  There is tenderness (left lower abdomen and left flank pain )  Neurological: She is alert  Skin: Skin is warm  Vitals reviewed        Intake/Output Summary (Last 24 hours) at 10/02/18 0844  Last data filed at 10/02/18 0055   Gross per 24 hour   Intake                0 ml   Output              110 ml   Net             -110 ml       LAB RESULTS:  Lab Results   Component Value Date    WBC 9 77 10/02/2018    HGB 7 7 (L) 10/02/2018    HCT 26 1 (L) 10/02/2018    MCV 76 (L) 10/02/2018     10/02/2018     Lab Results   Component Value Date    GLUCOSE 124 01/01/2017    CALCIUM 8 5 10/02/2018     (L) 10/02/2018    K 4 6 10/02/2018    CO2 20 (L) 10/02/2018    CL 98 (L) 10/02/2018    BUN 20 10/02/2018    CREATININE 1 37 (H) 10/02/2018     Lab Results   Component Value Date    CALCIUM 8 5 10/02/2018    PHOS 4 0 06/12/2018       OTHER STUDIES:  CT abdomen/pelvis without contrast 10/01/2018  Distal left ureteral stone measures approximately 7 x 5 x 4 mm resulting in mild to moderate left hydronephrosis      Colonic diverticulosis  No evidence for acute diverticulitis  Julianne Freedman PA-C  10/02/18    Portions of the record may have been created with voice recognition software   Occasional wrong word or "sound alike" substitutions may have occurred due to the inherent limitations of voice recognition software   Read the chart carefully and recognize, using context, where substitutions have occurred

## 2018-10-02 NOTE — ASSESSMENT & PLAN NOTE
History of chronic mesenteric ischemia  Reports to generalized abdominal pain for months and weight loss of 6-8 lbs  CT abdomen and pelvis with no acute findings except for left ureteral stone with hydronephrosis      · Lactic acid normal   · Monitor abdominal exam

## 2018-10-02 NOTE — PROGRESS NOTES
Progress Note Osjuhi Suffolk 9/10/1930, 80 y o  female MRN: [id number]    Unit/Bed#: [address] [place] [address] Encounter: [id number]    Primary Care Provider: Shellye Phalen, MD   Date and time admitted to hospital: 10/1/2018  6:20 PM        * Nephrolithiasis   Assessment & Plan    ·  CT scan of the abdomen and pelvis showed distal left ureteral stone measures approximately 7 x 5 x 4 mm resulting in mild to moderate left hydronephrosis  · Continue IVF  · Continue anti emetics  · Continue pain control p r n  · Patient seen by Urology, for left ureteral stent and possible ureteroscopy and stone extraction today  · Appreciate Urology input  Hyponatremia   Assessment & Plan    · Na 128->130 today  · Urine sodium 108, urine Osmo 404, serum osmol 273  Check TSH, uric acid  · Increase NS to 100 ml/hr  · Repeat BMP at midnight  · Consult Nephrology  Acute kidney injury superimposed on chronic kidney disease ([address] )   Assessment & Plan    Cr 1 12-> 1 37-> 1 65  today  Likely 2/2 # 1,+/- dehydration  Increase IV fluids to 100 ml/hr  Hold losartan  Check PVR stat  Avoid nephrotoxins  Consult Nephrology  Leukocytosis   Assessment & Plan    · WBC 21 79   · CXR showed CHF  Cardiomegaly and chronic changes  · UA showed small leukocytes, WBC 4-10  · Will order urine culture, procalcitonin  · Will start patient on IV Levaquin for possible UTI,add IV Flagyl in view of left-sided abdominal pain  · Repeat lab in a m        Anemia   Assessment & Plan    H&H 9 1/30 1  Microcytic hypochromic  Hb 7 7-> 8 2  Check B12, folate, TSH, iron study, stool occult blood x 3  Monitor  Weight loss, unintentional   Assessment & Plan    Admits to poor appetite secondary to generalized abdominal pain associated with nausea for months and weight loss of 6-8 lbs  Patient with history of mesenteric ischemia  CT scan showed no acute findings except for ureteral stone with hydronephrosis    · Monitor weight  · Nutritional consult     Generalized abdominal pain   Assessment & Plan    Reports to generalized abdominal pain for months now and a weight loss of 6-8 lbs  Pt is supposed to see Dr Davey Hanson but unable to make an appointment  She has nausea but no vomiting  CT abdomen and pelvis without contrast negative for acute findings but with 7x5x4  ureteral stone with mild to moderate hydronephrosis  LA 0 7, Amylase WNL, Lipase elevated  · Monitor abdominal exam  · Will consider GI consult if not improving after stent placement  · Anti emetic/PPI  · Trend lipase  · IV hydration  Essential hypertension   Assessment & Plan    Initially hypertensive in the ED likely induced pain  Patient on Hydralazine, Losartan and Metoprolol  · Will increase hydralazine to 50 mg p o  T i d  · Continue metoprolol  · Hold losartan due to [facility]  · Monitor  Chronic diastolic heart failure ([address] )   Assessment & Plan    · Echo on 06/12/2018 showed EF 55 % to 60 % with no regional wall motion abnormalities  · Patient appears dehydrated  · Continue metoprolol  · Daily weight  · Intake and output  Stenosis of right internal carotid artery   Assessment & Plan     History of 70-99% stenosis and was seen here with no vascular intervention  Patient on Plavix and S/P ASA  · Continue Plavix     Chronic mesenteric ischemia (HCC)   Assessment & Plan    History of chronic mesenteric ischemia  Reports to generalized abdominal pain for months and weight loss of 6-8 lbs  CT abdomen and pelvis with no acute findings except for left ureteral stone with hydronephrosis  · Lactic acid normal   · Monitor abdominal exam     Acid reflux   Assessment & Plan    Patient on Omeprazole  Pepcid IV given in the ED  · Continue           VTE Pharmacologic Prophylaxis:   Pharmacologic: Heparin  Mechanical VTE Prophylaxis in Place: Yes    Patient Centered Rounds: I have performed bedside rounds with nursing staff today      Discussions with Specialists or Other Care Team Provider: attending  Education and Discussions with Family / Patient: yes    Time Spent for Care: 20 minutes  More than 50% of total time spent on counseling and coordination of care as described above  Current Length of Stay: 1 day(s)    Current Patient Status: Inpatient   Certification Statement: The patient will continue to require additional inpatient hospital stay due to Left ureteral stone, leukocytosis, acute kidney injury, hyponatremia, possible UTI  Discharge Plan:  Pending PT OT eval     Code Status: Level 1 - Full Code      Subjective:   Patient reports left-sided abdominal pain on and off, denies back pain  Denies chest pain, headaches, SOB,vomiting, diarrhea constipation  Denies fever or chills  Reports dizzy and nauseous after IV morphine  Objective:     Vitals:   Temp (24hrs), Av 2 °F (36 8 °C), Min:97 6 °F (36 4 °C), Max:98 8 °F (37 1 °C)    HR:  [77-90] 83  Resp:  [16-18] 18  BP: (160-236)/(58-95) 181/75  SpO2:  [88 %-99 %] 97 %  Body mass index is 16 83 kg/m²  Input and Output Summary (last 24 hours): Intake/Output Summary (Last 24 hours) at 10/02/18 1650  Last data filed at 10/02/18 1645   Gross per 24 hour   Intake                0 ml   Output              660 ml   Net             -660 ml       Physical Exam:     Physical Exam   Constitutional: She is oriented to person, place, and time  She appears well-developed  Patient is underweight  HENT:   Head: Normocephalic and atraumatic  Dry mucous membrane  Neck: Normal range of motion  Neck supple  Cardiovascular: Normal rate and regular rhythm  Exam reveals no gallop and no friction rub  No murmur heard  Pulmonary/Chest: Breath sounds normal  No respiratory distress  She has no wheezes  She has no rales  Abdominal: Soft  Bowel sounds are normal  She exhibits no distension  There is tenderness  There is no rebound  Left-sided abdomen tender to touch      Musculoskeletal: Normal range of motion  She exhibits no edema or tenderness  Neurological: She is alert and oriented to person, place, and time  Skin: Skin is warm and dry  Psychiatric: She has a normal mood and affect  Nursing note and vitals reviewed  Additional Data:     Labs:      Results from last 7 days  Lab Units 10/02/18  1611  10/01/18  1904   WBC Thousand/uL 21 79*  < > 8 20   HEMOGLOBIN g/dL 8 2*  < > 9 1*   HEMATOCRIT % 27 5*  < > 30 1*   PLATELETS Thousands/uL 370  < > 404*   NEUTROS PCT %  --   --  78*   LYMPHS PCT %  --   --  12*   MONOS PCT %  --   --  9   EOS PCT %  --   --  1   < > = values in this interval not displayed  Results from last 7 days  Lab Units 10/02/18  1611  10/01/18  1904   SODIUM mmol/L 131*  < > 128*   POTASSIUM mmol/L 4 6  < > 4 4   CHLORIDE mmol/L 100  < > 95*   CO2 mmol/L 20*  < > 20*   BUN mg/dL 22  < > 22   CREATININE mg/dL 1 65*  < > 1 12   CALCIUM mg/dL 8 4  < > 9 2   ALK PHOS U/L  --   --  69   ALT U/L  --   --  22   AST U/L  --   --  18   < > = values in this interval not displayed  * I Have Reviewed All Lab Data Listed Above  * Additional Pertinent Lab Tests Reviewed:  Jonny Rivera Admission Reviewed    Imaging:    Imaging Reports Reviewed Today Include:  CT scan renal stone study, chest x-ray  Imaging Personally Reviewed by Myself Includes:  Chest x-ray    Recent Cultures (last 7 days):           Last 24 Hours Medication List:     Current Facility-Administered Medications:  acetaminophen 650 mg Oral Q6H PRN STEVEN Alcala    amLODIPine 5 mg Oral Daily STEVEN Marin    clopidogrel 75 mg Oral Daily STEVEN Alcala    heparin (porcine) 5,000 Units Subcutaneous Q8H [address], CRNP    hydrALAZINE 50 mg Oral TID STEVEN Marin    [START ON 10/4/2018] levofloxacin 250 mg Intravenous Q48H STEVEN Marin    levofloxacin 500 mg Intravenous Once STEVEN Marin    metoprolol succinate 100 mg Oral HS Royden Salt, CRNP    metroNIDAZOLE 500 mg Intravenous Q8H STEVEN Marin    morphine injection 2 mg Intravenous Q3H PRN STEVEN Scott    multivitamin-minerals 1 tablet Oral Daily Yolanda Garcia, STEVEN    polyethylene glycol 17 g Oral Daily PRN Yolanda Garcia, STEVEN    sodium chloride 100 mL/hr Intravenous Continuous STEVEN Marin Last Rate: 50 mL/hr (10/02/18 0123)        Today, Patient Was Seen By: STEVEN Dillard    ** Please Note: Dragon 360 Dictation voice to text software may have been used in the creation of this document   **

## 2018-10-02 NOTE — CASE MANAGEMENT
Continued Stay Review    Date: 10/2/18    Vital Signs: BP (!) 172/65 (BP Location: Right arm)   Pulse 82   Temp 98 4 °F (36 9 °C) (Temporal)   Resp 18   Ht 5' 1" (1 549 m)   Wt 40 4 kg (89 lb 1 1 oz)   SpO2 93%   BMI 16 83 kg/m²       gmf  Consult nephrology  xr retrograde pyelogram  Or cystoscopy  ua cs  Bmp cbc  bnp  Uric acid    Scheduled Meds:  Current Facility-Administered Medications:  acetaminophen 650 mg Oral Q6H PRN Yolanda S Garcia, CRNP    amLODIPine 5 mg Oral Daily Cuiyin Yurik, CRNP    clopidogrel 75 mg Oral Daily Yolanda S Garcia, CRNP    docusate sodium 100 mg Oral Daily Cuiyin Yurik, CRNP    hydrALAZINE 50 mg Oral TID Cuiyin Yurik, CRNP    HYDROmorphone 0 2 mg Intravenous Q3H PRN Cuijairon Giselarik, CRNP    [START ON 10/4/2018] levofloxacin 250 mg Intravenous Q48H Cuiyin Yurik, CRNP    metoprolol succinate 100 mg Oral HS Yolanda S Garcia, CRNP    metroNIDAZOLE 500 mg Intravenous Q8H Cuiyin Yurik, CRNP Last Rate: 500 mg (10/03/18 0051)   multivitamin-minerals 1 tablet Oral Daily Yolanda S Garcia, CRNP    oxyCODONE 2 5 mg Oral Q4H PRN Cuiyin Yurik, CRNP    oxyCODONE 5 mg Oral Q4H PRN Cuiyin Yurik, CRNP    polyethylene glycol 17 g Oral Daily PRN Yolanda S Garcia, CRNP    sodium chloride 75 mL/hr Intravenous Continuous Yolanda S Garcia, CRNP Last Rate: 75 mL/hr (10/03/18 0641)     Continuous Infusions:  sodium chloride 75 mL/hr Last Rate: 75 mL/hr (10/03/18 0641)     PRN Meds:   acetaminophen    HYDROmorphone    oxyCODONE    oxyCODONE    polyethylene glycol    Abnormal Labs/Diagnostic Results:  CL 98 CO2 20 CR 1 37 LIPASE 617 OSMOLALITY 273 H/H 7 7/26 1,     Age/Sex: 80 y o  female     Assessment/Plan:   Nephrolithiasis   Assessment & Plan     ·  CT scan of the abdomen and pelvis showed distal left ureteral stone measures approximately 7 x 5 x 4 mm resulting in mild to moderate left hydronephrosis      · Continue IVF  · Continue anti emetics  · Continue pain control p r n  · Patient seen by Urology, for left ureteral stent and possible ureteroscopy and stone extraction today  · Appreciate Urology input       Hyponatremia   Assessment & Plan     · Na 128->130 today  · Urine sodium 108, urine Osmo 404, serum osmol 273  Check TSH, uric acid  · Increase NS to 100 ml/hr  · Repeat BMP at midnight  · Consult Nephrology       Acute kidney injury superimposed on chronic kidney disease ([facility] [address] )   Assessment & Plan     Cr 1 12-> 1 37-> 1 65  today  Likely 2/2 # 1,+/- dehydration  Increase IV fluids to 100 ml/hr  Hold losartan  Check PVR stat  Avoid nephrotoxins  Consult Nephrology  Leukocytosis   Assessment & Plan     · WBC 21 79   · CXR showed CHF   Cardiomegaly and chronic changes  · UA showed small leukocytes, WBC 4-10  · Will order urine culture, procalcitonin  · Will start patient on IV Levaquin for possible UTI,add IV Flagyl in view of left-sided abdominal pain  · Repeat lab in a m      Current Patient Status: Inpatient   Certification Statement: The patient will continue to require additional inpatient hospital stay due to Left ureteral stone, leukocytosis, acute kidney injury, hyponatremia, possible UTI  OR   Procedure:  1  Cystoscopy  2  Fluoroscopy  3  Left retrograde pyelography  4  Left ureteroscopy  5  Laser lithotripsy  6  Stone basketing  7  Left ureteral stent placement  Discharge Plan: tbd    Thank you,  [address] [place], [place] [address]rj  Utilization Review Department  Phone: [phone]; Fax [phone]  ATTENTION: Please call with any questions or concerns to [phone]  and carefully follow the prompts so that you are directed to the right person  Send all requests for admission clinical reviews, approved or denied determinations and any other requests to fax [phone]   All voicemails are confidential

## 2018-10-02 NOTE — ASSESSMENT & PLAN NOTE
Initially hypertensive in the ED likely induced pain  Patient on Hydralazine, Losartan and Metoprolol    · Continue  · Monitor BP

## 2018-10-02 NOTE — ASSESSMENT & PLAN NOTE
· WBC 21 79   · CXR showed CHF  Cardiomegaly and chronic changes  · UA showed small leukocytes, WBC 4-10  · Will order urine culture, procalcitonin  · Will start patient on IV Levaquin for possible UTI,add IV Flagyl in view of left-sided abdominal pain  · Repeat lab in britta Rachel

## 2018-10-02 NOTE — ASSESSMENT & PLAN NOTE
History of chronic mesenteric ischemia  Reports to generalized abdominal pain for months and weight loss of 6-8 lbs  CT abdomen and pelvis with no acute findings except for left ureteral stone with hydronephrosis      · Monitor abdominal exam

## 2018-10-02 NOTE — H&P
H&P- Damir Mario 9/10/1930, 80 y o  female MRN: [id number]    Unit/Bed#: [address] [place] [address] Encounter: [id number]    Primary Care Provider: Shellye Phalen, MD   Date and time admitted to hospital: 10/1/2018  6:20 PM        * Nephrolithiasis   Assessment & Plan    Reports to left flank pain today associated with nausea  CT scan of the abdomen and pelvis showed distal left ureteral stone measures approximately 7 x 5 x 4 mm resulting in mild to moderate left hydronephrosis  UA pending  Patient was given IVF, Zofran and morphine for pain in the ED  · Continue IVF  · Continue anti emetics  · Continue pain control p r n   · NPO  · Consult Urology     Hyponatremia   Assessment & Plan    As evidenced by NA level of 128 likely from GI losses versus poor oral intake  139-140 in the past   1 L normal saline given in the ED  Repeated NA level was 128  · Gentle IV hydration  · UA for NA, osmolality, and serum osmolality pending  · Repeat BMP in a m  · Will consider Nephrology consult if not improving  Patient had seen Dr Stephanie Reyna in the past for BP management     Weight loss, unintentional   Assessment & Plan    Admits to poor appetite secondary to generalized abdominal pain associated with nausea for months and weight loss of 6-8 lbs  Patient with history of mesenteric ischemia  CT scan showed no acute findings except for ureteral stone with hydronephrosis  · Monitor weight  · Nutritional consult     Generalized abdominal pain   Assessment & Plan    Reports to generalized abdominal pain for months now and a weight loss of 6-8 lbs  Pt is supposed to see Dr Tony Lomeli but unable to make an appointment  She has nausea but no vomiting  CT abdomen and pelvis without contrast negative for acute findings but with 7x5x4  ureteral stone with mild to moderate hydronephrosis  LA 0 7, Amylase WNL, Lipase elevated    · Monitor abdominal exam  · Will consider GI consult if not improving  · Anti emetic/PPI  · Trend lipase       Essential hypertension   Assessment & Plan    Initially hypertensive in the ED likely induced pain  Patient on Hydralazine, Losartan and Metoprolol  · Continue  · Monitor BP     Chronic diastolic heart failure ([address] )   Assessment & Plan    Echo on 06/12/2018 showed 55 % to 60 % with no regional wall motion abnormalities  Patient euvolemic  Patient on beta-blocker  · Continue  · CXR- pending     Stenosis of right internal carotid artery   Assessment & Plan     History of 70-99% stenosis and was seen here with no vascular intervention  Patient on Plavix and S/P ASA  · Continue Plavix     Chronic mesenteric ischemia (HCC)   Assessment & Plan    History of chronic mesenteric ischemia  Reports to generalized abdominal pain for months and weight loss of 6-8 lbs  CT abdomen and pelvis with no acute findings except for left ureteral stone with hydronephrosis  · Monitor abdominal exam     Acid reflux   Assessment & Plan    Patient on Omeprazole  Pepcid IV given in the ED  · Continue         VTE Prophylaxis: Heparin  / sequential compression device   Code Status: Level 1 - Full Code    Anticipated Length of Stay:  Patient will be admitted on an Inpatient basis with an anticipated length of stay of  > 2 midnights  Justification for Hospital Stay:  IV fluid, Urology consult    Total Time for Visit, including Counseling / Coordination of Care: 45 minutes  Greater than 50% of this total time spent on direct patient counseling and coordination of care  Chief Complaint:   Abdominal Pain (states started with left lower back pain around 4pm and it radiated to right lower abd )      History of Present Illness:    Mervat Churchill is a 80 y o  female with a PMH of HTN, chronic diastolic CHF, chronic mesenteric ischemia, Stewart's esophagus, history of ruptured duodenal ulcer with repair who presents with abdominal pain for months and left-sided/flank pain started around 4:00 p m  today    Patient described the left flank pain as dull to sharp, came sudden while she was sitting and resting at home associated with nausea  She did not take any pain medicine to relieve the discomfort instead drove herself to the ED  Patient also reports to abdominal pain that has been going on for months with some bouts of nausea but no vomiting, no diarrhea or constipation, no melena/hematochezia  She did not get a chance to see a GI doctor hoping it will go away on its own  However, she saw Dr Le Nelson on the 24th for this abdominal pain and was advised to modify her diet  She was then given an Rx for Omeprazole  She claimed that none of these helped her abdominal discomfort  She also admits to 6-8 lb weight loss attributed to her poor appetite secondary to abdominal discomfort  In the ED, her lipase was elevated, CT abdomen and pelvis without contrast showed left ureteral stone with mild to moderate left hydronephrosis  In addition, her sodium level was 128 likely from her poor oral intake secondary to abdominal pain  She is then admitted to our service  Review of Systems:    Review of Systems   Constitutional: Positive for appetite change  Negative for activity change, chills, diaphoresis and fever  HENT: Negative for congestion, ear pain, sore throat and trouble swallowing  Respiratory: Negative for cough, chest tightness and shortness of breath  Cardiovascular: Negative for chest pain, palpitations and leg swelling  Gastrointestinal: Positive for abdominal pain, nausea and vomiting  Negative for abdominal distention, blood in stool, constipation and diarrhea  Genitourinary: Positive for flank pain and frequency  Negative for decreased urine volume, difficulty urinating, hematuria and urgency  Musculoskeletal: Positive for back pain  Negative for arthralgias, myalgias and neck pain  Skin: Negative for pallor, rash and wound  Neurological: Negative for dizziness, tremors, syncope and numbness     Psychiatric/Behavioral: Negative for agitation  The patient is not nervous/anxious  Past Medical and Surgical History:     Past Medical History:   Diagnosis Date    Acid reflux     Arthritis     Stewart's esophagus     COPD (chronic obstructive pulmonary disease) (HCC)     Duodenal ulcer     Hypertension     PAD (peripheral artery disease) (HCC)     Periodontal disease     Peripheral vascular disease (HCC)     Scoliosis        Past Surgical History:   Procedure Laterality Date    ANAL FISSURECTOMY      AORTA - SUPERIOR MESENTERIC ARTERY BYPASS GRAFT Bilateral     BREAST BIOPSY Right     FEMORAL ARTERY STENT Bilateral     HEMORROIDECTOMY      HYSTERECTOMY      INSERTION STENT ARTERY Left 1/1/2017    Procedure:  mesenteric arteriogram, balloon angioplasty of SMA stent;  Surgeon: Deonna Spain MD;  Location:  MAIN OR;  Service:    [facility] LAPAROTOMY N/A 1/1/2017    Procedure: LAPAROTOMY EXPLORATORY,  abdominal washout, repair of duodenal ulcer;  Surgeon: Shona Nicole MD;  Location:  MAIN OR;  Service:     [address] CATARACT EXTRACAP,INSERT LENS Right 1/11/2016    Procedure: EXTRACTION INTRACAPSULAR CATARACT PHACO INTRAOCULAR LENS (IOL); Surgeon: Hyun Morton MD;  Location: [facility] MAIN OR;  Service: Ophthalmology    TONSILECTOMY AND ADNOIDECTOMY         Meds/Allergies:    Prior to Admission medications    Medication Sig Start Date End Date Taking? Authorizing Provider   clopidogrel (PLAVIX) 75 mg tablet Take 75 mg by mouth daily  Yes Historical Provider, MD   hydrALAZINE (APRESOLINE) 25 mg tablet Take 1 tablet (25 mg total) by mouth 3 (three) times a day for 30 days    Hold if systolic blood pressure is < 120mmHg 6/12/18 10/1/18 Yes Cecilia Eli MD   losartan (COZAAR) 50 mg tablet Take 1 tablet by mouth daily for 30 days  Patient taking differently: Take 50 mg by mouth 2 (two) times a day   1/21/17 10/1/18 Yes Laural Bosworth, MD   metoprolol succinate (TOPROL-XL) 100 mg 24 hr tablet Take 100 mg by mouth daily at bedtime   Yes Historical Provider, MD   Multiple Vitamin (MULTIVITAMIN) capsule Take 1 capsule by mouth daily   Yes Historical Provider, MD   omeprazole (PriLOSEC) 40 MG capsule Take 40 mg by mouth daily   Yes Historical Provider, MD   VITAMIN D, ERGOCALCIFEROL, PO Take 2,000 Units by mouth daily     Yes Historical Provider, MD   aspirin 81 mg chewable tablet Chew 1 tablet (81 mg total) daily for 100 days 6/12/18 9/20/18  Lucy Cristina MD   atorvastatin (LIPITOR) 20 mg tablet Take 1 tablet (20 mg total) by mouth daily with dinner for 30 days 6/13/18 7/30/18  Lucy Cristina MD       Allergies: Allergies   Allergen Reactions    Chlorthalidone Itching    Penicillins Itching     Tolerates zosyn    Sulfa Antibiotics Rash    Bacitracin Rash    Ciprofloxacin GI Intolerance       Social History:     Marital Status: /Civil Union   Substance Use History:   History   Alcohol Use    0 6 oz/week    1 Glasses of wine per week     Comment: a glass wine daily   last drink 6/10/18     History   Smoking Status    Former Smoker    Packs/day: 1 00    Years: 25 00    Quit date: [facility] Never Used     Comment: smoked for 25 years  History   Drug Use No       Family History:    History reviewed  No pertinent family history  Physical Exam:     Vitals:   Blood Pressure: (!) 174/74 (10/02/18 0323)  Pulse: 77 (10/02/18 0323)  Temperature: 98 1 °F (36 7 °C) (10/02/18 0323)  Temp Source: Oral (10/02/18 0323)  Respirations: 18 (10/02/18 0323)  Height: 5' 1" (154 9 cm) (10/02/18 0500)  Weight - Scale: 40 4 kg (89 lb 1 1 oz) (10/02/18 0500)  SpO2: 98 % (10/02/18 0323)    Physical Exam   Constitutional: She is oriented to person, place, and time  She appears well-developed  Frail looking   HENT:   Head: Normocephalic  Dry oral mucosa   Neck: Normal range of motion  Neck supple  Cardiovascular: Normal rate, regular rhythm and normal heart sounds  No murmur heard    S1-S2 noted   Pulmonary/Chest: Effort normal  Abdominal: Soft  Bowel sounds are normal  She exhibits no distension  There is tenderness  There is no rebound and no guarding  Diffuse abdominal tenderness on palpation  Hypoactive bowel sounds on 4 quads   Musculoskeletal: Normal range of motion  She exhibits no edema  Positive L sided CVA tenderness   Neurological: She is alert and oriented to person, place, and time  Skin: Skin is warm and dry  Psychiatric: She has a normal mood and affect  Her behavior is normal  Judgment and thought content normal          Additional Data:     Lab Results: I have personally reviewed pertinent reports  Results from last 7 days  Lab Units 10/02/18  0525 10/01/18  1904   WBC Thousand/uL 9 77 8 20   HEMOGLOBIN g/dL 7 7* 9 1*   HEMATOCRIT % 26 1* 30 1*   PLATELETS Thousands/uL 347 404*   NEUTROS PCT %  --  78*   LYMPHS PCT %  --  12*   MONOS PCT %  --  9   EOS PCT %  --  1       Results from last 7 days  Lab Units 10/02/18  0525  10/01/18  1904   SODIUM mmol/L 130*  < > 128*   POTASSIUM mmol/L 4 6  --  4 4   CHLORIDE mmol/L 98*  --  95*   CO2 mmol/L 20*  --  20*   BUN mg/dL 20  --  22   CREATININE mg/dL 1 37*  --  1 12   CALCIUM mg/dL 8 5  --  9 2   ALK PHOS U/L  --   --  69   ALT U/L  --   --  22   AST U/L  --   --  18   < > = values in this interval not displayed  Imaging: I have personally reviewed pertinent reports  CT renal stone study abdomen pelvis wo contrast   Final Result by Nicolette Valdez DO (10/01 2020)      Distal left ureteral stone measures approximately 7 x 5 x 4 mm resulting in mild to moderate left hydronephrosis  Colonic diverticulosis  No evidence for acute diverticulitis  Workstation performed: [id number]         XR chest portable    (Results Pending)       CT renal stone study abdomen pelvis wo contrast   Final Result      Distal left ureteral stone measures approximately 7 x 5 x 4 mm resulting in mild to moderate left hydronephrosis        Colonic diverticulosis  No evidence for acute diverticulitis  Workstation performed: [id number]         XR chest portable    (Results Pending)       EKG, Pathology, and Other Studies Reviewed on Admission:   · EKG: Sr rate of 85, LVH with non-specific ST abnormality    Allscripts / Epic Records Reviewed: Yes     ** Please Note: This note has been constructed using a voice recognition system   **

## 2018-10-02 NOTE — ASSESSMENT & PLAN NOTE
Admits to poor appetite secondary to generalized abdominal pain associated with nausea for months and weight loss of 6-8 lbs  Patient with history of mesenteric ischemia  CT scan showed no acute findings except for ureteral stone with hydronephrosis    · Monitor weight  · Nutritional consult

## 2018-10-02 NOTE — OP NOTE
OPERATIVE REPORT- Dr Isiah Hutchins  PATIENT NAME: Iline Area Race    :  9/10/1930  MRN: [id number]  Pt Location: [facility] OR ROOM 04    SURGERY DATE: 10/2/2018    Surgeon: Ziyad Bridges MD    Pre-op Diagnosis:  1  Left ureteral stone  2  Left hydronephrosis and flank pain    Post-op Diagnosis:  1  Same    Procedure:  1  Cystoscopy  2  Fluoroscopy  3  Left retrograde pyelography  4  Left ureteroscopy  5  Laser lithotripsy  6  Stone basketing  7  Left ureteral stent placement    Specimen(s):  ID Type Source Tests Collected by Time Destination   1 : left ureteral calculi Calculus Ureter, Left STONE ANALYSIS, TISSUE EXAM Ziyad Bridges MD 10/2/2018 1847        Estimated Blood Loss: Minimal    Complications: None    Drains: 6 X 24 cm left ureteral stent    Anesthesia type:   General    Indications for surgery:  Please see the dictated history and physical and consultation  Persistent discomfort with stone in the left distal ureter and hydronephrosis on CT    Findings:  1  Left ureteral stone  Procedure and Technique:   After obtaining consent and identifying the patient, antibiotics were given as ordered and the patient was brought to the room  All appropriate leads and monitors were placed and the patient was appropriately positioned on the table  Anesthesia was administered and the patient was sterilely prepped and draped  A timeout was performed where the patient name, , procedure, antibiotics, allergies, etc  were discussed  All in the room were satisfied before the start of the operative procedure  What follows are the operative findings and events  Cystoscopy was performed   imaging was obtained  The stone was located in the distal left ureter and was lucent on x-ray  A retrograde pyelogram was performed  The stone was seen as a negative image A guidewire was passed up the ureter to the kidney and the scope was removed  The wire was secured to the drape as a safety wire    The rigid ureteroscope was passed up the ureter to the stone  The stone was identified and a picture was taken  The stone was broken with a 365 micron laser fiber  Stones of any significant size were extracted with a stone basket  A retrograde was performed  The scope was withdrawn down the ureter evaluating for any trauma  There were no stone fragments or trauma noted  The safety wire was backloaded through the cystoscope and the stent was placed over the wire  The wire was removed leaving a good coil proximally in the kidney and distally in the bladder  The bladder was drained and the patient was awakened and  transferred to the PACU in satisfactory condition  Plan:  Stent removal in one week  SIGNATURE: Jessica Reddy MD  DATE: October 2, 2018  TIME: 7:04 PM    Portions of the record may have been created with voice recognition software   Occasional wrong word or "sound alike" substitutions may have occurred due to the inherent limitations of voice recognition software   Read the chart carefully and recognize, using context, where substitutions have occurred

## 2018-10-02 NOTE — ASSESSMENT & PLAN NOTE
· Echo on 06/12/2018 showed EF 55 % to 60 % with no regional wall motion abnormalities  · Patient appears dehydrated  · Continue metoprolol  · Daily weight  · Intake and output

## 2018-10-02 NOTE — ASSESSMENT & PLAN NOTE
·  CT scan of the abdomen and pelvis showed distal left ureteral stone measures approximately 7 x 5 x 4 mm resulting in mild to moderate left hydronephrosis  · Continue IVF  · Continue anti emetics  · Continue pain control p r n  · Patient seen by Urology, for left ureteral stent and possible ureteroscopy and stone extraction today  · Appreciate Urology input

## 2018-10-02 NOTE — ASSESSMENT & PLAN NOTE
· Na 128->130 today  · Urine sodium 108, urine Osmo 404, serum osmol 273  Check TSH, uric acid  · Increase NS to 100 ml/hr  · Repeat BMP at midnight  · Consult Nephrology

## 2018-10-02 NOTE — ASSESSMENT & PLAN NOTE
Reports to generalized abdominal pain for months now and a weight loss of 6-8 lbs  Pt is supposed to see Dr Christine Ortiz but unable to make an appointment  She has nausea but no vomiting  CT abdomen and pelvis without contrast negative for acute findings but with 7x5x4  ureteral stone with mild to moderate hydronephrosis  LA 0 7, Amylase WNL, Lipase elevated    · Monitor abdominal exam  · Will consider GI consult if not improving  · Anti emetic/PPI  · Trend lipase

## 2018-10-02 NOTE — SOCIAL WORK
Met with pt  Resides with her  Krishna   Has RW, cane, bsc, and shower chair, but does not use them  No hhc  Has been independent and drives  Home is one floor with a basement  Explained role of cm, no d/c needs  CM reviewed d/c planning process including the following: identifying help at home, patient preference for d/c planning needs, and availability of the treatment team to discuss questions or concerns patient and/or family may have regarding understanding of medications and recognizing signs and symptoms once discharged  CM also encouraged patient to follow up with all recommended appointments after discharge  Patient advised of importance for patient and family to participate in managing patient's medical well being

## 2018-10-02 NOTE — ASSESSMENT & PLAN NOTE
Echo on 06/12/2018 showed 55 % to 60 % with no regional wall motion abnormalities  Patient euvolemic  Patient on beta-blocker    · Continue  · CXR- pending

## 2018-10-02 NOTE — ASSESSMENT & PLAN NOTE
As evidenced by NA level of 128 likely from GI losses versus poor oral intake  139-140 in the past   1 L normal saline given in the ED  Repeated NA level was 128  · Gentle IV hydration  · UA for NA, osmolality, and serum osmolality pending  · Repeat BMP in a m  · Will consider Nephrology consult if not improving    Patient had seen Dr Jose Luke in the past for BP management

## 2018-10-03 PROBLEM — N39.0 UTI (URINARY TRACT INFECTION): Status: ACTIVE | Noted: 2018-01-01

## 2018-10-03 PROBLEM — A41.9 SEVERE SEPSIS (HCC): Status: ACTIVE | Noted: 2018-01-01

## 2018-10-03 PROBLEM — R65.20 SEVERE SEPSIS (HCC): Status: ACTIVE | Noted: 2018-01-01

## 2018-10-03 PROBLEM — K72.00 SHOCK LIVER: Status: ACTIVE | Noted: 2018-01-01

## 2018-10-03 NOTE — ASSESSMENT & PLAN NOTE
· WBC 21 79-> 13 17,UA showed WBC 4-10, small leukocytes  · Lactic acid normal on admission  Patient afebrile  · Patient does not meet sepsis criteria  Mild tachy HR > 90 likely 2/2 anemia  · Will obtain blood culture x 2, repeat lactic acid in view of leukocytosis  · Patient started on IV Levaquin for UTI yesterday  Will continue  · Check urine culture  · PVR 0 yesterday  · Procalcitonin x 1 negative   Will repeat in AM

## 2018-10-03 NOTE — PROGRESS NOTES
2949 Received a call from RN due to hemoglobin of 6 9  Went to evaluate patient  Spoke to patient about blood transfusion and had a long discussion with her  Patient requesting to be seen by her GI doctor due to her history of perforated ulcer  Patient was seen by Dr Shabana Hope 2 years ago per patient  Consent obtained, no further questions  Assessment:  LCTA, no SOB, denies back pain, no bilateral edema noted  No active bleeding noted  Plan:  Type and and screen, 1 unit packed RBC ordered  Check hemoglobin post blood transfusion  GI consult

## 2018-10-03 NOTE — ASSESSMENT & PLAN NOTE
· History of chronic mesenteric ischemia,s/p SMA stenting  · Continue Plavix,hold lipitor in view of shock liver

## 2018-10-03 NOTE — ASSESSMENT & PLAN NOTE
History of 70-99% stenosis and was seen here with no vascular intervention  · Continue Plavix  · Hold Lipitor

## 2018-10-03 NOTE — PROGRESS NOTES
I spoke with DIANA Waterman to clarify IV fluid orders,levaquin and heparin SQ,she stated to hold heparin SQ tonight and give the levaquin,will endorse to [facility]

## 2018-10-03 NOTE — ASSESSMENT & PLAN NOTE
·  CT scan of the abdomen and pelvis showed distal left ureteral stone measures approximately 7 x 5 x 4 mm resulting in mild to moderate left hydronephrosis  · S/P Holmium laser,stone extraction, stent placement 10/3/18    · F/U 1 week for stent removal

## 2018-10-03 NOTE — SEPSIS NOTE
Sepsis Note   Nellie Bolus Race 80 y o  female MRN: [id number]  Unit/Bed#: [address] [place] [address] Encounter: [id number]            Initial Sepsis Screening     Row Name 10/03/18 1219                Is the patient's history suggestive of a new or worsening infection? (!)  Yes (Proceed)  -CY        Suspected source of infection urinary tract infection  -CY        Are two or more of the following signs & symptoms of infection both present and new to the patient? (!)  Yes (Proceed)  -CY        Indicate SIRS criteria Tachycardia > 90 bpm;Leukocytosis (WBC > [address] IJL)  -CY        If the answer is yes to both questions, suspicion of sepsis is present          If severe sepsis is present AND tissue hypoperfusion perists in the hour after fluid resuscitation or lactate > 4, the patient meets criteria for SEPTIC SHOCK          Are any of the following organ dysfunction criteria present within 6 hours of suspected infection and SIRS criteria that are NOT considered to be chronic conditions?         Organ dysfunction          Date of presentation of severe sepsis          Time of presentation of severe sepsis          Tissue hypoperfusion persists in the hour after crystalloid fluid administration, evidenced, by either:          Was hypotension present within one hour of the conclusion of crystalloid fluid administration?           Date of presentation of septic shock          Time of presentation of septic shock            User Key  (r) = Recorded By, (t) = Taken By, (c) = Cosigned By    Initials Name Provider Type    1000 Heather Fernandez, Aaron Ingram Nurse Practitioner

## 2018-10-03 NOTE — PROGRESS NOTES
Progress Note - Urology   Lydia Bergman Race 80 y o  female MRN: [id number]  Unit/Bed#: [address] [place] [address] Encounter: [id number]    Assessment/Plan:  Ureteral stone  7mm distal left ureteral stone s/p holmium laser, stone extraction, and stent placement 10/03/18  Creatinine still elevated 1 52 on 10/03/18  Leukocytosis improved from 21 79 to 13 17 after stent placement  · F/u 1 wk for stent removal  · Creatinine should improve with hydration now that blockage has been resolved  Abdominal pain  Pt with continued abdominal pain even after stone extraction and stent placement  Abdominal pain not as likely renal now that her kidney is no longer blocked  · Pt to f/u with GI for her pain and discomfort  Subjective:  Seen on rounds and notes the following: Pt lying in bed in NAD  Denies and flank pain or stent discomfort  Notes no trouble or pain with voiding  Does not have gross hematuria  Continues with her vague left sided abdominal pain  Objective:  Vitals Last 24 hours:   Temp:  [97 8 °F (36 6 °C)-99 °F (37 2 °C)] 97 8 °F (36 6 °C)  HR:  [83-98] 98  Resp:  [16-18] 18  BP: (103-181)/(45-75) 141/63  BMI: Body mass index is 16 83 kg/m²  Physical Exam   Constitutional:   thin   HENT:   Head: Normocephalic  Pulmonary/Chest: Effort normal    Abdominal: Soft  Neurological: She is alert  Psychiatric: She has a normal mood and affect  Vitals reviewed        Drains:   none  Lab Results and Cultures:     Results from last 7 days  Lab Units 10/03/18  0948 10/03/18  0050 10/02/18  1611 10/02/18  0525 10/01/18  1904   WBC Thousand/uL  --  13 17* 21 79* 9 77 8 20   RBC Million/uL  --  3 10* 3 60* 3 43* 4 00   HEMOGLOBIN g/dL  --  6 9* 8 2* 7 7* 9 1*   HEMATOCRIT %  --  24 4* 27 5* 26 1* 30 1*   PLATELETS Thousands/uL  --  300 370 347 404*   POTASSIUM mmol/L 4 6 4 5 4 6 4 6 4 4   CHLORIDE mmol/L 101 101 100 98* 95*   CO2 mmol/L 16* 15* 20* 20* 20*   BUN mg/dL 24 21 22 20 22   CREATININE mg/dL 1 52* 1 53* 1 65* 1 37* 1  12   CALCIUM mg/dL 8 2* 8 1* 8 4 8 5 9 2   AST U/L  --   --   --   --  18   ALT U/L  --   --   --   --  22   ALK PHOS U/L  --   --   --   --  69   EGFR ml/min/1 73sq m 30 30 28 34 44               Results from last 7 days  Lab Units 10/02/18  0147   COLOR UA  Yellow   CLARITY UA  Clear   SPEC GRAV UA  1 020   PH UA  5 0   LEUKOCYTES UA  Small*   NITRITE UA  Negative   PROTEIN UA mg/dl Negative   GLUCOSE UA mg/dl Negative   KETONES UA mg/dl Trace*   UROBILINOGEN UA E U /dl 0 2   BILIRUBIN UA  Negative   BLOOD UA  Trace-Intact*       No results found for: URINECX      Intake/Output Summary (Last 24 hours) at 10/03/18 1127  Last data filed at 10/02/18 2058   Gross per 24 hour   Intake             2100 ml   Output              550 ml   Net             1550 ml       Imaging:  CT abdomen/pelvis without contrast 10/01/2018  Distal left ureteral stone measures approximately 7 x 5 x 4 mm resulting in mild to moderate left hydronephrosis      Colonic diverticulosis   No evidence for acute diverticulitis  Nicola Lunsford PA-C  10/3/2018    Portions of the record may have been created with voice recognition software   Occasional wrong word or "sound alike" substitutions may have occurred due to the inherent limitations of voice recognition software   Read the chart carefully and recognize, using context, where substitutions have occurred

## 2018-10-03 NOTE — ASSESSMENT & PLAN NOTE
Reports to generalized abdominal pain for months now and a weight loss of 6-8 lbs  Pt is supposed to see Dr Landon Dakin but unable to make an appointment  She has nausea but no vomiting  CT abdomen and pelvis without contrast negative for acute findings but with 7x5x4  ureteral stone with mild to moderate hydronephrosis  LA 0 7, Amylase WNL, Lipase elevated on admission  · S/P left ureteral stent placement 10/3  · No improvement in abdomen pain  · History of perforated duodenal ulcer  · Patient seen by GI  For EGD in AM   · Symptom management

## 2018-10-03 NOTE — ASSESSMENT & PLAN NOTE
H&H 9 1/30 1  Microcytic hypochromic  Hb 7 7-> 8 2-> 6 9 today  B 12 966,folate > 20, iron 13, ferritin 15    stool occult blood x 3 pending  Patient was ordered 1 unit of PRBC this morning  Repeat CBC 2 hours post transfusion  Will start patient on MVI and iron supplement once LFTs improves  For EGD in a m   Monitor

## 2018-10-03 NOTE — CONSULTS
Inpatient Consultation - Nephrology   Yenny Cox Race 80 y o  female MRN: [id number]  Unit/Bed#: [address] [place] [address] Encounter: [id number]    ASSESSMENT and PLAN:    80 y o  female with a past medical history of hypertension, vascular disease with mesenteric ischemia, carotid artery disease, left renal artery stenosis, aortoiliac occlusive disease with stenting in the past, CHF with EF 55%, moderate to severe mitral regurgitation, former smoker, history of prior duodenal ulcer status post repair after rupture, who was admitted to [facility] after presenting with abdominal pain  A renal consultation is requested for assistance in the management of GROVER  1) GROVER -admission creatinine 1 12 mg/dL which increased to 1 7 mg/dL, today is 1 5 mg/dL  Patient has had a baseline creatinine of 0 6-0 9     - follows with Dr Wan Murry for HTN  -UA with 1-2 RBC, 4-10 WBC  -CT imaging on 10/01-without IV contrast-with distal left ureteral stone 7 mm causing mild to moderate left-sided hydronephrosis   -to note, the patient has a prior vascular study in 2016 with a right kidney is 9 3 cm, left kidney is 10 8 cm with a greater than 60% stenosis in the left kidney   -urology team was brought on board for ureteral stone    Patient had a stone extraction on 10/02   -I/O; avoid nephrotoxic agents  -monitor BMP with a level check in the morning  -hold intravenous fluids-see below  -etiology of acute kidney injury may be related to ATN in the setting of obstruction and relative hypotension versus postobstructive    2) abd pain    -possible multifactorial   Patient nephrolithiasis  -patient continues to have epigastric pain  -anemia  -GI on board  -EGD pending    3) volume    -elevated BNP  -clinically patient is not volume overloaded  -x-ray reviewed and does not appear to be overt pulmonary edema but rather chronic pulmonary changes?  -patient is former smoker  -hold fluids for now  -give 20 IV of Lasix with packed red blood cell today that the patient is receiving per the primary team    4) hyponatremia    -patient may have a component of SIADH in the setting of pain  Urine studies are difficult to interpret in the setting of saline  -for now monitor, hold intravenous fluids  -hypo osmolar    5) acid/base    -bicarbonate level lower with saline infusion  -hold saline  -repeat in the morning  -lactic acid was normal    7) anemia    -GI team on board  -further plan per GI and primary team's    8) leukocytosis    -patient is on broad-spectrum antibiotics while sources being evaluated further plan per primary team    HISTORY OF PRESENT ILLNESS:  Requesting Physician: Kate Johns MD  Reason for Consult: GROVER on CKD    Myles Amador is a 80 y o  female with a past medical history of hypertension, vascular disease with mesenteric ischemia, carotid artery disease, left renal artery stenosis, aortoiliac occlusive disease with stenting in the past, CHF with EF 55%, moderate to severe mitral regurgitation, former smoker, history of prior duodenal ulcer status post repair after rupture, who was admitted to [facility] after presenting with abdominal pain  A renal consultation is requested today for assistance in the management of GROVER  Patient follows with Dr Eloy Dalal for management of hypertension  Patient was recently in the hospital in June of 2018 for stenosis of right internal carotid artery  Patient had disease noted on the right internal carotid artery  Vascular surgery team was brought on board  CT angio of the neck showed 50% bilateral ICA stenosis then when reviewed with vascular surgery team, this stenosis appeared to be 70%  Patient saw vascular surgery as an outpatient and determined for medical management  With surveillance ultrasounds  Patient now presents on 10/01 with left-sided abdominal pain for many months, but worsening over the past 3-4 days  Patient states that it was intermittent, noted what worse when eating  There was nausea  Normal no vomiting, no diarrhea, no dark or bloody stools  PAST MEDICAL HISTORY:  Past Medical History:   Diagnosis Date    Acid reflux     Arthritis     Stewart's esophagus     COPD (chronic obstructive pulmonary disease) (HCC)     Duodenal ulcer     Hypertension     PAD (peripheral artery disease) (HCC)     Periodontal disease     Peripheral vascular disease (HCC)     Scoliosis        PAST SURGICAL HISTORY:  Past Surgical History:   Procedure Laterality Date    ANAL FISSURECTOMY      AORTA - SUPERIOR MESENTERIC ARTERY BYPASS GRAFT Bilateral     BREAST BIOPSY Right     FEMORAL ARTERY STENT Bilateral     HEMORROIDECTOMY      HYSTERECTOMY      INSERTION STENT ARTERY Left 1/1/2017    Procedure:  mesenteric arteriogram, balloon angioplasty of SMA stent;  Surgeon: Manpreet Spain MD;  Location: [facility];  Service:    Ashely Nine LAPAROTOMY N/A 1/1/2017    Procedure: LAPAROTOMY EXPLORATORY,  abdominal washout, repair of duodenal ulcer;  Surgeon: Emmett Ross MD;  Location: [facility];  Service:     ME CYSTO/URETERO W/LITHOTRIPSY &INDWELL STENT INSRT Left 10/2/2018    Procedure: CYSTOSCOPY URETEROSCOPY WITH LITHOTRIPSY HOLMIUM LASER, RETROGRADE PYELOGRAM AND INSERTION STENT URETERAL;  Surgeon: Hang Cardoso MD;  Location: [address] [place] [address];  Service: Urology     [address] Right 1/11/2016    Procedure: EXTRACTION INTRACAPSULAR CATARACT PHACO INTRAOCULAR LENS (IOL); Surgeon: Linda Kruger MD;  Location: [facility] MAIN OR;  Service: Ophthalmology    TONSILECTOMY AND ADNOIDECTOMY         ALLERGIES:  Allergies   Allergen Reactions    Chlorthalidone Itching    Penicillins Itching     Tolerates zosyn    Sulfa Antibiotics Rash    Bacitracin Rash    Ciprofloxacin GI Intolerance       SOCIAL HISTORY:  History   Alcohol Use    0 6 oz/week    1 Glasses of wine per week     Comment: a glass wine daily    last drink 6/10/18     History   Drug Use No     History   Smoking Status    Former Smoker    Packs/day: 1 00    Years: 25 00    Quit date: [facility] Never Used     Comment: smoked for 25 years  FAMILY HISTORY:  History reviewed  No pertinent family history  MEDICATIONS:    Current Facility-Administered Medications:     acetaminophen (TYLENOL) tablet 650 mg, 650 mg, Oral, Q6H PRN, STEVEN Alcala    amLODIPine (NORVASC) tablet 5 mg, 5 mg, Oral, Daily, STEVEN Marin, 5 mg at 10/03/18 6320    clopidogrel (PLAVIX) tablet 75 mg, 75 mg, Oral, Daily, STEVEN Alcala, 75 mg at 10/03/18 0951    docusate sodium (COLACE) capsule 100 mg, 100 mg, Oral, Daily, STEVEN Marin, 100 mg at 10/03/18 9075    furosemide (LASIX) injection 20 mg, 20 mg, Intravenous, Once, STEVEN Marin    hydrALAZINE (APRESOLINE) tablet 50 mg, 50 mg, Oral, TID, STEVEN Marin, 50 mg at 10/03/18 0951    HYDROmorphone (DILAUDID) injection 0 2 mg, 0 2 mg, Intravenous, Q3H PRN, STEVEN Marin    [START ON 10/4/2018] levofloxacin (LEVAQUIN) IVPB (premix) 250 mg, 250 mg, Intravenous, Q48H, STEVEN Marin    metoprolol succinate (TOPROL-XL) 24 hr tablet 100 mg, 100 mg, Oral, HS, STEVEN Alcala, 100 mg at 10/02/18 2217    metroNIDAZOLE (FLAGYL) IVPB (premix) 500 mg, 500 mg, Intravenous, Q8H, STEVEN Marin, Last Rate: 200 mL/hr at 10/03/18 0951, 500 mg at 10/03/18 0951    multivitamin-minerals (CENTRUM) tablet 1 tablet, 1 tablet, Oral, Daily, STEVEN Alcala, 1 tablet at 10/03/18 0951    oxyCODONE (ROXICODONE) IR tablet 2 5 mg, 2 5 mg, Oral, Q4H PRN, STEVEN Marin    oxyCODONE (ROXICODONE) IR tablet 5 mg, 5 mg, Oral, Q4H PRN, STEVEN Marin    polyethylene glycol (MIRALAX) packet 17 g, 17 g, Oral, Daily PRN, STEVEN Alcala    REVIEW OF SYSTEMS:    All the systems were reviewed and were negative except as documented on the HPI      PHYSICAL EXAM:  Current Weight: Weight - Scale: 40 4 kg (89 lb 1 1 oz)  First Weight: Weight - Scale: 39 9 kg (88 lb)  Vitals:    10/02/18 1930 10/02/18 2045 10/02/18 2342 10/03/18 0951   BP: 110/51 117/51 129/58 141/63   BP Location:   Right arm Right arm   Pulse: 94 93 98 98   Resp: 16 18 18   Temp: 99 °F (37 2 °C) 98 °F (36 7 °C) 98 2 °F (36 8 °C) 97 8 °F (36 6 °C)   TempSrc:  Temporal Temporal Axillary   SpO2: 95% 93% 93% 94%   Weight:       Height:           Intake/Output Summary (Last 24 hours) at 10/03/18 1238  Last data filed at 10/02/18 2058   Gross per 24 hour   Intake             2100 ml   Output              550 ml   Net             1550 ml     Physical Exam    General: NAD  Skin: no rash  Eyes: anicteric sclera  ENT: moist mucous membrane  Neck: supple  Chest: CTA b/l  CVS: s1s2  Abdomen: soft, nontender with exception of epigastric region  Extremities: no edema  : no ward  Neuro: AAOX3  Psych: normal affect      Invasive Devices:      Lab Results:     Results from last 7 days  Lab Units 10/03/18  0948 10/03/18  0050 10/02/18  1611 10/02/18  0525  10/01/18  1904   WBC Thousand/uL  --  13 17* 21 79* 9 77  --  8 20   HEMOGLOBIN g/dL  --  6 9* 8 2* 7 7*  --  9 1*   HEMATOCRIT %  --  24 4* 27 5* 26 1*  --  30 1*   PLATELETS Thousands/uL  --  300 370 347  --  404*   SODIUM mmol/L 131* 131* 131* 130*  < > 128*   POTASSIUM mmol/L 4 6 4 5 4 6 4 6  --  4 4   CHLORIDE mmol/L 101 101 100 98*  --  95*   CO2 mmol/L 16* 15* 20* 20*  --  20*   BUN mg/dL 24 21 22 20  --  22   CREATININE mg/dL 1 52* 1 53* 1 65* 1 37*  --  1 12   CALCIUM mg/dL 8 2* 8 1* 8 4 8 5  --  9 2   ALK PHOS U/L  --   --   --   --   --  69   ALT U/L  --   --   --   --   --  22   AST U/L  --   --   --   --   --  18   < > = values in this interval not displayed

## 2018-10-03 NOTE — PROGRESS NOTES
Lab personnel informed RN of pt's Hgb 6 9 at 4:49 am  The sample was taken from the extra purple tube specimen taken at 0050 by RN staff  CBC is ordered for 6am labs  RN staff informed hospitalist at 4:55am  Type and screen ordered and sent to lab   Consent for blood transfusion obtained

## 2018-10-03 NOTE — ASSESSMENT & PLAN NOTE
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

## 2018-10-03 NOTE — CASE MANAGEMENT
Continued Stay Review  Date: 10/3/18  Vital Signs: BP (!) 172/63   Pulse 84   Temp 97 6 °F (36 4 °C) (Oral)   Resp 18   Ht 5' 1" (1 549 m)   Wt 40 4 kg (89 lb 1 1 oz)   SpO2 95%   BMI 16 83 kg/m²   Medications:   Scheduled Meds:   Current Facility-Administered Medications:  amLODIPine 5 mg Oral Daily Cuiyin Yurik, CRNP    clopidogrel 75 mg Oral Daily Yolanda S Garcia, CRNP    docusate sodium 100 mg Oral Daily Cuiyin Yurik, CRNP    famotidine 20 mg Intravenous Daily Cuiyin Yurik, CRNP    furosemide 20 mg Intravenous Once Cuiyin Yurik, CRNP    hydrALAZINE 50 mg Oral TID Cuiyin Yurik, CRNP    HYDROmorphone 0 2 mg Intravenous Q3H PRN Cuiyin Yurik, CRNP    levofloxacin 250 mg Intravenous Q48H Cuiyin Yurik, CRNP    metoprolol succinate 100 mg Oral HS Yolanda S Garcia, CRNP    metroNIDAZOLE 500 mg Intravenous Q8H Cuiyin Yurik, CRNP    multivitamin-minerals 1 tablet Oral Daily Yolanda S Garcia, CRNP    oxyCODONE 2 5 mg Oral Q4H PRN Cuiyin Yurik, CRNP    oxyCODONE 5 mg Oral Q4H PRN Cuiyin Yurik, CRNP    polyethylene glycol 17 g Oral Daily PRN Yolanda S Garcia, CRNP    Continuous Infusions:  IVF NS @ 75CC/HR    PRN Meds: HYDROmorphone    oxyCODONE    oxyCODONE    polyethylene glycol  Abnormal Labs/Diagnostic Results:    CO2 15 ANION GAP 15 CR 1 53 CA 8 1 GFR 30 FREE T4 1 59 WBC 13 17 HGB 6 9 DBILI 0 50 AST 1129  TPROT 5 6 ALB 2 9   Age/Sex: 80 y o  female   Assessment/Plan:   S/P CYSTO WITH LASER LITHOTRIPSY & STENT PLACEMENT  HGB DROP TO 6 9, TO BE TRANSFUSED WITH 1UPRBC'S, FOLLOWED BY IV LASIX WITH LABS TO FOLLOW, GI CONSULTED AS PT HAS HX PERFORATED ULCER  C/O EPIGASTRIC & RUQ TENDERNESS, ABD SOMEWHAT DISTENDED  IVABT FOR LEUKOCYTOSIS, LEVAQUIN FOR POSSIBLE UTI & FLAGYL ADDED 2ND L SIDED ABD PAIN  URINE C&S ORDERED  IVF MAINTAINED  Discharge Plan: TBD  Thank you,  [facility] Utilization Review Department  Phone: [phone];  Fax [phone]  ATTENTION: Please call with any questions or concerns to [phone]  and carefully follow the prompts so that you are directed to the right person  Send all requests for admission clinical reviews, approved or denied determinations and any other requests to fax [phone]   All voicemails are confidential

## 2018-10-03 NOTE — ASSESSMENT & PLAN NOTE
· 2/2 left renal artery stenosis  Patient follows Dr Miguel Ángel Dotson as outpatient  · Aim to keep SBP around 140-160 given her chronic mesenteric ischemia per Dr Carly Loyola office note in 7/2018  · Initially hypertensive in the ED likely induced pain  Patient on Hydralazine, Losartan and Metoprolol  · Increased hydralazine to 50 mg p o  T i d Added Norvasc  · Continue metoprolol  · Hold losartan due to [facility]  · SBP dropped to 100-120's from 170-180's post procedure  Likely the cause of shock liver  · Monitor BP q 4 hours

## 2018-10-03 NOTE — ASSESSMENT & PLAN NOTE
· Likely 2/2 relative hypotension post procedure yesterday evening  · History of chronic mesenteric ischemia,s/p SMA stent and history of HTN 2/2 left renal artery stenosis  Patient follows Dr Dequan Ngo as outpatient for HTN  · Aim to keep SBP around 140-160 given her chronic mesenteric ischemia per Dr Camila Boyer office note in 7/2018  · SBP dropped to 100-120's from 170-180's post procedure  Likely the cause of shock liver  · AST 1129,,TB normal,direct bilirubin mild elevated  · Check right upper quadrant ultrasound stat  · Avoid hepatotoxins  · Check Tylenol level stat  · Hold Norvasc, hydralazine for SBP < 160  Hold metoprolol SBP < 140  · May bland diet as tolerated  · Discussed with attending  · Repeat lab in britta Garcia

## 2018-10-03 NOTE — CONSULTS
Consultation - Andres Olson Race 80 y o  female MRN: [id number]    Unit/Bed#: [address] [place] [address] Encounter: [id number]    Reason for consult:  Abdominal pain, drop in hemoglobin    Assessment/Plan     1  Abdominal pain nausea, weight loss elevated lipase 596 on admission now normalized  She has a history of perforated duodenal ulcer requiring surgical intervention  She complains of epigastric and right upper quadrant pain postprandially with diminished appetite and 8 lb weight loss pain is worse with eating  She will need EGD for further evaluation will plan tentatively for tomorrow, continue Plavix  2  Anemia-labs indicating low MCV elevated RDW likely component of iron deficiency anemia any source of GI blood losses of concern will plan with EGD for further evaluation may likely need colonoscopy as well at some point  Hemoglobin today is 6 9 plan for transfusion as per primary care team     3  Chronic mesenteric ischemia status post vascular intervention in 2015    4  Chronic diastolic heart failure BNP is elevated management per primary team    5  Right carotid artery stenosis on Plavix for the same she is followed by vascular    6  Hyponatremia-management is being done by Medicine    7  History of liver hemangioma on CT from 2016-follow up ultrasound as outpatient      History of Present Illness     HPI: Liana Mae is a 80y o  year old female who presents with a past medical history of chronic diastolic congestive heart failure, chronic mesenteric ischemia status post stent, history of Stewart's esophagus though none seen on last EGD, history of ruptured duodenal ulcer with repair in 2017, hypertension, presents for evaluation of left-sided flank pain  Additionally has some complaints of 8 lb weight loss associated with nausea no vomiting she was seen as an outpatient for this prescribed omeprazole advised on diet modification with states has not helped her    CT with evidence of nephrolithiasis colonic diverticulosis no evidence of acute diverticulitis she has been followed by Urology for the same she underwent cystoscopy yesterday with placement of left urethral stent  We are consulted for abdominal pain, of note patient with noted drop in hemoglobin from 8 2 yesterday to 6 9 today  Of note BNP is 18,000  Leukocytosis also present on admission chest x-ray did reveal congestive heart failure she was started on IV Levaquin  Hemoglobin on initial admission was 9 3 iron studies TSH been ordered stool for occult blood was ordered x3, result pending  Colonoscopy was in 2012 by Dr Catrachita Murillo [facility]), per patient no polyps  Review of Systems   Constitutional: Positive for activity change, appetite change and unexpected weight change  HENT: Negative for dental problem, sore throat, tinnitus, trouble swallowing and voice change  Eyes: Negative for visual disturbance  Respiratory: Negative for cough, choking and chest tightness  Cardiovascular: Negative for chest pain, palpitations and leg swelling  Gastrointestinal: Positive for abdominal distention and abdominal pain  Negative for blood in stool, constipation, diarrhea, nausea and vomiting  Endocrine: Negative for cold intolerance and heat intolerance  Genitourinary: Positive for dysuria and hematuria  Musculoskeletal: Positive for arthralgias and back pain  Skin: Positive for pallor  Negative for rash  Neurological: Positive for dizziness and headaches  Hematological: Bruises/bleeds easily  Psychiatric/Behavioral: Negative for confusion         Historical Information   Past Medical History:   Diagnosis Date    Acid reflux     Arthritis     Stewart's esophagus     COPD (chronic obstructive pulmonary disease) (HCC)     Duodenal ulcer     Hypertension     PAD (peripheral artery disease) (HCC)     Periodontal disease     Peripheral vascular disease (HCC)     Scoliosis      Past Surgical History:   Procedure Laterality Date  ANAL FISSURECTOMY      AORTA - SUPERIOR MESENTERIC ARTERY BYPASS GRAFT Bilateral     BREAST BIOPSY Right     FEMORAL ARTERY STENT Bilateral     HEMORROIDECTOMY      HYSTERECTOMY      INSERTION STENT ARTERY Left 1/1/2017    Procedure:  mesenteric arteriogram, balloon angioplasty of SMA stent;  Surgeon: Margaux Lamar MD;  Location: [facility];  Service:    Katarina Arteaga LAPAROTOMY N/A 1/1/2017    Procedure: LAPAROTOMY EXPLORATORY,  abdominal washout, repair of duodenal ulcer;  Surgeon: Deirdre Hirsch MD;  Location: [facility];  Service:     SD CYSTO/URETERO W/LITHOTRIPSY &INDWELL STENT INSRT Left 10/2/2018    Procedure: CYSTOSCOPY URETEROSCOPY WITH LITHOTRIPSY HOLMIUM LASER, RETROGRADE PYELOGRAM AND INSERTION STENT URETERAL;  Surgeon: Frandy Bo MD;  Location: [address] [place] [address];  Service: Urology     [address] Right 1/11/2016    Procedure: EXTRACTION INTRACAPSULAR CATARACT PHACO INTRAOCULAR LENS (IOL); Surgeon: Francesca Agrawal MD;  Location: [facility] MAIN OR;  Service: Ophthalmology    TONSILECTOMY AND ADNOIDECTOMY       Social History   History   Alcohol Use    0 6 oz/week    1 Glasses of wine per week     Comment: a glass wine daily   last drink 6/10/18     History   Drug Use No     History   Smoking Status    Former Smoker    Packs/day: 1 00    Years: 25 00    Quit date: [address], [place] Never Used     Comment: smoked for 25 years  History reviewed  No pertinent family history      Meds/Allergies   Current Facility-Administered Medications   Medication Dose Route Frequency Provider Last Rate Last Dose    acetaminophen (TYLENOL) tablet 650 mg  650 mg Oral Q6H PRN STEVEN Alcala        amLODIPine (NORVASC) tablet 5 mg  5 mg Oral Daily STEVEN Marin   Stopped at 10/02/18 1715    clopidogrel (PLAVIX) tablet 75 mg  75 mg Oral Daily STEVEN Alcala   75 mg at 10/02/18 1041    docusate sodium (COLACE) capsule 100 mg  100 mg Oral Daily Yoli STEVEN Benitez        hydrALAZINE (APRESOLINE) tablet 50 mg  50 mg Oral TID STEVEN Marin   50 mg at 10/02/18 2217    HYDROmorphone (DILAUDID) injection 0 2 mg  0 2 mg Intravenous Q3H PRN STEVEN Marin        [START ON 10/4/2018] levofloxacin (LEVAQUIN) IVPB (premix) 250 mg  250 mg Intravenous Q48H STEVEN Marin        metoprolol succinate (TOPROL-XL) 24 hr tablet 100 mg  100 mg Oral HS Yolanda STEVEN Beavers   100 mg at 10/02/18 2217    metroNIDAZOLE (FLAGYL) IVPB (premix) 500 mg  500 mg Intravenous Q8H STEVEN Marin 200 mL/hr at 10/03/18 0051 500 mg at 10/03/18 0051    multivitamin-minerals (CENTRUM) tablet 1 tablet  1 tablet Oral Daily STEVEN Alcala   Stopped at 10/02/18 1038    oxyCODONE (ROXICODONE) IR tablet 2 5 mg  2 5 mg Oral Q4H PRN STEVEN Marin        oxyCODONE (ROXICODONE) IR tablet 5 mg  5 mg Oral Q4H PRN STEVEN Marin        polyethylene glycol (MIRALAX) packet 17 g  17 g Oral Daily PRN STEVEN Alcala        sodium chloride 0 9 % infusion  75 mL/hr Intravenous Continuous STEVEN Luna 75 mL/hr at 10/03/18 0641 75 mL/hr at 10/03/18 9660     Allergies   Allergen Reactions    Chlorthalidone Itching    Penicillins Itching     Tolerates zosyn    Sulfa Antibiotics Rash    Bacitracin Rash    Ciprofloxacin GI Intolerance     Prescriptions Prior to Admission   Medication    clopidogrel (PLAVIX) 75 mg tablet    hydrALAZINE (APRESOLINE) 25 mg tablet    losartan (COZAAR) 50 mg tablet    metoprolol succinate (TOPROL-XL) 100 mg 24 hr tablet    Multiple Vitamin (MULTIVITAMIN) capsule    omeprazole (PriLOSEC) 40 MG capsule    VITAMIN D, ERGOCALCIFEROL, PO    aspirin 81 mg chewable tablet    atorvastatin (LIPITOR) 20 mg tablet          Objective     Vitals: Blood pressure 129/58, pulse 98, temperature 98 2 °F (36 8 °C), temperature source Temporal, resp   rate 18, height 5' 1" (1 549 m), weight 40 4 kg (89 lb 1 1 oz), SpO2 93 %     Intake/Output Summary (Last 24 hours) at 10/03/18 0932  Last data filed at 10/02/18 2058   Gross per 24 hour   Intake             2100 ml   Output              550 ml   Net             1550 ml       Xr Chest Portable    Result Date: 10/2/2018  Narrative: CHEST INDICATION:   Hx CHF  Low back pain radiates to abdomen  COMPARISON:  06/11/2018 EXAM PERFORMED/VIEWS:  XR CHEST PORTABLE 1 image FINDINGS: Cardiomediastinal silhouette appears enlarged  Chronic changes are present  The patient is in congestive heart failure  The lungs are clear  No pneumothorax  Minimal blunting of the costophrenic angles  Osseous structures appear within normal limits for patient age  Impression: CHF  Cardiomegaly and chronic changes  Workstation performed: [id number]     Xr Retrograde Pyelogram    Result Date: 10/3/2018  Narrative: RETROGRADE PYELOGRAM INDICATION: Urinary tract calculus  COMPARISON: CT performed October 1, 2018  IMAGES: 7 FLUOROSCOPY TIME:  40 SECONDS CONTRAST: 10 mL of Omnipaque 300 FINDINGS: Fluoroscopic guidance was provided for left-sided retrograde pyelogram  Final film depicts placement of ureteral stent  Impression: Fluoroscopic guidance provided for retrograde study  Please see procedure report for further details  Workstation performed: [id number]     Ct Renal Stone Study Abdomen Pelvis Wo Contrast    Result Date: 10/1/2018  Narrative: CT ABDOMEN AND PELVIS WITHOUT IV CONTRAST - LOW DOSE RENAL STONE INDICATION:   LT FLANK AND LUQ PAIN  COMPARISON:  None  TECHNIQUE:  Low dose thin section CT examination of the abdomen and pelvis was performed without intravenous or oral contrast according to a protocol specifically designed to evaluate for urinary tract calculus  Axial, sagittal, and coronal 2D reformatted images were created from the source data and submitted for interpretation  Evaluation for pathology in the abdomen and pelvis that is unrelated to urinary tract calculi is limited  Radiation dose length product (DLP) for this visit:  111 11 mGy-cm   This examination, like all CT scans performed in the [facility], was performed utilizing techniques to minimize radiation dose exposure, including the use of iterative  reconstruction and automated exposure control  FINDINGS: RIGHT KIDNEY AND URETER: No urinary tract calculi  No hydronephrosis or hydroureter  Linear calcifications likely vascular  LEFT KIDNEY AND URETER: Distal left ureteral stone identified with resultant mild to moderate left hydronephrosis  The stone measures 7 x 5 x 4 mm  No intrarenal stones are seen  [place] URINARY BLADDER: Unremarkable  No significant abnormality in the visualized lung bases  Limited low radiation dose noncontrast CT evaluation demonstrates no clinically significant abnormality of liver, spleen, pancreas, or adrenal glands  No calcified gallstones or gallbladder wall thickening noted  No ascites or bulky lymphadenopathy on this limited noncontrast study  Colonic diverticula are noted, without evidence to suggest acute diverticulitis  Visualized bowel appears otherwise unremarkable  Limited evaluation demonstrates no evidence to suggest acute appendicitis  No acute fracture or destructive osseous lesion is identified  Impression: Distal left ureteral stone measures approximately 7 x 5 x 4 mm resulting in mild to moderate left hydronephrosis  Colonic diverticulosis  No evidence for acute diverticulitis  Workstation performed: [id number]       Physical Exam   Constitutional: She is oriented to person, place, and time  Thin frail elderly female   HENT:   Head: Normocephalic and atraumatic  Eyes: Right eye exhibits no discharge  Left eye exhibits no discharge  No scleral icterus  sclera white conjunctiva pale   Neck: Normal range of motion  No thyromegaly present  Cardiovascular: Normal rate and regular rhythm  No murmur heard    Pulmonary/Chest: Effort normal and breath sounds normal  She has no wheezes  She has no rales  Abdominal: Soft  Bowel sounds are normal  She exhibits distension  She exhibits no mass  There is tenderness  There is no rebound and no guarding  There is mild epigastric and right upper quadrant tenderness abdomen is somewhat distended   Musculoskeletal: She exhibits no edema  Neurological: She is alert and oriented to person, place, and time  Skin: Skin is warm and dry  Psychiatric: She has a normal mood and affect         Recent Results (from the past 24 hour(s))   Basic metabolic panel    Collection Time: 10/02/18  4:11 PM   Result Value Ref Range    Sodium 131 (L) 136 - 145 mmol/L    Potassium 4 6 3 5 - 5 3 mmol/L    Chloride 100 100 - 108 mmol/L    CO2 20 (L) 21 - 32 mmol/L    ANION GAP 11 4 - 13 mmol/L    BUN 22 5 - 25 mg/dL    Creatinine 1 65 (H) 0 60 - 1 30 mg/dL    Glucose 92 65 - 140 mg/dL    Calcium 8 4 8 3 - 10 1 mg/dL    eGFR 28 ml/min/1 73sq m   CBC    Collection Time: 10/02/18  4:11 PM   Result Value Ref Range    WBC 21 79 (H) 4 31 - 10 16 Thousand/uL    RBC 3 60 (L) 3 81 - 5 12 Million/uL    Hemoglobin 8 2 (L) 11 5 - 15 4 g/dL    Hematocrit 27 5 (L) 34 8 - 46 1 %    MCV 76 (L) 82 - 98 fL    MCH 22 8 (L) 26 8 - 34 3 pg    MCHC 29 8 (L) 31 4 - 37 4 g/dL    RDW 17 8 (H) 11 6 - 15 1 %    Platelets 116 216 - 746 Thousands/uL    MPV 9 5 8 9 - 12 7 fL   NT-BNP PRO    Collection Time: 10/02/18  4:11 PM   Result Value Ref Range    NT-proBNP 18,468 (H) <450 pg/mL   Uric acid    Collection Time: 10/02/18  4:11 PM   Result Value Ref Range    Uric Acid 3 2 2 0 - 6 8 mg/dL   Vitamin B12    Collection Time: 10/02/18  4:11 PM   Result Value Ref Range    Vitamin B-12 966 (H) 100 - 900 pg/mL   Folate    Collection Time: 10/02/18  4:11 PM   Result Value Ref Range    Folate >20 0 (H) 3 1 - 17 5 ng/mL   Manual Differential (Non Wam)    Collection Time: 10/02/18  4:11 PM   Result Value Ref Range    Segmented % 89 %    Bands % 4 0 - 8 %    Lymphocytes % 3 % Monocytes % 4 4 - 12 %    Neutrophils Absolute 20 26 (H) 1 81 - 6 82 Thousand/uL    Lymphocytes Absolute 0 65 0 60 - 4 47 Thousand/uL    Monocytes Absolute 0 87 0 00 - 1 22 Thousand/uL    Total Counted 100     RBC Morphology     Iron Saturation %    Collection Time: 10/02/18  4:11 PM   Result Value Ref Range    Iron Saturation 3 %    TIBC 406 250 - 450 ug/dL    Iron 13 (L) 50 - 170 ug/dL   Ferritin    Collection Time: 10/02/18  4:11 PM   Result Value Ref Range    Ferritin 15 8 - 388 ng/mL   Basic metabolic panel    Collection Time: 10/03/18 12:50 AM   Result Value Ref Range    Sodium 131 (L) 136 - 145 mmol/L    Potassium 4 5 3 5 - 5 3 mmol/L    Chloride 101 100 - 108 mmol/L    CO2 15 (L) 21 - 32 mmol/L    ANION GAP 15 (H) 4 - 13 mmol/L    BUN 21 5 - 25 mg/dL    Creatinine 1 53 (H) 0 60 - 1 30 mg/dL    Glucose 129 65 - 140 mg/dL    Calcium 8 1 (L) 8 3 - 10 1 mg/dL    eGFR 30 ml/min/1 73sq m   TSH, 3rd generation    Collection Time: 10/03/18 12:50 AM   Result Value Ref Range    TSH 3RD GENERATON 2 229 0 358 - 3 740 uIU/mL   CBC and differential    Collection Time: 10/03/18 12:50 AM   Result Value Ref Range    WBC 13 17 (H) 4 31 - 10 16 Thousand/uL    RBC 3 10 (L) 3 81 - 5 12 Million/uL    Hemoglobin 6 9 (LL) 11 5 - 15 4 g/dL    Hematocrit 24 4 (L) 34 8 - 46 1 %    MCV 79 (L) 82 - 98 fL    MCH 22 3 (L) 26 8 - 34 3 pg    MCHC 28 3 (L) 31 4 - 37 4 g/dL    RDW 18 2 (H) 11 6 - 15 1 %    MPV 10 2 8 9 - 12 7 fL    Platelets 764 216 - 441 Thousands/uL    nRBC 0 /100 WBCs    Neutrophils Relative 84 (H) 43 - 75 %    Immat GRANS % 0 0 - 2 %    Lymphocytes Relative 6 (L) 14 - 44 %    Monocytes Relative 10 4 - 12 %    Eosinophils Relative 0 0 - 6 %    Basophils Relative 0 0 - 1 %    Neutrophils Absolute 11 01 (H) 1 85 - 7 62 Thousands/µL    Immature Grans Absolute 0 05 0 00 - 0 20 Thousand/uL    Lymphocytes Absolute 0 81 0 60 - 4 47 Thousands/µL    Monocytes Absolute 1 28 (H) 0 17 - 1 22 Thousand/µL    Eosinophils Absolute 0 00 0 00 - 0 61 Thousand/µL    Basophils Absolute 0 02 0 00 - 0 10 Thousands/µL   Lipase    Collection Time: 10/03/18 12:50 AM   Result Value Ref Range    Lipase 350 73 - 393 u/L   Type and screen    Collection Time: 10/03/18  5:36 AM   Result Value Ref Range    ABO Grouping A     Rh Factor Positive     Antibody Screen Negative     Specimen Expiration Date [id number]    Prepare RBC:Special Requirements: None; Has consent been obtained? Yes, 1 Units    Collection Time: 10/03/18  7:21 AM   Result Value Ref Range    Unit Product Code V5682T06     Unit Number [id number]     Unit ABO A     Unit RH POS     Crossmatch Compatible     Unit Dispense Status Crossmatched    liver function tests   Lab Results: I have personally reviewed pertinent reports  Counseling / Coordination of Care   All of the above discussed with Dr Krissy Sunshine  All carrillo decisions made by him

## 2018-10-03 NOTE — PROGRESS NOTES
Progress Note - Madelin Villatoro 9/10/1930, 80 y o  female MRN: [id number]    Unit/Bed#: [address] [address] Encounter: [id number]    Primary Care Provider: Sam Moore MD   Date and time admitted to hospital: 10/1/2018  6:20 PM        * Generalized abdominal pain   Assessment & Plan    Reports to generalized abdominal pain for months now and a weight loss of 6-8 lbs  Pt is supposed to see Dr Master Bowers but unable to make an appointment  She has nausea but no vomiting  CT abdomen and pelvis without contrast negative for acute findings but with 7x5x4  ureteral stone with mild to moderate hydronephrosis  LA 0 7, Amylase WNL, Lipase elevated on admission  · S/P left ureteral stent placement 10/3  · No improvement in abdomen pain  · History of perforated duodenal ulcer  · Patient seen by GI  For EGD in AM   · Symptom management  Shock liver   Assessment & Plan    · Likely 2/2 relative hypotension post procedure yesterday evening  · History of chronic mesenteric ischemia,s/p SMA stent and history of HTN 2/2 left renal artery stenosis  Patient follows Dr Ashkan Rodriguez as outpatient for HTN  · Aim to keep SBP around 140-160 given her chronic mesenteric ischemia per Dr Nelly King office note in 7/2018  · SBP dropped to 100-120's from 170-180's post procedure  Likely the cause of shock liver  · AST 1129,,TB normal,direct bilirubin mild elevated  · Check right upper quadrant ultrasound stat  · Avoid hepatotoxins  · Check Tylenol level stat  · Hold Norvasc, hydralazine for SBP < 160  Hold metoprolol SBP < 140  · May bland diet as tolerated  · Discussed with attending  · Repeat lab in a m  UTI (urinary tract infection)   Assessment & Plan    · WBC 21 79-> 13 17,UA showed WBC 4-10, small leukocytes  · Lactic acid normal on admission  Patient afebrile  · Patient does not meet sepsis criteria  Mild tachy HR > 90 likely 2/2 anemia    · Will obtain blood culture x 2, repeat lactic acid in view of leukocytosis  · Patient started on IV Levaquin for UTI yesterday  Will continue  · Check urine culture  · PVR 0 yesterday  · Procalcitonin x 1 negative  Will repeat in AM      Nephrolithiasis   Assessment & Plan    ·  CT scan of the abdomen and pelvis showed distal left ureteral stone measures approximately 7 x 5 x 4 mm resulting in mild to moderate left hydronephrosis  · S/P Holmium laser,stone extraction, stent placement 10/3/18  · F/U 1 week for stent removal      Hyponatremia   Assessment & Plan    · Na 128->130->131  today  · Urine sodium 108, urine Osmo 404, serum osmol 273, uric acid 3 2,TSH normal    · Suspect SIADH in the setting of pain  · Patient was on IV NS  Hold for now per Nephrology  · Lasix 20 mg iv x 1 during blood transfusion  · Repeat lab in a m  Lajean Cassette · Appreciate Nephrology input  Leukocytosis   Assessment & Plan    · WBC 21 79-> 13 17 today  · Likely secondary to UTI  · CXR showed CHF  Cardiomegaly and chronic changes  · On IV Levaquin  Will stop flagyl per discussion with attending  Acute kidney injury superimposed on chronic kidney disease ([facility] [address] )   Assessment & Plan    Cr 1 12-> 1 37-> 1 65-> 1 53->1 52   today  Likely multifactorial 2/2 ATN in the setting of obstruction and relative hypotension, +/- severe sepsis  Cr did not improve post left ureteral stent placement  Hold losartan  PVR was 0  Avoid nephrotoxins  Intake and output  Repeat lab in a m  Lajean Cassette Appreciate Nephrology input  Anemia   Assessment & Plan    H&H 9 1/30 1  Microcytic hypochromic  Hb 7 7-> 8 2-> 6 9 today  B 12 966,folate > 20, iron 13, ferritin 15    stool occult blood x 3 pending  Patient was ordered 1 unit of PRBC this morning  Repeat CBC 2 hours post transfusion  Will start patient on MVI and iron supplement once LFTs improves  For EGD in a m   Monitor  Essential hypertension   Assessment & Plan    · 2/2 left renal artery stenosis  Patient follows Dr Jyoti Gonzales as outpatient  · Aim to keep SBP around 140-160 given her chronic mesenteric ischemia per Dr Carly Loyola office note in 7/2018  · Initially hypertensive in the ED likely induced pain  Patient on Hydralazine, Losartan and Metoprolol  · Increased hydralazine to 50 mg p o  T i d Added Norvasc  · Continue metoprolol  · Hold losartan due to [facility]  · SBP dropped to 100-120's from 170-180's post procedure  Likely the cause of shock liver  · Monitor BP q 4 hours  Weight loss, unintentional   Assessment & Plan    Admits to poor appetite secondary to generalized abdominal pain associated with nausea for months and weight loss of 6-8 lbs  Patient with history of mesenteric ischemia  CT scan showed no acute findings except for ureteral stone with hydronephrosis  · Monitor weight  · Nutritional consult     Chronic diastolic heart failure ([facility] [address] )   Assessment & Plan    · Echo on 06/12/2018 showed EF 55 % to 60 %,GIDD  · Repeat echo today showed EF 40%,G1DD  · Patient does not appear fluid overload on exam   · Continue metoprolol  · Telemetry  · Consult Cardiology  · Daily weight  · Intake and output  Stenosis of right internal carotid artery   Assessment & Plan     History of 70-99% stenosis and was seen here with no vascular intervention  · Continue Plavix  · Hold Lipitor  Chronic mesenteric ischemia (HCC)   Assessment & Plan    · History of chronic mesenteric ischemia,s/p SMA stenting  · Continue Plavix,hold lipitor in view of shock liver  Acid reflux   Assessment & Plan    Patient on Omeprazole  Pepcid IV given in the ED  · Continue             VTE Pharmacologic Prophylaxis:   Pharmacologic: Pharmacologic VTE Prophylaxis contraindicated due to anemia  Mechanical VTE Prophylaxis in Place: Yes    Patient Centered Rounds: I have performed bedside rounds with nursing staff today      Discussions with Specialists or Other Care Team Provider: attending,GI, Nephrology    Education and Discussions with Family / Patient: YES    Time Spent for Care: 30 minutes  More than 50% of total time spent on counseling and coordination of care as described above  Current Length of Stay: 2 day(s)    Current Patient Status: Inpatient   Certification Statement: The patient will continue to require additional inpatient hospital stay due to UTI,shock liver, abdominal pain,anemia, CHF    Discharge Plan: pending progress    Code Status: Level 1 - Full Code      Subjective:   Patient reports persistent left-sided abdominal discomfort  Denies nausea, vomiting, diarrhea constipation  Denies fever or chills  Denies chest pains, headaches, dizziness, SOB  Objective:     Vitals:   Temp (24hrs), Av 1 °F (36 7 °C), Min:97 6 °F (36 4 °C), Max:99 °F (37 2 °C)    HR:  [84-98] 84  Resp:  [16-18] 18  BP: (103-176)/(45-73) 172/63  SpO2:  [93 %-95 %] 95 %  Body mass index is 16 83 kg/m²  Input and Output Summary (last 24 hours): Intake/Output Summary (Last 24 hours) at 10/03/18 1659  Last data filed at 10/03/18 1354   Gross per 24 hour   Intake             2100 ml   Output             1350 ml   Net              750 ml       Physical Exam:     Physical Exam   Constitutional: She is oriented to person, place, and time  She appears well-developed  Patient is under weight  HENT:   Head: Normocephalic and atraumatic  Neck: Normal range of motion  Neck supple  Cardiovascular: Normal rate and regular rhythm  Exam reveals no gallop and no friction rub  No murmur heard  Pulmonary/Chest: No respiratory distress  She has no wheezes  She has no rales  Breath sounds diminished lower lobes  On room air respirations easy  Abdominal: Soft  Bowel sounds are normal  She exhibits no distension  There is tenderness  There is no rebound  Left sided abdomen slight tender  Musculoskeletal: Normal range of motion  She exhibits no edema, tenderness or deformity     Neurological: She is alert and oriented to person, place, and time    Skin: Skin is warm and dry  Psychiatric: She has a normal mood and affect  Nursing note and vitals reviewed  Additional Data:     Labs:      Results from last 7 days  Lab Units 10/03/18  0050   WBC Thousand/uL 13 17*   HEMOGLOBIN g/dL 6 9*   HEMATOCRIT % 24 4*   PLATELETS Thousands/uL 300   NEUTROS PCT % 84*   LYMPHS PCT % 6*   MONOS PCT % 10   EOS PCT % 0       Results from last 7 days  Lab Units 10/03/18  1618   SODIUM mmol/L 132*   POTASSIUM mmol/L 5 2   CHLORIDE mmol/L 101   CO2 mmol/L 18*   BUN mg/dL 26*   CREATININE mg/dL 1 26   CALCIUM mg/dL 8 8   ALK PHOS U/L 65   ALT U/L 1,124*   AST U/L 1,788*       Results from last 7 days  Lab Units 10/03/18  1618   INR  1 30*       * I Have Reviewed All Lab Data Listed Above  * Additional Pertinent Lab Tests Reviewed:  [facility] 66 Admission Reviewed    Imaging:    Imaging Reports Reviewed Today Include: CXR  Imaging Personally Reviewed by Myself Includes:  CXR    Recent Cultures (last 7 days):           Last 24 Hours Medication List:     Current Facility-Administered Medications:  [START ON 10/4/2018] amLODIPine 5 mg Oral Daily Cuiyin Giselarik, NURISNP   clopidogrel 75 mg Oral Daily Yolanda S Garcia, STEVEN   docusate sodium 100 mg Oral Daily Cuiparul Higginsrik, STEVEN   famotidine 20 mg Intravenous Daily Cuiyirj Yurifreedom, CRDIANA   hydrALAZINE 50 mg Oral TID Cuiparul Benitez, CRDIANA   HYDROmorphone 0 2 mg Intravenous Q3H PRN STEVEN Marin   [START ON 10/4/2018] levofloxacin 250 mg Intravenous Q48H Cuiyirj Higginsrik, CRNP   metoprolol succinate 100 mg Oral HS Cuiyin Yurik, CRDIANA   multivitamin-minerals 1 tablet Oral Daily Yolanda S Garcia, STEVEN   oxyCODONE 2 5 mg Oral Q4H PRN Cuiyin Yurik, CRDIANA   oxyCODONE 5 mg Oral Q4H PRN Cuiyin Yurik, CRNP   polyethylene glycol 17 g Oral Daily PRN STEVEN Briones        Today, Patient Was Seen By: STEVEN Sibley    ** Please Note: Dragon 360 Dictation voice to text software may have been used in the creation of this document   **

## 2018-10-03 NOTE — ASSESSMENT & PLAN NOTE
Cr 1 12-> 1 37-> 1 65-> 1 53->1 52   today  Likely multifactorial 2/2 ATN in the setting of obstruction and relative hypotension, +/- severe sepsis  Cr did not improve post left ureteral stent placement  Hold losartan  PVR was 0  Avoid nephrotoxins  Intake and output  Repeat lab in a m  Cathlean Itz Appreciate Nephrology input

## 2018-10-03 NOTE — ASSESSMENT & PLAN NOTE
· WBC 21 79-> 13 17 today  · Likely secondary to UTI  · CXR showed CHF  Cardiomegaly and chronic changes  · On IV Levaquin  Will stop flagyl per discussion with attending

## 2018-10-04 NOTE — ASSESSMENT & PLAN NOTE
· Na 128->130->131-> 132->132 today  · Urine sodium 108, urine Osmo 404, serum osmol 273, uric acid 3 2,TSH normal    · Suspect SIADH in the setting of pain  · Patient was on IV NS which was discontinued  · Lasix 20 mg iv x 1 during blood transfusion on 10/3  · Lasix 40mg iv x 1 per Nephrology  · Appreciate Nephrology input

## 2018-10-04 NOTE — CASE MANAGEMENT
Continued Stay Review    Date: 10/4/18    Vital Signs: BP (!) 188/68   Pulse 71   Temp 98 1 °F (36 7 °C) (Oral)   Resp 20   Ht 5' 1" (1 549 m)   Wt 41 1 kg (90 lb 9 7 oz)   SpO2 97%   BMI 17 12 kg/m²     TELE MON  TYPE SCREEN  TRANSFUSE U PRBC   H/H  CONSULT CARDIOLOGY  BMP CMP CBC DIFF MG  PT INR   NM STRESS  Medications:   Scheduled Meds:   Current Facility-Administered Medications:  clopidogrel 75 mg Oral Daily Yolanda S Garcia, CRNP   docusate sodium 100 mg Oral Daily Cuiyin Yurik, CRNP   hydrALAZINE 25 mg Oral TID Cuiyin Yurik, CRNP   HYDROmorphone 0 2 mg Intravenous Q3H PRN Cuiyin Yurik, CRNP   levofloxacin 250 mg Intravenous Q48H Cuiyin Yurik, CRNP   metoprolol succinate 50 mg Oral HS Cuiyin Yurik, CRNP   multivitamin-minerals 1 tablet Oral Daily Yolanda S Garcia, CRNP   oxyCODONE 2 5 mg Oral Q4H PRN Cuiyin Yurik, CRNP   oxyCODONE 5 mg Oral Q4H PRN Cuiyin Yurik, CRNP   pantoprazole 40 mg Intravenous Q12H [address] Cuiyin Yurik, CRNP   polyethylene glycol 17 g Oral Daily PRN Yolanda S Garcia, CRNP     Continuous Infusions:    PRN Meds: HYDROmorphone    oxyCODONE    oxyCODONE    polyethylene glycol    Abnormal Labs/Diagnostic Results:  BUN 30   ALB 2 7 T BILI 1 90 , H/H 8/26 , PT /INR 13 7/1 33, PROCALCITONIN 0 43  STRESS TEST IMPRESSIONS: Abnormal study after pharmacologic stress  There was an inferior wall defect that was likely bowel attenuation artifact  Left ventricular systolic function was normal   Age/Sex: 80 y o  female     GI  Assessment/Plan:  1  Abdominal pain, nausea, weight loss, anemia- she has a history of duodenal ulcer with perforation in 2017   She was to have EGD today for further evaluation however LFTs were markedly elevated with repeated labs yesterday  Ultrasound of the abdomen with several small  liver massmost likely hemangiomas, indeterminate 2 1 x 2 2 cm mass in the right lobe which was previously demonstrated on CT from 2016 unchanged in size, mild chronic intrahepatic ductal dilatation  Will hold off on EGD planned tentatively for tomorrow if okay with Cardiology, will order MRI MRCP for further evaluation rule out choledocholithiasis  The elevated LFTs may be related to congestive hepatopathy, versus shock liver from demand ischemia, as BP did drop yesterday  Will start her on clear liquids this was discussed in length with patient, nurse, and cardiology team here   2  History of chronic mesenteric ischemia status post stent placement in 2015  3  Elevated BNP EF of 45% history of congestive heart failure-cardiology has been consulted  4  Anemia -she will can be getting another unit PRBCs today continue to trend hemoglobin     CARDIOLOGY CONSULT  Assessment:  1  Generalized abdominal pain in patient with history of peptic ulcer disease and chronic mesenteric ischemia  2  Elevated LFTs post episode of hypotension  3  UTI with left kidney stone status post cystoscopy and placement of stent  4  Combined systolic and diastolic heart failure:  EF on echocardiogram now noted to be 40% with moderate diffuse hypokinesis of the left ventricle which is a change from echocardiogram of June 2018 where it was seen to be normal     5  Postprocedural anemia:  Patient receiving transfusion  6  History of right carotid stenosis    7  History of left renal artery stenosis   Plan:  1  Would avoid profound hypotension in a patient with vascular disease on multiple meds  Norvasc is currently on hold and parameters placed for hydralazine and metoprolol  2  Upper endoscopy on hold per GI due to anemia and elevated LFTs  Plan for possible study tomorrow  Also planned for  MRI MRCP for further evaluation of liver  3  We will obtain a Lexiscan nuclear stress test today to evaluate for ischemia  4  Will continue to monitor patient and add heart failure medications as patient's blood pressure and condition tolerates        ATTENDING  Generalized abdominal pain  · Patient seen by GI was planned for EGD today,but on hold due to shock liver  Tentatively for tomorrow if ok with Cardiology per GI   · RUQ US on 10/3 showed several small hyperechoic liver masses,most likely hemangiomas  Right lobe mass unchanged from 2016  Chronic mild intrahepatic ductal dilatation with no extrahepatic bile duct dilatation  Gallbladder wall thickening with small amount of pericholecystic fluid which is nonspecific findings  ·  MRI MRCP ordered by GI to r/o choledocholithiasis  · Clear liquid diet today  · Appreciate GI input  Shock liver  · Aim to keep SBP around 140-160 given her chronic mesenteric ischemia per Dr Inna Anaya office note in 7/2018  · SBP dropped to 100-120's from 170-180's post procedure  Likely the cause of shock liver  UTI   · Patient started on IV Levaquin for UTI yesterday  Will continue  · Urine culture pending  · PVR 0   · Repeat procalcitonin tomorrow  Certification Statement: The patient will continue to require additional inpatient hospital stay due to shock liver,anemia,CHF

## 2018-10-04 NOTE — ASSESSMENT & PLAN NOTE
Admits to poor appetite secondary to generalized abdominal pain associated with nausea for months and weight loss of 6-8 lbs  Patient with history of mesenteric ischemia  CT scan showed no acute findings except for ureteral stone with hydronephrosis  · Monitor weight  · Nutritional consult  · GI on board

## 2018-10-04 NOTE — PROGRESS NOTES
Progress Note - Liana Mae 9/10/1930, 80 y o  female MRN: [id number]    Unit/Bed#: [address] [address] Encounter: [id number]    Primary Care Provider: Syeda Warren MD   Date and time admitted to hospital: 10/1/2018  6:20 PM        * Generalized abdominal pain   Assessment & Plan    · Improved today  Denies abdominal pain,reports mild tenderness to LUQ today  · CT abdomen and pelvis without contrast on admission negative for acute findings but with 7x5x4  ureteral stone with mild to moderate hydronephrosis  LA 0 7, Amylase WNL, Lipase elevated on admission  · S/P left ureteral stent placement 10/3  · History of perforated duodenal ulcer  · Patient seen by GI was planned for EGD today,but on hold due to shock liver  Tentatively for tomorrow if ok with Cardiology per GI   · RUQ US on 10/3 showed several small hyperechoic liver masses,most likely hemangiomas  Right lobe mass unchanged from 2016  Chronic mild intrahepatic ductal dilatation with no extrahepatic bile duct dilatation  Gallbladder wall thickening with small amount of pericholecystic fluid which is nonspecific findings  ·  MRI MRCP ordered by GI to r/o choledocholithiasis  · Clear liquid diet today  · Appreciate GI input  Shock liver   Assessment & Plan    · Likely 2/2 relative hypotension post procedure  · History of chronic mesenteric ischemia,s/p SMA stent and history of HTN 2/2 left renal artery stenosis  Patient follows Dr Vanesa Conley as outpatient for HTN  · Aim to keep SBP around 140-160 given her chronic mesenteric ischemia per Dr Nancy Newman office note in 7/2018  · SBP dropped to 100-120's from 170-180's post procedure  Likely the cause of shock liver  · AST 1129,,TB normal,direct bilirubin mild elevated  · LFTs improving  ,,TB 1 9 today  · Right upper quadrant ultrasound no acute findings  Tylenol level normal   · Avoid hepatotoxins  · Discontinued Norvasc  Hold hydralazine for SBP < 160   Hold metoprolol SBP < 160 Decreased metoprolol to 50 mg p o  HS  · Clear liquid diet  · Appreciate GI input  UTI (urinary tract infection)   Assessment & Plan    · WBC 21 79-> 13 17->12 26-> 9 03 ,UA showed WBC 4-10, small leukocytes  · Lactic acid normal on admission  Patient afebrile  · Patient does not meet sepsis criteria  Mild tachy HR > 90 likely 2/2 anemia  · Blood culture x 2 pending, repeat lactic acid normal   · Procalcitonin 0 24->0 44->0 43  · Patient started on IV Levaquin for UTI yesterday  Will continue  · Urine culture pending  · PVR 0   · Repeat procalcitonin tomorrow  Nephrolithiasis   Assessment & Plan    · CT scan of the abdomen and pelvis showed distal left ureteral stone measures approximately 7 x 5 x 4 mm resulting in mild to moderate left hydronephrosis  · S/P Holmium laser,stone extraction, stent placement 10/3/18  · F/U 1 week for stent removal   · Some gross hematuria post procedure  Not the source of her anemia per Urology  May continue Plavix per Urology  · Monitor hematuria     Hyponatremia   Assessment & Plan    · Na 128->130->131-> 132->132 today  · Urine sodium 108, urine Osmo 404, serum osmol 273, uric acid 3 2,TSH normal    · Suspect SIADH in the setting of pain  · Patient was on IV NS which was discontinued  · Lasix 20 mg iv x 1 during blood transfusion on 10/3  · Lasix 40mg iv x 1 per Nephrology  · Appreciate Nephrology input  Leukocytosis   Assessment & Plan    · WBC 21 79-> 13 17->9 03 today  Patient afebrile  · Likely secondary to UTI  · CXR showed CHF  Cardiomegaly and chronic changes  · On IV Levaquin  Will stop flagyl per discussion with attending  Acute kidney injury superimposed on chronic kidney disease ([facility] [address] )   Assessment & Plan    Cr 1 12-> 1 37-> 1 65-> 1 53->1 52 ->1 26->1 1  today  Likely multifactorial 2/2 ATN in the setting of obstruction and relative hypotension  Hold losartan  PVR was 0  Avoid nephrotoxins    Intake and output  Appreciate Nephrology input  Anemia   Assessment & Plan    H&H 9 1/30 1  Microcytic hypochromic  Hb 7 7-> 8 2-> 6 9->8 6-> 8 0 today  B 12 966,folate > 20, iron 13, ferritin 15    stool occult blood x 3 pending  Received 1 unit of RBC on 10/3  Will order 1 unit of RBC today in view of gross hematuria  Will start patient  iron supplement once LFTs improves  Continue MVI  Tentative EGD in AM   IV Protonix b i d   Monitor H/H every 8 hours  Essential hypertension   Assessment & Plan    · 2/2 left renal artery stenosis  Patient follows Dr Miguel Ángel Dotson as outpatient  · Aim to keep SBP around 140-160 given her chronic mesenteric ischemia per Dr Carly Loyola office note in 7/2018  · Initially hypertensive in the ED likely induced pain  Patient on Hydralazine, Losartan and Metoprolol  · Decreased to hydralazine to 25 mg p o  T i d ,hold for SBP < 160  DISCONTINUED Norvasc  · Continue metoprolol, but decreased to 50mg po daily, hold for systolic less than 321  · Hold losartan due to [facility]  · SBP dropped to 100-120's from 170-180's post procedure  Likely the cause of shock liver  · Monitor BP q 4 hours  Weight loss, unintentional   Assessment & Plan    Admits to poor appetite secondary to generalized abdominal pain associated with nausea for months and weight loss of 6-8 lbs  Patient with history of mesenteric ischemia  CT scan showed no acute findings except for ureteral stone with hydronephrosis  · Monitor weight  · Nutritional consult  · GI on board  Chronic diastolic heart failure ([facility] [address] )   Assessment & Plan    · Echo on 06/12/2018 showed EF 55 % to 60 %,GIDD  · Repeat echo today showed EF 40%,G1DD  · Patient does not appear fluid overload on exam   · Continue metoprolol  · Telemetry  · Lasix 40 mg iv x 1 today  · Nuclear stress test today  · Daily weight  · Intake and output  · Appreciate Cardiology input       Stenosis of right internal carotid artery   Assessment & Plan     History of 70-99% stenosis and was seen here with no vascular intervention  · Continue Plavix  · Hold Lipitor  Chronic mesenteric ischemia (HCC)   Assessment & Plan    · History of chronic mesenteric ischemia,s/p SMA stenting  · Continue Plavix,hold lipitor in view of shock liver  Acid reflux   Assessment & Plan    Patient on Omeprazole  Pepcid IV given in the ED  · IV Protonix  VTE Pharmacologic Prophylaxis:   Pharmacologic: Pharmacologic VTE Prophylaxis contraindicated due to anemia  Mechanical VTE Prophylaxis in Place: Yes    Patient Centered Rounds: I have performed bedside rounds with nursing staff today  Discussions with Specialists or Other Care Team Provider:  Nephrology  Education and Discussions with Family / Patient: yes    Time Spent for Care: 30 minutes  More than 50% of total time spent on counseling and coordination of care as described above  Current Length of Stay: 3 day(s)    Current Patient Status: Inpatient   Certification Statement: The patient will continue to require additional inpatient hospital stay due to shock liver,anemia,CHF  Discharge Plan:  Pending progress  Code Status: Level 1 - Full Code      Subjective:   Patient denies abdominal pain, reports mild tenderness to LUQ  Denies chest pains, headaches, dizziness, SOB, nausea, vomiting, diarrhea or constipation  Denies fever or chills  Objective:     Vitals:   Temp (24hrs), Av 1 °F (36 7 °C), Min:97 6 °F (36 4 °C), Max:98 8 °F (37 1 °C)    HR:  [68-89] 71  Resp:  [17-20] 20  BP: (124-188)/(56-73) 188/68  SpO2:  [93 %-98 %] 97 %  Body mass index is 17 12 kg/m²  Input and Output Summary (last 24 hours):        Intake/Output Summary (Last 24 hours) at 10/04/18 1327  Last data filed at 10/04/18 1100   Gross per 24 hour   Intake              350 ml   Output             1800 ml   Net            -1450 ml       Physical Exam:     Physical Exam   Constitutional: She is oriented to person, place, and time  She appears well-developed  Patient is underweight  HENT:   Head: Normocephalic and atraumatic  Neck: Normal range of motion  Neck supple  Cardiovascular: Normal rate and regular rhythm  Exam reveals no friction rub  No murmur heard  Pulmonary/Chest: Effort normal  No respiratory distress  She has no wheezes  She has no rales  Breath sounds clear diminished bilateral    Abdominal: Soft  Bowel sounds are normal  She exhibits no distension  There is tenderness  There is no rebound  LUQ slight tender  Musculoskeletal: Normal range of motion  She exhibits no edema, tenderness or deformity  Neurological: She is alert and oriented to person, place, and time  Skin: Skin is warm and dry  Psychiatric: She has a normal mood and affect  Nursing note and vitals reviewed  Additional Data:     Labs:      Results from last 7 days  Lab Units 10/04/18  0534   WBC Thousand/uL 9 03   HEMOGLOBIN g/dL 8 0*   HEMATOCRIT % 26 0*   PLATELETS Thousands/uL 255   NEUTROS PCT % 82*   LYMPHS PCT % 8*   MONOS PCT % 9   EOS PCT % 0       Results from last 7 days  Lab Units 10/04/18  0534   SODIUM mmol/L 132*   POTASSIUM mmol/L 3 7   CHLORIDE mmol/L 101   CO2 mmol/L 21   BUN mg/dL 30*   CREATININE mg/dL 1 10   CALCIUM mg/dL 8 2*   ALK PHOS U/L 64   ALT U/L 754*   AST U/L 708*       Results from last 7 days  Lab Units 10/04/18  0534   INR  1 33*       * I Have Reviewed All Lab Data Listed Above  * Additional Pertinent Lab Tests Reviewed: Jonny 66 Admission Reviewed    Imaging:    Imaging Reports Reviewed Today Include: RUQ US    Imaging Personally Reviewed by Myself Includes:  none    Recent Cultures (last 7 days):           Last 24 Hours Medication List:     Current Facility-Administered Medications:  clopidogrel 75 mg Oral Daily STEVEN Alcala   docusate sodium 100 mg Oral Daily STEVEN Marin   hydrALAZINE 25 mg Oral TID STEVEN Marin   HYDROmorphone 0 2 mg Intravenous Q3H PRN Manjuiparul Benitez, STEVEN   levofloxacin 250 mg Intravenous Q48H Cuiparul Benitez, STEVEN   metoprolol succinate 50 mg Oral HS Cuiparul Benitez, STEVEN   multivitamin-minerals 1 tablet Oral Daily Yolanda Garcia, STEVEN   oxyCODONE 2 5 mg Oral Q4H PRN Manjuiparul Benitez, STEVEN   oxyCODONE 5 mg Oral Q4H PRN Yoli Benitez, STEVEN   pantoprazole 40 mg Intravenous Q12H [address] Cuiyin Emmanuel, STEVEN   polyethylene glycol 17 g Oral Daily PRN STEVEN Tamayo        Today, Patient Was Seen By: STEVEN Tello    ** Please Note: Dragon 360 Dictation voice to text software may have been used in the creation of this document   **

## 2018-10-04 NOTE — PROGRESS NOTES
Pt blood transfusion paused during cardiac stress test, fear of infiltration, pt refused to let continue transfusion until new IV was placed, new IV placed by Felisa Melendez when returned to floor at 1630, hang time exceeded 4 hrs therefore we could not continue the infusion   320 ml of unit was infused

## 2018-10-04 NOTE — ASSESSMENT & PLAN NOTE
· 2/2 left renal artery stenosis  Patient follows Dr Stephanie Reyna as outpatient  · Aim to keep SBP around 140-160 given her chronic mesenteric ischemia per Dr Amy Burgos office note in 7/2018  · Initially hypertensive in the ED likely induced pain  Patient on Hydralazine, Losartan and Metoprolol  · Decreased to hydralazine to 25 mg p o  T i d ,hold for SBP < 160  DISCONTINUED Norvasc  · Continue metoprolol, but decreased to 50mg po daily, hold for systolic less than 866  · Hold losartan due to [facility]  · SBP dropped to 100-120's from 170-180's post procedure  Likely the cause of shock liver  · Monitor BP q 4 hours

## 2018-10-04 NOTE — ASSESSMENT & PLAN NOTE
· Echo on 06/12/2018 showed EF 55 % to 60 %,GIDD  · Repeat echo today showed EF 40%,G1DD  · Patient does not appear fluid overload on exam   · Continue metoprolol  · Telemetry  · Lasix 40 mg iv x 1 today  · Nuclear stress test today  · Daily weight  · Intake and output  · Appreciate Cardiology input

## 2018-10-04 NOTE — ASSESSMENT & PLAN NOTE
· WBC 21 79-> 13 17->12 26-> 9 03 ,UA showed WBC 4-10, small leukocytes  · Lactic acid normal on admission  Patient afebrile  · Patient does not meet sepsis criteria  Mild tachy HR > 90 likely 2/2 anemia  · Blood culture x 2 pending, repeat lactic acid normal   · Procalcitonin 0 24->0 44->0 43  · Patient started on IV Levaquin for UTI yesterday  Will continue  · Urine culture pending  · PVR 0   · Repeat procalcitonin tomorrow

## 2018-10-04 NOTE — PROGRESS NOTES
Progress Note - Anthony Zarate Race 80 y o  female MRN: [id number]    Unit/Bed#: [address] [place] [address] Encounter: [id number]      Subjective:   Patient is seen examined in bed  Continues with intermittent epigastric and right upper quadrant pain she was to have EGD today however repeat labs yesterday with AST of1,129 alt 741 total bili 2 0 they have come down today AST of 708, , total bilirubin 1 9  Additionally WBCs were elevated yesterday as well, hemoglobin had dropped to 6 9 she is status post 1 unit of PRBCs with repeat hemoglobin of 8 6 however this morning did drop to 8 0  Renal status is improving, there is no evidence of overt GI bleeding  Ultrasound of the abdomen with several small hypo echoic liver massmost likely hemangiomas, indeterminate 2 1 x 2 2 cm mass in the right lobe which was previously demonstrated on CT from 2016 unchanged in size, mild chronic intrahepatic ductal dilatation  There is gallbladder wall thickening with some small amount of josesito cholecystic fluid  She is quite asymptomatic denies any headache lightheadedness dizziness chest pain chest tightness shortness of breath, urine is clearer than yesterday she denies any itchy skin new joint pain bruising or new rashes  Objective:     Vitals: Blood pressure 126/58, pulse 72, temperature 98 3 °F (36 8 °C), temperature source Oral, resp  rate 17, height 5' 1" (1 549 m), weight 41 1 kg (90 lb 9 7 oz), SpO2 95 %  ,Body mass index is 17 12 kg/m²        Intake/Output Summary (Last 24 hours) at 10/04/18 1004  Last data filed at 10/03/18 1748   Gross per 24 hour   Intake              350 ml   Output             1350 ml   Net            -1000 ml       Gastrointestinal ROS:  Right upper quadrant and epigastric abdominal pain, change in bowel habits, or black or bloody stools      Physical:   General:  Well-appearing no acute distress appears comfortable, scleral icterus  Abdomen:  Soft mildly distended bowel sounds are positive x4 quads there is no guarding or rebound tenderness                  Current Facility-Administered Medications:     clopidogrel (PLAVIX) tablet 75 mg, 75 mg, Oral, Daily, STEVEN Alcala, 75 mg at 10/04/18 0941    docusate sodium (COLACE) capsule 100 mg, 100 mg, Oral, Daily, STEVEN Marin, 100 mg at 10/04/18 9223    hydrALAZINE (APRESOLINE) tablet 25 mg, 25 mg, Oral, TID, STEVEN Marin, 25 mg at 10/03/18 2153    HYDROmorphone (DILAUDID) injection 0 2 mg, 0 2 mg, Intravenous, Q3H PRN, STEVEN Marin    levofloxacin (LEVAQUIN) IVPB (premix) 250 mg, 250 mg, Intravenous, Q48H, STEVEN Marin    metoprolol succinate (TOPROL-XL) 24 hr tablet 50 mg, 50 mg, Oral, HS, STEVEN Marin    multivitamin-minerals (CENTRUM) tablet 1 tablet, 1 tablet, Oral, Daily, STEVEN Alcala, 1 tablet at 10/04/18 0939    oxyCODONE (ROXICODONE) IR tablet 2 5 mg, 2 5 mg, Oral, Q4H PRN, STEVEN Marin    oxyCODONE (ROXICODONE) IR tablet 5 mg, 5 mg, Oral, Q4H PRN, STEVEN Marin    pantoprazole (PROTONIX) injection 40 mg, 40 mg, Intravenous, Q12H [facility], STEVEN Marin, 40 mg at 10/04/18 0941    polyethylene glycol (MIRALAX) packet 17 g, 17 g, Oral, Daily PRN, STEVEN Alcala    Lab, Imaging and other studies:  Lab Results   Component Value Date    WBC 9 03 10/04/2018    HGB 8 0 (L) 10/04/2018    HCT 26 0 (L) 10/04/2018    MCV 77 (L) 10/04/2018     10/04/2018                                                              Lab Results   Component Value Date    GLUCOSE 124 01/01/2017    CALCIUM 8 2 (L) 10/04/2018     (L) 10/04/2018    K 3 7 10/04/2018    CO2 21 10/04/2018     10/04/2018    BUN 30 (H) 10/04/2018    CREATININE 1 10 10/04/2018       Lab Results   Component Value Date     (H) 10/04/2018     (H) 10/04/2018    ALKPHOS 64 10/04/2018           Assessment/Plan:  1   Abdominal pain, nausea, weight loss, anemia- she has a history of duodenal ulcer with perforation in 2017  She was to have EGD today for further evaluation however LFTs were markedly elevated with repeated labs yesterday  Ultrasound of the abdomen with several small  liver massmost likely hemangiomas, indeterminate 2 1 x 2 2 cm mass in the right lobe which was previously demonstrated on CT from 2016 unchanged in size, mild chronic intrahepatic ductal dilatation  Will hold off on EGD planned tentatively for tomorrow if okay with Cardiology, will order MRI MRCP for further evaluation rule out choledocholithiasis  The elevated LFTs may be related to congestive hepatopathy, versus shock liver from demand ischemia, as BP did drop yesterday  Will start her on clear liquids this was discussed in length with patient, nurse, and cardiology team here   2  History of chronic mesenteric ischemia status post stent placement in 2015  3  Elevated BNP EF of 45% history of congestive heart failure-cardiology has been consulted  4  Anemia -she will can be getting another unit PRBCs today continue to trend hemoglobin      Above case discussed with  Dr Leonora Keating all carrillo decisions made by him

## 2018-10-04 NOTE — ASSESSMENT & PLAN NOTE
· Improved today  Denies abdominal pain,reports mild tenderness to LUQ today  · CT abdomen and pelvis without contrast on admission negative for acute findings but with 7x5x4  ureteral stone with mild to moderate hydronephrosis  LA 0 7, Amylase WNL, Lipase elevated on admission  · S/P left ureteral stent placement 10/3  · History of perforated duodenal ulcer  · Patient seen by GI was planned for EGD today,but on hold due to shock liver  Tentatively for tomorrow if ok with Cardiology per GI   · RUQ US on 10/3 showed several small hyperechoic liver masses,most likely hemangiomas  Right lobe mass unchanged from 2016  Chronic mild intrahepatic ductal dilatation with no extrahepatic bile duct dilatation  Gallbladder wall thickening with small amount of pericholecystic fluid which is nonspecific findings  ·  MRI MRCP ordered by GI to r/o choledocholithiasis  · Clear liquid diet today  · Appreciate GI input

## 2018-10-04 NOTE — ASSESSMENT & PLAN NOTE
Cr 1 12-> 1 37-> 1 65-> 1 53->1 52 ->1 26->1 1  today  Likely multifactorial 2/2 ATN in the setting of obstruction and relative hypotension  Hold losartan  PVR was 0  Avoid nephrotoxins  Intake and output  Appreciate Nephrology input

## 2018-10-04 NOTE — PROGRESS NOTES
NEPHROLOGY PROGRESS NOTE    Nellie Bolus Race 80 y o  female MRN: [id number]  Unit/Bed#: [address] [place] [address] Encounter: [id number]  Reason for Consult:  Hyponatremia  ASSESSMENT/PLAN:  1  Hyponatremia  The patient had urinary studies that suggested inappropriate ADH stimulation  No other medications that I noted on outpatient could explain this so potentially was related to pain causing ADH release  The level slightly improved since admission but stable  Renal function is normal as well after relief of obstructing stone  Will give another dose of Lasix today and monitor  Of note patient also had significant increase in LFTs that are slowly improving  Lasix 40 mg IV x1  Monitor sodium concentration  SUBJECTIVE:  Review of Systems   Constitution: Negative for chills and fever  HENT: Negative  Eyes: Negative  Cardiovascular: Negative  Respiratory: Negative  Gastrointestinal: Negative  Genitourinary: Negative  OBJECTIVE:  Current Weight: Weight - Scale: 41 1 kg (90 lb 9 7 oz)  Vitals:Temp (24hrs), Av °F (36 7 °C), Min:97 6 °F (36 4 °C), Max:98 8 °F (37 1 °C)  Current: Temperature: 98 8 °F (37 1 °C)   Blood pressure 132/59, pulse 77, temperature 98 8 °F (37 1 °C), temperature source Oral, resp  rate 18, height 5' 1" (1 549 m), weight 41 1 kg (90 lb 9 7 oz), SpO2 94 %  , Body mass index is 17 12 kg/m²  Intake/Output Summary (Last 24 hours) at 10/04/18 0900  Last data filed at 10/03/18 1748   Gross per 24 hour   Intake              350 ml   Output             1350 ml   Net            -1000 ml       Physical Exam: /59 (BP Location: Left arm)   Pulse 77   Temp 98 8 °F (37 1 °C) (Oral)   Resp 18   Ht 5' 1" (1 549 m)   Wt 41 1 kg (90 lb 9 7 oz)   SpO2 94%   BMI 17 12 kg/m²   Physical Exam   Constitutional: No distress  HENT:   Mouth/Throat: No oropharyngeal exudate  Eyes: No scleral icterus  Neck: Neck supple  No JVD present  Cardiovascular: Normal rate    Exam reveals no friction rub  Pulmonary/Chest: Effort normal and breath sounds normal  No respiratory distress  She has no wheezes  She has no rales  Abdominal: Soft  Bowel sounds are normal  She exhibits no distension  There is no tenderness  There is no rebound         Medications:    Current Facility-Administered Medications:     clopidogrel (PLAVIX) tablet 75 mg, 75 mg, Oral, Daily, STEVEN Alcala, 75 mg at 10/03/18 0951    diphenhydrAMINE (BENADRYL) tablet 12 5 mg, 12 5 mg, Oral, Once, STEVEN Marin    docusate sodium (COLACE) capsule 100 mg, 100 mg, Oral, Daily, Yoli Benitez, STEVEN, 100 mg at 10/03/18 1906    furosemide (LASIX) injection 40 mg, 40 mg, Intravenous, Once, Jean Peña MD    hydrALAZINE (APRESOLINE) tablet 25 mg, 25 mg, Oral, TID, STEVEN Marin, 25 mg at 10/03/18 2153    HYDROmorphone (DILAUDID) injection 0 2 mg, 0 2 mg, Intravenous, Q3H PRN, STEVEN Marin    levofloxacin (LEVAQUIN) IVPB (premix) 250 mg, 250 mg, Intravenous, Q48H, STEVEN Marin    metoprolol succinate (TOPROL-XL) 24 hr tablet 50 mg, 50 mg, Oral, HS, STEVEN Marin    multivitamin-minerals (CENTRUM) tablet 1 tablet, 1 tablet, Oral, Daily, STEVEN Alcala, 1 tablet at 10/03/18 0951    oxyCODONE (ROXICODONE) IR tablet 2 5 mg, 2 5 mg, Oral, Q4H PRN, Yoli Benitez, STEVEN    oxyCODONE (ROXICODONE) IR tablet 5 mg, 5 mg, Oral, Q4H PRN, STEVEN Marin    pantoprazole (PROTONIX) injection 40 mg, 40 mg, Intravenous, Q12H [facility], STEVEN Marin    polyethylene glycol (MIRALAX) packet 17 g, 17 g, Oral, Daily PRN, STEVEN Rosales    Laboratory Results:  Lab Results   Component Value Date    WBC 9 03 10/04/2018    HGB 8 0 (L) 10/04/2018    HCT 26 0 (L) 10/04/2018    MCV 77 (L) 10/04/2018     10/04/2018     Lab Results   Component Value Date    GLUCOSE 124 01/01/2017    CALCIUM 8 2 (L) 10/04/2018     (L) 10/04/2018    K 3 7 10/04/2018    CO2 21 10/04/2018     10/04/2018 BUN 30 (H) 10/04/2018    CREATININE 1 10 10/04/2018     Lab Results   Component Value Date    CALCIUM 8 2 (L) 10/04/2018    PHOS 4 0 06/12/2018     No results found for: LABPROT

## 2018-10-04 NOTE — ASSESSMENT & PLAN NOTE
· CT scan of the abdomen and pelvis showed distal left ureteral stone measures approximately 7 x 5 x 4 mm resulting in mild to moderate left hydronephrosis  · S/P Holmium laser,stone extraction, stent placement 10/3/18  · F/U 1 week for stent removal   · Some gross hematuria post procedure  Not the source of her anemia per Urology  May continue Plavix per Urology    · Monitor hematuria

## 2018-10-04 NOTE — CONSULTS
Consultation - Cardiology   Delfina Segura Race 80 y o  female MRN: [id number]  Unit/Bed#: [facility] [facility] 413-01 Encounter: [id number]    Assessment/Plan     Assessment:  1  Generalized abdominal pain in patient with history of peptic ulcer disease and chronic mesenteric ischemia  2  Elevated LFTs post episode of hypotension  3  UTI with left kidney stone status post cystoscopy and placement of stent  4  Combined systolic and diastolic heart failure:  EF on echocardiogram now noted to be 40% with moderate diffuse hypokinesis of the left ventricle which is a change from echocardiogram of June 2018 where it was seen to be normal     5  Postprocedural anemia:  Patient receiving transfusion  6  History of right carotid stenosis    7  History of left renal artery stenosis     Plan:  Patient has been admitted to the hospitalist service  1  Would avoid profound hypotension in a patient with vascular disease on multiple beds  Norvasc is currently on hold and parameters placed for hydralazine and metoprolol  2  Upper endoscopy on hold per GI due to anemia and elevated LFTs  Plan for possible study tomorrow  Also planned for  MRI MRCP for further evaluation of liver  3  We will obtain a Lexiscan nuclear stress test today to evaluate for ischemia  4  Will continue to monitor patient and add heart failure medications as patient's blood pressure and condition tolerates  History of Present Illness   Physician Requesting Consult: Sky Ireland MD  Reason for Consult / Principal Problem:  Heart failure  HPI: Anjum Hennessy is a 80y o  year old female who initially presented on 10/01/2018 with left lower quadrant abdominal pain that radiated to her left flank  Patient does have a history of chronic mesenteric ischemia for which she has previously had a stent placed  She notes in addition to this abdominal pain which always was associated with meals she had decreased her intake and has lost approximately 8 lb to 1 month period of time  Initial testing in the emergency room found a large left-sided kidney stone  On October 10 patient underwent cystoscopy with removal of stone and stent placement  Postop patient experienced hypotension and a drop in her hemoglobin to 6 9  On evaluation patient was noted to have elevated LFTs concerning for possible shock liver  BNP was also obtained at 18,468  Echocardiogram was repeated and demonstrated an ejection fraction visually estimated at 40%  Moderate to diffuse hypokinesis of the left ventricle wall motion  This is a new finding when compared to echocardiogram performed in June of 2018  Patient was noted also grade 1 diastolic dysfunction moderate mitral valve regurgitation with restricted motion of the mitral valve moderate TR  On interview patient notes she has recently found it more difficult to perform her activities of daily living  She notes this chronic mesenteric ischemia which abdominal pain has worsened  She denies any epigastric pain or chest pain  Patient also has a history of right carotid stenosis which is felt is not intervenable  Significant history of GI issues with Stewart's esophagus, peptic ulcer disease with previous perforation in 2017  Troponins on admission were negative, 12 lead EKG showed sinus rhythm with presence of LVH and mild downward sloping depression of ST segment seen in V5 and 6  Inpatient consult to Cardiology  Consult performed by: Mihir Garcia ordered by: Boris Covarrubias          Review of Systems   Constitutional: Positive for activity change, fatigue and unexpected weight change  Negative for diaphoresis and fever  HENT: Negative  Eyes: Negative  Respiratory: Negative  Cardiovascular: Negative  Gastrointestinal: Positive for abdominal pain  Weight loss and abdominal pain associated with the intake of food in the lower quadrants   Endocrine: Negative  Genitourinary: Negative      Musculoskeletal: Positive for arthralgias, back pain and gait problem  Hematological: Negative  Psychiatric/Behavioral: Negative  Historical Information   Past Medical History:   Diagnosis Date    Acid reflux     Arthritis     Stewart's esophagus     COPD (chronic obstructive pulmonary disease) (HCC)     Duodenal ulcer     Hypertension     PAD (peripheral artery disease) (HCC)     Periodontal disease     Peripheral vascular disease (HCC)     Scoliosis      Past Surgical History:   Procedure Laterality Date    ANAL FISSURECTOMY      AORTA - SUPERIOR MESENTERIC ARTERY BYPASS GRAFT Bilateral     BREAST BIOPSY Right     FEMORAL ARTERY STENT Bilateral     HEMORROIDECTOMY      HYSTERECTOMY      INSERTION STENT ARTERY Left 1/1/2017    Procedure:  mesenteric arteriogram, balloon angioplasty of SMA stent;  Surgeon: Fady Spain MD;  Location:  MAIN OR;  Service:    Alec Florence LAPAROTOMY N/A 1/1/2017    Procedure: LAPAROTOMY EXPLORATORY,  abdominal washout, repair of duodenal ulcer;  Surgeon: Carmelo Burgos MD;  Location:  MAIN OR;  Service:     OK CYSTO/URETERO W/LITHOTRIPSY &INDWELL STENT INSRT Left 10/2/2018    Procedure: CYSTOSCOPY URETEROSCOPY WITH LITHOTRIPSY HOLMIUM LASER, RETROGRADE PYELOGRAM AND INSERTION STENT URETERAL;  Surgeon: Keerthi Rehman MD;  Location: [address] [place] [address];  Service: Urology     [address] Right 1/11/2016    Procedure: EXTRACTION INTRACAPSULAR CATARACT PHACO INTRAOCULAR LENS (IOL); Surgeon: Consuella Brunner, MD;  Location: [facility] MAIN OR;  Service: Ophthalmology    TONSILECTOMY AND ADNOIDECTOMY       History   Alcohol Use    0 6 oz/week    1 Glasses of wine per week     Comment: a glass wine daily   last drink 6/10/18     History   Drug Use No     History   Smoking Status    Former Smoker    Packs/day: 1 00    Years: 25 00    Quit date: [address] Never Used     Comment: smoked for 25 years  Family History: History reviewed   No pertinent family history  Meds/Allergies   all current active meds have been reviewed, current meds:   Current Facility-Administered Medications   Medication Dose Route Frequency    clopidogrel (PLAVIX) tablet 75 mg  75 mg Oral Daily    docusate sodium (COLACE) capsule 100 mg  100 mg Oral Daily    hydrALAZINE (APRESOLINE) tablet 25 mg  25 mg Oral TID    HYDROmorphone (DILAUDID) injection 0 2 mg  0 2 mg Intravenous Q3H PRN    levofloxacin (LEVAQUIN) IVPB (premix) 250 mg  250 mg Intravenous Q48H    metoprolol succinate (TOPROL-XL) 24 hr tablet 50 mg  50 mg Oral HS    multivitamin-minerals (CENTRUM) tablet 1 tablet  1 tablet Oral Daily    oxyCODONE (ROXICODONE) IR tablet 2 5 mg  2 5 mg Oral Q4H PRN    oxyCODONE (ROXICODONE) IR tablet 5 mg  5 mg Oral Q4H PRN    pantoprazole (PROTONIX) injection 40 mg  40 mg Intravenous Q12H [address]    polyethylene glycol (MIRALAX) packet 17 g  17 g Oral Daily PRN    and PTA meds:   Prior to Admission Medications   Prescriptions Last Dose Informant Patient Reported? Taking? Multiple Vitamin (MULTIVITAMIN) capsule 9/30/2018 at Unknown time Self Yes Yes   Sig: Take 1 capsule by mouth daily   VITAMIN D, ERGOCALCIFEROL, PO 10/1/2018 at 0800 Self Yes Yes   Sig: Take 2,000 Units by mouth daily     aspirin 81 mg chewable tablet  Self No No   Sig: Chew 1 tablet (81 mg total) daily for 100 days   atorvastatin (LIPITOR) 20 mg tablet  Self No No   Sig: Take 1 tablet (20 mg total) by mouth daily with dinner for 30 days   clopidogrel (PLAVIX) 75 mg tablet 10/1/2018 at 0800 Self Yes Yes   Sig: Take 75 mg by mouth daily  hydrALAZINE (APRESOLINE) 25 mg tablet 10/1/2018 at Unknown time Self No Yes   Sig: Take 1 tablet (25 mg total) by mouth 3 (three) times a day for 30 days    Hold if systolic blood pressure is < 120mmHg   losartan (COZAAR) 50 mg tablet 10/1/2018 at 0800 Self No Yes   Sig: Take 1 tablet by mouth daily for 30 days   Patient taking differently: Take 50 mg by mouth 2 (two) times a day metoprolol succinate (TOPROL-XL) 100 mg 24 hr tablet 9/30/2018 at Unknown time Self Yes Yes   Sig: Take 100 mg by mouth daily at bedtime   omeprazole (PriLOSEC) 40 MG capsule 9/30/2018 at Unknown time  Yes Yes   Sig: Take 40 mg by mouth daily      Facility-Administered Medications: None     Allergies   Allergen Reactions    Chlorthalidone Itching    Penicillins Itching     Tolerates zosyn    Sulfa Antibiotics Rash    Bacitracin Rash    Ciprofloxacin GI Intolerance       Objective   Vitals: Blood pressure (!) 182/72, pulse 71, temperature 98 5 °F (36 9 °C), temperature source Temporal, resp  rate 20, height 5' 1" (1 549 m), weight 41 1 kg (90 lb 9 7 oz), SpO2 98 %  Orthostatic Blood Pressures      Most Recent Value   Blood Pressure   182/72 filed at 10/04/2018 0930   Patient Position - Orthostatic VS  Lying filed at 10/04/2018 0930            Intake/Output Summary (Last 24 hours) at 10/04/18 1100  Last data filed at 10/03/18 1748   Gross per 24 hour   Intake              350 ml   Output             1350 ml   Net            -1000 ml       Invasive Devices     Peripheral Intravenous Line            Peripheral IV 10/01/18 Left Forearm 2 days          Drain            Ureteral Drain/Stent Left ureter 6 Fr  1 day                Physical Exam   Constitutional: She is oriented to person, place, and time  She appears well-developed and well-nourished  No distress  HENT:   Head: Normocephalic and atraumatic  Eyes: Pupils are equal, round, and reactive to light  Right eye exhibits no discharge  Left eye exhibits no discharge  Neck: Normal range of motion  Neck supple  No JVD present  No thyromegaly present  Cardiovascular: Normal rate, regular rhythm and normal pulses  Murmur heard  Systolic murmur is present with a grade of 2/6   Pulmonary/Chest: Effort normal and breath sounds normal  No respiratory distress  She has no wheezes  Abdominal: Soft  Bowel sounds are normal  She exhibits no distension  There is no tenderness  Musculoskeletal: She exhibits no edema  Neurological: She is alert and oriented to person, place, and time  Skin: Skin is warm and dry  She is not diaphoretic  Psychiatric: She has a normal mood and affect  Nursing note and vitals reviewed  Lab Results:   I have personally reviewed pertinent lab results  CBC with diff:   Results from last 7 days  Lab Units 10/04/18  0534   WBC Thousand/uL 9 03   RBC Million/uL 3 40*   HEMOGLOBIN g/dL 8 0*   HEMATOCRIT % 26 0*   MCV fL 77*   MCH pg 23 5*   MCHC g/dL 30 8*   RDW % 18 4*   MPV fL 10 0   PLATELETS Thousands/uL 255     CMP:   Results from last 7 days  Lab Units 10/04/18  0534   SODIUM mmol/L 132*   POTASSIUM mmol/L 3 7   CHLORIDE mmol/L 101   CO2 mmol/L 21   BUN mg/dL 30*   CREATININE mg/dL 1 10   CALCIUM mg/dL 8 2*   AST U/L 708*   ALT U/L 754*   ALK PHOS U/L 64   EGFR ml/min/1 73sq m 45     Troponin:   0  Lab Value Date/Time   TROPONINI <0 02 06/11/2018 2330   TROPONINI <0 02 06/11/2018 2004   TROPONINI <0 02 06/11/2018 1452   TROPONINI <0 02 01/17/2017 1529     BNP:   Results from last 7 days  Lab Units 10/04/18  0534   SODIUM mmol/L 132*   POTASSIUM mmol/L 3 7   CHLORIDE mmol/L 101   CO2 mmol/L 21   BUN mg/dL 30*   CREATININE mg/dL 1 10   CALCIUM mg/dL 8 2*   EGFR ml/min/1 73sq m 45     Coags:   Results from last 7 days  Lab Units 10/04/18  0534 10/03/18  1618   PTT seconds  --  24   INR  1 33* 1 30*     TSH:   Results from last 7 days  Lab Units 10/03/18  0050   TSH 3RD GENERATON uIU/mL 2 229     Magnesium:   Results from last 7 days  Lab Units 10/04/18  0534   MAGNESIUM mg/dL 1 6     Lipid Profile:     Imaging: I have personally reviewed pertinent reports  EKG:  Sinus rhythm borderline first-degree heart block:  Presence of LVH    Minimal ST flattening with downward sloping depressions seen V5 - V6  VTE Prophylaxis: Sequential compression device (Venodyne)     Code Status: Level 1 - Full Code  Advance Directive and Living Will:      Power of :    POLST:      Counseling / Coordination of Care  Total floor / unit time spent today 60 minutes  Greater than 50% of total time was spent with the patient and / or family counseling and / or coordination of care  A description of the counseling / coordination of care: Abnormal proBNP and change in echocardiogram with decrease in ejection fraction

## 2018-10-04 NOTE — ASSESSMENT & PLAN NOTE
· WBC 21 79-> 13 17->9 03 today  Patient afebrile  · Likely secondary to UTI  · CXR showed CHF  Cardiomegaly and chronic changes  · On IV Levaquin  Will stop flagyl per discussion with attending

## 2018-10-04 NOTE — PROGRESS NOTES
Progress Note - Urology   Sharmin Lout Race 80 y o  female MRN: [id number]  Unit/Bed#: [address] [place] [address] Encounter: [id number]    Assessment/Plan:  Ureteral stone  7mm distal left ureteral stone - underwent a holmium laser lithotripsy, stone extraction, and stent placement 10/03/18  GROVER improved  Creatinine 10/04/18 was 2 7  · F/u 1 wk out pt for stent removal    Abd pain  LUQ abd discomfort and nausea improved some today  · Scheduled for endoscopy     Anemia  Some gross hematuria from the stone treatment, but not main source of her anemia  Hematuria will resolve with stent removal in 1-2 wks  Subjective:  Seen on rounds and notes the following: Pt lying in bed in NAD  Notes some return of her hematuria but able to void without issues  Currently NPO for her upcoming endoscopy  Objective:  Vitals Last 24 hours:   Temp:  [97 6 °F (36 4 °C)-98 8 °F (37 1 °C)] 98 °F (36 7 °C)  HR:  [71-89] 71  Resp:  [17-20] 20  BP: (124-182)/(56-73) 152/63  BMI: Body mass index is 17 12 kg/m²  Physical Exam   Constitutional:   thin   HENT:   Head: Normocephalic  Pulmonary/Chest: Effort normal    Neurological: She is alert  Skin: Skin is warm  Psychiatric: She has a normal mood and affect  Vitals reviewed        Drains:   none    Lab Results and Cultures:     Results from last 7 days  Lab Units 10/04/18  0534 10/03/18  2202 10/03/18  1618 10/03/18  0948 10/03/18  0050   WBC Thousand/uL 9 03 12 26*  --   --  13 17*   RBC Million/uL 3 40* 3 62*  --   --  3 10*   HEMOGLOBIN g/dL 8 0* 8 6*  --   --  6 9*   HEMATOCRIT % 26 0* 26 5*  --   --  24 4*   PLATELETS Thousands/uL 255 309  --   --  300   POTASSIUM mmol/L 3 7  --  5 2 4 6 4 5   CHLORIDE mmol/L 101  --  101 101 101   CO2 mmol/L 21  --  18* 16* 15*   BUN mg/dL 30*  --  26* 24 21   CREATININE mg/dL 1 10  --  1 26 1 52* 1 53*   MAGNESIUM mg/dL 1 6  --   --  1 7  --    CALCIUM mg/dL 8 2*  --  8 8 8 2* 8 1*   AST U/L 708*  --  1,788* 1,129*  --    ALT U/L 754*  --  1,124* 741* --    ALK PHOS U/L 64  --  65 57  --    EGFR ml/min/1 73sq m 45  --  38 30 30   PTT seconds  --   --  24  --   --    INR  1 33*  --  1 30*  --   --                Results from last 7 days  Lab Units 10/02/18  0147   COLOR UA  Yellow   CLARITY UA  Clear   SPEC GRAV UA  1 020   PH UA  5 0   LEUKOCYTES UA  Small*   NITRITE UA  Negative   PROTEIN UA mg/dl Negative   GLUCOSE UA mg/dl Negative   KETONES UA mg/dl Trace*   UROBILINOGEN UA E U /dl 0 2   BILIRUBIN UA  Negative   BLOOD UA  Trace-Intact*       No results found for: URINECX      Intake/Output Summary (Last 24 hours) at 10/04/18 1147  Last data filed at 10/04/18 1100   Gross per 24 hour   Intake              350 ml   Output             1800 ml   Net            -1450 ml       Imaging:  CT abdomen/pelvis without contrast 10/01/2018  Distal left ureteral stone measures approximately 7 x 5 x 4 mm resulting in mild to moderate left hydronephrosis      Colonic diverticulosis   No evidence for acute diverticulitis  Holley Thompson PA-C  10/4/2018    Portions of the record may have been created with voice recognition software   Occasional wrong word or "sound alike" substitutions may have occurred due to the inherent limitations of voice recognition software   Read the chart carefully and recognize, using context, where substitutions have occurred

## 2018-10-04 NOTE — ASSESSMENT & PLAN NOTE
· Likely 2/2 relative hypotension post procedure  · History of chronic mesenteric ischemia,s/p SMA stent and history of HTN 2/2 left renal artery stenosis  Patient follows Dr Geri Perkins as outpatient for HTN  · Aim to keep SBP around 140-160 given her chronic mesenteric ischemia per Dr Kwon Ped office note in 7/2018  · SBP dropped to 100-120's from 170-180's post procedure  Likely the cause of shock liver  · AST 1129,,TB normal,direct bilirubin mild elevated  · LFTs improving  ,,TB 1 9 today  · Right upper quadrant ultrasound no acute findings  Tylenol level normal   · Avoid hepatotoxins  · Discontinued Norvasc  Hold hydralazine for SBP < 160  Hold metoprolol SBP < 160  Decreased metoprolol to 50 mg p o  HS  · Clear liquid diet  · Appreciate GI input

## 2018-10-04 NOTE — ASSESSMENT & PLAN NOTE
H&H 9 1/30 1  Microcytic hypochromic  Hb 7 7-> 8 2-> 6 9->8 6-> 8 0 today  B 12 966,folate > 20, iron 13, ferritin 15    stool occult blood x 3 pending  Received 1 unit of RBC on 10/3  Will order 1 unit of RBC today in view of gross hematuria  Will start patient  iron supplement once LFTs improves  Continue MVI  Tentative EGD in AM   IV Protonix b i d   Monitor H/H every 8 hours

## 2018-10-05 PROBLEM — E87.8 ELECTROLYTE IMBALANCE: Status: ACTIVE | Noted: 2018-01-01

## 2018-10-05 PROBLEM — D72.829 LEUKOCYTOSIS: Status: RESOLVED | Noted: 2018-01-01 | Resolved: 2018-01-01

## 2018-10-05 NOTE — ASSESSMENT & PLAN NOTE
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

## 2018-10-05 NOTE — PROGRESS NOTES
Progress Note - Eli Moon 9/10/1930, 80 y o  female MRN: [id number]    Unit/Bed#: [address] [address] Encounter: [id number]    Primary Care Provider: Jen Chacon MD   Date and time admitted to hospital: 10/1/2018  6:20 PM        * Shock liver   Assessment & Plan    · Likely 2/2 relative hypotension post procedure  · History of chronic mesenteric ischemia,s/p SMA stent and history of HTN 2/2 left renal artery stenosis  Patient follows Dr Kathrin Khan as outpatient for HTN  · Aim to keep SBP around 140-160 given her chronic mesenteric ischemia per Dr Liberty Urrutia office note in 7/2018  · SBP dropped to 100-120's from 170-180's post procedure  Likely the cause of shock liver  · AST 1129,,TB normal,direct bilirubin mild elevated  · ,,TB 1 9 on 10/5  Improving  · Right upper quadrant ultrasound no acute findings  Tylenol level normal   · Avoid hepatotoxins  · Discontinued Norvasc  Hold hydralazine for SBP < 160  Hold metoprolol SBP < 160  Decreased metoprolol to 50 mg p o  HS  · Diet per GI  · Appreciate GI input  Generalized abdominal pain   Assessment & Plan    · Resolved  Pain was located in left sided of abdomen  · CT abdomen and pelvis without contrast on admission negative for acute findings but with 7x5x4  ureteral stone with mild to moderate hydronephrosis  LA 0 7, Amylase WNL, Lipase elevated on admission  · S/P left ureteral stent placement 10/3  · History of perforated duodenal ulcer  · EGD today  · RUQ US on 10/3 showed several small hyperechoic liver masses,most likely hemangiomas  Right lobe mass unchanged from 2016  Chronic mild intrahepatic ductal dilatation with no extrahepatic bile duct dilatation  Gallbladder wall thickening with small amount of pericholecystic fluid which is nonspecific findings  ·  MRI MRCP ordered by GI to r/o choledocholithiasis, pending  · Appreciate GI input            UTI (urinary tract infection)   Assessment & Plan    · WBC 21 79-> 13 17->12 26-> 9 03 ,UA showed WBC 4-10, small leukocytes  · Lactic acid normal  Patient afebrile  · Patient does not meet sepsis criteria  Mild tachy HR > 90 likely 2/2 anemia  · Blood culture x 2 at 24 hours not growth  · Procalcitonin 0 24->0 44->0 43->0 27  · Patient started on IV Levaquin for UTI yesterday  Dose # 2   · Urine culture pending  · PVR 0  Nephrolithiasis   Assessment & Plan    · CT scan of the abdomen and pelvis showed distal left ureteral stone measures approximately 7 x 5 x 4 mm resulting in mild to moderate left hydronephrosis  · S/P Holmium laser,stone extraction, stent placement 10/3/18  · F/U 1 week for stent removal   · Some gross hematuria post procedure, improving  Not the source of her anemia per Urology  May continue Plavix per Urology  · Monitor hematuria     Hyponatremia   Assessment & Plan    · Na 128->130->131-> 132->132-> 136  today  · Urine sodium 108, urine Osmo 404, serum osmol 273, uric acid 3 2,TSH normal    · Suspect SIADH in the setting of pain  · Patient was on IV NS which was discontinued  · Lasix 20 mg iv x 1 during blood transfusion on 10/3  · Lasix 40mg iv x 1 per Nephrology on 10/4  · Appreciate Nephrology input  · Monitor       Acute kidney injury superimposed on chronic kidney disease ([facility] [address] )   Assessment & Plan    Cr 1 12-> 1 37-> 1 65-> 1 53->1 52 ->1 26->1 1->1 1  today  Likely multifactorial 2/2 ATN in the setting of obstruction and relative hypotension  Hold losartan  PVR was 0  Avoid nephrotoxins  Intake and output  Appreciate Nephrology input  Leukocytosisresolved as of 10/5/2018   Assessment & Plan    · WBC 21 79-> 13 17->9 03-> 8 53 today  Patient afebrile  · Likely secondary to UTI  · CXR showed CHF  Cardiomegaly and chronic changes  · On IV Levaquin,dose # 2  Anemia   Assessment & Plan    Microcytic hypochromic  Hb 9 1-> 7 7-> 8 2-> 6 9->8 6-> 8 0->10 5->10 1  today  B 12 966,folate > 20, iron 13, ferritin 15  Stool occult blood x 3 pending  Received 1 unit of RBC on 10/3 and 1 unit of RBC on 10/4  Hb stable since transfusion  Will start patient on iron supplement once LFTs improves  Continue MVI  EGD today  IV Protonix b i d  Repeat CBC in a m          Essential hypertension   Assessment & Plan    · 2/2 left renal artery stenosis  Patient follows Dr Miguel Ángel Dotson as outpatient  · Aim to keep SBP around 140-160 given her chronic mesenteric ischemia per Dr Carly Loyola office note in 7/2018  · Initially hypertensive in the ED likely induced pain  Patient on Hydralazine, Losartan and Metoprolol  · Decreased to hydralazine to 25 mg p o  T i d ,hold for SBP < 160  DISCONTINUED Norvasc  · Continue metoprolol, but decreased to 50mg po daily, hold for systolic less than 800  · Hold losartan due to [facility]  · SBP dropped to 100-120's from 170-180's post procedure  Likely the cause of shock liver  · Monitor BP q 4 hours  Electrolyte imbalance   Assessment & Plan    K 2 8,Mg 1 5  Replete  Repeat lab in a m        Weight loss, unintentional   Assessment & Plan    Admits to poor appetite secondary to generalized abdominal pain associated with nausea for months and weight loss of 6-8 lbs  Patient with history of mesenteric ischemia  CT scan showed no acute findings except for ureteral stone with hydronephrosis  · Monitor weight  · Nutritional consult  · GI on board  Chronic diastolic heart failure ([facility] [address] )   Assessment & Plan    · Echo on 06/12/2018 showed EF 55 % to 60 %,GIDD  · Repeat echo today showed EF 40%,G1DD  · Patient does not appear fluid overload on exam   · Continue metoprolol  · Telemetry showed NSR  · Lasix 40 mg iv x 1 on 10/4  · Nuclear stress test was normal   · Daily weight  · Intake and output  · Appreciate Cardiology input  Stenosis of right internal carotid artery   Assessment & Plan     History of 70-99% stenosis and was seen here with no vascular intervention      · Continue Plavix  · Hold Lipitor  Chronic mesenteric ischemia (HCC)   Assessment & Plan    · History of chronic mesenteric ischemia,s/p SMA stenting  · Continue Plavix,hold lipitor in view of shock liver  Acid reflux   Assessment & Plan    Patient on Omeprazole  Pepcid IV given in the ED  · IV Protonix  VTE Pharmacologic Prophylaxis:   Pharmacologic: Pharmacologic VTE Prophylaxis contraindicated due to anemia  Mechanical VTE Prophylaxis in Place: Yes    Patient Centered Rounds: I have performed bedside rounds with nursing staff today  Discussions with Specialists or Other Care Team Provider: RN     Education and Discussions with Family / Patient: yes    Time Spent for Care: 20 minutes  More than 50% of total time spent on counseling and coordination of care as described above  Current Length of Stay: 4 day(s)    Current Patient Status: Inpatient   Certification Statement: The patient will continue to require additional inpatient hospital stay due to Shock liver,anemia,UTI,CHF  Discharge Plan: pending PT OT EVAL  Code Status: Level 1 - Full Code      Subjective:   Patient denies left-sided abdominal pain  Reports mild right-sided abdominal pain likely from puling a muscle recently  Reports tolerated dinner last night  Denies nausea, vomiting, diarrhea constipation  Denies fever or chills  Denies chest pains, headaches, dizziness,, SOB  Objective:     Vitals:   Temp (24hrs), Av 4 °F (36 9 °C), Min:98 °F (36 7 °C), Max:98 7 °F (37 1 °C)    HR:  [71-82] 74  Resp:  [19-20] 19  BP: (139-197)/(60-77) 184/75  SpO2:  [94 %-97 %] 97 %  Body mass index is 16 61 kg/m²  Input and Output Summary (last 24 hours):        Intake/Output Summary (Last 24 hours) at 10/05/18 1210  Last data filed at 10/04/18 1630   Gross per 24 hour   Intake              320 ml   Output                0 ml   Net              320 ml       Physical Exam:     Physical Exam   Constitutional: She is oriented to person, place, and time  She appears well-developed  Patient underweight  HENT:   Head: Normocephalic and atraumatic  Neck: Normal range of motion  Neck supple  Cardiovascular: Normal rate and regular rhythm  Exam reveals no gallop and no friction rub  No murmur heard  Pulmonary/Chest: Effort normal and breath sounds normal  No respiratory distress  She has no wheezes  She has no rales  Abdominal: Soft  Bowel sounds are normal  She exhibits no distension  There is no tenderness  There is no rebound  Musculoskeletal: Normal range of motion  She exhibits no edema, tenderness or deformity  Neurological: She is alert and oriented to person, place, and time  Skin: Skin is warm and dry  Psychiatric: She has a normal mood and affect  Nursing note and vitals reviewed  Additional Data:     Labs:      Results from last 7 days  Lab Units 10/05/18  0503   WBC Thousand/uL 8 53   HEMOGLOBIN g/dL 10 1*   HEMATOCRIT % 31 5*   PLATELETS Thousands/uL 280   NEUTROS PCT % 73   LYMPHS PCT % 13*   MONOS PCT % 12   EOS PCT % 1       Results from last 7 days  Lab Units 10/05/18  0503   SODIUM mmol/L 136   POTASSIUM mmol/L 2 8*   CHLORIDE mmol/L 103   CO2 mmol/L 23   BUN mg/dL 29*   CREATININE mg/dL 1 10   CALCIUM mg/dL 8 2*   ALK PHOS U/L 69   ALT U/L 553*   AST U/L 303*       Results from last 7 days  Lab Units 10/05/18  0504   INR  1 22*       * I Have Reviewed All Lab Data Listed Above  * Additional Pertinent Lab Tests Reviewed: [facility]    Imaging:    Imaging Reports Reviewed Today Include: none  Imaging Personally Reviewed by Myself Includes:  none    Recent Cultures (last 7 days):       Results from last 7 days  Lab Units 10/03/18  2203   BLOOD CULTURE  No Growth at 24 hrs  No Growth at 24 hrs         Last 24 Hours Medication List:     Current Facility-Administered Medications:  clopidogrel 75 mg Oral Daily STEVEN Alcala    docusate sodium 100 mg Oral Daily Yoli Saltyk, CRNP    hydrALAZINE 25 mg Oral TID Cuiyin Giselarik, CRNP    HYDROmorphone 0 2 mg Intravenous Q3H PRN Cuiparul Benitez, CRNP    levofloxacin 250 mg Intravenous Q48H Cuiyin Yurik, CRNP Last Rate: 250 mg (10/04/18 1821)   magnesium sulfate 2 g Intravenous Once Cuijairon Giselarik, CRNP    metoprolol succinate 50 mg Oral HS Cuiyin Giselarik, CRNP    multivitamin-minerals 1 tablet Oral Daily Yolanda S Garcia, CRNP    oxyCODONE 2 5 mg Oral Q4H PRN Cuiparul Pondk, CRNP    oxyCODONE 5 mg Oral Q4H PRN Cuiparul Benitez, CRNP    pantoprazole 40 mg Intravenous Q12H [facility] Cuiyirj Pondk, CRNP    polyethylene glycol 17 g Oral Daily PRN Yolanda S Garcia, CRNP    potassium chloride 20 mEq Intravenous Once STEVEN Cat         Today, Patient Was Seen By: STEVEN Cat    ** Please Note: Dragon 360 Dictation voice to text software may have been used in the creation of this document   **

## 2018-10-05 NOTE — PROGRESS NOTES
Progress Note - Cardiology   [facility]     Jazmine Giraldo Race 80 y o  female MRN: [id number]  : 9/10/1930  Unit/Bed#: OR POOL Encounter: [id number]    Assessment and Plan:   1  Generalized abdominal pain in a patient with history of peptic ulcer disease and chronic mesenteric ischemia:  Patient underwent EGD today with Dr Kimberly Schultz and was found to have antral ulcers, multiple gastric AVMs, sloughing mucosa in the distal esophagus  To be started on b i d  PPI  2  Combined systolic and diastolic heart failure:  EF on echocardiogram now noted to be 40% with moderate diffuse hypokinesis of the left ventricle  Nuclear stress test demonstrated a moderate-sized severe fixed myocardial perfusion defect of the entire inferior wall question if this was due to GI activity  Results were discussed with patient  She would like to defer any further cardiac workup at this time  In light of her history of occlusive disease and other vascular beds there is concern she most likely has some left with coronary artery disease  Patient has been started on beta-blocker, hydralazine and nitrates at this time  Will to continue to monitor while in hospital and titrate as patient tolerates  Will need outpatient cardiac catheterization if patient agreeable  Patient should follow as an outpatient within 2 weeks of discharge from hospital   Previous Ace inhibitor was discontinued due to acute kidney injury and question of SIADH  3  Elevated LFTs post episode of hypotension:  Slowly returned to baseline  Goal is to keep systolic blood pressure greater than 135     4  UTI with left sided kidney stone status post cystoscopy and placement of stent    5  History of right carotid stenosis and left renal artery stenosis      6  Anemia most likely due to multiple gastric AVMs and antral ulcers  Subjective / Objective:   Patient seen examined    Results of nuclear stress test performed yesterday showed inferior wall defect but question whether this was due to bowel attenuation or an actual area of defect  In light of patient's decreased EF discussed with her possible further intervention with cardiac catheterization  Patient states at this time, she would like to complete her GI testing and go home and address any further cardiac workup as an outpatient  This is acceptable at this time as patient has been pain-free  Will start patient on appropriate medication and follow as an outpatient  Vitals: Blood pressure 170/74, pulse 82, temperature 98 3 °F (36 8 °C), resp  rate 16, height 5' 1" (1 549 m), weight 41 2 kg (90 lb 13 3 oz), SpO2 99 %  Vitals:    10/04/18 0600 10/05/18 0539   Weight: 41 1 kg (90 lb 9 7 oz) 41 2 kg (90 lb 13 3 oz)     Body mass index is 16 61 kg/m²  BP Readings from Last 3 Encounters:   10/05/18 170/74   07/30/18 (!) 180/80   06/26/18 (!) 200/90     Orthostatic Blood Pressures      Most Recent Value   Blood Pressure  170/74 filed at 10/05/2018 1340   Patient Position - Orthostatic VS  Lying filed at 10/05/2018 0815        I/O       10/03 0701 - 10/04 0700 10/04 0701 - 10/05 0700 10/05 0701 - 10/06 0700    P  O    0    I V  (mL/kg)   100 (2 4)    Blood 350 320     Total Intake(mL/kg) 350 (8 5) 320 (7 8) 100 (2 4)    Urine (mL/kg/hr) 1350 (1 4) 450 (0 5)     Total Output 1350 450      Net -1000 -130 +100               Invasive Devices     Peripheral Intravenous Line            Peripheral IV 10/05/18 Left Arm less than 1 day          Drain            Ureteral Drain/Stent Left ureter 6 Fr  2 days                  Intake/Output Summary (Last 24 hours) at 10/05/18 1353  Last data filed at 10/05/18 1342   Gross per 24 hour   Intake              420 ml   Output                0 ml   Net              420 ml         Physical Exam:   Physical Exam   Constitutional: She is oriented to person, place, and time  She appears well-developed and well-nourished  HENT:   Head: Normocephalic and atraumatic  Eyes: Pupils are equal, round, and reactive to light  Right eye exhibits no discharge  Left eye exhibits no discharge  Neck: Normal range of motion  Neck supple  No JVD present  No thyromegaly present  Cardiovascular: Normal rate, regular rhythm and intact distal pulses  Murmur heard  Systolic murmur is present with a grade of 2/6   Pulmonary/Chest: Effort normal and breath sounds normal  No respiratory distress  She has no wheezes  Abdominal: Soft  Bowel sounds are normal  She exhibits no distension  Musculoskeletal: She exhibits no edema  Neurological: She is alert and oriented to person, place, and time  Skin: Skin is warm and dry  Psychiatric: She has a normal mood and affect  Nursing note and vitals reviewed              Medications/ Allergies:     Current Facility-Administered Medications:  [MAR Hold] clopidogrel 75 mg Oral Daily STEVEN Alcala    [facility] Hold] docusate sodium 100 mg Oral Daily STEVEN Marin    [facility] Hold] hydrALAZINE 25 mg Oral TID STEVEN Marin    [facility] Hold] HYDROmorphone 0 2 mg Intravenous Q3H PRN STEVEN Marin    [facility] Hold] levofloxacin 250 mg Intravenous Q48H STEVEN Marin Last Rate: 250 mg (10/04/18 2241)   lidocaine 0 5 mL Infiltration Once PRN Isaiah Rajput MD    magnesium sulfate 2 g Intravenous Once STEVEN Miranda    [facility] Hold] metoprolol succinate 50 mg Oral HS STEVEN Miranda    [facility] Hold] multivitamin-minerals 1 tablet Oral Daily STEVEN Cronin    [facility] Hold] oxyCODONE 2 5 mg Oral Q4H PRN STEVEN Marin    [facility] Hold] oxyCODONE 5 mg Oral Q4H PRN STEVEN Marin    [facility] Hold] pantoprazole 40 mg Intravenous Q12H STEVEN Canela    [MAR Hold] polyethylene glycol 17 g Oral Daily PRN STEVEN Cronin    [facility] Hold] potassium chloride 20 mEq Intravenous Once STEVEN Marin    sodium chloride 75 mL/hr Intravenous Continuous Isaiah Rajput MD Last Rate: Stopped (10/05/18 1310) [MAR Hold] HYDROmorphone 0 2 mg Q3H PRN   lidocaine 0 5 mL Once PRN   [MAR Hold] oxyCODONE 2 5 mg Q4H PRN   [MAR Hold] oxyCODONE 5 mg Q4H PRN   [MAR Hold] polyethylene glycol 17 g Daily PRN     Allergies   Allergen Reactions    Chlorthalidone Itching    Penicillins Itching     Tolerates zosyn    Sulfa Antibiotics Rash    Bacitracin Rash    Ciprofloxacin GI Intolerance       VTE Pharmacologic Prophylaxis:   Sequential compression device (Venodyne)     Labs:     CBC with diff:  Results from last 7 days  Lab Units 10/05/18  0503 10/05/18  0038 10/04/18  0534 10/03/18  2202 10/03/18  0050 10/02/18  1611 10/02/18  0525 10/01/18  1904   WBC Thousand/uL 8 53  --  9 03 12 26* 13 17* 21 79* 9 77 8 20   HEMOGLOBIN g/dL 10 1* 10 5* 8 0* 8 6* 6 9* 8 2* 7 7* 9 1*   HEMATOCRIT % 31 5* 32 7* 26 0* 26 5* 24 4* 27 5* 26 1* 30 1*   MCV fL 77*  --  77* 73* 79* 76* 76* 75*   PLATELETS Thousands/uL 280  --  255 309 300 370 347 404*   MCH pg 24 6*  --  23 5* 23 8* 22 3* 22 8* 22 4* 22 8*   MCHC g/dL 32 1  --  30 8* 32 5 28 3* 29 8* 29 5* 30 2*   RDW % 18 7*  --  18 4* 18 8* 18 2* 17 8* 17 8* 17 9*   MPV fL 9 9  --  10 0 9 2 10 2 9 5 9 6 9 4   NRBC AUTO /100 WBCs 0  --  0  --  0  --   --  0       CMP:  Results from last 7 days  Lab Units 10/05/18  0503 10/04/18  0534 10/03/18  1618 10/03/18  0948 10/03/18  0050 10/02/18  1611 10/02/18  0525  10/01/18  1904   SODIUM mmol/L 136 132* 132* 131* 131* 131* 130*  < > 128*   POTASSIUM mmol/L 2 8* 3 7 5 2 4 6 4 5 4 6 4 6  --  4 4   CHLORIDE mmol/L 103 101 101 101 101 100 98*  --  95*   CO2 mmol/L 23 21 18* 16* 15* 20* 20*  --  20*   BUN mg/dL 29* 30* 26* 24 21 22 20  --  22   CREATININE mg/dL 1 10 1 10 1 26 1 52* 1 53* 1 65* 1 37*  --  1 12   CALCIUM mg/dL 8 2* 8 2* 8 8 8 2* 8 1* 8 4 8 5  --  9 2   AST U/L 303* 708* 1,788* 1,129*  --   --   --   --  18   ALT U/L 553* 754* 1,124* 741*  --   --   --   --  22   ALK PHOS U/L 69 64 65 57  --   --   --   --  69   EGFR ml/min/1 73sq m 45 45 38 30 30 28 34  --  44   < > = values in this interval not displayed  Magnesium:  Results from last 7 days  Lab Units 10/05/18  0503 10/04/18  0534 10/03/18  0948   MAGNESIUM mg/dL 1 5* 1 6 1 7     Coags:  Results from last 7 days  Lab Units 10/05/18  0504 10/04/18  0534 10/03/18  1618   PTT seconds  --   --  24   INR  1 22* 1 33* 1 30*     TSH:  Results from last 7 days  Lab Units 10/03/18  0050   TSH 3RD GENERATON uIU/mL 2 229     NT-proBNP:   Recent Labs      10/02/18   1611   NTBNP  18,468*        Imaging & Testing   I have personally reviewed pertinent reports  Xr Chest Portable    Result Date: 10/2/2018  Narrative: CHEST INDICATION:   Hx CHF  Low back pain radiates to abdomen  COMPARISON:  06/11/2018 EXAM PERFORMED/VIEWS:  XR CHEST PORTABLE 1 image FINDINGS: Cardiomediastinal silhouette appears enlarged  Chronic changes are present  The patient is in congestive heart failure  The lungs are clear  No pneumothorax  Minimal blunting of the costophrenic angles  Osseous structures appear within normal limits for patient age  Impression: CHF  Cardiomegaly and chronic changes  Workstation performed: [id number]     Xr Retrograde Pyelogram    Result Date: 10/3/2018  Narrative: RETROGRADE PYELOGRAM INDICATION: Urinary tract calculus  COMPARISON: CT performed October 1, 2018  IMAGES: 7 FLUOROSCOPY TIME:  40 SECONDS CONTRAST: 10 mL of Omnipaque 300 FINDINGS: Fluoroscopic guidance was provided for left-sided retrograde pyelogram  Final film depicts placement of ureteral stent  Impression: Fluoroscopic guidance provided for retrograde study  Please see procedure report for further details  Workstation performed: [id number]     Us Abdomen Limited    Result Date: 10/3/2018  Narrative: RIGHT UPPER QUADRANT ULTRASOUND INDICATION:    shock liver, elevated LFTs    COMPARISON:  Unenhanced CT abdomen and pelvis, renal stone study, from 10/1/2018  Enhanced CT of the abdomen and pelvis from December 31, 2016  TECHNIQUE:   Real-time ultrasound of the right upper quadrant was performed with a curvilinear transducer with both volumetric sweeps and still imaging techniques  FINDINGS: PANCREAS:  Pancreatic tail unremarkable  Body and head of the pancreas difficult to identify  AORTA AND IVC:  Extensive aortic atherosclerosis without aneurysm  IVC patent  LIVER: Size:  Within normal range  The liver measures 15 1 cm in the midclavicular line  Contour:  Surface contour is smooth  Parenchyma:  Echogenicity and echotexture are within normal limits  1 cm hyperechoic mass, most likely hemangioma, in the subcapsular portion of the lateral segment of the left lobe, unchanged in size since the CT from 2016  Several additional similar-appearing subcentimeter masses, most consistent with additional small  hemangiomas  2 1 x 2 2 cm poorly marginated mass in the right lobe, adjacent to the intrahepatic segment of the IVC, primarily hypoechoic to liver  This corresponds to an enhancing mass demonstrated on prior CT  Its appearance is nonspecific but it has not increased in size since 2016  Main portal vein patent with hepatopetal flow  GALLBLADDER: No gallstones  Gallbladder wall thickened, measuring  10 millimeters  Scattered hypoechoic foci in the wall, consistent with edema  Small amount of pericholecystic fluid  Sonographer reports no sonographic Hammond's sign  BILE DUCTS: No extrahepatic bile duct dilatation  CBD measures 2 mm  Mild intrahepatic bile duct dilatation, unchanged since the CT from 2016  No evidence of choledocholithiasis  RIGHT KIDNEY: Right kidney measures 7 9 x 3 7 cm  No mass, calculus or hydronephrosis  ASCITES:   None  Impression: 1  Several small hyperechoic liver masses, most likely hemangiomas  2   Indeterminate 2 1 x 2 2 cm mass in the right lobe, adjacent to the IVC, previously demonstrated on a CT of the abdomen and pelvis from 12/31/2016 and unchanged in size   3   Chronic mild intrahepatic End stage renal failure on dialysis ductal dilatation with no extrahepatic bile duct dilatation  4   Gallbladder wall thickening with small amount of pericholecystic fluid  This is a nonspecific finding that can be seen in acute and chronic cholecystitis, hepatitis, hypoalbuminemia and congestive heart failure, among other etiologies  Workstation performed: [id number]     Ct Renal Stone Study Abdomen Pelvis Wo Contrast    Result Date: 10/1/2018  Narrative: CT ABDOMEN AND PELVIS WITHOUT IV CONTRAST - LOW DOSE RENAL STONE INDICATION:   LT FLANK AND LUQ PAIN  COMPARISON:  None  TECHNIQUE:  Low dose thin section CT examination of the abdomen and pelvis was performed without intravenous or oral contrast according to a protocol specifically designed to evaluate for urinary tract calculus  Axial, sagittal, and coronal 2D reformatted images were created from the source data and submitted for interpretation  Evaluation for pathology in the abdomen and pelvis that is unrelated to urinary tract calculi is limited  Radiation dose length product (DLP) for this visit:  111 11 mGy-cm   This examination, like all CT scans performed in the [facility], was performed utilizing techniques to minimize radiation dose exposure, including the use of iterative  reconstruction and automated exposure control  FINDINGS: RIGHT KIDNEY AND URETER: No urinary tract calculi  No hydronephrosis or hydroureter  Linear calcifications likely vascular  LEFT KIDNEY AND URETER: Distal left ureteral stone identified with resultant mild to moderate left hydronephrosis  The stone measures 7 x 5 x 4 mm  No intrarenal stones are seen  [facility] URINARY BLADDER: Unremarkable  No significant abnormality in the visualized lung bases  Limited low radiation dose noncontrast CT evaluation demonstrates no clinically significant abnormality of liver, spleen, pancreas, or adrenal glands  No calcified gallstones or gallbladder wall thickening noted   No ascites or bulky lymphadenopathy on this limited noncontrast study  Colonic diverticula are noted, without evidence to suggest acute diverticulitis  Visualized bowel appears otherwise unremarkable  Limited evaluation demonstrates no evidence to suggest acute appendicitis  No acute fracture or destructive osseous lesion is identified  Impression: Distal left ureteral stone measures approximately 7 x 5 x 4 mm resulting in mild to moderate left hydronephrosis  Colonic diverticulosis  No evidence for acute diverticulitis  Workstation performed: [id number]     Radiology Results    Result Date: 10/4/2018  Narrative: Ordered by an unspecified provider  Cardiac testing:   Results for orders placed during the hospital encounter of 10/01/18   Echo complete with contrast if indicated    Narrative [address]  [address] [facility] [address]  [phone]    Transthoracic Echocardiogram  2D, M-mode, Doppler, and Color Doppler    Study date:  03-Oct-2018    Patient: Jt WATERS  MR number: [id number]  Account number: [de-identified]  : 10-Sep-1930  Age: 80 years  Gender: Female  Status: Inpatient  Location: Bedside  Height: 61 in  Weight: 88 9 lb  BP: 128/ 60 mmHg    Indications: Congestive Heart Failure    Diagnoses: I50 9 - Heart failure, unspecified    Sonographer:  ABDIRAHMAN Headley, RVS  Primary Physician:  Sam Moore MD  Referring Physician:  Rio Gomez MD  Group:  [facility]  Interpreting Physician:  Claudy Yi DO    SUMMARY    LEFT VENTRICLE:  Systolic function was moderately reduced  Ejection fraction was estimated to be 40 %  There was moderate diffuse hypokinesis with no identifiable regional variations  There was moderate concentric hypertrophy  Doppler parameters were consistent with abnormal left ventricular relaxation (grade 1 diastolic dysfunction)  LEFT ATRIUM:  The atrium was mildly dilated      MITRAL VALVE:  There was moderately reduced leaflet separation  There was moderately restricted mobility  There was moderate regurgitation  AORTIC VALVE:  The valve was trileaflet  Leaflets exhibited normal thickness, calcification, and moderately reduced cuspal separation  There was no evidence for stenosis  There was no regurgitation  TRICUSPID VALVE:  There was moderate regurgitation  Pulmonary artery systolic pressure was within the normal range  PULMONIC VALVE:  There was mild to moderate regurgitation  HISTORY: PRIOR HISTORY: PVD, COPD, GERD, Arthritis, Scoliosis    PROCEDURE: The procedure was performed at the bedside  This was a routine study  The transthoracic approach was used  The study included complete 2D imaging, M-mode, complete spectral Doppler, and color Doppler  The heart rate was 81 bpm,  at the start of the study  Images were obtained from the parasternal, apical, and subcostal acoustic windows  Images were not obtained from the suprasternal notch acoustic windows  Image quality was adequate  LEFT VENTRICLE: Size was normal  Systolic function was moderately reduced  Ejection fraction was estimated to be 40 %  There was moderate diffuse hypokinesis with no identifiable regional variations  There was moderate concentric  hypertrophy  DOPPLER: Doppler parameters were consistent with abnormal left ventricular relaxation (grade 1 diastolic dysfunction)  RIGHT VENTRICLE: The size was normal  Systolic function was normal  DOPPLER: Systolic pressure was within the normal range  LEFT ATRIUM: The atrium was mildly dilated  No thrombus was identified  RIGHT ATRIUM: Size was normal     MITRAL VALVE: Valve structure was normal  There was moderately reduced leaflet separation  There was moderately restricted mobility  No echocardiographic evidence for prolapse  DOPPLER: The transmitral velocity was within the normal range  There was no evidence for stenosis  There was moderate regurgitation      AORTIC VALVE: The valve was trileaflet  Leaflets exhibited normal thickness, calcification, and moderately reduced cuspal separation  DOPPLER: Transaortic velocity was within the normal range  There was no evidence for stenosis  There was  no regurgitation  TRICUSPID VALVE: The valve structure was normal  There was normal leaflet separation  DOPPLER: The transtricuspid velocity was within the normal range  There was moderate regurgitation  Pulmonary artery systolic pressure was within the  normal range  Estimated peak PA pressure was 37 mmHg  PULMONIC VALVE: Leaflets exhibited normal thickness, no calcification, and normal cuspal separation  DOPPLER: The transpulmonic velocity was within the normal range  There was mild to moderate regurgitation  PERICARDIUM: There was no thickening  There was no pericardial effusion  AORTA: The root exhibited normal size and fibrocalcific change  PULMONARY ARTERY: The size was normal  The morphology appeared normal     SYSTEM MEASUREMENT TABLES    2D mode  AoR Diam 2D: 2 9 cm  LA Diam (2D): 3 6 cm  LA/Ao (2D): 1 24  FS (2D Teich): 20 %  IVSd (2D): 1 27 cm  LVDEV: 80 8 cm³  LVEDV MOD BP: 96 cm³  LVESV: 47 4 cm³  LVIDd(2D): 4 25 cm  LVISd (2D): 3 4 cm  LVPWd (2D): 1 27 cm  SV (Teich): 33 4 cm³    Apical four chamber  LVEF A4C: 40 %    Apical two chamber  LVEF A2C: 45 %    Unspecified Scan Mode  MV Peak A Manuel: 1380 mm/s  MV Peak E Manuel  Mean: 983 mm/s  MVA (PHT): 4 23 cm squared  PHT: 75 ms  Max P mm[Hg]  V Max: 3080 mm/s  Vmax: 2680 mm/s  RA Area: 20 7 cm squared  RA Volume: 66 9 cm³  TAPSE: 1 6 cm    [facility] Accredited Echocardiography Laboratory    Prepared and electronically signed by    Deven Chacon DO  Signed 03-Oct-2018 14:43:04       Counseling / Coordination of Care  Total time spent today 20 minutes  Greater than 50% of total time was spent with the patient and / or family counseling and / or coordination of care        STEVEN Higginbotham        "This note has been constructed using a voice recognition system  Therefore there may be syntax, spelling, and/or grammatical errors   Please call if you have any questions  "

## 2018-10-05 NOTE — ASSESSMENT & PLAN NOTE
· 2/2 left renal artery stenosis  Patient follows Dr Luis Alberto Quinn as outpatient  · Aim to keep SBP around 140-160 given her chronic mesenteric ischemia per Dr Alla Krishnan office note in 7/2018  · Initially hypertensive in the ED likely induced pain  Patient on Hydralazine, Losartan and Metoprolol  · Decreased to hydralazine to 25 mg p o  T i d ,hold for SBP < 160  DISCONTINUED Norvasc  · Continue metoprolol, but decreased to 50mg po daily, hold for systolic less than 543  · Hold losartan due to [facility]  · SBP dropped to 100-120's from 170-180's post procedure  Likely the cause of shock liver  · Monitor BP q 4 hours

## 2018-10-05 NOTE — ASSESSMENT & PLAN NOTE
· Echo on 06/12/2018 showed EF 55 % to 60 %,GIDD  · Repeat echo today showed EF 40%,G1DD  · Patient does not appear fluid overload on exam   · Continue metoprolol  · Telemetry showed NSR  · Lasix 40 mg iv x 1 on 10/4  · Nuclear stress test was normal   · Daily weight  · Intake and output  · Appreciate Cardiology input

## 2018-10-05 NOTE — CASE MANAGEMENT
Continued Stay Review    Date: 10/5/18    Vital Signs: /74   Pulse 82   Temp 98 3 °F (36 8 °C)   Resp 16   Ht 5' 1" (1 549 m)   Wt 41 2 kg (90 lb 13 3 oz)   SpO2 99%   BMI 16 61 kg/m²     NPO  EGD  IV AMG SULFATE 2GM  IV KCL 20 MEQ   MRI ABDOMEN MRCQ  CMP MG CBC  PROCALCITONIN BMP  Medications:   Scheduled Meds:   Current Facility-Administered Medications:  clopidogrel 75 mg Oral Daily Yolandamelissa Robersona, CRNP    docusate sodium 100 mg Oral Daily Cuijairon Giselarik, CRNP    hydrALAZINE 25 mg Oral TID Cuijairon Giselarik, CRNP    HYDROmorphone 0 2 mg Intravenous Q3H PRN Cuiparul Pondk, CRNP    isosorbide mononitrate 30 mg Oral Daily Rogerio Pinch, CRNP    levofloxacin 250 mg Intravenous Q48H Cuiparul Benitez, CRNP Last Rate: 250 mg (10/04/18 2241)   metoprolol succinate 50 mg Oral HS Cuiparul Pondk, CRNP    multivitamin-minerals 1 tablet Oral Daily Yolanda S Garcia, CRNP    oxyCODONE 2 5 mg Oral Q4H PRN Cuiparul Yurik, CRNP    oxyCODONE 5 mg Oral Q4H PRN Cuiparul Higginsrik, CRNP    pantoprazole 40 mg Intravenous Q12H [facility] Cuiyin Giselarik, CRNP    polyethylene glycol 17 g Oral Daily PRN Yolanda S Agrcia, CRNP    potassium chloride 20 mEq Intravenous Once Cuiparul Benitez, CRNP    sodium chloride 75 mL/hr Intravenous Continuous Annamae Lanes, MD Last Rate: Stopped (10/05/18 1310)     Continuous Infusions:   sodium chloride 75 mL/hr Last Rate: Stopped (10/05/18 1310)     PRN Meds: HYDROmorphone    oxyCODONE    oxyCODONE    polyethylene glycol    Abnormal Labs/Diagnostic Results:     Age/Sex: 80 y o  female     Assessment/Plan:   Shock liver   Assessment & Plan     · Likely 2/2 relative hypotension post procedure  · History of chronic mesenteric ischemia,s/p SMA stent and history of HTN 2/2 left renal artery stenosis  Patient follows Dr Brittny Harris as outpatient for HTN  · Aim to keep SBP around 140-160 given her chronic mesenteric ischemia per Dr Gilbert Gonzales office note in 7/2018    · SBP dropped to 100-120's from 170-180's post procedure  Likely the cause of shock liver  · AST 1129,,TB normal,direct bilirubin mild elevated  · ,,TB 1 9 on 10/5  Improving  · Right upper quadrant ultrasound no acute findings  Tylenol level normal   · Avoid hepatotoxins  · Discontinued Norvasc  Hold hydralazine for SBP < 160  Hold metoprolol SBP < 160  Decreased metoprolol to 50 mg p o  HS  · Diet per GI  · Appreciate GI input   Generalized abdominal pain   Assessment & Plan     · Resolved  Pain was located in left sided of abdomen  · CT abdomen and pelvis without contrast on admission negative for acute findings but with 7x5x4  ureteral stone with mild to moderate hydronephrosis  LA 0 7, Amylase WNL, Lipase elevated on admission  · S/P left ureteral stent placement 10/3  · History of perforated duodenal ulcer  · EGD today  · RUQ US on 10/3 showed several small hyperechoic liver masses,most likely hemangiomas  Right lobe mass unchanged from 2016  Chronic mild intrahepatic ductal dilatation with no extrahepatic bile duct dilatation  Gallbladder wall thickening with small amount of pericholecystic fluid which is nonspecific findings  ·  MRI MRCP ordered by GI to r/o choledocholithiasis, pending  · Appreciate GI input  UTI (urinary tract infection)   Assessment & Plan     · WBC 21 79-> 13 17->12 26-> 9 03 ,UA showed WBC 4-10, small leukocytes  · Lactic acid normal  Patient afebrile  · Patient does not meet sepsis criteria  Mild tachy HR > 90 likely 2/2 anemia  · Blood culture x 2 at 24 hours not growth  · Procalcitonin 0 24->0 44->0 43->0 27  · Patient started on IV Levaquin for UTI yesterday  Dose # 2   · Urine culture pending  · PVR 0  Nephrolithiasis   Assessment & Plan     · CT scan of the abdomen and pelvis showed distal left ureteral stone measures approximately 7 x 5 x 4 mm resulting in mild to moderate left hydronephrosis  · S/P Holmium laser,stone extraction, stent placement 10/3/18    · F/U 1 week for stent removal   · Some gross hematuria post procedure, improving  Not the source of her anemia per Urology  May continue Plavix per Urology  · Monitor hematuria   Acute kidney injury superimposed on chronic kidney disease ([facility] [address] )   Assessment & Plan     Cr 1 12-> 1 37-> 1 65-> 1 53->1 52 ->1 26->1 1->1 1  today  Likely multifactorial 2/2 ATN in the setting of obstruction and relative hypotension  Hold losartan  PVR was 0  Avoid nephrotoxins  Intake and output  Appreciate Nephrology input  Anemia  Will start patient on iron supplement once LFTs improves  Continue MVI  EGD today  IV Protonix b i d  Repeat CBC in a      Electrolyte imbalance   Assessment & Plan     K 2 8,Mg 1 5  Replete  Repeat lab in a   Gerhardt Sep Certification Statement: The patient will continue to require additional inpatient hospital stay due to Shock liver,anemia,UTI,CHF  GI EGD  1  Sloughing mucosa in the distal esophagus  2  Hiatal hernia  3  Multiple gastric AVMs  4  Antral ulcers  Recommendations: 1  Continue BID PPI on discharge  2  Check H pylori fecal antigen and treat if positive  3  Repeat EGD in 4-6 weeks, preferably off Plavix, if possible  CARDIO   Combined systolic and diastolic heart failure:  EF on echocardiogram now noted to be 40% with moderate diffuse hypokinesis of the left ventricle  Nuclear stress test demonstrated a moderate-sized severe fixed myocardial perfusion defect of the entire inferior wall question if this was due to GI activity  Results were discussed with patient  She would like to defer any further cardiac workup at this time  In light of her history of occlusive disease and other vascular beds there is concern she most likely has some left with coronary artery disease  Patient has been started on beta-blocker, hydralazine and nitrates at this time  Will to continue to monitor while in hospital and titrate as patient tolerates    Will need outpatient cardiac catheterization if patient agreeable  Patient should follow as an outpatient within 2 weeks of discharge from hospital   Previous Ace inhibitor was discontinued due to acute kidney injury and question of SIADH

## 2018-10-05 NOTE — OP NOTE
OPERATIVE REPORT  PATIENT NAME: Char Amador    :  9/10/1930  MRN: [id number]  Pt Location: [facility] OR ROOM 02    SURGERY DATE: 10/5/2018    Surgeon(s) and Role:     * Elpidio Alas MD - Primary    Preop Diagnosis:  Generalized abdominal pain [R10 84]  History of Perforated duodenal ulcer ([address] ) [K26 5]    Post-Op Diagnosis Codes:     * Generalized abdominal pain [R10 84]     * Gastric ulcers         * Procedure(s) (LRB):  ESOPHAGOGASTRODUODENOSCOPY (EGD) (N/A)    Specimen(s):  * No specimens in log *    Estimated Blood Loss:   none    Drains:  Ureteral Drain/Stent Left ureter 6 Fr  (Active)   Site Assessment MICHELLE 10/2/2018  7:30 PM   Number of days: 3       Anesthesia Type:   IV Sedation with Anesthesia    Operative Indications:  Generalized abdominal pain [R10 84]  History of Perforated duodenal ulcer ([address] ) [K26 5]      Operative Findings:    1  Sloughing mucosa in the distal esophagus  2  Hiatal hernia  3  Multiple gastric AVMs  4  Antral ulcers  Recommendations: 1  Continue BID PPI on discharge  2  Check H pylori fecal antigen and treat if positive  3  Repeat EGD in 4-6 weeks, preferably off Plavix, if possible  Complications:   None    Procedure and Technique:  After adequate sedation by Anesthesia Service the Olympus gastroscope was advanced through the mouth to the 3rd portion of the duodenum and slowly withdrawn  All mucosal surfaces were carefully examined  The mucosa in the distal 3rd of the esophagus was noted to be sloughing without obvious evidence of inflammation or Stewart's esophagus  There was a 3-4 cm hiatal hernia  Multiple nonbleeding AVMs in the gastric corpus  Two superficial, irregular, medium-sized ulcers  in the gastric antrum with localized erythema  Duodenum appeared normal   Biopsies were not obtained at this time due to patient's continued use of Plavix      Patient Disposition:   Stable    SIGNATURE: Gerard Isabel MD  DATE: 2018  TIME: 1:16 PM

## 2018-10-05 NOTE — ASSESSMENT & PLAN NOTE
Microcytic hypochromic  Hb 9 1-> 7 7-> 8 2-> 6 9->8 6-> 8 0->10 5->10 1  today  B 12 966,folate > 20, iron 13, ferritin 15  Stool occult blood x 3 pending  Received 1 unit of RBC on 10/3 and 1 unit of RBC on 10/4  Hb stable since transfusion  Will start patient on iron supplement once LFTs improves  Continue MVI  EGD today  IV Protonix b i d  Repeat CBC in a [facility] Organ

## 2018-10-05 NOTE — PROGRESS NOTES
NEPHROLOGY PROGRESS NOTE    Sayda Govea Race 80 y o  female MRN: [id number]  Unit/Bed#: [address] [place] [address] Encounter: [id number]  Reason for Consult:  Hyponatremia    Patient is doing well states that her abdominal discomfort improved although occasionally has little bit her left side  No other complaints  ASSESSMENT/PLAN:  1  Renal  Patient at hyponatremia that responded to loop diuretic and at this point her sodium concentration has normalized  She may have had some non osmotic ADH release due to her pain on admission  At this point renal function and electrolytes have normalized except for hypokalemia and repletion is ordered per the primary team and they will monitor that level  Acute renal failure has resolved  No active renal issues will sign off please call if we can be of further assistance  SUBJECTIVE:  Review of Systems   Constitution: Negative for chills and fever  HENT: Negative  Eyes: Negative  Cardiovascular: Negative  Negative for chest pain, leg swelling and orthopnea  Respiratory: Negative for cough and shortness of breath  Gastrointestinal: Negative for bloating, abdominal pain, diarrhea and vomiting  Genitourinary: Negative  OBJECTIVE:  Current Weight: Weight - Scale: 41 2 kg (90 lb 13 3 oz)  Vitals:Temp (24hrs), Av 3 °F (36 8 °C), Min:98 °F (36 7 °C), Max:98 7 °F (37 1 °C)  Current: Temperature: 98 7 °F (37 1 °C)   Blood pressure (!) 184/75, pulse 74, temperature 98 7 °F (37 1 °C), temperature source Oral, resp  rate 19, height 5' 1" (1 549 m), weight 41 2 kg (90 lb 13 3 oz), SpO2 97 %  , Body mass index is 16 61 kg/m²        Intake/Output Summary (Last 24 hours) at 10/05/18 1039  Last data filed at 10/04/18 1630   Gross per 24 hour   Intake              320 ml   Output              450 ml   Net             -130 ml       Physical Exam: BP (!) 184/75 (BP Location: Left arm)   Pulse 74   Temp 98 7 °F (37 1 °C) (Oral)   Resp 19   Ht 5' 1" (1 549 m)   Wt 41 2 kg (90 lb 13 3 oz)   SpO2 97%   BMI 16 61 kg/m²   Physical Exam   Constitutional: She is oriented to person, place, and time  No distress  HENT:   Mouth/Throat: No oropharyngeal exudate  Eyes: No scleral icterus  Neck: Neck supple  No JVD present  Cardiovascular: Normal rate and regular rhythm  Exam reveals no friction rub  No edema  Pulmonary/Chest: Effort normal and breath sounds normal  No respiratory distress  She has no wheezes  She has no rales  Abdominal: Soft  Bowel sounds are normal  She exhibits no distension  There is no tenderness  There is no rebound  Neurological: She is alert and oriented to person, place, and time         Medications:    Current Facility-Administered Medications:     clopidogrel (PLAVIX) tablet 75 mg, 75 mg, Oral, Daily, Yolanda Garcia, STEVEN, 75 mg at 10/05/18 0858    docusate sodium (COLACE) capsule 100 mg, 100 mg, Oral, Daily, Yoli Higginsrifreedom, CRNP, 100 mg at 10/05/18 0858    hydrALAZINE (APRESOLINE) tablet 25 mg, 25 mg, Oral, TID, Yoli Higginsrik, CRNP, 25 mg at 10/05/18 0858    HYDROmorphone (DILAUDID) injection 0 2 mg, 0 2 mg, Intravenous, Q3H PRN, Yoli Higginsrik, CRNP    levofloxacin (LEVAQUIN) IVPB (premix) 250 mg, 250 mg, Intravenous, Q48H, Manjuiparul Higginsrik, CRNP, Last Rate: 50 mL/hr at 10/04/18 2241, 250 mg at 10/04/18 2241    magnesium sulfate 2 g/50 mL IVPB (premix) 2 g, 2 g, Intravenous, Once, Manjuiparul Higginsrik, CRNP, 2 g at 10/05/18 0857    metoprolol succinate (TOPROL-XL) 24 hr tablet 50 mg, 50 mg, Oral, HS, Manjuiparul Higginsrik, CRNP    multivitamin-minerals (CENTRUM) tablet 1 tablet, 1 tablet, Oral, Daily, Yolanda S Garcia, CRNP, 1 tablet at 10/05/18 0858    oxyCODONE (ROXICODONE) IR tablet 2 5 mg, 2 5 mg, Oral, Q4H PRN, Manjuiyin Yurik, CRNP    oxyCODONE (ROXICODONE) IR tablet 5 mg, 5 mg, Oral, Q4H PRN, STEVEN Marin    pantoprazole (PROTONIX) injection 40 mg, 40 mg, Intravenous, Q12H [facility], STEVEN Marin, 40 mg at 10/05/18 2128    polyethylene glycol (MIRALAX) packet 17 g, 17 g, Oral, Daily PRN, STEVEN Alcala    potassium chloride 20 mEq IVPB (premix), 20 mEq, Intravenous, Once, Last Rate: 50 mL/hr at 10/05/18 0857, 20 mEq at 10/05/18 0857 **FOLLOWED BY** potassium chloride 20 mEq IVPB (premix), 20 mEq, Intravenous, Once, STEVEN Marin    Laboratory Results:  Lab Results   Component Value Date    WBC 8 53 10/05/2018    HGB 10 1 (L) 10/05/2018    HCT 31 5 (L) 10/05/2018    MCV 77 (L) 10/05/2018     10/05/2018     Lab Results   Component Value Date    GLUCOSE 124 01/01/2017    CALCIUM 8 2 (L) 10/05/2018     10/05/2018    K 2 8 (L) 10/05/2018    CO2 23 10/05/2018     10/05/2018    BUN 29 (H) 10/05/2018    CREATININE 1 10 10/05/2018     Lab Results   Component Value Date    CALCIUM 8 2 (L) 10/05/2018    PHOS 4 0 06/12/2018     No results found for: LABPROT

## 2018-10-05 NOTE — ASSESSMENT & PLAN NOTE
· WBC 21 79-> 13 17->12 26-> 9 03 ,UA showed WBC 4-10, small leukocytes  · Lactic acid normal  Patient afebrile  · Patient does not meet sepsis criteria  Mild tachy HR > 90 likely 2/2 anemia  · Blood culture x 2 at 24 hours not growth  · Procalcitonin 0 24->0 44->0 43->0 27  · Patient started on IV Levaquin for UTI yesterday  Dose # 2   · Urine culture pending  · PVR 0

## 2018-10-05 NOTE — ASSESSMENT & PLAN NOTE
Cr 1 12-> 1 37-> 1 65-> 1 53->1 52 ->1 26->1 1->1 1  today  Likely multifactorial 2/2 ATN in the setting of obstruction and relative hypotension  Hold losartan  PVR was 0  Avoid nephrotoxins  Intake and output  Appreciate Nephrology input

## 2018-10-05 NOTE — ASSESSMENT & PLAN NOTE
· Resolved  Pain was located in left sided of abdomen  · CT abdomen and pelvis without contrast on admission negative for acute findings but with 7x5x4  ureteral stone with mild to moderate hydronephrosis  LA 0 7, Amylase WNL, Lipase elevated on admission  · S/P left ureteral stent placement 10/3  · History of perforated duodenal ulcer  · EGD today  · RUQ US on 10/3 showed several small hyperechoic liver masses,most likely hemangiomas  Right lobe mass unchanged from 2016  Chronic mild intrahepatic ductal dilatation with no extrahepatic bile duct dilatation  Gallbladder wall thickening with small amount of pericholecystic fluid which is nonspecific findings  ·  MRI MRCP ordered by GI to r/o choledocholithiasis, pending  · Appreciate GI input

## 2018-10-05 NOTE — ANESTHESIA PREPROCEDURE EVALUATION
Review of Systems/Medical History  Patient summary reviewed  Chart reviewed      Cardiovascular  EKG reviewed, Hypertension , Valvular heart disease , mitral regurgitation, tricuspid regurgitation and pulmonary regurgitation,   Comment: Carotid stenosis ,  Pulmonary  COPD moderate- medication dependent ,        GI/Hepatic    PUD, GERD , Liver disease , No hepatitis ,   Comment: Liver hemangioma      Kidney stones,        Endo/Other     GYN       Hematology  Anemia iron deficiency anemia,     Musculoskeletal    Arthritis     Neurology   Psychology           Physical Exam    Airway    Mallampati score: I  TM Distance: >3 FB  Neck ROM: full     Dental       Cardiovascular  Rhythm: regular, Rate: normal,     Pulmonary  Breath sounds clear to auscultation,     Other Findings  No loose teeth       Anesthesia Plan  ASA Score- 4     Anesthesia Type- IV sedation with anesthesia with ASA Monitors  Additional Monitors:   Airway Plan:         Plan Factors-    Induction- intravenous  Postoperative Plan-     Informed Consent- Anesthetic plan and risks discussed with patient  I personally reviewed this patient with the CRNA  Discussed and agreed on the Anesthesia Plan with the CRNA  Elina Lemon

## 2018-10-05 NOTE — ASSESSMENT & PLAN NOTE
· WBC 21 79-> 13 17->9 03-> 8 53 today  Patient afebrile  · Likely secondary to UTI  · CXR showed CHF  Cardiomegaly and chronic changes  · On IV Levaquin,dose # 2

## 2018-10-05 NOTE — ASSESSMENT & PLAN NOTE
· CT scan of the abdomen and pelvis showed distal left ureteral stone measures approximately 7 x 5 x 4 mm resulting in mild to moderate left hydronephrosis  · S/P Holmium laser,stone extraction, stent placement 10/3/18  · F/U 1 week for stent removal   · Some gross hematuria post procedure, improving  Not the source of her anemia per Urology  May continue Plavix per Urology    · Monitor hematuria

## 2018-10-05 NOTE — ANESTHESIA POSTPROCEDURE EVALUATION
Post-Op Assessment Note      CV Status:  Stable    Mental Status:  Alert    Hydration Status:  Stable    PONV Controlled:  Controlled    Airway Patency:  Patent    Post Op Vitals Reviewed: Yes          Staff: CRNA           BP     Temp      Pulse     Resp      SpO2

## 2018-10-05 NOTE — NURSING NOTE
Verified with Anshul Don regarding the IVF orders-preprocedure ordered to start from 2130;advised to hold off the fluids

## 2018-10-05 NOTE — ASSESSMENT & PLAN NOTE
· Likely 2/2 relative hypotension post procedure  · History of chronic mesenteric ischemia,s/p SMA stent and history of HTN 2/2 left renal artery stenosis  Patient follows Dr Dequan Ngo as outpatient for HTN  · Aim to keep SBP around 140-160 given her chronic mesenteric ischemia per Dr Camila Boyer office note in 7/2018  · SBP dropped to 100-120's from 170-180's post procedure  Likely the cause of shock liver  · AST 1129,,TB normal,direct bilirubin mild elevated  · ,,TB 1 9 on 10/5  Improving  · Right upper quadrant ultrasound no acute findings  Tylenol level normal   · Avoid hepatotoxins  · Discontinued Norvasc  Hold hydralazine for SBP < 160  Hold metoprolol SBP < 160  Decreased metoprolol to 50 mg p o  HS  · Diet per GI  · Appreciate GI input

## 2018-10-06 PROBLEM — E87.1 HYPONATREMIA: Status: RESOLVED | Noted: 2018-01-01 | Resolved: 2018-01-01

## 2018-10-06 PROBLEM — I48.0 PAROXYSMAL ATRIAL FIBRILLATION (HCC): Status: ACTIVE | Noted: 2018-01-01

## 2018-10-06 PROBLEM — N20.0 NEPHROLITHIASIS: Status: RESOLVED | Noted: 2018-01-01 | Resolved: 2018-01-01

## 2018-10-06 PROBLEM — D64.9 ANEMIA: Status: RESOLVED | Noted: 2018-01-01 | Resolved: 2018-01-01

## 2018-10-06 PROBLEM — R74.01 TRANSAMINITIS: Status: ACTIVE | Noted: 2018-01-01

## 2018-10-06 PROBLEM — I48.91 NEW ONSET ATRIAL FIBRILLATION (HCC): Status: ACTIVE | Noted: 2018-01-01

## 2018-10-06 PROBLEM — E87.8 ELECTROLYTE IMBALANCE: Status: RESOLVED | Noted: 2018-01-01 | Resolved: 2018-01-01

## 2018-10-06 NOTE — RAPID RESPONSE
Progress Note - Rapid Response/Transfer to ICU   Sirisha Garcia Race 80 y o  female MRN: [id number]    Time Called ([facility],[address] Time): 6742  Date Called: 10/6  Level of Care: MS  Room#: 473  [id number] Time ( Time): 3814  Event End Time ( Time): 0430  Primary reason for call: Acute change in HR  Interventions:  Airway/Breathing:  O2 Mask/Nasal  Circulation: IV Fluid Bolus and EKG  Other Treatments: N/A       New onset atrial fibrillation (HCC)   Assessment & Plan    · Rapid response on 10/06 for new onset atrial fibrillation with RVR  · Patient initially with heart rate 1 40 to 160s  She was relatively hemodynamically stable  · Patient was given IV metoprolol with little response  Patient then received 7 5 mg of IV Cardizem with a decrease in heart rate from the 120s to 1 100s  · Cardizem drip initiated  · Cardiology already following for new onset systolic heart failure  · Troponins pending  · Electrolytes within normal limits will replete when necessary  Nephrolithiasisresolved as of 10/6/2018   Assessment & Plan    · CT scan of the abdomen and pelvis showed distal left urethral stone measuring approximately 7 x 5 x 4 mm resulting in mild to moderate left hydronephrosis  · Status post Holmium laser stone extraction and stent placement on October 3rd  · Follow-up in 1 week for stent removal  · Patient was noted to have some gross hematuria post procedure  · Continue to monitor hematuria     Acute kidney injury superimposed on chronic kidney disease ([facility] [address] )   Assessment & Plan    · Likely multifactorial secondary to pre renal hypotension versus postrenal obstruction  · Baseline creatinine 0 8-1  · Most recent lab value creatinine 0 97  · Avoid nephrotoxin agents  · Strict I&Os  · Nephrology consulted and following     * Transaminitis   Assessment & Plan    · Improving  · Possibly multifactorial   · Per chart review, transaminitis could possibly be secondary to acute hepatitis    MRCP scheduled to evaluate for choledocholithiasis  · Due to an episode of hypotension during urology procedure, could consider ischemic etiology  · Gastroenterology following  · On admission patient's liver enzymes were normal and peak significantly into the 1 thousands  Chronic diastolic heart failure [facility])   Assessment & Plan    · Echo on June 12, 2018 demonstrated ejection fraction of 55-60% with grade 1 diastolic dysfunction  · Repeat echo October 3rd demonstrates new acute systolic function with an EF of 40% and grade 1 diastolic dysfunction  · Cardiology following  · Will continue current medication regimen however will change extended release tablet to immediate release b i d  Dosing  · On exam patient does not appear to be volume overloaded  · Will re-evaluate the necessity for diuretics  Chronic mesenteric ischemia (HCC)   Assessment & Plan    · History of chronic mesenteric ischemia status post mesenteric artery bypass graft and stent placement in approximately 2015  · Continue Plavix      Anemiaresolved as of 10/6/2018   Assessment & Plan    · Patient's hemoglobin initially on admission was 9 1  Her hemoglobin was noted to down trend to 6 9 on October 3rd for which she received 1 unit of packed red blood cells on October 4th  · EGD on October 5th demonstrated multiple nonbleeding AVMs  · Continue to monitor and trend  · Patient's hemoglobin currently 11 2     Hyponatremiaresolved as of 10/6/2018   Assessment & Plan    · Patient admitted with hyponatremia with a sodium of 128  Sodium 136  · Nephrology was consulted and there was suspicion for SIADH secondary to pain  · Patient received 40 mg of IV Lasix  · Will give an additional 40 mg of IV Lasix this morning  Generalized abdominal pain   Assessment & Plan    · Patient mid was admitted for abdominal pain and left-sided flank pain    · Per interview with patient she states that her left flank pain and abdominal pain has mostly resolved since her urology procedure  · Patient states she had been having generalized abdominal pain on off for months mostly after eating  · CT of the abdomen and pelvis without contrast demonstrated a ureteral stone with mild-to-moderate hydronephrosis  · Right upper quadrant ultrasound on October 3rd demonstrated several small hyper a cardiac 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  · Patient to have follow-up MRCP  · GI following     Stenosis of right internal carotid artery   Assessment & Plan    · Patient has history 70-99% stenosis  · Continue Plavix     Essential hypertension   Assessment & Plan    · On admission in the ED patient was initially hypertensive  · Patient normally on hydralazine losartan and metoprolol at home  · Will start on hold secondary to a KI  · Per chart review it was recommended during an office visit that patient's systolic blood pressure be maintained around 140-160 given her chronic arterial stenosis of multiple vessels including her mesenteric artery as well as left renal artery stenosis  · Patient had episode of hypotension during her urology procedure  Weight loss, unintentional   Assessment & Plan    · Patient states she has had a poor appetite secondary to her abdominal pain  · Per chart review and interview with the patient, patient had had nausea without vomiting for the past few months  · Nutrition consulted     Leukocytosisresolved as of 10/5/2018   Assessment & Plan    · Likely secondary to UTI       Acid reflux   Assessment & Plan    · Patient currently receiving IV Protonix b i d   · GI following  · Endoscopy done on October 5th demonstrated multiple nonbleeding AVMs  Patient does have history peptic ulcer bleed  · Patient usually on omeprazole at home              HPI/Chief Complaint (Background/Situation):     Heber Richmond is a 80y o  year old female  past medical history of hypertension, chronic diastolic heart failure, significant vascular disease including right carotid artery stenosis, history of aortoiliac occlusive disease with stenting, known renal artery stenosis, chronic mesenteric ischemia status post mesenteric artery bypass graft with status post stent placement in 2015 for which she is on Plavix, Stewart's esophagus, history of ruptured duodenal ulcer with repair, history of liver hemangioma, she presented to the ED on October 1st with abdominal pain and left flank pain  Lab demonstrated a KI leukocytosis  CT scan of the abdomen and pelvis without contrast showed a left ureteral stone with mild to moderate left hydronephrosis  She was admitted to the floor for further workup and monitoring  Urology was consulted  She had a holminum laser stone extraction and stent placement on October 3rd  Her abdominal pain continued even after stone extraction  Per chart review patient had a hypotensive episode during procedure which was thought to contribute to her elevated LFTs and possible shock liver  Gastroenterology was consulted  An EGD was performed October 5th and was found to have multiple nonbleeding gastric AVMs, Antral ulcers  Patient also had echo this admission which demonstrated a new acute systolic heart failure with an EF of 40% with moderate diffuse hypokinesis  Cardiology was consulted There is concern for underlying coronary artery disease  A rapid or a sponsor was called due to new onset atrial fibrillation with RVR  On arrival pt's heart rate was 150's to 170's  BP 82/54  Fluids infusing  Pt stated she felt a little dizzy  She denied chest pain or SOB  She stated she had a feeling of epigastric discomfort that resolved with burping   BP 90/42  Pt given 2 5 mg metoprolol  Cardizem IV 7 5 mg given   atrial fibrillation /58  Pt transferred to the ICU for further monitoring and workup           Historical Information   Past Medical History:   Diagnosis Date    Acid reflux     Arthritis     Stewart's esophagus     COPD (chronic obstructive pulmonary disease) (HCC)     Duodenal ulcer     Hypertension     PAD (peripheral artery disease) (HCC)     Periodontal disease     Peripheral vascular disease ([facility] [address] )     Scoliosis      Past Surgical History:   Procedure Laterality Date    ANAL FISSURECTOMY      AORTA - SUPERIOR MESENTERIC ARTERY BYPASS GRAFT Bilateral     BREAST BIOPSY Right     FEMORAL ARTERY STENT Bilateral     HEMORROIDECTOMY      HYSTERECTOMY      INSERTION STENT ARTERY Left 1/1/2017    Procedure:  mesenteric arteriogram, balloon angioplasty of SMA stent;  Surgeon: America Spain MD;  Location: [facility] MAIN OR;  Service:    Larry Pert LAPAROTOMY N/A 1/1/2017    Procedure: LAPAROTOMY EXPLORATORY,  abdominal washout, repair of duodenal ulcer;  Surgeon: Bharathi Saxena MD;  Location:  MAIN OR;  Service:     SC CYSTO/URETERO W/LITHOTRIPSY &INDWELL STENT INSRT Left 10/2/2018    Procedure: CYSTOSCOPY URETEROSCOPY WITH LITHOTRIPSY HOLMIUM LASER, RETROGRADE PYELOGRAM AND INSERTION STENT URETERAL;  Surgeon: aDquan Mendoza MD;  Location: [address] [place] [address];  Service: Urology     [address] Right 1/11/2016    Procedure: EXTRACTION INTRACAPSULAR CATARACT PHACO INTRAOCULAR LENS (IOL); Surgeon: Ryan Clarke MD;  Location: [facility] MAIN OR;  Service: Ophthalmology    TONSILECTOMY AND ADNOIDECTOMY       Social History   History   Alcohol Use    0 6 oz/week    1 Glasses of wine per week     Comment: a glass wine daily   last drink 6/10/18     History   Drug Use No     History   Smoking Status    Former Smoker    Packs/day: 1 00    Years: 25 00    Quit date: [facility] Never Used     Comment: smoked for 25 years       Family History: non-contributory    Meds/Allergies     Current Facility-Administered Medications:  clopidogrel 75 mg Oral Daily STEVEN Alcala    diltiazem 1-15 mg/hr Intravenous Titrated STEVEN Webb Last Rate: 7 mg/hr (10/06/18 8884) diltiazem 7 5 mg Intravenous Once Jennifer Ram, STEVEN    docusate sodium 100 mg Oral Daily Yoli Benitez, STEVEN    furosemide 40 mg Intravenous Once Adriana Shoemaker PA-C    isosorbide mononitrate 30 mg Oral Daily Rosana Taylor, STEVEN    levofloxacin 250 mg Intravenous Q48H Cuiparul Benitez, CRNP Last Rate: 250 mg (10/04/18 2241)   metoprolol tartrate 25 mg Oral Q12H [address] Jennifer Ram, STEVEN    multivitamin-minerals 1 tablet Oral Daily Yolanda S Garcia, STEVEN    oxyCODONE 2 5 mg Oral Q4H PRN Manjuiparul Pondk, CRDIANA    oxyCODONE 5 mg Oral Q4H PRN Cuiparul Pondk, CRDIANA    pantoprazole 40 mg Intravenous Q12H [address] Cuiyin Saltyk, CRNP    polyethylene glycol 17 g Oral Daily PRN Yolanda S Garcia, STEVEN    potassium chloride 40 mEq Oral Once Adriana Shoemaker PA-C          diltiazem 1-15 mg/hr Last Rate: 7 mg/hr (10/06/18 4238)       Allergies   Allergen Reactions    Chlorthalidone Itching    Penicillins Itching     Tolerates zosyn    Sulfa Antibiotics Rash    Bacitracin Rash    Ciprofloxacin GI Intolerance       Review of Systems   Constitutional: Positive for activity change, appetite change and fatigue  Negative for chills  HENT: Negative  Eyes: Negative  Respiratory: Negative  Cardiovascular: Negative  Gastrointestinal: Negative  Mild epigastric discomfort   Endocrine: Negative  Genitourinary: Negative  Musculoskeletal: Negative  Skin: Negative  Allergic/Immunologic: Negative  Neurological: Positive for dizziness  Hematological: Negative  Psychiatric/Behavioral: Negative  Physical Exam   Constitutional: She is oriented to person, place, and time  She appears well-developed  She appears cachectic  She is cooperative  She is easily aroused  She appears ill  No distress  Nasal cannula in place  HENT:   Head: Normocephalic and atraumatic     Mouth/Throat: Oropharynx is clear and moist and mucous membranes are normal    Eyes: Pupils are equal, round, and reactive to light  Neck: Trachea normal and normal range of motion  Neck supple  Cardiovascular: Intact distal pulses  An irregularly irregular rhythm present  Tachycardia present  Pulmonary/Chest: Effort normal  No respiratory distress  She has decreased breath sounds  Abdominal: Soft  There is no tenderness  Musculoskeletal: Normal range of motion  Neurological: She is alert, oriented to person, place, and time and easily aroused  She has normal strength  Skin: Skin is warm, dry and intact  Capillary refill takes less than 2 seconds  Psychiatric: She has a normal mood and affect  Her speech is normal          Intake/Output Summary (Last 24 hours) at 10/06/18 0653  Last data filed at 10/06/18 0601   Gross per 24 hour   Intake           103 49 ml   Output                0 ml   Net           103 49 ml       Respiratory    Lab Data (Last 4 hours)      10/06 0513            pH, Arterial       (!)7 455           pCO2, Arterial       (!)30 5           pO2, Arterial       (!)162 8           HCO3, Arterial       (!)21 0           Base Excess, Arterial       -2 1                O2/Vent Data     None              Invasive Devices     Peripheral Intravenous Line            Peripheral IV 10/05/18 Left Arm less than 1 day    Peripheral IV 10/06/18 Right Antecubital less than 1 day          Drain            Ureteral Drain/Stent Left ureter 6 Fr  3 days                DIAGNOSTIC DATA:    Lab: I have personally reviewed pertinent lab results     CBC:     Results from last 7 days  Lab Units 10/06/18  0351   WBC Thousand/uL 8 53   HEMOGLOBIN g/dL 11 2*   HEMATOCRIT % 36 1   PLATELETS Thousands/uL 316     CMP:     Results from last 7 days  Lab Units 10/06/18  0351 10/05/18  2156 10/05/18  0503 10/04/18  0534   SODIUM mmol/L 136 135* 136 132*   POTASSIUM mmol/L 4 4 4 0 2 8* 3 7   CHLORIDE mmol/L 104 103 103 101   CO2 mmol/L 20* 22 23 21   BUN mg/dL 20 23 29* 30*   CREATININE mg/dL 0 97 0 95 1 10 1 10   CALCIUM mg/dL 8 4 8 5 8 2* 8 2*   ALK PHOS U/L 68  --  69 64   ALT U/L 478*  --  553* 754*   AST U/L 194*  --  303* 708*     PT/INR:   Lab Results   Component Value Date    INR 1 11 10/06/2018   ,   Magnesium: No results found for: MAG,   Phosphorous: No results found for: PHOS    Microbiology:  Lab Results   Component Value Date    BLOODCX No Growth at 24 hrs  10/03/2018    BLOODCX No Growth at 24 hrs  10/03/2018    BLOODCX No Growth After 5 Days  01/17/2017    BLOODCX No Growth After 5 Days  01/17/2017    WOUNDCULT (A) 01/17/2017     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

## 2018-10-06 NOTE — ASSESSMENT & PLAN NOTE
· Likely multifactorial secondary to pre renal hypotension versus postrenal obstruction  · Baseline creatinine 0 8-1    · Most recent lab value creatinine 0 97  · Avoid nephrotoxin agents  · Strict I&Os  · Nephrology consulted and following

## 2018-10-06 NOTE — ASSESSMENT & PLAN NOTE
· Improving  · Possibly multifactorial   · Per chart review, transaminitis could possibly be secondary to acute hepatitis  MRCP from 10/05 negative for choledocholithiasis  · Due to an episode of hypotension during urology procedure, could consider ischemic etiology  · Gastroenterology following  · On admission patient's liver enzymes were normal and peak significantly into the 1 thousands

## 2018-10-06 NOTE — ASSESSMENT & PLAN NOTE
· Patient currently receiving IV Protonix b i d   · GI following  · Endoscopy done on October 5th demonstrated multiple nonbleeding AVMs  Patient does have history peptic ulcer bleed  · Patient usually on omeprazole at home

## 2018-10-06 NOTE — ASSESSMENT & PLAN NOTE
· Patient's hemoglobin initially on admission was 9 1  Her hemoglobin was noted to down trend to 6 9 on October 3rd for which she received 1 unit of packed red blood cells on October 4th  · EGD on October 5th demonstrated multiple nonbleeding AVMs    · Continue to monitor and trend  · Patient's hemoglobin currently 11 2

## 2018-10-06 NOTE — ASSESSMENT & PLAN NOTE
· Patient admitted with hyponatremia with a sodium of 128  Sodium 136  · Nephrology was consulted and there was suspicion for SIADH secondary to pain  · Patient received 40 mg of IV Lasix  · Will give an additional 40 mg of IV Lasix this morning

## 2018-10-06 NOTE — ASSESSMENT & PLAN NOTE
· On admission in the ED patient was initially hypertensive  · Patient normally on hydralazine losartan and metoprolol at home  · Will start on hold secondary to GROVER  · Per chart review it was recommended during an office visit that patient's systolic blood pressure be maintained around 140-160 given her chronic arterial stenosis of multiple vessels including her mesenteric artery as well as left renal artery stenosis  · Patient had episode of hypotension during her urology procedure

## 2018-10-06 NOTE — ASSESSMENT & PLAN NOTE
· Rapid response on 10/06 for new onset atrial fibrillation with RVR  · Patient initially with heart rate 140 to 160s  She was relatively hemodynamically stable  · Patient was given IV metoprolol with little response  Patient then received IV Cardizem with a decrease in heart rate from the 120s to 100s  · Cardizem drip initiated  · Cardiology already following for new onset systolic heart failure  · Pt converted to NSR at 1015 am on 10/6  · Continue PO Amiodarone  · Pt is a poor candidate for PCI  She has failed anticoagulation in the past  Hx of multiple AVM's and antral ulcers

## 2018-10-06 NOTE — ASSESSMENT & PLAN NOTE
· Echo on June 12, 2018 demonstrated ejection fraction of 55-60% with grade 1 diastolic dysfunction  · Repeat echo October 3rd demonstrates new acute systolic function with an EF of 40% and grade 1 diastolic dysfunction  · Cardiology following  · Will continue current medication regimen however will change extended release tablet to immediate release b i d  Dosing  · On exam patient does not appear to be volume overloaded  · Will re-evaluate the necessity for diuretics

## 2018-10-06 NOTE — ASSESSMENT & PLAN NOTE
· Patient states she has had a poor appetite secondary to her abdominal pain  · Per chart review and interview with the patient, patient had had nausea without vomiting for the past few months    · Nutrition consulted

## 2018-10-06 NOTE — ASSESSMENT & PLAN NOTE
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

## 2018-10-06 NOTE — ASSESSMENT & PLAN NOTE
· History of chronic mesenteric ischemia status post mesenteric artery bypass graft and stent placement in approximately 2015    · Continue Plavix

## 2018-10-06 NOTE — PROGRESS NOTES
Called by RN for new onset rapid atrial fibrillation  Upon arrival to the room, patient in the bathroom with a HR of 160  Patient reported feeling dizzy - assisted to bed  When placed back in bed, patient with a manual BP of 80/40 - RRT called  See RRT note

## 2018-10-06 NOTE — ASSESSMENT & PLAN NOTE
· CT scan of the abdomen and pelvis showed distal left urethral stone measuring approximately 7 x 5 x 4 mm resulting in mild to moderate left hydronephrosis  · Status post Holmium laser stone extraction and stent placement on October 3rd  · Follow-up in 1 week for stent removal  · Patient was noted to have some gross hematuria post procedure    · Continue to monitor hematuria

## 2018-10-07 PROBLEM — I50.43 ACUTE ON CHRONIC COMBINED SYSTOLIC AND DIASTOLIC HEART FAILURE (HCC): Status: ACTIVE | Noted: 2018-01-01

## 2018-10-07 PROBLEM — N18.9 ACUTE KIDNEY INJURY SUPERIMPOSED ON CHRONIC KIDNEY DISEASE (HCC): Status: RESOLVED | Noted: 2018-01-01 | Resolved: 2018-01-01

## 2018-10-07 PROBLEM — N17.9 ACUTE KIDNEY INJURY SUPERIMPOSED ON CHRONIC KIDNEY DISEASE (HCC): Status: RESOLVED | Noted: 2018-01-01 | Resolved: 2018-01-01

## 2018-10-07 NOTE — PROGRESS NOTES
ICU Progress Note - Cardiology   Lindsay Fletcher Race 80 y o  female MRN: [id number]  Unit/Bed#: ICU 02 Encounter: [id number]    Assessment/Plan:  Atrial fibrillation with rapid ventricular rate- new onset secondary to recent blood transfusion/volume overload? Spontaneous conversion this morning  Given her age, history of multiple grafts strict AVMs/ulcers, inability to tolerate anticoagulation will load the patient with amiodarone to prevent further recurrences  Patient has a prior history of elevated transaminitis which has been improving  We will monitor with amiodarone usage  We will hold any NOAC  Recommend amiodarone 200mg TID for seven days meals, 200mg BID for severe days with meals, and 200mg daily  Continue metoprolol 50mg bid  Acute likely ischemic systolic heart failure EF 40%- improved volume status The patient declines invasive workup currently? 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- continue PPI    Subjective/Objective   No afib overnight  CP free  Denies having currently shortness of breath  Currently in normal sinus rhythm      Objective:     Vitals: /70 (BP Location: Right arm)   Pulse 78   Temp 97 5 °F (36 4 °C) (Temporal)   Resp 18   Ht 5' 1" (1 549 m)   Wt 39 6 kg (87 lb 4 8 oz)   SpO2 99%   BMI 15 97 kg/m²   Vitals:    10/06/18 0600 10/07/18 0600   Weight: 39 kg (85 lb 15 7 oz) 39 6 kg (87 lb 4 8 oz)     Orthostatic Blood Pressures      Most Recent Value   Blood Pressure  157/70 filed at 10/07/2018 1200   Patient Position - Orthostatic VS  Sitting filed at 10/07/2018 1200            Intake/Output Summary (Last 24 hours) at 10/07/18 1427  Last data filed at 10/07/18 1426   Gross per 24 hour   Intake              700 ml   Output              400 ml   Net              300 ml       Invasive Devices     Peripheral Intravenous Line            Peripheral IV 10/05/18 Left Arm 2 days    Peripheral IV 10/06/18 Right Antecubital 1 day          Drain            Ureteral Drain/Stent Left ureter 6 Fr  4 days                Review of Systems: reviewed    Physical Exam   Constitutional: She is oriented to person, place, and time  No distress  HENT:   Head: Normocephalic and atraumatic  Right Ear: External ear normal    Left Ear: External ear normal    Eyes: Pupils are equal, round, and reactive to light  Conjunctivae are normal  Right eye exhibits no discharge  Left eye exhibits no discharge  No scleral icterus  Neck: Normal range of motion  Neck supple  No JVD present  No tracheal deviation present  No thyromegaly present  Cardiovascular: Normal rate and regular rhythm  Exam reveals gallop  Exam reveals no friction rub  Murmur heard  Pulmonary/Chest: Effort normal and breath sounds normal  No stridor  No respiratory distress  She has no wheezes  She has no rales  She exhibits no tenderness  Abdominal: Soft  Bowel sounds are normal  She exhibits no distension and no mass  There is no tenderness  There is no rebound and no guarding  Musculoskeletal: Normal range of motion  She exhibits no edema, tenderness or deformity  Neurological: She is alert and oriented to person, place, and time  She has normal reflexes  No cranial nerve deficit  She exhibits normal muscle tone  Coordination normal    Skin: Skin is warm and dry  No rash noted  She is not diaphoretic  No erythema  No pallor     Psychiatric: She has a normal mood and affect  Her behavior is normal  Judgment and thought content normal      Lab Results: I have personally reviewed pertinent lab results  Imaging: I have personally reviewed pertinent reports  EKG: reviewed  VTE Pharmacologic Prophylaxis: Sequential compression device (Venodyne)   VTE Mechanical Prophylaxis: sequential compression device    Counseling / Coordination of Care  Total time spent today 40 minutes  Greater than 50% of total time was spent with the patient and / or family counseling and / or coordination of care   A description of the counseling / coordination of care: afib with rvr

## 2018-10-07 NOTE — PROGRESS NOTES
The patient is transferred to [facility] via wheelchair in no distress  The patient is accompanied by CNA and her spouse

## 2018-10-07 NOTE — PROGRESS NOTES
Daily Progress Note - Critical Care/ [facility] Race 80 y o  female MRN: [id number]  Unit/Bed#: ICU 02 Encounter: [id number]    ______________________________________________________________________  Assessment:   Principal Problem:    Nephrolithiasis  Active Problems:    New onset atrial fibrillation ([facility] [address] )    Acute kidney injury superimposed on chronic kidney disease ([facility] [address] )    Transaminitis    Chronic mesenteric ischemia (HCC)    Acute on chronic combined systolic and diastolic heart failure (HCC)    Stenosis of right internal carotid artery    Generalized abdominal pain    UTI (urinary tract infection)    Essential hypertension    Weight loss, unintentional    Acid reflux  Resolved Problems:    Hyponatremia    Anemia    Leukocytosis    Electrolyte imbalance      New onset atrial fibrillation ([facility] [address] )   Assessment & Plan    · Rapid response on 10/06 for new onset atrial fibrillation with RVR  · Patient initially with heart rate 140 to 160s  She was relatively hemodynamically stable  · Patient was given IV metoprolol with little response  Patient then received IV Cardizem with a decrease in heart rate from the 120s to 100s  · Cardizem drip initiated  · Cardiology already following for new onset systolic heart failure  · Pt converted to NSR at 1015 am on 10/6  · Continue PO Amiodarone  · Pt is a poor candidate for PCI  She has failed anticoagulation in the past  Hx of multiple AVM's and antral ulcers  * Nephrolithiasis   Assessment & Plan    · CT scan of the abdomen and pelvis showed distal left urethral stone measuring approximately 7 x 5 x 4 mm resulting in mild to moderate left hydronephrosis  · Status post Holmium laser stone extraction and stent placement on October 3rd  · Follow-up in 1 week for stent removal  · Patient was noted to have some gross hematuria post procedure    · Continue to monitor hematuria     Transaminitis   Assessment & Plan    · Improving  · Possibly multifactorial   · Per chart review, transaminitis could possibly be secondary to acute hepatitis  MRCP from 10/05 negative for choledocholithiasis  · Due to an episode of hypotension during urology procedure, could consider ischemic etiology  · Gastroenterology following  · On admission patient's liver enzymes were normal and peak significantly into the 1 thousands  Acute kidney injury superimposed on chronic kidney disease ([address] )   Assessment & Plan    · Likely multifactorial secondary to pre renal hypotension versus postrenal obstruction  · Baseline creatinine 0 8-1  · Most recent lab value creatinine 0 97  · Avoid nephrotoxin agents  · Strict I&Os  · Nephrology consulted and following     Acute on chronic combined systolic and diastolic heart failure [facility])   Assessment & Plan    · Echo on June 12, 2018 demonstrated ejection fraction of 55-60% with grade 1 diastolic dysfunction  · Repeat echo October 3rd demonstrates new acute systolic function with an EF of 40% and grade 1 diastolic dysfunction  · Cardiology following  · Will continue current medication regimen however will change extended release tablet to immediate release b i d  Dosing  · On exam patient does not appear to be volume overloaded  · Will re-evaluate the necessity for diuretics  Chronic mesenteric ischemia (HCC)   Assessment & Plan    · History of chronic mesenteric ischemia status post mesenteric artery bypass graft and stent placement in approximately 2015  · Continue Plavix      Anemiaresolved as of 10/6/2018   Assessment & Plan    · Patient's hemoglobin initially on admission was 9 1  Her hemoglobin was noted to down trend to 6 9 on October 3rd for which she received 1 unit of packed red blood cells on October 4th  · EGD on October 5th demonstrated multiple nonbleeding AVMs    · Continue to monitor and trend  · Patient's hemoglobin currently 11 2     Hyponatremiaresolved as of 10/6/2018   Assessment & Plan    · Patient admitted with hyponatremia with a sodium of 128  Sodium 136  · Nephrology was consulted and there was suspicion for SIADH secondary to pain  · Patient received 40 mg of IV Lasix  · Will give an additional 40 mg of IV Lasix this morning  Generalized abdominal pain   Assessment & Plan    · Patient mid was admitted for abdominal pain and left-sided flank pain  · Per interview with patient she states that her left flank pain and abdominal pain has mostly resolved since her urology procedure  · Patient states she had been having generalized abdominal pain on off for months mostly after eating  · CT of the abdomen and pelvis without contrast demonstrated a ureteral stone with mild-to-moderate hydronephrosis  · Right upper quadrant ultrasound on October 3rd demonstrated several small hyper a cardiac 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  · Patient to have follow-up MRCP  · GI following     Stenosis of right internal carotid artery   Assessment & Plan    · Patient has history 70-99% stenosis  · Continue Plavix     UTI (urinary tract infection)   Assessment & Plan    · Pt noted to have UTI present on admission  · Continue Levaquin day 6     Essential hypertension   Assessment & Plan    · On admission in the ED patient was initially hypertensive  · Patient normally on hydralazine losartan and metoprolol at home  · Will start on hold secondary to GROVER  · Per chart review it was recommended during an office visit that patient's systolic blood pressure be maintained around 140-160 given her chronic arterial stenosis of multiple vessels including her mesenteric artery as well as left renal artery stenosis  · Patient had episode of hypotension during her urology procedure  Weight loss, unintentional   Assessment & Plan    · Patient states she has had a poor appetite secondary to her abdominal pain    · Per chart review and interview with the patient, patient had had nausea without vomiting for the past few months  · Nutrition consulted     Leukocytosisresolved as of 10/5/2018   Assessment & Plan    · Likely secondary to UTI       Acid reflux   Assessment & Plan    · Patient currently receiving IV Protonix b i d   · GI following  · Endoscopy done on October 5th demonstrated multiple nonbleeding AVMs  Patient does have history peptic ulcer bleed  · Patient usually on omeprazole at home  Plan:    Neuro:   · Delirium: CAM ICU positive no   · Pain controlled with:  Oxycodone IR  · Pain score: none  · Regulate sleep/wake cycle    CV:   · Cardiac infusions: Cardizem, stopped yesterday  · Rhythm: NSR  · Follow rhythm on telemetry    Pulm:   · Encourage incentive spirometer coughing and deep breathing    GI:   · Nutrition/diet plan:  Cardiac TLC 2 3 g sodium with fluid restriction 1500 mL  · Stress ulcer prophylaxis: Protonix IV  · Bowel regimen: Colace and Miralax  · Last BM: 10/6 medium soft brown    FEN:   · Fluid/Diuretic plan: Needs diuresis lasix IV  · Goal 24 hour fluid balance:  Net negative  · Electrolytes repleted: not applicable  · Goal: K >4 8, Mag >2 0, and Phos >3 0    :   · Indwelling Jalloh present: no   · Strict I&Os    ID:   · Abx ordered: Levaquin  · Day # 6 of 10 day course  · Trend temps and WBC count    Heme:   · Trend hgb and plts    Endo:   · Glycemic control plan: N/A    Msk/Skin:  · Mobility goal:  As tolerated  · PT consult: Yes  · OT consult: Yes  · Frequent turning and off-loading    Family:  · Family updated within 24 hours: yes   · Family meeting planned today: no     VTE Prophylaxis:  · Pharmacologic Prophylaxis: Reason for no pharmacologic prophylaxis Anemia possibly secondary to multiple gastric AVMs  · Mechanical Prophylaxis: sequential compression device    Disposition: Transfer to general medical floor with telemetry    Code Status: Level 1 - Full Code    Counseling / Coordination of Care  Total time spent today 32 minutes   Greater than 50% of total time was spent with the patient and / or family counseling and / or coordination of care  A description of the counseling / coordination of care: As discussed above and with care team  ______________________________________________________________________    HPI/24hr events: The patient was a rapid response early morning of 10 6 with a change of heart rate atrial fibrillation with RVR  Patient was started on Cardizem drip  Patient converted to normal sinus rhythm at 10:15 a m  Yesterday morning  No acute events overnight     ______________________________________________________________________    Physical Exam:   Physical Exam   Constitutional: She is oriented to person, place, and time  She appears well-developed  She appears cachectic  She is cooperative  She appears ill  HENT:   Head: Normocephalic and atraumatic  Mouth/Throat: Oropharynx is clear and moist and mucous membranes are normal    Eyes: Pupils are equal, round, and reactive to light  Conjunctivae are normal    Neck: Normal range of motion and full passive range of motion without pain  Cardiovascular: Regular rhythm and intact distal pulses  Murmur heard  Pulmonary/Chest: Effort normal  She has decreased breath sounds  Abdominal: Soft  Bowel sounds are decreased  There is no tenderness  Musculoskeletal: Normal range of motion  Neurological: She is alert and oriented to person, place, and time  GCS eye subscore is 4  GCS verbal subscore is 5  GCS motor subscore is 6  Skin: Skin is warm, dry and intact  Capillary refill takes less than 2 seconds         ______________________________________________________________________  Vitals:    10/06/18 2200 10/06/18 2230 10/06/18 2300 10/06/18 2330   BP: (!) 174/74 157/65 159/96 (!) 174/70   BP Location:       Pulse: 83 84 80 76   Resp: 19 (!) 40 (!) 26 (!) 25   Temp:       TempSrc:       SpO2: 95% 95% 95% 95%   Weight:       Height:                  Temperature:   Temp (24hrs), Av 9 °F (36 6 °C), Min:97 2 °F (36 2 °C), Max:98 6 °F (37 °C)    Current Temperature: 98 3 °F (36 8 °C)    Weights:   IBW: 47 8 kg    Body mass index is 15 73 kg/m²  Weight (last 2 days)     Date/Time   Weight    10/06/18 0600  39 (85 98)    10/05/18 0539  41 2 (90 83)              Hemodynamic Monitoring:  N/A       Non-Invasive/Invasive Ventilation Settings:  Respiratory    Lab Data (Last 4 hours)    None         O2/Vent Data (Last 4 hours)    None              Lab Results   Component Value Date    PHART 7 455 (H) 10/06/2018    YOX0ENP 30 5 (L) 10/06/2018    PO2ART 162 8 (H) 10/06/2018    QWR0ZKY 21 0 (L) 10/06/2018    BEART -2 1 10/06/2018    SOURCE Radial, Right 10/06/2018     SpO2: SpO2: 95 %    Intake and Outputs:  I/O       10/05 0701 - 10/06 0700 10/06 0701 - 10/07 0700    P  O  0 500    I V  (mL/kg) 103 5 (2 7)     IV Piggyback  100    Total Intake(mL/kg) 103 5 (2 7) 600 (15 4)    Urine (mL/kg/hr)  1350 (1 4)    Total Output   1350    Net +103 5 -750          Unmeasured Urine Occurrence 1 x 1 x    Unmeasured Stool Occurrence  1 x          Nutrition:        Diet Orders            Start     Ordered    10/06/18 1102  Diet Cardiac; Cardiac TLC 2 3 GM NA; Fluid Restriction 1500 ML  Diet effective now     Question Answer Comment   Diet Type Cardiac    Cardiac Cardiac TLC 2 3 GM NA    Other Restriction(s): Fluid Restriction 1500 ML    RD to adjust diet per protocol?  Yes        10/06/18 1101    10/02/18 1157  Room Service  Once     Question:  Type of Service  Answer:  Room Service - Appropriate with Assistance    10/02/18 1156          Labs:     Results from last 7 days  Lab Units 10/06/18  0351 10/05/18  0503 10/05/18  0038 10/04/18  0534 10/03/18  2202   WBC Thousand/uL 8 53 8 53  --  9 03 12 26*   HEMOGLOBIN g/dL 11 2* 10 1* 10 5* 8 0* 8 6*   HEMATOCRIT % 36 1 31 5* 32 7* 26 0* 26 5*   PLATELETS Thousands/uL 316 280  --  255 309   NEUTROS PCT %  --  73  --  82* 87*   MONOS PCT %  --  12  --  9 7       Results from last 7 days  Lab Units 10/06/18  0351 10/05/18  2156 10/05/18  0503 10/04/18  0534   SODIUM mmol/L 136 135* 136 132*   POTASSIUM mmol/L 4 4 4 0 2 8* 3 7   CHLORIDE mmol/L 104 103 103 101   CO2 mmol/L 20* 22 23 21   BUN mg/dL 20 23 29* 30*   CREATININE mg/dL 0 97 0 95 1 10 1 10   CALCIUM mg/dL 8 4 8 5 8 2* 8 2*   ALK PHOS U/L 68  --  69 64   ALT U/L 478*  --  553* 754*   AST U/L 194*  --  303* 708*       Results from last 7 days  Lab Units 10/06/18  0351 10/05/18  2156 10/05/18  0503   MAGNESIUM mg/dL 2 0 2 0 1 5*     Lab Results   Component Value Date    PHOS 4 0 06/12/2018    PHOS 3 3 01/19/2017    PHOS 2 7 01/02/2017        Results from last 7 days  Lab Units 10/06/18  0351 10/05/18  0504 10/04/18  0534 10/03/18  1618   INR  1 11 1 22* 1 33* 1 30*   PTT seconds  --   --   --  24       0  Lab Value Date/Time   TROPONINI 1 46 (H) 10/06/2018 1058   TROPONINI 1 59 (H) 10/06/2018 0813   TROPONINI 1 27 (H) 10/06/2018 0511   TROPONINI <0 02 06/11/2018 2330   TROPONINI <0 02 06/11/2018 2004   TROPONINI <0 02 06/11/2018 1452   TROPONINI <0 02 01/17/2017 1529       Results from last 7 days  Lab Units 10/06/18  0511 10/04/18  0534 10/03/18  1618   LACTIC ACID mmol/L 1 5 1 1 1 5     ABG:  Lab Results   Component Value Date    PHART 7 455 (H) 10/06/2018    ISY2OJQ 30 5 (L) 10/06/2018    PO2ART 162 8 (H) 10/06/2018    [id number] 21 0 (L) 10/06/2018    BEART -2 1 10/06/2018    SOURCE Radial, Right 10/06/2018       Imaging:   MRI abdomen wo contrast and mrcp   Final Result      There is no biliary dilation seen and there is no abnormal dilation of the common bile duct  There is no evidence of choledocholithiasis      Suboptimal evaluation of the intrahepatic ducts and common hepatic duct, however there are no findings to suggest intrahepatic      Mild to moderate left hydronephrosis and hydroureter        Multiple T2 hyperintense hepatic lesions which are stable since the previous study of October 9, 2014 likely hemangiomas               Workstation performed: [id number]         RADIOLOGY RESULTS   Final Result      US abdomen limited   Final Result      1  Several small hyperechoic liver masses, most likely hemangiomas  2   Indeterminate 2 1 x 2 2 cm mass in the right lobe, adjacent to the IVC, previously demonstrated on a CT of the abdomen and pelvis from 2016 and unchanged in size  3   Chronic mild intrahepatic ductal dilatation with no extrahepatic bile duct dilatation  4   Gallbladder wall thickening with small amount of pericholecystic fluid  This is a nonspecific finding that can be seen in acute and chronic cholecystitis, hepatitis, hypoalbuminemia and congestive heart failure, among other etiologies  Workstation performed: [id number]         XR retrograde pyelogram   Final Result      Fluoroscopic guidance provided for retrograde study  Please see procedure report for further details  Workstation performed: [id number]         XR chest portable   Final Result      CHF  Cardiomegaly and chronic changes  Workstation performed: [id number]         CT renal stone study abdomen pelvis wo contrast   Final Result      Distal left ureteral stone measures approximately 7 x 5 x 4 mm resulting in mild to moderate left hydronephrosis  Colonic diverticulosis  No evidence for acute diverticulitis  Workstation performed: [id number]            I have personally reviewed pertinent reports      ECHO:  Study date:  03-Oct-2018     Patient: Morales Fast RACE  MR number: [id number]  Account number: [de-identified]  : 10-Sep-1930  Age: 80 years  Gender: Female  Status: Inpatient  Location: Bedside  Height: 61 in  Weight: 88 9 lb  BP: 128/ 60 mmHg     Indications: Congestive Heart Failure     Diagnoses: I50 9 - Heart failure, unspecified     Sonographer:  ABDIRAHMAN Sutherland, RVS  Primary Physician:  Ilene Bose MD  Referring Physician:  Carl Bonilla MD  Group:  [facility]  Interpreting Physician:  DO LEIF Jorgensen     LEFT VENTRICLE:  Systolic function was moderately reduced  Ejection fraction was estimated to be 40 %  There was moderate diffuse hypokinesis with no identifiable regional variations  There was moderate concentric hypertrophy  Doppler parameters were consistent with abnormal left ventricular relaxation (grade 1 diastolic dysfunction)      LEFT ATRIUM:  The atrium was mildly dilated      MITRAL VALVE:  There was moderately reduced leaflet separation  There was moderately restricted mobility  There was moderate regurgitation      AORTIC VALVE:  The valve was trileaflet  Leaflets exhibited normal thickness, calcification, and moderately reduced cuspal separation  There was no evidence for stenosis  There was no regurgitation      TRICUSPID VALVE:  There was moderate regurgitation  Pulmonary artery systolic pressure was within the normal range      PULMONIC VALVE:  There was mild to moderate regurgitation      HISTORY: PRIOR HISTORY: PVD, COPD, GERD, Arthritis, Scoliosis     PROCEDURE: The procedure was performed at the bedside  This was a routine study  The transthoracic approach was used  The study included complete 2D imaging, M-mode, complete spectral Doppler, and color Doppler  The heart rate was 81 bpm,  at the start of the study  Images were obtained from the parasternal, apical, and subcostal acoustic windows  Images were not obtained from the suprasternal notch acoustic windows  Image quality was adequate      LEFT VENTRICLE: Size was normal  Systolic function was moderately reduced  Ejection fraction was estimated to be 40 %  There was moderate diffuse hypokinesis with no identifiable regional variations  There was moderate concentric  hypertrophy   DOPPLER: Doppler parameters were consistent with abnormal left ventricular relaxation (grade 1 diastolic dysfunction)      RIGHT VENTRICLE: The size was normal  Systolic function was normal  DOPPLER: Systolic pressure was within the normal range      LEFT ATRIUM: The atrium was mildly dilated  No thrombus was identified      RIGHT ATRIUM: Size was normal      MITRAL VALVE: Valve structure was normal  There was moderately reduced leaflet separation  There was moderately restricted mobility  No echocardiographic evidence for prolapse  DOPPLER: The transmitral velocity was within the normal range  There was no evidence for stenosis  There was moderate regurgitation      AORTIC VALVE: The valve was trileaflet  Leaflets exhibited normal thickness, calcification, and moderately reduced cuspal separation  DOPPLER: Transaortic velocity was within the normal range  There was no evidence for stenosis  There was  no regurgitation      TRICUSPID VALVE: The valve structure was normal  There was normal leaflet separation  DOPPLER: The transtricuspid velocity was within the normal range  There was moderate regurgitation  Pulmonary artery systolic pressure was within the  normal range  Estimated peak PA pressure was 37 mmHg      PULMONIC VALVE: Leaflets exhibited normal thickness, no calcification, and normal cuspal separation  DOPPLER: The transpulmonic velocity was within the normal range  There was mild to moderate regurgitation      PERICARDIUM: There was no thickening  There was no pericardial effusion      AORTA: The root exhibited normal size and fibrocalcific change      PULMONARY ARTERY: The size was normal  The morphology appeared normal      SYSTEM MEASUREMENT TABLES     2D mode  AoR Diam 2D: 2 9 cm  LA Diam (2D): 3 6 cm  LA/Ao (2D): 1 24  FS (2D Teich): 20 %  IVSd (2D): 1 27 cm  LVDEV: 80 8 cm³  LVEDV MOD BP: 96 cm³  LVESV: 47 4 cm³  LVIDd(2D): 4 25 cm  LVISd (2D): 3 4 cm  LVPWd (2D): 1 27 cm  SV (Teich): 33 4 cm³     Apical four chamber  LVEF A4C: 40 %     Apical two chamber  LVEF A2C: 45 %     Unspecified Scan Mode  MV Peak A Manuel: 1380 mm/s  MV Peak E Manuel   Mean: 983 mm/s  MVA (PHT): 4 23 cm squared  PHT: 75 ms  Max P mm[Hg]  V Max: 3080 mm/s  Vmax: 2680 mm/s  RA Area: 20 7 cm squared  RA Volume: 66 9 cm³  TAPSE: 1 6 cm     [facility] Accredited Echocardiography Laboratory     Prepared and electronically signed by  Medhat Nielson DO  Signed 03-Oct-2018 14:43:04      Linked Documents     View Image   Imaging     Echo complete with contrast if indicated (Order #[id number]) on 10/3/2018 - Imaging Information   Result History     Echo complete with contrast if indicated (Order #[id number]) on 10/3/2018 - Order Result History Report    Show result history   Result Comparison   Echo complete with contrast if indicated (Order [id number])   Newer Version Older Version   Final result    10/3/2018  2:43 PM    Interface, Radiology Results In         This is the newest version No older versions exist   Narrative     [address]   [address] [facility] [address]   [phone]     Transthoracic Echocardiogram   2D, M-mode, Doppler, and Color Doppler     Study date:       Patient: Heriberto Medley RACE   MR number: [id number]   Account number: [de-identified]   : 10-Sep-1930   Age: 80 years   Gender: Female   Status: Inpatient   Location: Bedside   Height: 61 in   Weight: 88 9 lb   BP: 128/ 60 mmHg     Indications: Congestive Heart Failure     Diagnoses: I50 9 - Heart failure, unspecified     Sonographer: ABDIRAHMAN Arroyo RVS   Primary Physician: Ovidio Murray MD   Referring Physician: Trina Hunt MD   Group: [facility]   Interpreting Physician: Laith Whipple DO     SUMMARY     LEFT VENTRICLE:   Systolic function was moderately reduced  Ejection fraction was estimated to be 40 %  There was moderate diffuse hypokinesis with no identifiable regional variations  There was moderate concentric hypertrophy  Doppler parameters were consistent with abnormal left ventricular relaxation (grade 1 diastolic dysfunction)  LEFT ATRIUM:   The atrium was mildly dilated  MITRAL VALVE:   There was moderately reduced leaflet separation  There was moderately restricted mobility  There was moderate regurgitation  AORTIC VALVE:   The valve was trileaflet  Leaflets exhibited normal thickness, calcification, and moderately reduced cuspal separation  There was no evidence for stenosis  There was no regurgitation  TRICUSPID VALVE:   There was moderate regurgitation  Pulmonary artery systolic pressure was within the normal range  PULMONIC VALVE:   There was mild to moderate regurgitation  HISTORY: PRIOR HISTORY: PVD, COPD, GERD, Arthritis, Scoliosis     PROCEDURE: The procedure was performed at the bedside  This was a routine study  The transthoracic approach was used  The study included complete 2D imaging, M-mode, complete spectral Doppler, and color Doppler  The heart rate was 81 bpm,   at the start of the study  Images were obtained from the parasternal, apical, and subcostal acoustic windows  Images were not obtained from the suprasternal notch acoustic windows  Image quality was adequate  LEFT VENTRICLE: Size was normal  Systolic function was moderately reduced  Ejection fraction was estimated to be 40 %  There was moderate diffuse hypokinesis with no identifiable regional variations  There was moderate concentric   hypertrophy  DOPPLER: Doppler parameters were consistent with abnormal left ventricular relaxation (grade 1 diastolic dysfunction)  RIGHT VENTRICLE: The size was normal  Systolic function was normal  DOPPLER: Systolic pressure was within the normal range  LEFT ATRIUM: The atrium was mildly dilated  No thrombus was identified  RIGHT ATRIUM: Size was normal      MITRAL VALVE: Valve structure was normal  There was moderately reduced leaflet separation  There was moderately restricted mobility  No echocardiographic evidence for prolapse  DOPPLER: The transmitral velocity was within the normal range     There was no evidence for stenosis  There was moderate regurgitation  AORTIC VALVE: The valve was trileaflet  Leaflets exhibited normal thickness, calcification, and moderately reduced cuspal separation  DOPPLER: Transaortic velocity was within the normal range  There was no evidence for stenosis  There was   no regurgitation  TRICUSPID VALVE: The valve structure was normal  There was normal leaflet separation  DOPPLER: The transtricuspid velocity was within the normal range  There was moderate regurgitation  Pulmonary artery systolic pressure was within the   normal range  Estimated peak PA pressure was 37 mmHg  PULMONIC VALVE: Leaflets exhibited normal thickness, no calcification, and normal cuspal separation  DOPPLER: The transpulmonic velocity was within the normal range  There was mild to moderate regurgitation  PERICARDIUM: There was no thickening  There was no pericardial effusion  AORTA: The root exhibited normal size and fibrocalcific change  PULMONARY ARTERY: The size was normal  The morphology appeared normal      SYSTEM MEASUREMENT TABLES     2D mode   AoR Diam 2D: 2 9 cm   LA Diam (2D): 3 6 cm   LA/Ao (2D): 1 24   FS (2D Teich): 20 %   IVSd (2D): 1 27 cm   LVDEV: 80 8 cm³   LVEDV MOD BP: 96 cm³   LVESV: 47 4 cm³   LVIDd(2D): 4 25 cm   LVISd (2D): 3 4 cm   LVPWd (2D): 1 27 cm   SV (Teich): 33 4 cm³     Apical four chamber   LVEF A4C: 40 %     Apical two chamber   LVEF A2C: 45 %     Unspecified Scan Mode   MV Peak A Manuel: 1380 mm/s   MV Peak E Manuel  Mean: 983 mm/s   MVA (PHT): 4 23 cm squared   PHT: 75 ms   Max P mm[Hg]   V Max: 3080 mm/s   Vmax: 2680 mm/s   RA Area: 20 7 cm squared   RA Volume: 66 9 cm³   TAPSE: 1 6 cm     [employer] Accredited Echocardiography Laboratory     Prepared and electronically signed by     Deven Chacon DO   Signed 03-Oct-2018 14:43:04      EKG: NSR    Micro:  Lab Results   Component Value Date    BLOODCX No Growth at 48 hrs   10/03/2018    BLOODCX No Growth at 48 hrs  10/03/2018    BLOODCX No Growth After 5 Days  01/17/2017    BLOODCX No Growth After 5 Days  01/17/2017    WOUNDCULT (A) 01/17/2017     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       Allergies: Allergies   Allergen Reactions    Chlorthalidone Itching    Penicillins Itching     Tolerates zosyn    Sulfa Antibiotics Rash    Bacitracin Rash    Ciprofloxacin GI Intolerance     Medications:   Scheduled Meds:  Current Facility-Administered Medications:  amiodarone 200 mg Oral TID With Meals Kathleen Romano PA-C    Followed by        Ayo Alonzo ON 10/13/2018] amiodarone 200 mg Oral BID With Meals Kathleen Romano PA-C    Followed by        Ayo Alonzo ON 10/21/2018] amiodarone 200 mg Oral Daily With Breakfast Adriana Shoemaker PA-C    clopidogrel 75 mg Oral Daily STEVEN Alcala    diltiazem 1-15 mg/hr Intravenous Titrated STEVEN Webb Last Rate: Stopped (10/06/18 1030)   diltiazem 7 5 mg Intravenous Once STEVEN Black    docusate sodium 100 mg Oral Daily STEVEN Marin    isosorbide mononitrate 30 mg Oral Daily STEVEN Castaneda    levofloxacin 250 mg Intravenous Q48H STEVEN Marin Last Rate: Stopped (10/06/18 2226)   metoprolol tartrate 25 mg Oral Q12H [address] STEVEN Black    multivitamin-minerals 1 tablet Oral Daily STEVEN Alcala    oxyCODONE 2 5 mg Oral Q4H PRN STEVEN Marin    oxyCODONE 5 mg Oral Q4H PRN STEVEN Marin    pantoprazole 40 mg Intravenous Q12H [address] CuiSTEVEN Richmond    polyethylene glycol 17 g Oral Daily PRN STEVEN Alcala      Continuous Infusions:  diltiazem 1-15 mg/hr Last Rate: Stopped (10/06/18 1030)     PRN Meds:    oxyCODONE 2 5 mg Q4H PRN   oxyCODONE 5 mg Q4H PRN   polyethylene glycol 17 g Daily PRN       Invasive lines and devices:   Invasive Devices     Peripheral Intravenous Line            Peripheral IV 10/05/18 Left Arm 1 day    Peripheral IV 10/06/18 Right Antecubital less than 1 day          Drain            Ureteral Drain/Stent Left ureter 6 Fr  4 days                   SIGNATURE: STEVEN Rodriguez  DATE: October 7, 2018

## 2018-10-07 NOTE — PLAN OF CARE
CARDIOVASCULAR - ADULT     Maintains optimal cardiac output and hemodynamic stability Progressing     Absence of cardiac dysrhythmias or at baseline rhythm Progressing        DISCHARGE PLANNING     Discharge to home or other facility with appropriate resources Progressing        DISCHARGE PLANNING - CARE MANAGEMENT     Discharge to post-acute care or home with appropriate resources Progressing        INFECTION - ADULT     Absence or prevention of progression during hospitalization Progressing     Absence of fever/infection during neutropenic period Progressing        Knowledge Deficit     Patient/family/caregiver demonstrates understanding of disease process, treatment plan, medications, and discharge instructions Progressing        Nutrition/Hydration-ADULT     Nutrient/Hydration intake appropriate for improving, restoring or maintaining nutritional needs Progressing        PAIN - ADULT     Verbalizes/displays adequate comfort level or baseline comfort level Progressing        Potential for Falls     Patient will remain free of falls Progressing        SAFETY ADULT     Patient will remain free of falls Progressing     Maintain or return to baseline ADL function Progressing     Maintain or return mobility status to optimal level Progressing

## 2018-10-07 NOTE — PHYSICAL THERAPY NOTE
PT EVALUATION  Patient seen by PT , no skilled OT needs at this time, patient ambulating independently to bathroom for independent ADLs     10/07/18 1420   Pain Assessment   Pain Assessment No/denies pain   Home Living   Type of [address] [facility] One level;Stairs to enter with rails  (full flight to enter)   [address] [facility]   Additional Comments patient reports not using assistive device prior to admission and attending gym couple times per week   Prior Function   Level of [place] Independent with ADLs and functional mobility   Lives With Spouse   Receives Help From Family   ADL Assistance Independent   IADLs Independent   Comments patient drives and presents as independent with all ADLs and IADLs   Restrictions/Precautions   Other Precautions Contact/isolation; Fall Risk   General   Additional Pertinent History chart reviewed, patient admitted with L ureteral stone, pancreatitis,hyponatremia  Patient now seen in ICU and s/p lithotripsy with stenting and EGD as well as rapid responce yesterday as related to cardiac function  Patient now found standing in room independently at counter upon arrival by therapist  Patient now demonstrating independence with ADLs and does not require skilled OT services but will be followed by PT as needed   Family/Caregiver Present No   Cognition   Overall Cognitive Status WFL   Arousal/Participation Cooperative   Orientation Level Oriented X4   Following Commands Follows all commands and directions without difficulty   RLE Assessment   RLE Assessment (ROM WFL, strength 3+/4-)   LLE Assessment   LLE Assessment (ROM WFL, strength 3+/4-)   Coordination   Movements are Fluid and Coordinated 0   Transfers   Sit to Stand 7  Independent   Stand to Sit 7  Independent   Ambulation/Elevation   Gait Assistance (supervision to min assist at times)   Additional items Verbal cues; Tactile cues   Assistive Device (none)   Distance 100 feet with change in direction and patient holding onto wall/wall bar at times with occasional loss of balance laterally   Balance   Static Sitting Good   Dynamic Sitting Good   Static Standing Fair   Dynamic Standing Fair -   Ambulatory Fair -   Activity Tolerance   Activity Tolerance Patient limited by fatigue   Nurse Made Aware yes   Assessment   Prognosis Good   Problem List Decreased strength;Decreased range of motion;Decreased endurance; Impaired balance;Decreased mobility; Decreased coordination   Assessment Patient seen for Physical Therapy evaluation  Patient admitted with Nephrolithiasis  Comorbidities affecting patient's physical performance include:HTN, chronic diastolic CHF, chronic mesenteric ischemia, Stewart's esophagus, history of ruptured duodenal ulcer with repair who presents with abdominal pain  Personal factors affecting patient at time of initial evaluation include: stairs to enter home, inability to ambulate household distances, inability to navigate community distances, inability to navigate level surfaces without external assistance, inability to perform dynamic tasks in community, limited home support, inability to perform caregiver support/tasks, inability to perform physical activity, inability to perform ADLS and inability to perform IADLS   Prior to admission, patient was independent with functional mobility without assistive device, independent with ADLS, independent with IADLS, living in a multi-level home, ambulating household distance, ambulating community distances and home with family assist   Please find objective findings from Physical Therapy assessment regarding body systems outlined above with impairments and limitations including weakness, decreased ROM, impaired balance, decreased endurance, impaired coordination, gait deviations, decreased activity tolerance, decreased functional mobility tolerance and fall risk    The Barthel Index was used as a functional outcome tool presenting with a score of 75 today indicating moderate limitations of functional mobility and ADLS  Patient's clinical presentation is currently evolving as seen in patient's presentation of vital sign response, changing level of pain, increased fall risk, new onset of impairment of functional mobility, decreased endurance and new onset of weakness  Pt would benefit from continued Physical Therapy treatment to address deficits as defined above and maximize level of functional mobility  As demonstrated by objective findings, the assigned level of complexity for this evaluation is moderate  Goals   Patient Goals to go home tomorrow and stay independent   STG Expiration Date 10/14/18   Short Term Goal #1 transfers and gait with cane independently   Short Term Goal #2 gait endurance to functional community distances, strength BLEs 4-/5 all to meet patient goal of returning home independently   LTG Expiration Date 10/21/18   Long Term Goal #1 transfers and gait independently without assitive devices   Long Term Goal #2 strength BLEs 4/5, return to gym weekly as prior to admission   Treatment Day 1   Plan   Treatment/Interventions ADL retraining;Functional transfer training;LE strengthening/ROM; Elevations; Therapeutic exercise; Endurance training;Patient/family training;Equipment eval/education;Gait training; Compensatory technique education   PT Frequency 5x/wk   Recommendation   Recommendation (home with use of cane as needed)   Barthel Index   Feeding 10   Bathing 0   Grooming Score 5   Dressing Score 10   Bladder Score 5   Bowels Score 10   Toilet Use Score 5   Transfers (Bed/Chair) Score 15   Mobility (Level Surface) Score 10   Stairs Score 5   Barthel Index Score 76   Licensure   [place] License Number  April Hickman PT [id number]       PT TREATMENT  Time In:1410  Time Out:1420  Total Time: 10min      S:  Patient reports feeling unsteady at times with gait   O:  -sit to and from stand independently   -gait training with single point cane with supervision, endurance 100 feet with change in direction several times for balance training  Patient given cane for use in room with staff supervision as needed  -exercise completed: standing at sink counter: hip abduction, hip extension/flexion, calf raises 5 reps each  A: patient very active and independent prior to admission and attending gym couple times per week to remain independent and strong as per patient   Patient now unsteady at times with gait and tolerated use of cane well and will benefit from increasing mobility to regain independence  P:  DC recommendation: return home with use of cane as needed     Ashley Brown, [id number]

## 2018-10-07 NOTE — PROGRESS NOTES
Received the patient from [facility]  The patient is seen ambulating in the hallway, in no distress and offers no complaints  Will continue to follow

## 2018-10-07 NOTE — PLAN OF CARE
Problem: CARDIOVASCULAR - ADULT  Goal: Maintains optimal cardiac output and hemodynamic stability  INTERVENTIONS:  - Monitor I/O, vital signs and rhythm  - Monitor for S/S and trends of decreased cardiac output i e  bleeding, hypotension  - Administer and titrate ordered vasoactive medications to optimize hemodynamic stability  - Assess quality of pulses, skin color and temperature  - Assess for signs of decreased coronary artery perfusion - ex   Angina  - Instruct patient to report change in severity of symptoms   Outcome: Progressing    Goal: Absence of cardiac dysrhythmias or at baseline rhythm  INTERVENTIONS:  - Continuous cardiac monitoring, monitor vital signs, obtain 12 lead EKG if indicated  - Administer antiarrhythmic and heart rate control medications as ordered  - Monitor electrolytes and administer replacement therapy as ordered   Outcome: Progressing

## 2018-10-07 NOTE — PROGRESS NOTES
Transfer Note - ICU/Stepdown Transfer to [facility]/MS tele   Andres Olson Race 80 y o  female MRN: [id number]  Holmat 45   Unit/Bed#: ICU 02 Encounter: [id number]    Code Status: Level 1 - Full Code    Reason for ICU/Stepdown admission:  Atrial fibrillation with RVR new onset    Active problems: Principal Problem:    Transaminitis  Active Problems:    New onset atrial fibrillation ([facility] [address] [address] )    Nephrolithiasis    Acute kidney injury superimposed on chronic kidney disease [facility] )    Chronic mesenteric ischemia ([facility] )    Acute on chronic combined systolic and diastolic heart failure ([facility] )    Stenosis of right internal carotid artery    Generalized abdominal pain    UTI (urinary tract infection)    Essential hypertension    Weight loss, unintentional    Acid reflux  Resolved Problems:    Hyponatremia    Anemia    Leukocytosis    Electrolyte imbalance      New onset atrial fibrillation ([facility] )   Assessment & Plan    · Rapid response on 10/06 for new onset atrial fibrillation with RVR  · Patient initially with heart rate 140 to 160s  She was relatively hemodynamically stable  · Patient was given IV metoprolol with little response  Patient then received IV Cardizem with a decrease in heart rate from the 120s to 100s  · Cardizem drip initiated  · Cardiology already following for new onset systolic heart failure  · Pt converted to NSR at 1015 am on 10/6  · Continue PO Amiodarone  · Pt is a poor candidate for PCI  She has failed anticoagulation in the past  Hx of multiple AVM's and antral ulcers  Nephrolithiasis   Assessment & Plan    · CT scan of the abdomen and pelvis showed distal left urethral stone measuring approximately 7 x 5 x 4 mm resulting in mild to moderate left hydronephrosis  · Status post Holmium laser stone extraction and stent placement on October 3rd  · Follow-up in 1 week for stent removal  · Patient was noted to have some gross hematuria post procedure    · Continue to monitor hematuria     Acute kidney injury superimposed on chronic kidney disease ([facility] [address] )   Assessment & Plan    · Likely multifactorial secondary to pre renal hypotension versus postrenal obstruction  · Baseline creatinine 0 8-1  · Most recent lab value creatinine 0 97  · Avoid nephrotoxin agents  · Strict I&Os  · Nephrology consulted and following     * Transaminitis   Assessment & Plan    · Improving  · Possibly multifactorial   · Per chart review, transaminitis could possibly be secondary to acute hepatitis  MRCP from 10/05 negative for choledocholithiasis  · Due to an episode of hypotension during urology procedure, could consider ischemic etiology  · Gastroenterology following  · On admission patient's liver enzymes were normal and peak significantly into the 1 thousands  Acute on chronic combined systolic and diastolic heart failure [facility])   Assessment & Plan    · Echo on June 12, 2018 demonstrated ejection fraction of 55-60% with grade 1 diastolic dysfunction  · Repeat echo October 3rd demonstrates new acute systolic function with an EF of 40% and grade 1 diastolic dysfunction  · Cardiology following  · Will continue current medication regimen however will change extended release tablet to immediate release b i d  Dosing  · On exam patient does not appear to be volume overloaded  · Will re-evaluate the necessity for diuretics  Chronic mesenteric ischemia (HCC)   Assessment & Plan    · History of chronic mesenteric ischemia status post mesenteric artery bypass graft and stent placement in approximately 2015  · Continue Plavix      Anemiaresolved as of 10/6/2018   Assessment & Plan    · Patient's hemoglobin initially on admission was 9 1  Her hemoglobin was noted to down trend to 6 9 on October 3rd for which she received 1 unit of packed red blood cells on October 4th  · EGD on October 5th demonstrated multiple nonbleeding AVMs    · Continue to monitor and trend  · Patient's hemoglobin currently 11  2     Hyponatremiaresolved as of 10/6/2018   Assessment & Plan    · Patient admitted with hyponatremia with a sodium of 128  Sodium 136  · Nephrology was consulted and there was suspicion for SIADH secondary to pain  · Patient received 40 mg of IV Lasix  · Will give an additional 40 mg of IV Lasix this morning  Generalized abdominal pain   Assessment & Plan    · Patient mid was admitted for abdominal pain and left-sided flank pain  · Per interview with patient she states that her left flank pain and abdominal pain has mostly resolved since her urology procedure  · Patient states she had been having generalized abdominal pain on off for months mostly after eating  · CT of the abdomen and pelvis without contrast demonstrated a ureteral stone with mild-to-moderate hydronephrosis  · Right upper quadrant ultrasound on October 3rd demonstrated several small hyper a cardiac 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  · Patient to have follow-up MRCP  · GI following     Stenosis of right internal carotid artery   Assessment & Plan    · Patient has history 70-99% stenosis  · Continue Plavix     UTI (urinary tract infection)   Assessment & Plan    · Pt noted to have UTI present on admission  · Continue Levaquin day 6     Essential hypertension   Assessment & Plan    · On admission in the ED patient was initially hypertensive  · Patient normally on hydralazine losartan and metoprolol at home  · Will start on hold secondary to GROVER  · Per chart review it was recommended during an office visit that patient's systolic blood pressure be maintained around 140-160 given her chronic arterial stenosis of multiple vessels including her mesenteric artery as well as left renal artery stenosis  · Patient had episode of hypotension during her urology procedure  Weight loss, unintentional   Assessment & Plan    · Patient states she has had a poor appetite secondary to her abdominal pain    · Per chart review and interview with the patient, patient had had nausea without vomiting for the past few months  · Nutrition consulted     Leukocytosisresolved as of 10/5/2018   Assessment & Plan    · Likely secondary to UTI       Acid reflux   Assessment & Plan    · Patient currently receiving IV Protonix b i d   · GI following  · Endoscopy done on October 5th demonstrated multiple nonbleeding AVMs  Patient does have history peptic ulcer bleed  · Patient usually on omeprazole at home  Consultants:   · Cardiology  · Nephrology  · Gastroenterology  · Urology    History of Present Illness/Summary of clinical course:    29-year-old female with past medical history of hypertension, chronic diastolic heart failure, significant vascular disease including right carotid artery stenosis history of aortoiliac occlusive disease with stenting known renal artery stenosis, chronic mesenteric ischemia a yeah status post enteric artery bypass graft with stent placement in 2015 for which she is currently on Plavix  She also has medical history of Stewart's esophagus, history of ruptured duodenal ulcer with repair, history of liver hemangioma  She recently presented to the emergency room on October 1st with abdominal pain in left flank plain  Labs demonstrated acute kidney injury and leukocytosis  CT scan of the abdomen and pelvis without contrast showed a left ureteral stone with mild to moderate left hydronephrosis  She was admitted to floor for further workup and monitoring neurology was consulted  She had a holminum laser stone extraction and stent placement on October 3rd  Her abdominal pain continued even after stone extraction  Per chart review patient had a hypotensive episode during her urology procedure which was thought to contribute to her elevated LFTs and possible shock liver  Gastroenterology was also consulted    An EGD which was performed on October 5th patient was found to have multiple nonbleeding gastric AVMs, antral ulcers  She also had echo this admission with demonstrated new acute on chronic combined heart failure with an EF of 40% with moderate diffuse hypokinesis  Cardiology was consulted as there was concern for underlying coronary artery disease  She was a rapid response early morning of October 6 due to new onset atrial fibrillation with RVR  She was initiated on a Cardizem infusion and subsequently converted to normal sinus rhythm later that morning at approximately 10:15 a m  She was started on p o  Amiodarone  Cardiology is following  Per review of cardiology note patient not a good candidate for PCI, as she has declined further workup at this time  Please refer to today's progress note for further clinical details  Recent or scheduled procedures:   10/5 MRI abdomen without contrast and MRCP  10/3 ultrasound of the abdomen  10/2 retrograde pyelogram  10/1 CT renal stone study of the abdomen and pelvis    Outstanding/pending diagnostics:  NA       Mobilization Plan:  As tolerated    Nutrition Plan:  Continue cardiac diet with 1500 mL fluid restriction    Discharge Plan:  Pending future hospital course     Specific Diagnosis Plan:    Sepsis: Source:  UTI, Antibiotics:  Levaquin, Length:  Currently day 6    Need for Infectious Disease consult:  NO  Heart Failure:  Cardiology consult placed  Yes    Diuresis plan:  Reassess daily  Hyperglycemia:  Converting from IV to subcutaneous insulin:  NA    Need for Endocrine consult:  NA    [  ] Family aware of transfer out of critical care: yes       Spoke with Dr Loulou Prasad regarding transfer Patient accepted to their service      STEVEN Krause

## 2018-10-08 PROBLEM — L03.116 BILATERAL CELLULITIS OF LOWER LEG: Status: RESOLVED | Noted: 2017-01-17 | Resolved: 2018-01-01

## 2018-10-08 PROBLEM — I16.0 HYPERTENSIVE URGENCY: Status: RESOLVED | Noted: 2018-01-01 | Resolved: 2018-01-01

## 2018-10-08 PROBLEM — R79.89 ELEVATED BRAIN NATRIURETIC PEPTIDE (BNP) LEVEL: Status: RESOLVED | Noted: 2017-01-17 | Resolved: 2018-01-01

## 2018-10-08 PROBLEM — L03.115 BILATERAL CELLULITIS OF LOWER LEG: Status: RESOLVED | Noted: 2017-01-17 | Resolved: 2018-01-01

## 2018-10-08 NOTE — CASE MANAGEMENT
Continued Stay Review    Date: 10/8/18    Vital Signs: /80 (BP Location: Right arm)   Pulse 65   Temp 98 8 °F (37 1 °C) (Temporal)   Resp 16   Ht 5' 2" (1 575 m)   Wt 39 7 kg (87 lb 8 4 oz)   SpO2 98%   BMI 16 01 kg/m²     Medications:   Scheduled Meds:   Current Facility-Administered Medications:  amiodarone 200 mg Oral TID With Meals Crow Dunn PA-C   Followed by       Sariah Whitehead ON 10/13/2018] amiodarone 200 mg Oral BID With Meals Crow Dunn PA-C   Followed by       Sariah Whitehead ON 10/21/2018] amiodarone 200 mg Oral Daily With Breakfast Adriana Shoemaker PA-C   clopidogrel 75 mg Oral Daily STEVEN Alcala   docusate sodium 100 mg Oral Daily STEVEN Marin   isosorbide mononitrate 30 mg Oral Daily STEVEN Owens   levofloxacin 250 mg Oral Q24H Adriana Shoemaker PA-C   losartan 50 mg Oral Daily Adriana Shoemaker PA-C   metoprolol tartrate 50 mg Oral Q12H [address] STEVEN Gagnon   multivitamin-minerals 1 tablet Oral Daily STEVEN Alcala   oxyCODONE 2 5 mg Oral Q4H PRN STEVEN Marin   oxyCODONE 5 mg Oral Q4H PRN STEVEN Marin   pantoprazole 40 mg Intravenous Q12H [address] STEVEN Marin   polyethylene glycol 17 g Oral Daily PRN STEVEN Alcala     Continuous Infusions:    PRN Meds: oxyCODONE    oxyCODONE    polyethylene glycol    Abnormal Labs/Diagnostic Results:     Age/Sex: 80 y o  female     GI  Assessment/Plan:  1  Abdominal pain, anemia- she has a history of perforated duodenal ulcer she is status post evaluation with EGD which revealed esophageal mucosa and distal esophagus, hiatal hernia, multiple gastric AVMs, antral ulcers  Stool is to be checked for H pylori antigen  She will need repeat EGD in the next couple weeks time preferably off Plavix if possible  Hemoglobin has improved  Colonoscopy can also be considered as well once more stable from a cardiac standpoint  2   Elevated liver function tests MRCP done for further evaluation there is no evidence of choledocholithiasis likely secondary to ischemic hepatopathy  3  History of chronic mesenteric ischemia status post stent placement in 2015  4  New onset atrial fibrillation she is being followed by Cardiology not a candidate for anticoagulation secondary to history of bleeding ulcer, and noted multiple AVMs on recent EGD    CARDIOLOGY  Assessment and Plan:   1  Paroxysmal atrial fibrillation:  Patient is currently maintaining sinus rhythm on amiodarone  Amiodarone taper has been written in her MA are for time of discharge  Plan is amiodarone 200 mg t i d  X7 days, then 200 mg b i d  X7 days, then amiodarone 200 mg once a day  Will continue her metoprolol 50 mg b i d  Despite chads Vasc 2 score equaling 5  Patient will not be placed on long-term anticoagulation secondary to her increased risk of bleeding and history of gastric AVMs and antral ulcers  2  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%: This was a new finding on echocardiogram during this admission  Patient at this time declining any further cardiac workup as inpatient  States she will follow as an outpatient  Patient with history of atherosclerosis and multiple other vascular beds  Patient will be high risk for any type of PCI due to her inability to tolerate anti-platelet agents   3  Hypertension:  Patient recommended to maintain blood pressure systolic greater than 147 for organ perfusion  As patient did experience bout of shock liver after period of hypotension during hospitalization  ATTENDING  New onset atrial fibrillation   · Cardiology already following for new onset systolic heart failure  · Pt converted to NSR at 1015 am on 10/6  · Now on PO Amiodarone  · Pt is a poor candidate for PCI   She has failed anticoagulation in the past  Hx of multiple AVM's and antral ulcers  UTI   · On Levaquin Day 6/7  · UA dirty with trace blood and small leukocytes, unfortunately sample did not reflex to culture

## 2018-10-08 NOTE — ASSESSMENT & PLAN NOTE
· Admits to poor appetite secondary to generalized abdominal pain with resultant weight loss  · Monitor weight  · Nutritional consult  · GI on board

## 2018-10-08 NOTE — ASSESSMENT & PLAN NOTE
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

## 2018-10-08 NOTE — PHYSICAL THERAPY NOTE
PT TREATMENT     10/08/18 1418   Pain Assessment   Pain Assessment No/denies pain   Restrictions/Precautions   Other Precautions Contact/isolation   General   Chart Reviewed Yes   Family/Caregiver Present No   Cognition   Arousal/Participation Cooperative   Subjective   Subjective patient without pain complaints and reports possibly going home tomorrow   Bed Mobility   Supine to Sit 7  Independent   Sit to Supine 7  Independent   Transfers   Sit to Stand 7  Independent   Stand to Sit 7  Independent   Ambulation/Elevation   Gait Assistance 5  Supervision   Additional items Verbal cues   Assistive Device Straight cane   Distance 100 feet with change in direction   Stair Management Assistance 5  Supervision  (instructed with cane and also with 2 rails)   Additional items Verbal cues   Stair Management Technique One rail R;With cane; Alternating pattern; Step to pattern   Number of Stairs 12   Exercises   Knee AROM Long Arc Quad Sitting;5 reps;Bilateral   Marching Standing;15 reps;Bilateral   Balance training  sidestepping, backward walking and weight shifting activity all completed in standing and tolerated well    Assessment   Assessment patient tolerating gait well with cane on level and unlevel surfaces and will benefit from increasing functional mobility to regain independence  Plan   Treatment/Interventions ADL retraining;Functional transfer training;LE strengthening/ROM; Elevations; Therapeutic exercise; Endurance training;Patient/family training;Equipment eval/education;Gait training; Compensatory technique education   PT Frequency 5x/wk   Recommendation   Recommendation (home)   Licensure   [place] License Number  [facility] [id number]

## 2018-10-08 NOTE — ASSESSMENT & PLAN NOTE
· Initially hypertensive in the ED likely induced pain  Patient on Hydralazine, Losartan and Metoprolol  · DISCONTINUED hydralazine  · Continue losartan, cont metoprolol, but decreased from 100mg to 50mg po daily  · Aim to keep SBP around 140-160 given her chronic mesenteric ischemia per Dr Gilbert Gonzales office note in 7/2018  · SBP dropped to 100-120's from 170-180's post procedure  Likely caused shock liver    · Monitor BP closely, avoid hypotension

## 2018-10-08 NOTE — ASSESSMENT & PLAN NOTE
· POA    · On Levaquin Day 6/7  · UA dirty with trace blood and small leukocytes, unfortunately sample did not reflex to culture  · DC Abx in AM

## 2018-10-08 NOTE — ASSESSMENT & PLAN NOTE
· Rapid response on 10/06 for new onset atrial fibrillation with RVR  · Patient initially with heart rate 140 to 160s  She was relatively hemodynamically stable  · Patient was given IV metoprolol with little response  Patient then received IV Cardizem with a decrease in heart rate from the 120s to 100s  Then started on Cardizem drip initiated  · Cardiology already following for new onset systolic heart failure  · Pt converted to NSR at 1015 am on 10/6  · Now on PO Amiodarone  · Pt is a poor candidate for PCI  She has failed anticoagulation in the past  Hx of multiple AVM's and antral ulcers

## 2018-10-08 NOTE — CASE MANAGEMENT
Continued Stay Review    Date: 10/7/18    Vital Signs: /80 (BP Location: Right arm)   Pulse 65   Temp 98 8 °F (37 1 °C) (Temporal)   Resp 16   Ht 5' 2" (1 575 m)   Wt 39 7 kg (87 lb 8 4 oz)   SpO2 98%   BMI 16 01 kg/m²     ICU>TRNF TELE   TELE MON  PT OT   CBC DIFF CMP MG PHOS  Medications:   Scheduled Meds:   Current Facility-Administered Medications:  amiodarone 200 mg Oral TID With Meals Corie Guerrero PA-C   Followed by       Renetta Gordon ON 10/13/2018] amiodarone 200 mg Oral BID With Meals Corie Guerrero PA-C   Followed by       Renetta Gordon ON 10/21/2018] amiodarone 200 mg Oral Daily With Breakfast Adriana Shoemaker PA-C   clopidogrel 75 mg Oral Daily STEVEN Alcala   docusate sodium 100 mg Oral Daily STEVEN Marin   isosorbide mononitrate 30 mg Oral Daily STEVEN Slaughter   levofloxacin 250 mg Oral Q24H Adriana Shoemaker PA-C   losartan 50 mg Oral Daily Adriana Shoemaker PA-C   metoprolol tartrate 50 mg Oral Q12H [facility] STEVEN Krause   multivitamin-minerals 1 tablet Oral Daily STEVEN Alcala   oxyCODONE 2 5 mg Oral Q4H PRN STEVEN Marin   oxyCODONE 5 mg Oral Q4H PRN STEVEN Marin   pantoprazole 40 mg Intravenous Q12H [facility] SETVEN Marin   polyethylene glycol 17 g Oral Daily PRN Yolanda S STEVEN Garcia     Continuous Infusions:    PRN Meds: oxyCODONE    oxyCODONE    polyethylene glycol    Abnormal Labs/Diagnostic Results: BUN 33 AST 86  ALB 2 8 TROPONIN 1 58,1 46 H/H 10 4/34 1     Age/Sex: 80 y o  female     Assessment/Plan:   New onset atrial fibrillation ([facility] [address] )   Assessment & Plan     · Rapid response on 10/06 for new onset atrial fibrillation with RVR  · Patient initially with heart rate 140 to 160s  She was relatively hemodynamically stable  · Patient was given IV metoprolol with little response  Patient then received IV Cardizem with a decrease in heart rate from the 120s to 100s  · Cardizem drip initiated    · Cardiology already following for new onset systolic heart failure  · Pt converted to NSR at 1015 am on 10/6  · Continue PO Amiodarone  · Pt is a poor candidate for PCI  She has failed anticoagulation in the past  Hx of multiple AVM's and antral ulcers  Nephrolithiasis   Assessment & Plan     · CT scan of the abdomen and pelvis showed distal left urethral stone measuring approximately 7 x 5 x 4 mm resulting in mild to moderate left hydronephrosis  · Status post Holmium laser stone extraction and stent placement on October 3rd  · Follow-up in 1 week for stent removal  · Patient was noted to have some gross hematuria post procedure  · Continue to monitor hematuria   Acute kidney injury superimposed on chronic kidney disease ([facility] [address] )   Assessment & Plan     · Likely multifactorial secondary to pre renal hypotension versus postrenal obstruction  · Baseline creatinine 0 8-1  · Most recent lab value creatinine 0 97  · Avoid nephrotoxin agents  · Strict I&Os  · Nephrology consulted and following   * Transaminitis   Assessment & Plan     · Improving  · Possibly multifactorial   · Per chart review, transaminitis could possibly be secondary to acute hepatitis  MRCP from 10/05 negative for choledocholithiasis  · Due to an episode of hypotension during urology procedure, could consider ischemic etiology  · Gastroenterology following  · On admission patient's liver enzymes were normal and peak significantly into the 1 thousands  Acute on chronic combined systolic and diastolic heart failure [facility])   Assessment & Plan     · Echo on June 12, 2018 demonstrated ejection fraction of 55-60% with grade 1 diastolic dysfunction  · Repeat echo October 3rd demonstrates new acute systolic function with an EF of 40% and grade 1 diastolic dysfunction  · Cardiology following  · Will continue current medication regimen however will change extended release tablet to immediate release b i d  Dosing    · On exam patient does not appear to be volume overloaded  · Will re-evaluate the necessity for diuretics  Chronic mesenteric ischemia (HCC)   Assessment & Plan     · History of chronic mesenteric ischemia status post mesenteric artery bypass graft and stent placement in approximately 2015  · Continue Plavix    Generalized abdominal pain  · Patient to have follow-up MRCP  · GI following  Stenosis of right internal carotid artery   Assessment & Plan     · Patient has history 70-99% stenosis  · Continue Plavix   UTI (urinary tract infection)   Assessment & Plan     · Pt noted to have UTI present on admission  · Continue Levaquin day 6     CARDIOLOGY     Assessment/Plan:  Atrial fibrillation with rapid ventricular rate- new onset secondary to recent blood transfusion/volume overload? Given her age, history of multiple grafts strict AVMs/ulcers, inability to tolerate anticoagulation will load the patient with amiodarone to prevent further recurrences  Patient has a prior history of elevated transaminitis which has been improving  We will monitor with amiodarone usage  We will hold any NOAC  Recommend amiodarone 200mg TID for seven days meals, 200mg BID for severe days with meals, and 200mg daily  Continue metoprolol 50mg bid      Acute likely ischemic systolic heart failure EF 40%- improved volume status The patient declines invasive workup currently? She is anxious to go home and is willing to follow up as an outpatient  Given her history of gastric AVMs and antral ulcers currently she would be a poor candidate for PCI as she would not be able to tolerate anti-platelet agents   We will continue her beta-blocker, hydralazine, nitrate, and restart cozaar      Discharge Plan:

## 2018-10-08 NOTE — PROGRESS NOTES
Progress Note - Vasu Page 9/10/1930, 80 y o  female MRN: [id number]    Unit/Bed#: [address] [place] [address] Encounter: [id number]    Primary Care Provider: Ovidio Murray MD   Date and time admitted to hospital: 10/1/2018  6:20 PM        New onset atrial fibrillation [facility])   Assessment & Plan    · Rapid response on 10/06 for new onset atrial fibrillation with RVR  · Patient initially with heart rate 140 to 160s  She was relatively hemodynamically stable  · Patient was given IV metoprolol with little response  Patient then received IV Cardizem with a decrease in heart rate from the 120s to 100s  Then started on Cardizem drip initiated  · Cardiology already following for new onset systolic heart failure  · Pt converted to NSR at 1015 am on 10/6  · Now on PO Amiodarone  · Pt is a poor candidate for PCI  She has failed anticoagulation in the past  Hx of multiple AVM's and antral ulcers  UTI (urinary tract infection)   Assessment & Plan    · POA  · On Levaquin Day 6/7  · UA dirty with trace blood and small leukocytes, unfortunately sample did not reflex to culture  · DC Abx in AM         Shock liver   Assessment & Plan    · RESOLVED  · Sudden profound elevation of LFTs  Likely 2/2 relative hypotension post urological procedure  · SBP dropped to 100-120's from 170-180's post procedure  · AST 1129,,TB normal,direct bilirubin mild elevated  All indices downtrending  · RUQ US with no acute findings  · Tylenol level normal   · Antihypertensive med changes as noted  · Aim to keep SBP around 140-160 given her chronic mesenteric ischemia per Dr Gordon Ng office note in 7/2018  Acute kidney injury superimposed on chronic kidney disease ([facility] [address] )   Assessment & Plan    · RESOLVED  · Likely multifactorial 2/2 ATN in the setting of obstruction and relative hypotension  · Held losartan  · PVR was 0   · Avoid nephrotoxins  · Intake and output  · Appreciate Nephrology input       Weight loss, unintentional Assessment & Plan    · Admits to poor appetite secondary to generalized abdominal pain with resultant weight loss  · Monitor weight  · Nutritional consult  · GI on board  Generalized abdominal pain   Assessment & Plan    · MULTIFACTORIAL  She was admitted with abd pain, left side and flank  · GI and Urology consulted  · CT A/P showed 7x5x4  ureteral stone with mild to moderate hydronephrosis  · S/P left ureteral stent placement 10/3  · She has a History of perforated duodenal ulcer so underwent EGD which showed Sloughing mucosa in the distal esophagus  Hiatal hernia  Multiple gastric AVMs  Antral ulcers  · RUQ US on 10/3 showed several small hyper a cardiac 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  · MRI MRCP showed no biliary dilation seen and no abnormal dilation of the common bile duct  There is no evidence of choledocholithiasis          Essential hypertension   Assessment & Plan    · Initially hypertensive in the ED likely induced pain  Patient on Hydralazine, Losartan and Metoprolol  · DISCONTINUED hydralazine  · Continue losartan, cont metoprolol, but decreased from 100mg to 50mg po daily  · Aim to keep SBP around 140-160 given her chronic mesenteric ischemia per Dr Angel Vences office note in 7/2018  · SBP dropped to 100-120's from 170-180's post procedure  Likely caused shock liver  · Monitor BP closely, avoid hypotension     Acute on chronic combined systolic and diastolic heart failure ([address] )   Assessment & Plan    · Echo 10/3 showed EF 40%,G1DD (previous echo from 6/2018 showed EF 60%)  · Telemetry showed NSR  · Nuclear stress test was normal   · Daily weight, I/Os  · Cardiology following  · Appears euvolemic     Stenosis of right internal carotid artery   Assessment & Plan    ·  History of 70-99% stenosis and was seen here with no vascular intervention  · Continue Plavix  · Hold Lipitor       Chronic mesenteric ischemia (HCC)   Assessment & Plan    · History of chronic mesenteric ischemia,s/p SMA bypass and stenting in 2015  · Continue Plavix     Acid reflux   Assessment & Plan    · GI following  · S/p endoscopy for abdominal pain on 10/5 demonstrated multiple nonbleeding AVMs  · Cont PPI  · Avoid Anticoagulation and NSAIDS     * Nephrolithiasis   Assessment & Plan    · CT scan of the abdomen and pelvis showed distal left ureteral stone measures approximately 7 x 5 x 4 mm resulting in mild to moderate left hydronephrosis  · S/P Holmium laser,stone extraction, stent placement 10/3/18  · Had some hematuria post procedure but stable  · F/U 1 week after discharge for stent removal              VTE Pharmacologic Prophylaxis:   Pharmacologic: none 2/2 risk of bleeding  Mechanical VTE Prophylaxis in Place: Yes    Patient Centered Rounds: I have performed bedside rounds with nursing staff today  Discussions with Specialists or Other Care Team Provider: Yes    Education and Discussions with Family / Patient:Yes    Time Spent for Care: 45 minutes  More than 50% of total time spent on counseling and coordination of care as described above  Current Length of Stay: 7 day(s)    Current Patient Status: Inpatient     Discharge Plan: pending, possibly in AM    Code Status: Level 1 - Full Code      Subjective:   Transferred from ICU yesterday, was there 2/2 Afib  Now in NSR  Has no complaints, wants to go home  Objective:       Vitals:   Temp (24hrs), Av 2 °F (36 8 °C), Min:97 5 °F (36 4 °C), Max:99 1 °F (37 3 °C)    HR:  [65-89] 77  Resp:  [16-18] 18  BP: (147-192)/(70-80) 177/76  SpO2:  [96 %-98 %] 98 %  Body mass index is 16 01 kg/m²  Input and Output Summary (last 24 hours): Intake/Output Summary (Last 24 hours) at 10/08/18 1801  Last data filed at 10/08/18 0740   Gross per 24 hour   Intake              150 ml   Output              420 ml   Net             -270 ml       Physical Exam:     Physical Exam   Constitutional: She is oriented to person, place, and time  She appears well-developed  No distress  HENT:   Head: Normocephalic and atraumatic  Cardiovascular: Normal rate and regular rhythm  Murmur heard  Pulmonary/Chest: Effort normal and breath sounds normal  No respiratory distress  She has no wheezes  She has no rales  Abdominal: Soft  Bowel sounds are normal  She exhibits no distension  There is no tenderness  There is no rebound and no guarding  Musculoskeletal: She exhibits no edema, tenderness or deformity  Neurological: She is alert and oriented to person, place, and time  Skin: Skin is warm and dry  Psychiatric: She has a normal mood and affect  Her behavior is normal    Nursing note and vitals reviewed  Additional Data:     Labs:      Results from last 7 days  Lab Units 10/08/18  0522   WBC Thousand/uL 8 76   HEMOGLOBIN g/dL 10 4*   HEMATOCRIT % 34 1*   PLATELETS Thousands/uL 260   NEUTROS PCT % 71   LYMPHS PCT % 12*   MONOS PCT % 12   EOS PCT % 3       Results from last 7 days  Lab Units 10/07/18  0641   SODIUM mmol/L 138   POTASSIUM mmol/L 4 4   CHLORIDE mmol/L 105   CO2 mmol/L 22   BUN mg/dL 33*   CREATININE mg/dL 0 99   CALCIUM mg/dL 8 4   ALK PHOS U/L 57   ALT U/L 334*   AST U/L 86*       Results from last 7 days  Lab Units 10/06/18  0351   INR  1 11       * I Have Reviewed All Lab Data Listed Above  * Additional Pertinent Lab Tests Reviewed: All Labs Within Last 24 Hours Reviewed    Imaging:  Xr Chest Portable    Result Date: 10/2/2018  Narrative: CHEST INDICATION:   Hx CHF  Low back pain radiates to abdomen  COMPARISON:  06/11/2018 EXAM PERFORMED/VIEWS:  XR CHEST PORTABLE 1 image FINDINGS: Cardiomediastinal silhouette appears enlarged  Chronic changes are present  The patient is in congestive heart failure  The lungs are clear  No pneumothorax  Minimal blunting of the costophrenic angles  Osseous structures appear within normal limits for patient age  Impression: CHF  Cardiomegaly and chronic changes   Workstation performed: [id number]     Mri Abdomen Wo Contrast And Mrcp    Result Date: 10/5/2018  Narrative: MRI OF THE ABDOMEN WITHOUT CONTRAST WITH MRCP INDICATION: Elevated LFT COMPARISON: Previous CT from October 9, 2014 TECHNIQUE:  The following pulse sequences were obtained on a 1 5 T scanner: 3D MRCP with radial thick slabs and projections  Coronal and axial T2 with TE of 90 and 180 respectively, axial T1-weighted in-and-out-of phase, axial DWI/ADC, axial T2 with fat  saturation, axial FIESTA fat-sat, and axial T1 with fat saturation  3D rendering was performed from the acquisition scanner  Evaluation of the solid abdominal viscera is limited without intravenous contrast  FINDINGS: BILIARY/PANCREATIC DUCTS:  No intra-hepatic biliary ductal dilatation  The common bile duct is of normal caliber  The common bile duct measures 2 2 mm  The common bile duct is better seen on coronal image 14 of series 3 There is a suboptimal evaluation of the intrahepatic ducts  There is no dilation of the intrahepatic ducts seen LIVER:  A T2 hyperintensity noted adjacent to the IVC, measuring about 2 2 cm, bright on the longer Echo sequences, may be a hemangioma or cyst ,  Stable since previous study of 2014  Additional lead T2 hyperintense lesion seen in the lateral segment of the left hepatic lobe, seen in image 46 of series 9, stable since previous study of 4 October 1914  Additional lobulated T2 hyperintensity seen in the right hepatic lobe in the posterior aspect of the segment 8 area, measuring about 19 mm, stable Additional lead lesion seen in inferior and anterior aspect of the liver in image 18 of series 3, stable GALLBLADDER:  Gallbladder appear unremarkable and unchanged PANCREAS:  Evaluation is limited    However there is no pancreatic ductal dilation seen there is no pancreatic atrophy seen ADRENAL GLANDS:  Normal  SPLEEN: Normal  KIDNEYS:  There is mild to moderate left hydronephrosis with with dilation of the left pelvicalyceal system  There is perinephric stranding on the left side ABDOMINAL CAVITY:  No lymphadenopathy or mass  No ascites  BOWEL:  No abnormal dilation of the bowel loops seen OSSEOUS STRUCTURES:  There is scoliosis in the lumbar spine VASCULAR STRUCTURES:  No aneurysm  ABDOMINAL WALL:  Normal  LOWER CHEST:  Unremarkable MRI appearance  Impression: There is no biliary dilation seen and there is no abnormal dilation of the common bile duct  There is no evidence of choledocholithiasis Suboptimal evaluation of the intrahepatic ducts and common hepatic duct, however there are no findings to suggest intrahepatic Mild to moderate left hydronephrosis and hydroureter  Multiple T2 hyperintense hepatic lesions which are stable since the previous study of October 9, 2014 likely hemangiomas Workstation performed: [id number]     Xr Retrograde Pyelogram    Result Date: 10/3/2018  Narrative: RETROGRADE PYELOGRAM INDICATION: Urinary tract calculus  COMPARISON: CT performed October 1, 2018  IMAGES: 7 FLUOROSCOPY TIME:  40 SECONDS CONTRAST: 10 mL of Omnipaque 300 FINDINGS: Fluoroscopic guidance was provided for left-sided retrograde pyelogram  Final film depicts placement of ureteral stent  Impression: Fluoroscopic guidance provided for retrograde study  Please see procedure report for further details  Workstation performed: [id number]     Us Abdomen Limited    Result Date: 10/3/2018  Narrative: RIGHT UPPER QUADRANT ULTRASOUND INDICATION:    shock liver, elevated LFTs    COMPARISON:  Unenhanced CT abdomen and pelvis, renal stone study, from 10/1/2018  Enhanced CT of the abdomen and pelvis from December 31, 2016  TECHNIQUE:   Real-time ultrasound of the right upper quadrant was performed with a curvilinear transducer with both volumetric sweeps and still imaging techniques  FINDINGS: PANCREAS:  Pancreatic tail unremarkable  Body and head of the pancreas difficult to identify   AORTA AND IVC:  Extensive aortic atherosclerosis without aneurysm  IVC patent  LIVER: Size:  Within normal range  The liver measures 15 1 cm in the midclavicular line  Contour:  Surface contour is smooth  Parenchyma:  Echogenicity and echotexture are within normal limits  1 cm hyperechoic mass, most likely hemangioma, in the subcapsular portion of the lateral segment of the left lobe, unchanged in size since the CT from 2016  Several additional similar-appearing subcentimeter masses, most consistent with additional small  hemangiomas  2 1 x 2 2 cm poorly marginated mass in the right lobe, adjacent to the intrahepatic segment of the IVC, primarily hypoechoic to liver  This corresponds to an enhancing mass demonstrated on prior CT  Its appearance is nonspecific but it has not increased in size since 2016  Main portal vein patent with hepatopetal flow  GALLBLADDER: No gallstones  Gallbladder wall thickened, measuring  10 millimeters  Scattered hypoechoic foci in the wall, consistent with edema  Small amount of pericholecystic fluid  Sonographer reports no sonographic Hammond's sign  BILE DUCTS: No extrahepatic bile duct dilatation  CBD measures 2 mm  Mild intrahepatic bile duct dilatation, unchanged since the CT from 2016  No evidence of choledocholithiasis  RIGHT KIDNEY: Right kidney measures 7 9 x 3 7 cm  No mass, calculus or hydronephrosis  ASCITES:   None  Impression: 1  Several small hyperechoic liver masses, most likely hemangiomas  2   Indeterminate 2 1 x 2 2 cm mass in the right lobe, adjacent to the IVC, previously demonstrated on a CT of the abdomen and pelvis from 12/31/2016 and unchanged in size  3   Chronic mild intrahepatic ductal dilatation with no extrahepatic bile duct dilatation  4   Gallbladder wall thickening with small amount of pericholecystic fluid  This is a nonspecific finding that can be seen in acute and chronic cholecystitis, hepatitis, hypoalbuminemia and congestive heart failure, among other etiologies  Workstation performed: [id number]     Ct Renal Stone Study Abdomen Pelvis Wo Contrast    Result Date: 10/1/2018  Narrative: CT ABDOMEN AND PELVIS WITHOUT IV CONTRAST - LOW DOSE RENAL STONE INDICATION:   LT FLANK AND LUQ PAIN  COMPARISON:  None  TECHNIQUE:  Low dose thin section CT examination of the abdomen and pelvis was performed without intravenous or oral contrast according to a protocol specifically designed to evaluate for urinary tract calculus  Axial, sagittal, and coronal 2D reformatted images were created from the source data and submitted for interpretation  Evaluation for pathology in the abdomen and pelvis that is unrelated to urinary tract calculi is limited  Radiation dose length product (DLP) for this visit:  111 11 mGy-cm   This examination, like all CT scans performed in the [facility], was performed utilizing techniques to minimize radiation dose exposure, including the use of iterative  reconstruction and automated exposure control  FINDINGS: RIGHT KIDNEY AND URETER: No urinary tract calculi  No hydronephrosis or hydroureter  Linear calcifications likely vascular  LEFT KIDNEY AND URETER: Distal left ureteral stone identified with resultant mild to moderate left hydronephrosis  The stone measures 7 x 5 x 4 mm  No intrarenal stones are seen  David Heckler URINARY BLADDER: Unremarkable  No significant abnormality in the visualized lung bases  Limited low radiation dose noncontrast CT evaluation demonstrates no clinically significant abnormality of liver, spleen, pancreas, or adrenal glands  No calcified gallstones or gallbladder wall thickening noted  No ascites or bulky lymphadenopathy on this limited noncontrast study  Colonic diverticula are noted, without evidence to suggest acute diverticulitis  Visualized bowel appears otherwise unremarkable  Limited evaluation demonstrates no evidence to suggest acute appendicitis  No acute fracture or destructive osseous lesion is identified  Impression: Distal left ureteral stone measures approximately 7 x 5 x 4 mm resulting in mild to moderate left hydronephrosis  Colonic diverticulosis  No evidence for acute diverticulitis  Workstation performed: [id number]     Radiology Results    Result Date: 10/4/2018  Narrative: Ordered by an unspecified provider  Imaging Reports Reviewed by myself    Cultures:   Blood Culture:   Lab Results   Component Value Date    BLOODCX No Growth After 4 Days  10/03/2018    BLOODCX No Growth After 4 Days  10/03/2018    BLOODCX No Growth After 5 Days  01/17/2017    BLOODCX No Growth After 5 Days   01/17/2017     Urine Culture: No results found for: URINECX  Sputum Culture: No components found for: SPUTUMCX  Wound Culture:   Lab Results   Component Value Date    WOUNDCULT (A) 01/17/2017     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       Last 24 Hours Medication List:     Current Facility-Administered Medications:  amiodarone 200 mg Oral TID With Meals Maira Beckman PA-C   Followed by       Sandy Strauss ON 10/13/2018] amiodarone 200 mg Oral BID With Meals Maira Beckman PA-C   Followed by       Sandy Strauss ON 10/21/2018] amiodarone 200 mg Oral Daily With Breakfast Adriana Shoemaker PA-C   clopidogrel 75 mg Oral Daily STEVEN Alcala   docusate sodium 100 mg Oral Daily STEVEN Marin   isosorbide mononitrate 30 mg Oral Daily STEVEN Zavala   levofloxacin 250 mg Oral Q24H Adriana Shoemaker PA-C   losartan 50 mg Oral Daily Adriana Shoemaker PA-C   metoprolol tartrate 50 mg Oral Q12H [address] STEVEN White   multivitamin-minerals 1 tablet Oral Daily STEVEN Alcala   oxyCODONE 2 5 mg Oral Q4H PRN STEVEN Marin   oxyCODONE 5 mg Oral Q4H PRN STEVEN Marin   pantoprazole 40 mg Intravenous Q12H [address] ManjuiyiSTEVEN Fisher   polyethylene glycol 17 g Oral Daily PRN Carmelita Dorene Ganser, CRNP        Today, Patient Was Seen By: Nathan Orta MD    ** Please Note: Dragon 360 Dictation voice to text software may have been used in the creation of this document   **

## 2018-10-08 NOTE — ASSESSMENT & PLAN NOTE
· Echo 10/3 showed EF 40%,G1DD (previous echo from 6/2018 showed EF 60%)  · Telemetry showed NSR    · Nuclear stress test was normal   · Daily weight, I/Os  · Cardiology following  · Appears euvolemic

## 2018-10-08 NOTE — PROGRESS NOTES
Progress Note - Cardiology   [facility]     Joe Braun Race 80 y o  female MRN: [id number]  : 9/10/1930  Unit/Bed#: [address] [place] [address] Encounter: [id number]    Assessment and Plan:   1  Paroxysmal atrial fibrillation:  Patient is currently maintaining sinus rhythm on amiodarone  Amiodarone taper has been written in her MA are for time of discharge  Plan is amiodarone 200 mg t i d  X7 days, then 200 mg b i d  X7 days, then amiodarone 200 mg once a day  Will continue her metoprolol 50 mg b i d  Despite chads Vasc 2 score equaling 5  Patient will not be placed on long-term anticoagulation secondary to her increased risk of bleeding and history of gastric AVMs and antral ulcers  2  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%: This was a new finding on echocardiogram during this admission  Patient at this time declining any further cardiac workup as inpatient  States she will follow as an outpatient  Patient with history of atherosclerosis and multiple other vascular beds  Patient will be high risk for any type of PCI due to her inability to tolerate anti-platelet agents  3  Hypertension:  Patient recommended to maintain blood pressure systolic greater than 664 for organ perfusion  As patient did experience bout of shock liver after period of hypotension during hospitalization  4  UTI status post left kidney stone with cystoscopy and placement of stent    5  Abdominal discomfort with multiple gastric AVMs and antral ulcers:  Continue PPI  Patient has a history of ischemic  mesenteric disease  6  History of significant peripheral vascular disease/right carotid artery disease and left renal artery stenosis:  Continue optimal medical management    Subjective / Objective:   Patient seen examined  Anxious to go home  But has had a tenuous hospitalization    Has been maintaining sinus rhythm, but during her bout of rapid atrial fibrillation was hypotensive requiring ICU monitoring  Patient started on amiodarone without long-term anticoagulation because of her history of anemia and AVMs  Patient denies any chest pain pressure    Vitals: Blood pressure 164/78, pulse 81, temperature 97 8 °F (36 6 °C), temperature source Temporal, resp  rate 18, height 5' 2" (1 575 m), weight 39 7 kg (87 lb 8 4 oz), SpO2 97 %  Vitals:    10/07/18 1935 10/08/18 0600   Weight: 39 6 kg (87 lb 4 8 oz) 39 7 kg (87 lb 8 4 oz)     Body mass index is 16 01 kg/m²  BP Readings from Last 3 Encounters:   10/08/18 164/78   07/30/18 (!) 180/80   06/26/18 (!) 200/90     Orthostatic Blood Pressures      Most Recent Value   Blood Pressure  164/78 filed at 10/08/2018 0817   Patient Position - Orthostatic VS  Sitting filed at 10/08/2018 0817        I/O       10/06 0701 - 10/07 0700 10/07 0701 - 10/08 0700 10/08 0701 - 10/09 0700    P  O  700 350     IV Piggyback 100      Total Intake(mL/kg) 800 (20 2) 350 (8 8)     Urine (mL/kg/hr) 1350 (1 4) 700 (0 7) 120 (0 9)    Total Output 1350 700 120    Net -550 -350 -120           Unmeasured Urine Occurrence 2 x 1 x     Unmeasured Stool Occurrence 1 x          Invasive Devices     Peripheral Intravenous Line            Peripheral IV 10/05/18 Left Arm 2 days    Peripheral IV 10/06/18 Right Antecubital 2 days          Drain            Ureteral Drain/Stent Left ureter 6 Fr  5 days                  Intake/Output Summary (Last 24 hours) at 10/08/18 1018  Last data filed at 10/08/18 0740   Gross per 24 hour   Intake              150 ml   Output              620 ml   Net             -470 ml         Physical Exam:   Physical Exam   Constitutional: She is oriented to person, place, and time  She appears well-developed and well-nourished  HENT:   Head: Normocephalic and atraumatic  Eyes: Pupils are equal, round, and reactive to light  Right eye exhibits no discharge  Left eye exhibits no discharge  Neck: Normal range of motion  Neck supple   No JVD present  No thyromegaly present  Cardiovascular: Normal rate, regular rhythm and intact distal pulses  Murmur heard  Systolic murmur is present with a grade of 2/6   Pulmonary/Chest: Effort normal and breath sounds normal  No respiratory distress  She has no wheezes  Abdominal: Soft  Bowel sounds are normal    Musculoskeletal: She exhibits no edema  Neurological: She is alert and oriented to person, place, and time  Skin: Skin is warm and dry  Psychiatric: She has a normal mood and affect  Nursing note and vitals reviewed              Medications/ Allergies:     Current Facility-Administered Medications:  amiodarone 200 mg Oral TID With Meals Letty Gallo PA-C   Followed by       Juan Umana ON 10/13/2018] amiodarone 200 mg Oral BID With Meals Letty Gallo PA-C   Followed by       Juan Umana ON 10/21/2018] amiodarone 200 mg Oral Daily With Breakfast Adriana Shoemaker PA-C   clopidogrel 75 mg Oral Daily STEVEN Alcala   docusate sodium 100 mg Oral Daily STEVEN Marin   isosorbide mononitrate 30 mg Oral Daily STEVEN Latham   levofloxacin 250 mg Oral Q24H Adriana Shoemaker PA-C   losartan 50 mg Oral Daily Adriana Shoemaker PA-C   metoprolol tartrate 50 mg Oral Q12H [facility] STEVEN Rodriguez   multivitamin-minerals 1 tablet Oral Daily Yolanda RobersonaSTEVEN   oxyCODONE 2 5 mg Oral Q4H PRN STEVEN Marin   oxyCODONE 5 mg Oral Q4H PRN STEVEN Marin   pantoprazole 40 mg Intravenous Q12H [facility] STEVEN Marin   polyethylene glycol 17 g Oral Daily PRN Yolanda S GarciaSTEVEN       oxyCODONE 2 5 mg Q4H PRN   oxyCODONE 5 mg Q4H PRN   polyethylene glycol 17 g Daily PRN     Allergies   Allergen Reactions    Chlorthalidone Itching    Penicillins Itching     Tolerates zosyn    Sulfa Antibiotics Rash    Bacitracin Rash    Ciprofloxacin GI Intolerance       VTE Pharmacologic Prophylaxis:   Sequential compression device (Venodyne)     Labs: Troponins:  Results from last 7 days  Lab Units 10/06/18  1058 10/06/18  0813 10/06/18  0511   TROPONIN I ng/mL 1 46* 1 59* 1 27*       CBC with diff:  Results from last 7 days  Lab Units 10/08/18  0522 10/07/18  0424 10/06/18  0351 10/05/18  0503 10/05/18  0038 10/04/18  0534 10/03/18  2202 10/03/18  0050  10/01/18  1904   WBC Thousand/uL 8 76 8 17 8 53 8 53  --  9 03 12 26* 13 17*  < > 8 20   HEMOGLOBIN g/dL 10 4* 9 8* 11 2* 10 1* 10 5* 8 0* 8 6* 6 9*  < > 9 1*   HEMATOCRIT % 34 1* 32 5* 36 1 31 5* 32 7* 26 0* 26 5* 24 4*  < > 30 1*   MCV fL 80* 81* 77* 77*  --  77* 73* 79*  < > 75*   PLATELETS Thousands/uL 260 265 316 280  --  255 309 300  < > 404*   MCH pg 24 5* 24 3* 24 0* 24 6*  --  23 5* 23 8* 22 3*  < > 22 8*   MCHC g/dL 30 5* 30 2* 31 0* 32 1  --  30 8* 32 5 28 3*  < > 30 2*   RDW % 21 4* 21 0* 20 3* 18 7*  --  18 4* 18 8* 18 2*  < > 17 9*   MPV fL 9 6 10 0 9 2 9 9  --  10 0 9 2 10 2  < > 9 4   NRBC AUTO /100 WBCs 0 0  --  0  --  0  --  0  --  0   < > = values in this interval not displayed  CMP:  Results from last 7 days  Lab Units 10/07/18  0641 10/06/18  0351 10/05/18  2156 10/05/18  0503 10/04/18  0534 10/03/18  1618 10/03/18  0948  10/01/18  1904   SODIUM mmol/L 138 136 135* 136 132* 132* 131*  < > 128*   POTASSIUM mmol/L 4 4 4 4 4 0 2 8* 3 7 5 2 4 6  < > 4 4   CHLORIDE mmol/L 105 104 103 103 101 101 101  < > 95*   CO2 mmol/L 22 20* 22 23 21 18* 16*  < > 20*   BUN mg/dL 33* 20 23 29* 30* 26* 24  < > 22   CREATININE mg/dL 0 99 0 97 0 95 1 10 1 10 1 26 1 52*  < > 1 12   CALCIUM mg/dL 8 4 8 4 8 5 8 2* 8 2* 8 8 8 2*  < > 9 2   AST U/L 86* 194*  --  303* 708* 1,788* 1,129*  --  18   ALT U/L 334* 478*  --  553* 754* 1,124* 741*  --  22   ALK PHOS U/L 57 68  --  69 64 65 57  --  69   EGFR ml/min/1 73sq m 51 52 54 45 45 38 30  < > 44   < > = values in this interval not displayed      Magnesium:  Results from last 7 days  Lab Units 10/08/18  0522 10/07/18  3999 10/06/18  4121 10/05/18  2156 10/05/18  0503 10/04/18  0534 10/03/18  0948   MAGNESIUM mg/dL 1 7 1 7 2 0 2 0 1 5* 1 6 1 7     Coags:  Results from last 7 days  Lab Units 10/06/18  0351 10/05/18  0504 10/04/18  0534 10/03/18  1618   PTT seconds  --   --   --  24   INR  1 11 1 22* 1 33* 1 30*     TSH:  Results from last 7 days  Lab Units 10/03/18  0050   TSH 3RD GENERATON uIU/mL 2 229     NT-proBNP:   Recent Labs      10/06/18   0351   NTBNP  96,541*        Imaging & Testing   I have personally reviewed pertinent reports  Xr Chest Portable    Result Date: 10/2/2018  Narrative: CHEST INDICATION:   Hx CHF  Low back pain radiates to abdomen  COMPARISON:  06/11/2018 EXAM PERFORMED/VIEWS:  XR CHEST PORTABLE 1 image FINDINGS: Cardiomediastinal silhouette appears enlarged  Chronic changes are present  The patient is in congestive heart failure  The lungs are clear  No pneumothorax  Minimal blunting of the costophrenic angles  Osseous structures appear within normal limits for patient age  Impression: CHF  Cardiomegaly and chronic changes  Workstation performed: [id number]     Mri Abdomen Wo Contrast And Mrcp    Result Date: 10/5/2018  Narrative: MRI OF THE ABDOMEN WITHOUT CONTRAST WITH MRCP INDICATION: Elevated LFT COMPARISON: Previous CT from October 9, 2014 TECHNIQUE:  The following pulse sequences were obtained on a 1 5 T scanner: 3D MRCP with radial thick slabs and projections  Coronal and axial T2 with TE of 90 and 180 respectively, axial T1-weighted in-and-out-of phase, axial DWI/ADC, axial T2 with fat  saturation, axial FIESTA fat-sat, and axial T1 with fat saturation  3D rendering was performed from the acquisition scanner  Evaluation of the solid abdominal viscera is limited without intravenous contrast  FINDINGS: BILIARY/PANCREATIC DUCTS:  No intra-hepatic biliary ductal dilatation  The common bile duct is of normal caliber  The common bile duct measures 2 2 mm    The common bile duct is better seen on coronal image 14 of series 3 There is a suboptimal evaluation of the intrahepatic ducts  There is no dilation of the intrahepatic ducts seen LIVER:  A T2 hyperintensity noted adjacent to the IVC, measuring about 2 2 cm, bright on the longer Echo sequences, may be a hemangioma or cyst ,  Stable since previous study of 2014  Additional lead T2 hyperintense lesion seen in the lateral segment of the left hepatic lobe, seen in image 46 of series 9, stable since previous study of 4 October 1914  Additional lobulated T2 hyperintensity seen in the right hepatic lobe in the posterior aspect of the segment 8 area, measuring about 19 mm, stable Additional lead lesion seen in inferior and anterior aspect of the liver in image 18 of series 3, stable GALLBLADDER:  Gallbladder appear unremarkable and unchanged PANCREAS:  Evaluation is limited  However there is no pancreatic ductal dilation seen there is no pancreatic atrophy seen ADRENAL GLANDS:  Normal  SPLEEN: Normal  KIDNEYS:  There is mild to moderate left hydronephrosis with with dilation of the left pelvicalyceal system  There is perinephric stranding on the left side ABDOMINAL CAVITY:  No lymphadenopathy or mass  No ascites  BOWEL:  No abnormal dilation of the bowel loops seen OSSEOUS STRUCTURES:  There is scoliosis in the lumbar spine VASCULAR STRUCTURES:  No aneurysm  ABDOMINAL WALL:  Normal  LOWER CHEST:  Unremarkable MRI appearance  Impression: There is no biliary dilation seen and there is no abnormal dilation of the common bile duct  There is no evidence of choledocholithiasis Suboptimal evaluation of the intrahepatic ducts and common hepatic duct, however there are no findings to suggest intrahepatic Mild to moderate left hydronephrosis and hydroureter   Multiple T2 hyperintense hepatic lesions which are stable since the previous study of October 9, 2014 likely hemangiomas Workstation performed: [id number]     Xr Retrograde Pyelogram    Result Date: 10/3/2018  Narrative: RETROGRADE PYELOGRAM INDICATION: Urinary tract calculus  COMPARISON: CT performed October 1, 2018  IMAGES: 7 FLUOROSCOPY TIME:  40 SECONDS CONTRAST: 10 mL of Omnipaque 300 FINDINGS: Fluoroscopic guidance was provided for left-sided retrograde pyelogram  Final film depicts placement of ureteral stent  Impression: Fluoroscopic guidance provided for retrograde study  Please see procedure report for further details  Workstation performed: [id number]     Us Abdomen Limited    Result Date: 10/3/2018  Narrative: RIGHT UPPER QUADRANT ULTRASOUND INDICATION:    shock liver, elevated LFTs    COMPARISON:  Unenhanced CT abdomen and pelvis, renal stone study, from 10/1/2018  Enhanced CT of the abdomen and pelvis from December 31, 2016  TECHNIQUE:   Real-time ultrasound of the right upper quadrant was performed with a curvilinear transducer with both volumetric sweeps and still imaging techniques  FINDINGS: PANCREAS:  Pancreatic tail unremarkable  Body and head of the pancreas difficult to identify  AORTA AND IVC:  Extensive aortic atherosclerosis without aneurysm  IVC patent  LIVER: Size:  Within normal range  The liver measures 15 1 cm in the midclavicular line  Contour:  Surface contour is smooth  Parenchyma:  Echogenicity and echotexture are within normal limits  1 cm hyperechoic mass, most likely hemangioma, in the subcapsular portion of the lateral segment of the left lobe, unchanged in size since the CT from 2016  Several additional similar-appearing subcentimeter masses, most consistent with additional small  hemangiomas  2 1 x 2 2 cm poorly marginated mass in the right lobe, adjacent to the intrahepatic segment of the IVC, primarily hypoechoic to liver  This corresponds to an enhancing mass demonstrated on prior CT  Its appearance is nonspecific but it has not increased in size since 2016  Main portal vein patent with hepatopetal flow  GALLBLADDER: No gallstones  Gallbladder wall thickened, measuring  10 millimeters    Scattered hypoechoic foci in the wall, consistent with edema  Small amount of pericholecystic fluid  Sonographer reports no sonographic Hammond's sign  BILE DUCTS: No extrahepatic bile duct dilatation  CBD measures 2 mm  Mild intrahepatic bile duct dilatation, unchanged since the CT from 2016  No evidence of choledocholithiasis  RIGHT KIDNEY: Right kidney measures 7 9 x 3 7 cm  No mass, calculus or hydronephrosis  ASCITES:   None  Impression: 1  Several small hyperechoic liver masses, most likely hemangiomas  2   Indeterminate 2 1 x 2 2 cm mass in the right lobe, adjacent to the IVC, previously demonstrated on a CT of the abdomen and pelvis from 12/31/2016 and unchanged in size  3   Chronic mild intrahepatic ductal dilatation with no extrahepatic bile duct dilatation  4   Gallbladder wall thickening with small amount of pericholecystic fluid  This is a nonspecific finding that can be seen in acute and chronic cholecystitis, hepatitis, hypoalbuminemia and congestive heart failure, among other etiologies  Workstation performed: [id number]     Ct Renal Stone Study Abdomen Pelvis Wo Contrast    Result Date: 10/1/2018  Narrative: CT ABDOMEN AND PELVIS WITHOUT IV CONTRAST - LOW DOSE RENAL STONE INDICATION:   LT FLANK AND LUQ PAIN  COMPARISON:  None  TECHNIQUE:  Low dose thin section CT examination of the abdomen and pelvis was performed without intravenous or oral contrast according to a protocol specifically designed to evaluate for urinary tract calculus  Axial, sagittal, and coronal 2D reformatted images were created from the source data and submitted for interpretation  Evaluation for pathology in the abdomen and pelvis that is unrelated to urinary tract calculi is limited  Radiation dose length product (DLP) for this visit:  111 11 mGy-cm     This examination, like all CT scans performed in the [facility], was performed utilizing techniques to minimize radiation dose exposure, including the use of iterative  reconstruction and automated exposure control  FINDINGS: RIGHT KIDNEY AND URETER: No urinary tract calculi  No hydronephrosis or hydroureter  Linear calcifications likely vascular  LEFT KIDNEY AND URETER: Distal left ureteral stone identified with resultant mild to moderate left hydronephrosis  The stone measures 7 x 5 x 4 mm  No intrarenal stones are seen  Nancy Burow URINARY BLADDER: Unremarkable  No significant abnormality in the visualized lung bases  Limited low radiation dose noncontrast CT evaluation demonstrates no clinically significant abnormality of liver, spleen, pancreas, or adrenal glands  No calcified gallstones or gallbladder wall thickening noted  No ascites or bulky lymphadenopathy on this limited noncontrast study  Colonic diverticula are noted, without evidence to suggest acute diverticulitis  Visualized bowel appears otherwise unremarkable  Limited evaluation demonstrates no evidence to suggest acute appendicitis  No acute fracture or destructive osseous lesion is identified  Impression: Distal left ureteral stone measures approximately 7 x 5 x 4 mm resulting in mild to moderate left hydronephrosis  Colonic diverticulosis  No evidence for acute diverticulitis  Workstation performed: [id number]     Radiology Results    Result Date: 10/4/2018  Narrative: Ordered by an unspecified provider  EKG / Monitor: Personally reviewed      Sinus rhythm    Cardiac testing:   Results for orders placed during the hospital encounter of 10/01/18   Echo complete with contrast if indicated    Narrative [address]  [address] [address]  [phone]    Transthoracic Echocardiogram  2D, M-mode, Doppler, and Color Doppler    Study date:  03-Oct-2018    Patient: Shiva Galarza RACE  MR number: [id number]  Account number: [de-identified]  : 10-Sep-1930  Age: 80 years  Gender: Female  Status: Inpatient  Location: Bedside  Height: 61 in  Weight: 88 9 lb  BP: 128/ 60 mmHg    Indications: Congestive Heart Failure    Diagnoses: I50 9 - Heart failure, unspecified    Sonographer:  ABDIRAHMAN Marin, RVS  Primary Physician:  Jackie Gomez MD  Referring Physician:  John Steiner MD  Group:  [facility]  Interpreting Physician:  Estela Dennis DO    SUMMARY    LEFT VENTRICLE:  Systolic function was moderately reduced  Ejection fraction was estimated to be 40 %  There was moderate diffuse hypokinesis with no identifiable regional variations  There was moderate concentric hypertrophy  Doppler parameters were consistent with abnormal left ventricular relaxation (grade 1 diastolic dysfunction)  LEFT ATRIUM:  The atrium was mildly dilated  MITRAL VALVE:  There was moderately reduced leaflet separation  There was moderately restricted mobility  There was moderate regurgitation  AORTIC VALVE:  The valve was trileaflet  Leaflets exhibited normal thickness, calcification, and moderately reduced cuspal separation  There was no evidence for stenosis  There was no regurgitation  TRICUSPID VALVE:  There was moderate regurgitation  Pulmonary artery systolic pressure was within the normal range  PULMONIC VALVE:  There was mild to moderate regurgitation  HISTORY: PRIOR HISTORY: PVD, COPD, GERD, Arthritis, Scoliosis    PROCEDURE: The procedure was performed at the bedside  This was a routine study  The transthoracic approach was used  The study included complete 2D imaging, M-mode, complete spectral Doppler, and color Doppler  The heart rate was 81 bpm,  at the start of the study  Images were obtained from the parasternal, apical, and subcostal acoustic windows  Images were not obtained from the suprasternal notch acoustic windows  Image quality was adequate  LEFT VENTRICLE: Size was normal  Systolic function was moderately reduced  Ejection fraction was estimated to be 40 %   There was moderate diffuse hypokinesis with no identifiable regional variations  There was moderate concentric  hypertrophy  DOPPLER: Doppler parameters were consistent with abnormal left ventricular relaxation (grade 1 diastolic dysfunction)  RIGHT VENTRICLE: The size was normal  Systolic function was normal  DOPPLER: Systolic pressure was within the normal range  LEFT ATRIUM: The atrium was mildly dilated  No thrombus was identified  RIGHT ATRIUM: Size was normal     MITRAL VALVE: Valve structure was normal  There was moderately reduced leaflet separation  There was moderately restricted mobility  No echocardiographic evidence for prolapse  DOPPLER: The transmitral velocity was within the normal range  There was no evidence for stenosis  There was moderate regurgitation  AORTIC VALVE: The valve was trileaflet  Leaflets exhibited normal thickness, calcification, and moderately reduced cuspal separation  DOPPLER: Transaortic velocity was within the normal range  There was no evidence for stenosis  There was  no regurgitation  TRICUSPID VALVE: The valve structure was normal  There was normal leaflet separation  DOPPLER: The transtricuspid velocity was within the normal range  There was moderate regurgitation  Pulmonary artery systolic pressure was within the  normal range  Estimated peak PA pressure was 37 mmHg  PULMONIC VALVE: Leaflets exhibited normal thickness, no calcification, and normal cuspal separation  DOPPLER: The transpulmonic velocity was within the normal range  There was mild to moderate regurgitation  PERICARDIUM: There was no thickening  There was no pericardial effusion  AORTA: The root exhibited normal size and fibrocalcific change      PULMONARY ARTERY: The size was normal  The morphology appeared normal     SYSTEM MEASUREMENT TABLES    2D mode  AoR Diam 2D: 2 9 cm  LA Diam (2D): 3 6 cm  LA/Ao (2D): 1 24  FS (2D Teich): 20 %  IVSd (2D): 1 27 cm  LVDEV: 80 8 cm³  LVEDV MOD BP: 96 cm³  LVESV: 47 4 cm³  LVIDd(2D): 4 25 cm  LVISd (2D): 3 4 cm  LVPWd (2D): 1 27 cm  SV (Teich): 33 4 cm³    Apical four chamber  LVEF A4C: 40 %    Apical two chamber  LVEF A2C: 45 %    Unspecified Scan Mode  MV Peak A Manuel: 1380 mm/s  MV Peak E Manuel  Mean: 983 mm/s  MVA (PHT): 4 23 cm squared  PHT: 75 ms  Max P mm[Hg]  V Max: 3080 mm/s  Vmax: 2680 mm/s  RA Area: 20 7 cm squared  RA Volume: 66 9 cm³  TAPSE: 1 6 cm    [facility]    Prepared and electronically signed by    Deven Chacon DO  Signed 03-Oct-2018 14:43:04       Counseling / Coordination of Care  Total time spent today 20 minutes  Greater than 50% of total time was spent with the patient and / or family counseling and / or coordination of care  STEVEN Higginbotham        "This note has been constructed using a voice recognition system  Therefore there may be syntax, spelling, and/or grammatical errors   Please call if you have any questions  "

## 2018-10-08 NOTE — ASSESSMENT & PLAN NOTE
· CT scan of the abdomen and pelvis showed distal left ureteral stone measures approximately 7 x 5 x 4 mm resulting in mild to moderate left hydronephrosis  · S/P Holmium laser,stone extraction, stent placement 10/3/18  · Had some hematuria post procedure but stable    · F/U 1 week after discharge for stent removal

## 2018-10-08 NOTE — ASSESSMENT & PLAN NOTE
·  History of 70-99% stenosis and was seen here with no vascular intervention  · Continue Plavix  · Hold Lipitor

## 2018-10-08 NOTE — ASSESSMENT & PLAN NOTE
· GI following  · S/p endoscopy for abdominal pain on 10/5 demonstrated multiple nonbleeding AVMs  · Cont PPI  · Avoid Anticoagulation and NSAIDS

## 2018-10-08 NOTE — ASSESSMENT & PLAN NOTE
· RESOLVED  · Likely multifactorial 2/2 ATN in the setting of obstruction and relative hypotension  · Held losartan  · PVR was 0   · Avoid nephrotoxins  · Intake and output  · Appreciate Nephrology input

## 2018-10-08 NOTE — CASE MANAGEMENT
Continued Stay Review    Date: 10/618    Vital Signs: 'S /80 (BP Location: Right arm)   Pulse 65   Temp 98 8 °F (37 1 °C) (Temporal)   Resp 16   Ht 5' 2" (1 575 m)   Wt 39 7 kg (87 lb 8 4 oz)   SpO2 98%   BMI 16 01 kg/m²     TRANSFER ICU  TELE MON  IV METOPROLOL 2 5MG  CBC CMP MG   PROCALCITONIN  PT INR  IV METOPROLOL  2 5MG  IV CARDIZEM GTT  LACTIC ACID   ABG  IV LASIX 40MG   BNP  KDUR 40 MEW  IV METOPROLOL 5MG   Medications:   Scheduled Meds:   Current Facility-Administered Medications:  amiodarone 200 mg Oral TID With Meals Keren Álvarez PA-C   Followed by       Vanesa Huitron ON 10/13/2018] amiodarone 200 mg Oral BID With Meals Keren Álvarez PA-C   Followed by       Vanesa Huitron ON 10/21/2018] amiodarone 200 mg Oral Daily With Breakfast Adriana Shoemaker PA-C   clopidogrel 75 mg Oral Daily STEVEN Alcala   docusate sodium 100 mg Oral Daily STEVEN Marin   isosorbide mononitrate 30 mg Oral Daily STEVEN Beard   levofloxacin 250 mg Oral Q24H Adriana Shoemaker PA-C   losartan 50 mg Oral Daily Adriana Shoemaker PA-C   metoprolol tartrate 50 mg Oral Q12H [address] STEVEN [place]   multivitamin-minerals 1 tablet Oral Daily STEVEN Alcala   oxyCODONE 2 5 mg Oral Q4H PRN STEVEN Marin   oxyCODONE 5 mg Oral Q4H PRN [place]STEVEN [place]   pantoprazole 40 mg Intravenous Q12H [address] STEVEN Marin   polyethylene glycol 17 g Oral Daily PRN STEVEN Alcala     Continuous Infusions:    PRN Meds: oxyCODONE    oxyCODONE    polyethylene glycol    Abnormal Labs/Diagnostic Results: PCO2 30 5 PO2 162 8 PH 7 455 HCO3 21     ALB 2 9 , TROPONIN 1 27 BNP 13,829   Age/Sex: 80 y o  female     ICU  Cardiology was consulted There is concern for underlying coronary artery disease  A rapid or a sponsor was called due to new onset atrial fibrillation with RVR  On arrival pt's heart rate was 150's to 170's  BP 82/54  Fluids infusing   Pt stated she felt a little dizzy  She denied chest pain or SOB  She stated she had a feeling of epigastric discomfort that resolved with burping   BP 90/42  Pt given 2 5 mg metoprolol  Cardizem IV 7 5 mg given   atrial fibrillation /58  Pt transferred to the ICU for further monitoring and workup  Time Called ( Time): 7936  Date Called: 10/6  Level of Care: MS  Room#: 004  GOJEIRQ Time ( Time): 5734  Event End Time ( Time): 0430  Primary reason for call: Acute change in HR  Interventions:  Airway/Breathing:  O2 Mask/Nasal  Circulation: IV Fluid Bolus and EKG  Other Treatments: N/A         New onset atrial fibrillation (HCC)   Assessment & Plan     · Rapid response on 10/06 for new onset atrial fibrillation with RVR  · Patient initially with heart rate 1 40 to 160s  She was relatively hemodynamically stable  · Patient was given IV metoprolol with little response  Patient then received 7 5 mg of IV Cardizem with a decrease in heart rate from the 120s to 1 100s  · Cardizem drip initiated  · Cardiology already following for new onset systolic heart failure  · Troponins pending  · Electrolytes within normal limits will replete when necessary          Acute kidney injury superimposed on chronic kidney disease ([facility] [address] )   Assessment & Plan     · Likely multifactorial secondary to pre renal hypotension versus postrenal obstruction  · Baseline creatinine 0 8-1  · Most recent lab value creatinine 0 97  · Avoid nephrotoxin agents  · Strict I&Os  · Nephrology consulted and following   * Transaminitis   Assessment & Plan     · Improving  · Possibly multifactorial   · Per chart review, transaminitis could possibly be secondary to acute hepatitis  MRCP scheduled to evaluate for choledocholithiasis  · Due to an episode of hypotension during urology procedure, could consider ischemic etiology    · Gastroenterology following  · On admission patient's liver enzymes were normal and peak significantly into the 1 thousands  Chronic diastolic heart failure [facility])   Assessment & Plan     · Echo on June 12, 2018 demonstrated ejection fraction of 55-60% with grade 1 diastolic dysfunction  · Repeat echo October 3rd demonstrates new acute systolic function with an EF of 40% and grade 1 diastolic dysfunction  · Cardiology following  · Will continue current medication regimen however will change extended release tablet to immediate release b i d  Dosing  · On exam patient does not appear to be volume overloaded  · Will re-evaluate the necessity for diuretics  Chronic mesenteric ischemia (HCC)   Assessment & Plan     · History of chronic mesenteric ischemia status post mesenteric artery bypass graft and stent placement in approximately 2015  · Continue Plavix    Anemiaresolved as of 10/6/2018   Assessment & Plan     · Patient's hemoglobin initially on admission was 9 1  Her hemoglobin was noted to down trend to 6 9 on October 3rd for which she received 1 unit of packed red blood cells on October 4th  · EGD on October 5th demonstrated multiple nonbleeding AVMs  · Continue to monitor and trend  · Patient's hemoglobin currently 11 2        Generalized abdominal pain   Assessment & Plan     · Patient mid was admitted for abdominal pain and left-sided flank pain  · Per interview with patient she states that her left flank pain and abdominal pain has mostly resolved since her urology procedure  · Patient states she had been having generalized abdominal pain on off for months mostly after eating  · CT of the abdomen and pelvis without contrast demonstrated a ureteral stone with mild-to-moderate hydronephrosis  · Right upper quadrant ultrasound on October 3rd demonstrated several small hyper a cardiac 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  · Patient to have follow-up MRCP    · GI following   Stenosis of right internal carotid artery   Assessment & Plan     · Patient has history 70-99% stenosis  · Continue Plavix      Essential hypertension   Assessment & Plan     · On admission in the ED patient was initially hypertensive  · Patient normally on hydralazine losartan and metoprolol at home  · Will start on hold secondary to a KI  · Per chart review it was recommended during an office visit that patient's systolic blood pressure be maintained around 140-160 given her chronic arterial stenosis of multiple vessels including her mesenteric artery as well as left renal artery stenosis  · Patient had episode of hypotension during her urology procedure  Weight loss, unintentional   Assessment & Plan     · Patient states she has had a poor appetite secondary to her abdominal pain  · Per chart review and interview with the patient, patient had had nausea without vomiting for the past few months  · Nutrition consulted     Acid reflux   Assessment & Plan     · Patient currently receiving IV Protonix b i d   · GI following  · Endoscopy done on October 5th demonstrated multiple nonbleeding AVMs  Patient does have history peptic ulcer bleed  · Patient usually on omeprazole at home       CARDIOLOGY  Atrial fibrillation with rapid ventricular rate- new onset secondary to recent blood transfusion/volume overload? Given her age, history of multiple grafts strict AVMs/ulcers, inability to tolerate anticoagulation will load the patient with amiodarone to prevent further recurrences  Patient has a prior history of elevated transaminitis which has been improving  We will monitor with amiodarone usage  We will hold any anticoagulation     Acute likely ischemic systolic heart failure EF 40%- current volume overload likely from recent blood transfusion  Improve volume status  The patient declines invasive workup currently? She is anxious to go home and is willing to follow up as an outpatient    Given her history of gastric AVMs and antral ulcers currently she would be a poor candidate for PCI as she would not be able to tolerate anti-platelet agents   We will continue her beta-blocker, hydralazine, and nitrate  History of significant peripheral vascular disease/right carotid stenosis/left renal artery stenosis- continue statin therapy  Elevated LFTs post episode of hypotension  UTI with left kidney stone status post cystoscopy and placement of stent  Anemia most likely due to multiple gastric AVMs and antral ulcers- continue PPI

## 2018-10-09 PROBLEM — N39.0 UTI (URINARY TRACT INFECTION): Status: RESOLVED | Noted: 2018-01-01 | Resolved: 2018-01-01

## 2018-10-09 PROBLEM — I50.43 ACUTE ON CHRONIC COMBINED SYSTOLIC AND DIASTOLIC HEART FAILURE (HCC): Status: RESOLVED | Noted: 2018-01-01 | Resolved: 2018-01-01

## 2018-10-09 PROBLEM — R10.84 GENERALIZED ABDOMINAL PAIN: Status: RESOLVED | Noted: 2018-01-01 | Resolved: 2018-01-01

## 2018-10-09 PROBLEM — N18.9 ACUTE KIDNEY INJURY SUPERIMPOSED ON CHRONIC KIDNEY DISEASE (HCC): Status: RESOLVED | Noted: 2018-01-01 | Resolved: 2018-01-01

## 2018-10-09 PROBLEM — K72.00 SHOCK LIVER: Status: RESOLVED | Noted: 2018-01-01 | Resolved: 2018-01-01

## 2018-10-09 PROBLEM — N17.9 ACUTE KIDNEY INJURY SUPERIMPOSED ON CHRONIC KIDNEY DISEASE (HCC): Status: RESOLVED | Noted: 2018-01-01 | Resolved: 2018-01-01

## 2018-10-09 PROBLEM — N20.0 NEPHROLITHIASIS: Status: RESOLVED | Noted: 2018-01-01 | Resolved: 2018-01-01

## 2018-10-09 NOTE — ASSESSMENT & PLAN NOTE
· Seen by GI  · S/p endoscopy for abdominal pain on 10/5 demonstrated multiple nonbleeding AVMs and antral ulcers  · Cont PPI b i d    · Avoid Anticoagulation and NSAIDS  · Tolerating Plavix  · Denies any abdominal pain, tolerating diet well  · Follow up with GI as outpatient for H pylori test and repeat EGD  · Repeat colonoscopy in future

## 2018-10-09 NOTE — ASSESSMENT & PLAN NOTE
· Rapid response on 10/06 for new onset atrial fibrillation with RVR  · Patient initially with heart rate 140 to 160s  · Patient was given IV metoprolol with little response  Patient then received IV Cardizem with a decrease in heart rate from the 120s to 100s  Then started on Cardizem drip initiated  Required ICU transfer for stabilization  · Followed by Cardiology, started on amiodarone  · Pt converted to NSR at 1015 am on 10/6  · Now on PO Amiodarone, remaining in sinus rhythm  · Pt is a poor candidate for anticoagulation  Hx of multiple AVM's and antral ulcers  · Follow up closely with Cardiology as outpatient    Discussed with the patient

## 2018-10-09 NOTE — PROGRESS NOTES
Progress Note - Anthony Zarate Race 80 y o  female MRN: [id number]    Unit/Bed#: [address] [place] [address] Encounter: [id number]      Subjective:   Patient is seen examined in bed she has no specific complaints she denies any abdominal pain nausea vomiting gas bloating or dysphagia  Laboratory results reviewed LFTs are continuing to trend down  At this time she denies any headache lightheadedness dizziness chest pain chest tightness shortness of breath urinary urgency frequency dark urine new joint pain or rashes  She is anxious to go home    Objective:     Vitals: Blood pressure 145/65, pulse 74, temperature 98 4 °F (36 9 °C), temperature source Temporal, resp  rate 18, height 5' 2" (1 575 m), weight 40 9 kg (90 lb 2 7 oz), SpO2 97 %  ,Body mass index is 16 49 kg/m²        Intake/Output Summary (Last 24 hours) at 10/09/18 8014  Last data filed at 10/09/18 0088   Gross per 24 hour   Intake                0 ml   Output              650 ml   Net             -650 ml       Gastrointestinal ROS: no abdominal pain, change in bowel habits, or black or bloody stools      Physical:   General: well appearing no acute distress   Abdomen:  Soft nontender to touch bowel sounds are positive x4 quads no guarding or rebound tenderness                  Current Facility-Administered Medications:     amiodarone tablet 200 mg, 200 mg, Oral, TID With Meals, 200 mg at 10/08/18 1641 **FOLLOWED BY** [START ON 10/13/2018] amiodarone tablet 200 mg, 200 mg, Oral, BID With Meals **FOLLOWED BY** [START ON 10/21/2018] amiodarone tablet 200 mg, 200 mg, Oral, Daily With Breakfast, Adriana Shoemaker PA-C    clopidogrel (PLAVIX) tablet 75 mg, 75 mg, Oral, Daily, STEVEN Alcala, 75 mg at 10/08/18 0950    docusate sodium (COLACE) capsule 100 mg, 100 mg, Oral, Daily, NURIS MarinNP, 100 mg at 10/08/18 0950    isosorbide mononitrate (IMDUR) 24 hr tablet 30 mg, 30 mg, Oral, Daily, STEVEN Walker, 30 mg at 10/08/18 0950    losartan (COZAAR) tablet 50 mg, 50 mg, Oral, Daily, Adriana Shoemaker PA-C, 50 mg at 10/08/18 0950    metoprolol tartrate (LOPRESSOR) tablet 50 mg, 50 mg, Oral, Q12H [facility], STEVEN Black, 50 mg at 10/08/18 2025    multivitamin-minerals (CENTRUM) tablet 1 tablet, 1 tablet, Oral, Daily, STEVEN [place]cal[place], 1 tablet at 10/08/18 0950    oxyCODONE (ROXICODONE) IR tablet 2 5 mg, 2 5 mg, Oral, Q4H PRN, STEVEN Marin    oxyCODONE (ROXICODONE) IR tablet 5 mg, 5 mg, Oral, Q4H PRN, STEVEN Marin    pantoprazole (PROTONIX) injection 40 mg, 40 mg, Intravenous, Q12H [address], STEVEN Marin, 40 mg at 10/08/18 2025    polyethylene glycol (MIRALAX) packet 17 g, 17 g, Oral, Daily PRN, STEVEN Alcala    Lab, Imaging and other studies:  Lab Results   Component Value Date    WBC 8 76 10/08/2018    HGB 10 4 (L) 10/08/2018    HCT 34 1 (L) 10/08/2018    MCV 80 (L) 10/08/2018     10/08/2018                                                              Lab Results   Component Value Date    GLUCOSE 124 01/01/2017    CALCIUM 8 4 10/07/2018     10/07/2018    K 4 4 10/07/2018    CO2 22 10/07/2018     10/07/2018    BUN 33 (H) 10/07/2018    CREATININE 0 99 10/07/2018       Lab Results   Component Value Date     (H) 10/07/2018    AST 86 (H) 10/07/2018    ALKPHOS 57 10/07/2018           Assessment/Plan:  1  Abdominal pain, anemia she had evaluation with EGD with noted hiatal hernia, multiple gastric AVM, antral ulcers  Stool for H pylori antigen has been ordered and is pending  Recommend repeat EGD in the next 4-6 weeks for further bleed off Plavix for further evaluation  Will consider colonoscopy when she is more stable from cardiac standpoint  Discussed with patient in detail  2  Elevated liver function test evaluation with MRCP with no evidence of choledocholithiasis, likely secondary to ischemic hepatopathy LFTs are trending down and should continue to normalize  3   A chronic mesenteric ischemia status post stent placement 2015 4  New onset of atrial fibrillation she is being followed by Cardiology    Stable for discharge from GI perspective she will call our office for follow-up post discharge    Above case discussed with  Dr Rubi Cabral all carrillo decisions made by him

## 2018-10-09 NOTE — ASSESSMENT & PLAN NOTE
Microcytic hypochromic  Hb 9 1-> 7 7-> 8 2-> 6 9->8 6-> 8 0->10 5->10 1   B 12 966,folate > 20, iron 13, ferritin 15  Received 1 unit of RBC on 10/3 and 1 unit of RBC on 10/4  Hb stable since transfusion  Continue MVI  EGD as above without any overt bleeding  Continue Protonix b i d    Colonoscopy as outpatient

## 2018-10-09 NOTE — ASSESSMENT & PLAN NOTE
· WBC 21 79-> 13 17->9 03-> 8 53 today  Patient afebrile  · Likely secondary to UTI  · CXR showed CHF  Cardiomegaly and chronic changes     · Treated with Levaquin for 1 week

## 2018-10-09 NOTE — ASSESSMENT & PLAN NOTE
· Initially hypertensive in the ED likely induced pain  Patient on Hydralazine, Losartan and Metoprolol at home  · DISCONTINUED hydralazine  · Continue losartan, and metoprolol  · Aim to keep SBP around 140-160 given her chronic mesenteric ischemia per Dr Maryann Rnicon office note in 7/2018  · SBP dropped to 100-120's from 170-180's post procedure  Likely caused shock liver    · Currently acceptable on metoprolol, losartan and isosorbide mononitrate  · Follow-up as outpatient with Cardiology and Nephrology

## 2018-10-09 NOTE — ASSESSMENT & PLAN NOTE
· Echo 10/3 showed EF 40%,G1DD (previous echo from 6/2018 showed EF 60%)  · Nuclear stress test was normal   · Daily weight, I/Os  · Cardiology following, input appreciated the the   Recommended further workup as outpatient  · Appears euvolemic  · Continue current meds, Plavix, isosorbide mononitrate and metoprolol

## 2018-10-09 NOTE — PROGRESS NOTES
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%:  Patient appears to be stable at this time  Will need to follow as outpatient for further cardiac workup  3  Hypertension:  Blood pressure stable on current regimen  Would avoid profound hypotension with systolic less than 359 due to history of shock liver  4  UTI/kidney stone: Follow up with Dr Ruth Ann Comer an outpatient for stent removal     5  Antral ulcers and multiple gastric AVMs:  Patient has a history of ischemic mesenteric disease  Continue PPI as per Gastroenterology  6  History of significant peripheral and carotid vascular disease      Subjective / Objective:   Patient seen examined  She offers no complaints  She states she slept well last night  She also notes that her appetite has improved and she is no longer experiencing abdominal discomfort  Blood pressures remained slightly elevated but are stable  Would avoid profound hypotension secondary to previous shock liver due to hypotension  Patient maintaining sinus rhythm on amiodarone  Discussed with her importance of follow-up as an outpatient as we will need to follow her liver functions closely while on amiodarone  Vitals: Blood pressure 145/65, pulse 74, temperature 98 4 °F (36 9 °C), temperature source Temporal, resp  rate 18, height 5' 2" (1 575 m), weight 40 9 kg (90 lb 2 7 oz), SpO2 97 %    Vitals:    10/08/18 0600 10/09/18 0600   Weight: 39 7 kg (87 lb 8 4 oz) 40 9 kg (90 lb 2 7 oz)     Body mass index is 16 49 kg/m²   BP Readings from Last 3 Encounters:   10/09/18 145/65   07/30/18 (!) 180/80   06/26/18 (!) 200/90     Orthostatic Blood Pressures      Most Recent Value   Blood Pressure  145/65 filed at 10/09/2018 0305   Patient Position - Orthostatic VS  Lying filed at 10/09/2018 0305        I/O       10/07 0701 - 10/08 0700 10/08 0701 - 10/09 0700 10/09 0701 - 10/10 0700    P  O  350      Total Intake(mL/kg) 350 (8 8)      Urine (mL/kg/hr) 700 (0 7) 770 (0 8)     Total Output 700 770      Net -350 -770             Unmeasured Urine Occurrence 1 x          Invasive Devices     Peripheral Intravenous Line            Peripheral IV 10/05/18 Left Arm 3 days          Drain            Ureteral Drain/Stent Left ureter 6 Fr  6 days                  Intake/Output Summary (Last 24 hours) at 10/09/18 0850  Last data filed at 10/09/18 4209   Gross per 24 hour   Intake                0 ml   Output              650 ml   Net             -650 ml         Physical Exam:   Physical Exam   Constitutional: She is oriented to person, place, and time  She appears well-developed and well-nourished  No distress  HENT:   Head: Normocephalic and atraumatic  Eyes: Pupils are equal, round, and reactive to light  Right eye exhibits no discharge  Left eye exhibits no discharge  Neck: Normal range of motion  Neck supple  No JVD present  No thyromegaly present  Cardiovascular: Normal rate, regular rhythm and intact distal pulses  Murmur heard  Systolic murmur is present with a grade of 2/6   Pulmonary/Chest: Effort normal and breath sounds normal    Decreased   Abdominal: Soft  Bowel sounds are normal  She exhibits no distension  Musculoskeletal: She exhibits no edema  Neurological: She is alert and oriented to person, place, and time  Skin: Skin is warm and dry  She is not diaphoretic  Psychiatric: She has a normal mood and affect  Nursing note and vitals reviewed              Medications/ Allergies:     Current Facility-Administered Medications:  amiodarone 200 mg Oral TID With Meals William Charles PA-C   Followed by       Cinthia Parks ON 10/13/2018] amiodarone 200 mg Oral BID With Meals William Charles PA-C   Followed by       Cinthia Parks ON 10/21/2018] amiodarone 200 mg Oral Daily With Breakfast Adriana Shoemaker PA-C   clopidogrel 75 mg Oral Daily Yolanda Garcia, STEVEN   docusate sodium 100 mg Oral Daily Yoli Benitez, STEVEN   isosorbide mononitrate 30 mg Oral Daily Ragena Dandy, STEVEN   levofloxacin 250 mg Oral Q24H Adriana Shoemaker PA-C   losartan 50 mg Oral Daily Adriana Shoemaker PA-C   metoprolol tartrate 50 mg Oral Q12H [address] Charis Quiver, CRDIANA   multivitamin-minerals 1 tablet Oral Daily Yolanda S Garcia, STEVEN   oxyCODONE 2 5 mg Oral Q4H PRN Cuiyin Yurik, CRNP   oxyCODONE 5 mg Oral Q4H PRN Cuiyin Yurik, CRNP   pantoprazole 40 mg Intravenous Q12H [address] Cuiyin Yurik, CRNP   polyethylene glycol 17 g Oral Daily PRN Yolanda S Garcia, CRNP       oxyCODONE 2 5 mg Q4H PRN   oxyCODONE 5 mg Q4H PRN   polyethylene glycol 17 g Daily PRN     Allergies   Allergen Reactions    Chlorthalidone Itching    Penicillins Itching     Tolerates zosyn    Sulfa Antibiotics Rash    Bacitracin Rash    Ciprofloxacin GI Intolerance       VTE Pharmacologic Prophylaxis:   Sequential compression device (Venodyne)     Labs:   Troponins:  Results from last 7 days  Lab Units 10/06/18  1058 10/06/18  0813 10/06/18  0511   TROPONIN I ng/mL 1 46* 1 59* 1 27*       CBC with diff:  Results from last 7 days  Lab Units 10/08/18  0522 10/07/18  5280 10/06/18  0351 10/05/18  0503 10/05/18  0038 10/04/18  0534 10/03/18  2202 10/03/18  0050   WBC Thousand/uL 8 76 8 17 8 53 8 53  --  9 03 12 26* 13 17*   HEMOGLOBIN g/dL 10 4* 9 8* 11 2* 10 1* 10 5* 8 0* 8 6* 6 9*   HEMATOCRIT % 34 1* 32 5* 36 1 31 5* 32 7* 26 0* 26 5* 24 4*   MCV fL 80* 81* 77* 77*  --  77* 73* 79*   PLATELETS Thousands/uL 260 265 316 280  --  255 309 300   MCH pg 24 5* 24 3* 24 0* 24 6*  -- 23 5* 23 8* 22 3*   MCHC g/dL 30 5* 30 2* 31 0* 32 1  --  30 8* 32 5 28 3*   RDW % 21 4* 21 0* 20 3* 18 7*  --  18 4* 18 8* 18 2*   MPV fL 9 6 10 0 9 2 9 9  --  10 0 9 2 10 2   NRBC AUTO /100 WBCs 0 0  --  0  --  0  --  0       CMP:  Results from last 7 days  Lab Units 10/07/18  0641 10/06/18  0351 10/05/18  2156 10/05/18  0503 10/04/18  0534 10/03/18  1618 10/03/18  0948   SODIUM mmol/L 138 136 135* 136 132* 132* 131*   POTASSIUM mmol/L 4 4 4 4 4 0 2 8* 3 7 5 2 4 6   CHLORIDE mmol/L 105 104 103 103 101 101 101   CO2 mmol/L 22 20* 22 23 21 18* 16*   BUN mg/dL 33* 20 23 29* 30* 26* 24   CREATININE mg/dL 0 99 0 97 0 95 1 10 1 10 1 26 1 52*   CALCIUM mg/dL 8 4 8 4 8 5 8 2* 8 2* 8 8 8 2*   AST U/L 86* 194*  --  303* 708* 1,788* 1,129*   ALT U/L 334* 478*  --  553* 754* 1,124* 741*   ALK PHOS U/L 57 68  --  69 64 65 57   EGFR ml/min/1 73sq m 51 52 54 45 45 38 30       Magnesium:  Results from last 7 days  Lab Units 10/08/18  0522 10/07/18  0641 10/06/18  0351 10/05/18  2156 10/05/18  0503 10/04/18  0534 10/03/18  0948   MAGNESIUM mg/dL 1 7 1 7 2 0 2 0 1 5* 1 6 1 7     Coags:  Results from last 7 days  Lab Units 10/06/18  0351 10/05/18  0504 10/04/18  0534 10/03/18  1618   PTT seconds  --   --   --  24   INR  1 11 1 22* 1 33* 1 30*     TSH:  Results from last 7 days  Lab Units 10/03/18  0050   TSH 3RD GENERATON uIU/mL 2 229        Imaging & Testing   I have personally reviewed pertinent reports  Xr Chest Portable    Result Date: 10/2/2018  Narrative: CHEST INDICATION:   Hx CHF  Low back pain radiates to abdomen  COMPARISON:  06/11/2018 EXAM PERFORMED/VIEWS:  XR CHEST PORTABLE 1 image FINDINGS: Cardiomediastinal silhouette appears enlarged  Chronic changes are present  The patient is in congestive heart failure  The lungs are clear  No pneumothorax  Minimal blunting of the costophrenic angles  Osseous structures appear within normal limits for patient age  Impression: CHF  Cardiomegaly and chronic changes  Workstation performed: [id number]     Mri Abdomen Wo Contrast And Mrcp    Result Date: 10/5/2018  Narrative: MRI OF THE ABDOMEN WITHOUT CONTRAST WITH MRCP INDICATION: Elevated LFT COMPARISON: Previous CT from October 9, 2014 TECHNIQUE:  The following pulse sequences were obtained on a 1 5 T scanner: 3D MRCP with radial thick slabs and projections  Coronal and axial T2 with TE of 90 and 180 respectively, axial T1-weighted in-and-out-of phase, axial DWI/ADC, axial T2 with fat  saturation, axial FIESTA fat-sat, and axial T1 with fat saturation  3D rendering was performed from the acquisition scanner  Evaluation of the solid abdominal viscera is limited without intravenous contrast  FINDINGS: BILIARY/PANCREATIC DUCTS:  No intra-hepatic biliary ductal dilatation  The common bile duct is of normal caliber  The common bile duct measures 2 2 mm  The common bile duct is better seen on coronal image 14 of series 3 There is a suboptimal evaluation of the intrahepatic ducts  There is no dilation of the intrahepatic ducts seen LIVER:  A T2 hyperintensity noted adjacent to the IVC, measuring about 2 2 cm, bright on the longer Echo sequences, may be a hemangioma or cyst ,  Stable since previous study of 2014  Additional lead T2 hyperintense lesion seen in the lateral segment of the left hepatic lobe, seen in image 46 of series 9, stable since previous study of 4 October 1914  Additional lobulated T2 hyperintensity seen in the right hepatic lobe in the posterior aspect of the segment 8 area, measuring about 19 mm, stable Additional lead lesion seen in inferior and anterior aspect of the liver in image 18 of series 3, stable GALLBLADDER:  Gallbladder appear unremarkable and unchanged PANCREAS:  Evaluation is limited    However there is no pancreatic ductal dilation seen there is no pancreatic atrophy seen ADRENAL GLANDS:  Normal  SPLEEN: Normal  KIDNEYS:  There is mild to moderate left hydronephrosis with with dilation of the left pelvicalyceal system  There is perinephric stranding on the left side ABDOMINAL CAVITY:  No lymphadenopathy or mass  No ascites  BOWEL:  No abnormal dilation of the bowel loops seen OSSEOUS STRUCTURES:  There is scoliosis in the lumbar spine VASCULAR STRUCTURES:  No aneurysm  ABDOMINAL WALL:  Normal  LOWER CHEST:  Unremarkable MRI appearance  Impression: There is no biliary dilation seen and there is no abnormal dilation of the common bile duct  There is no evidence of choledocholithiasis Suboptimal evaluation of the intrahepatic ducts and common hepatic duct, however there are no findings to suggest intrahepatic Mild to moderate left hydronephrosis and hydroureter  Multiple T2 hyperintense hepatic lesions which are stable since the previous study of October 9, 2014 likely hemangiomas Workstation performed: [id number]     Xr Retrograde Pyelogram    Result Date: 10/3/2018  Narrative: RETROGRADE PYELOGRAM INDICATION: Urinary tract calculus  COMPARISON: CT performed October 1, 2018  IMAGES: 7 FLUOROSCOPY TIME:  40 SECONDS CONTRAST: 10 mL of Omnipaque 300 FINDINGS: Fluoroscopic guidance was provided for left-sided retrograde pyelogram  Final film depicts placement of ureteral stent  Impression: Fluoroscopic guidance provided for retrograde study  Please see procedure report for further details  Workstation performed: [id number]     Us Abdomen Limited    Result Date: 10/3/2018  Narrative: RIGHT UPPER QUADRANT ULTRASOUND INDICATION:    shock liver, elevated LFTs    COMPARISON:  Unenhanced CT abdomen and pelvis, renal stone study, from 10/1/2018  Enhanced CT of the abdomen and pelvis from December 31, 2016  TECHNIQUE:   Real-time ultrasound of the right upper quadrant was performed with a curvilinear transducer with both volumetric sweeps and still imaging techniques  FINDINGS: PANCREAS:  Pancreatic tail unremarkable  Body and head of the pancreas difficult to identify   AORTA AND IVC:  Extensive aortic atherosclerosis without aneurysm  IVC patent  LIVER: Size:  Within normal range  The liver measures 15 1 cm in the midclavicular line  Contour:  Surface contour is smooth  Parenchyma:  Echogenicity and echotexture are within normal limits  1 cm hyperechoic mass, most likely hemangioma, in the subcapsular portion of the lateral segment of the left lobe, unchanged in size since the CT from 2016  Several additional similar-appearing subcentimeter masses, most consistent with additional small  hemangiomas  2 1 x 2 2 cm poorly marginated mass in the right lobe, adjacent to the intrahepatic segment of the IVC, primarily hypoechoic to liver  This corresponds to an enhancing mass demonstrated on prior CT  Its appearance is nonspecific but it has not increased in size since 2016  Main portal vein patent with hepatopetal flow  GALLBLADDER: No gallstones  Gallbladder wall thickened, measuring  10 millimeters  Scattered hypoechoic foci in the wall, consistent with edema  Small amount of pericholecystic fluid  Sonographer reports no sonographic Hammond's sign  BILE DUCTS: No extrahepatic bile duct dilatation  CBD measures 2 mm  Mild intrahepatic bile duct dilatation, unchanged since the CT from 2016  No evidence of choledocholithiasis  RIGHT KIDNEY: Right kidney measures 7 9 x 3 7 cm  No mass, calculus or hydronephrosis  ASCITES:   None  Impression: 1  Several small hyperechoic liver masses, most likely hemangiomas  2   Indeterminate 2 1 x 2 2 cm mass in the right lobe, adjacent to the IVC, previously demonstrated on a CT of the abdomen and pelvis from 12/31/2016 and unchanged in size  3   Chronic mild intrahepatic ductal dilatation with no extrahepatic bile duct dilatation  4   Gallbladder wall thickening with small amount of pericholecystic fluid  This is a nonspecific finding that can be seen in acute and chronic cholecystitis, hepatitis, hypoalbuminemia and congestive heart failure, among other etiologies  Workstation performed: [id number]     Ct Renal Stone Study Abdomen Pelvis Wo Contrast    Result Date: 10/1/2018  Narrative: CT ABDOMEN AND PELVIS WITHOUT IV CONTRAST - LOW DOSE RENAL STONE INDICATION:   LT FLANK AND LUQ PAIN  COMPARISON:  None  TECHNIQUE:  Low dose thin section CT examination of the abdomen and pelvis was performed without intravenous or oral contrast according to a protocol specifically designed to evaluate for urinary tract calculus  Axial, sagittal, and coronal 2D reformatted images were created from the source data and submitted for interpretation  Evaluation for pathology in the abdomen and pelvis that is unrelated to urinary tract calculi is limited  Radiation dose length product (DLP) for this visit:  111 11 mGy-cm   This examination, like all CT scans performed in the [facility], was performed utilizing techniques to minimize radiation dose exposure, including the use of iterative  reconstruction and automated exposure control  FINDINGS: RIGHT KIDNEY AND URETER: No urinary tract calculi  No hydronephrosis or hydroureter  Linear calcifications likely vascular  LEFT KIDNEY AND URETER: Distal left ureteral stone identified with resultant mild to moderate left hydronephrosis  The stone measures 7 x 5 x 4 mm  No intrarenal stones are seen  Jaskaran Nabil URINARY BLADDER: Unremarkable  No significant abnormality in the visualized lung bases  Limited low radiation dose noncontrast CT evaluation demonstrates no clinically significant abnormality of liver, spleen, pancreas, or adrenal glands  No calcified gallstones or gallbladder wall thickening noted  No ascites or bulky lymphadenopathy on this limited noncontrast study  Colonic diverticula are noted, without evidence to suggest acute diverticulitis  Visualized bowel appears otherwise unremarkable  Limited evaluation demonstrates no evidence to suggest acute appendicitis  No acute fracture or destructive osseous lesion is identified  Impression: Distal left ureteral stone measures approximately 7 x 5 x 4 mm resulting in mild to moderate left hydronephrosis  Colonic diverticulosis  No evidence for acute diverticulitis  Workstation performed: [id number]     Radiology Results    Result Date: 10/4/2018  Narrative: Ordered by an unspecified provider  EKG / Monitor: Personally reviewed  Maintaining sinus rhythm on amiodarone    Cardiac testing:   Results for orders placed during the hospital encounter of 10/01/18   Echo complete with contrast if indicated    Narrative [address]  [address] [facility]  [phone]    Transthoracic Echocardiogram  2D, M-mode, Doppler, and Color Doppler    Study date:  03-Oct-2018    Patient: Qasim WATERS  MR number: [id number]  Account number: [de-identified]  : 10-Sep-1930  Age: 80 years  Gender: Female  Status: Inpatient  Location: Bedside  Height: 61 in  Weight: 88 9 lb  BP: 128/ 60 mmHg    Indications: Congestive Heart Failure    Diagnoses: I50 9 - Heart failure, unspecified    Sonographer:  ABDIRAHMAN Gonzáles, RVS  Primary Physician:  Paula Levin MD  Referring Physician:  Fernanda Shabazz MD  Group:  [facility]  Interpreting Physician:  DO LEIF Valerio    LEFT VENTRICLE:  Systolic function was moderately reduced  Ejection fraction was estimated to be 40 %  There was moderate diffuse hypokinesis with no identifiable regional variations  There was moderate concentric hypertrophy  Doppler parameters were consistent with abnormal left ventricular relaxation (grade 1 diastolic dysfunction)  LEFT ATRIUM:  The atrium was mildly dilated  MITRAL VALVE:  There was moderately reduced leaflet separation  There was moderately restricted mobility  There was moderate regurgitation  AORTIC VALVE:  The valve was trileaflet  Leaflets exhibited normal thickness, calcification, and moderately reduced cuspal separation    There was no evidence for stenosis  There was no regurgitation  TRICUSPID VALVE:  There was moderate regurgitation  Pulmonary artery systolic pressure was within the normal range  PULMONIC VALVE:  There was mild to moderate regurgitation  HISTORY: PRIOR HISTORY: PVD, COPD, GERD, Arthritis, Scoliosis    PROCEDURE: The procedure was performed at the bedside  This was a routine study  The transthoracic approach was used  The study included complete 2D imaging, M-mode, complete spectral Doppler, and color Doppler  The heart rate was 81 bpm,  at the start of the study  Images were obtained from the parasternal, apical, and subcostal acoustic windows  Images were not obtained from the suprasternal notch acoustic windows  Image quality was adequate  LEFT VENTRICLE: Size was normal  Systolic function was moderately reduced  Ejection fraction was estimated to be 40 %  There was moderate diffuse hypokinesis with no identifiable regional variations  There was moderate concentric  hypertrophy  DOPPLER: Doppler parameters were consistent with abnormal left ventricular relaxation (grade 1 diastolic dysfunction)  RIGHT VENTRICLE: The size was normal  Systolic function was normal  DOPPLER: Systolic pressure was within the normal range  LEFT ATRIUM: The atrium was mildly dilated  No thrombus was identified  RIGHT ATRIUM: Size was normal     MITRAL VALVE: Valve structure was normal  There was moderately reduced leaflet separation  There was moderately restricted mobility  No echocardiographic evidence for prolapse  DOPPLER: The transmitral velocity was within the normal range  There was no evidence for stenosis  There was moderate regurgitation  AORTIC VALVE: The valve was trileaflet  Leaflets exhibited normal thickness, calcification, and moderately reduced cuspal separation  DOPPLER: Transaortic velocity was within the normal range  There was no evidence for stenosis  There was  no regurgitation      TRICUSPID VALVE: The valve structure was normal  There was normal leaflet separation  DOPPLER: The transtricuspid velocity was within the normal range  There was moderate regurgitation  Pulmonary artery systolic pressure was within the  normal range  Estimated peak PA pressure was 37 mmHg  PULMONIC VALVE: Leaflets exhibited normal thickness, no calcification, and normal cuspal separation  DOPPLER: The transpulmonic velocity was within the normal range  There was mild to moderate regurgitation  PERICARDIUM: There was no thickening  There was no pericardial effusion  AORTA: The root exhibited normal size and fibrocalcific change  PULMONARY ARTERY: The size was normal  The morphology appeared normal     SYSTEM MEASUREMENT TABLES    2D mode  AoR Diam 2D: 2 9 cm  LA Diam (2D): 3 6 cm  LA/Ao (2D): 1 24  FS (2D Teich): 20 %  IVSd (2D): 1 27 cm  LVDEV: 80 8 cm³  LVEDV MOD BP: 96 cm³  LVESV: 47 4 cm³  LVIDd(2D): 4 25 cm  LVISd (2D): 3 4 cm  LVPWd (2D): 1 27 cm  SV (Teich): 33 4 cm³    Apical four chamber  LVEF A4C: 40 %    Apical two chamber  LVEF A2C: 45 %    Unspecified Scan Mode  MV Peak A Manuel: 1380 mm/s  MV Peak E Manuel  Mean: 983 mm/s  MVA (PHT): 4 23 cm squared  PHT: 75 ms  Max P mm[Hg]  V Max: 3080 mm/s  Vmax: 2680 mm/s  RA Area: 20 7 cm squared  RA Volume: 66 9 cm³  TAPSE: 1 6 cm    [facility]    Prepared and electronically signed by    Claudy Yi DO  Signed 03-Oct-2018 14:43:04         Counseling / Coordination of Care  Total time spent today 20 minutes  Greater than 50% of total time was spent with the patient and / or family counseling and / or coordination of care  STEVEN Jeter        "This note has been constructed using a voice recognition system  Therefore there may be syntax, spelling, and/or grammatical errors   Please call if you have any questions  "

## 2018-10-09 NOTE — ASSESSMENT & PLAN NOTE
· POA    · Treated with Levaquin for 7 days  · UA dirty with trace blood and small leukocytes, unfortunately sample did not reflex to culture

## 2018-10-09 NOTE — ASSESSMENT & PLAN NOTE
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

## 2018-10-09 NOTE — DISCHARGE SUMMARY
Discharge Summary - [address] [facility]    Patient Information: Bautista Pak Race 80 y o  female MRN: [id number]  Unit/Bed#: [address] [place] [address] Encounter: [id number]    Discharging Physician / Practitioner: Smiley José MD  PCP: David Hines MD  Admission Date: 10/1/2018  Discharge Date: 10/09/18    Reason for Admission: Abdominal Pain (states started with left lower back pain around 4pm and it radiated to right lower abd )      Discharge Diagnoses:     Principal Problem (Resolved):    Nephrolithiasis  Active Problems:    New onset atrial fibrillation (HCC)    Acid reflux    Chronic mesenteric ischemia ([facility] )    Stenosis of right internal carotid artery    Essential hypertension    Weight loss, unintentional  Resolved Problems:    Acute on chronic combined systolic and diastolic heart failure (HCC)    Hyponatremia    Generalized abdominal pain    Acute kidney injury superimposed on chronic kidney disease ([facility] )    Anemia    Leukocytosis    Shock liver    UTI (urinary tract infection)    Electrolyte imbalance        New onset atrial fibrillation ([facility] )   Assessment & Plan    · Rapid response on 10/06 for new onset atrial fibrillation with RVR  · Patient initially with heart rate 140 to 160s  · Patient was given IV metoprolol with little response  Patient then received IV Cardizem with a decrease in heart rate from the 120s to 100s  Then started on Cardizem drip initiated  Required ICU transfer for stabilization  · Followed by Cardiology, started on amiodarone  · Pt converted to NSR at 1015 am on 10/6  · Now on PO Amiodarone, remaining in sinus rhythm  · Pt is a poor candidate for anticoagulation  Hx of multiple AVM's and antral ulcers  · Follow up closely with Cardiology as outpatient  Discussed with the patient     Weight loss, unintentional   Assessment & Plan    · Admits to poor appetite secondary to generalized abdominal pain with resultant weight loss  · Nutritional consult  · P o   Intake is better  · Follow up with GI as outpatient for further workup     Essential hypertension   Assessment & Plan    · Initially hypertensive in the ED likely induced pain  Patient on Hydralazine, Losartan and Metoprolol at home  · DISCONTINUED hydralazine  · Continue losartan, and metoprolol  · Aim to keep SBP around 140-160 given her chronic mesenteric ischemia per Dr Arianna Vega office note in 7/2018  · SBP dropped to 100-120's from 170-180's post procedure  Likely caused shock liver  · Currently acceptable on metoprolol, losartan and isosorbide mononitrate  · Follow-up as outpatient with Cardiology and Nephrology     Stenosis of right internal carotid artery   Assessment & Plan    ·  History of 70-99% stenosis and was seen here with no vascular intervention  · Continue Plavix and resume statin at discharge     Chronic mesenteric ischemia [facility])   Assessment & Plan    · History of chronic mesenteric ischemia,s/p SMA bypass and stenting in 2015  · Continue Plavix  · Denies any abdominal pain or other GI symptoms  · Tolerating diet well     Acid reflux   Assessment & Plan    · Seen by GI  · S/p endoscopy for abdominal pain on 10/5 demonstrated multiple nonbleeding AVMs and antral ulcers  · Cont PPI b i d  · Avoid Anticoagulation and NSAIDS  · Tolerating Plavix  · Denies any abdominal pain, tolerating diet well  · Follow up with GI as outpatient for H pylori test and repeat EGD  · Repeat colonoscopy in future      Electrolyte imbalanceresolved as of 10/6/2018   Assessment & Plan    K 2 8,Mg 1 5      UTI (urinary tract infection)resolved as of 10/9/2018   Assessment & Plan    · POA  · Treated with Levaquin for 7 days  · UA dirty with trace blood and small leukocytes, unfortunately sample did not reflex to culture         Shock liverresolved as of 10/9/2018   Assessment & Plan    · RESOLVED  · Sudden profound elevation of LFTs  Likely 2/2 relative hypotension post urological procedure    · SBP dropped to 100-120's from 170-180's post procedure  · AST 1129,,TB normal,direct bilirubin mild elevated  All indices downtrending  · RUQ US with no acute findings  · Tylenol level normal   · Antihypertensive med changes as noted  · Aim to keep SBP around 140-160 given her chronic mesenteric ischemia per Dr Gilbert Gonzales office note in 7/2018  · Follow up with GI, monitor LFT as outpatient         Leukocytosisresolved as of 10/5/2018   Assessment & Plan    · WBC 21 79-> 13 17->9 03-> 8 53 today  Patient afebrile  · Likely secondary to UTI  · CXR showed CHF  Cardiomegaly and chronic changes  · Treated with Levaquin for 1 week     Anemiaresolved as of 10/6/2018   Assessment & Plan    Microcytic hypochromic  Hb 9 1-> 7 7-> 8 2-> 6 9->8 6-> 8 0->10 5->10 1   B 12 966,folate > 20, iron 13, ferritin 15  Received 1 unit of RBC on 10/3 and 1 unit of RBC on 10/4  Hb stable since transfusion  Continue MVI  EGD as above without any overt bleeding  Continue Protonix b i d  Colonoscopy as outpatient       Acute kidney injury superimposed on chronic kidney disease (HCC)resolved as of 10/9/2018   Assessment & Plan    · RESOLVED  · Likely multifactorial 2/2 ATN in the setting of obstruction and relative hypotension  · Held losartan initially  · PVR was 0   · Avoid nephrotoxins  · Intake and output  · Appreciate Nephrology input  Generalized abdominal painresolved as of 10/9/2018   Assessment & Plan    · MULTIFACTORIAL  She was admitted with abd pain, left side and flank  · GI and Urology consulted  · CT A/P showed 7x5x4  ureteral stone with mild to moderate hydronephrosis  · S/P left ureteral stent placement 10/3  · She has a History of perforated duodenal ulcer so underwent EGD which showed Sloughing mucosa in the distal esophagus  Hiatal hernia  Multiple gastric AVMs  Antral ulcers  · RUQ US on 10/3 showed several small stable hyperechoic 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    · MRI MRCP showed no biliary dilation seen and no abnormal dilation of the common bile duct  There is no evidence of choledocholithiasis  · Continue PPI b i d   · Follow up with GI as outpatient          Hyponatremiaresolved as of 10/6/2018   Assessment & Plan    · Na 128->130->131-> 132->132-> 136   · Urine sodium 108, urine Osmo 404, serum osmol 273, uric acid 3 2,TSH normal    · Suspect SIADH in the setting of pain  · Patient was on IV NS which was discontinued  · Lasix 20 mg iv x 1 during blood transfusion on 10/3  · Lasix 40mg iv x 1 per Nephrology on 10/4  · Appreciate Nephrology input  · Monitor       Acute on chronic combined systolic and diastolic heart failure (HCC)resolved as of 10/9/2018   Assessment & Plan    · Echo 10/3 showed EF 40%,G1DD (previous echo from 6/2018 showed EF 60%)  · Nuclear stress test was normal   · Daily weight, I/Os  · Cardiology following, input appreciated the the   Recommended further workup as outpatient  · Appears euvolemic  · Continue current meds, Plavix, isosorbide mononitrate and metoprolol     * Nephrolithiasisresolved as of 10/9/2018   Assessment & Plan    · CT scan of the abdomen and pelvis showed distal left ureteral stone measures approximately 7 x 5 x 4 mm resulting in mild to moderate left hydronephrosis  · S/P Holmium laser,stone extraction, stent placement 10/3/18  · Had some hematuria post procedure but now resolved  · F/U 1 week after discharge for stent removal            Consultations During Hospital Stay:  [address]  IP CONSULT TO NUTRITION SERVICES  IP CONSULT TO NEPHROLOGY  IP CONSULT TO GASTROENTEROLOGY  IP CONSULT TO CARDIOLOGY    Procedures Performed:     · Procedure(s):  · ESOPHAGOGASTRODUODENOSCOPY (EGD)  Operative Findings:     1  Sloughing mucosa in the distal esophagus  2  Hiatal hernia  3  Multiple gastric AVMs  4  Antral ulcers        Recommendations:     1  Continue BID PPI on discharge    2  Check H pylori fecal antigen and treat if positive  3  Repeat EGD in 4-6 weeks, preferably off Plavix, if possible  Procedure:  1  Cystoscopy  2  Fluoroscopy  3  Left retrograde pyelography  4  Left ureteroscopy  5  Laser lithotripsy  6  Stone basketing  7  Left ureteral stent placement    Significant Findings:     · Cultures were negative  · See assessment and plan for further details    Imaging while in hospital:    Xr Chest Portable    Result Date: 10/2/2018  Narrative: CHEST INDICATION:   Hx CHF  Low back pain radiates to abdomen  COMPARISON:  06/11/2018 EXAM PERFORMED/VIEWS:  XR CHEST PORTABLE 1 image FINDINGS: Cardiomediastinal silhouette appears enlarged  Chronic changes are present  The patient is in congestive heart failure  The lungs are clear  No pneumothorax  Minimal blunting of the costophrenic angles  Osseous structures appear within normal limits for patient age  Impression: CHF  Cardiomegaly and chronic changes  Workstation performed: [id number]     Mri Abdomen Wo Contrast And Mrcp    Result Date: 10/5/2018  Narrative: MRI OF THE ABDOMEN WITHOUT CONTRAST WITH MRCP INDICATION: Elevated LFT COMPARISON: Previous CT from October 9, 2014 TECHNIQUE:  The following pulse sequences were obtained on a 1 5 T scanner: 3D MRCP with radial thick slabs and projections  Coronal and axial T2 with TE of 90 and 180 respectively, axial T1-weighted in-and-out-of phase, axial DWI/ADC, axial T2 with fat  saturation, axial FIESTA fat-sat, and axial T1 with fat saturation  3D rendering was performed from the acquisition scanner  Evaluation of the solid abdominal viscera is limited without intravenous contrast  FINDINGS: BILIARY/PANCREATIC DUCTS:  No intra-hepatic biliary ductal dilatation  The common bile duct is of normal caliber  The common bile duct measures 2 2 mm  The common bile duct is better seen on coronal image 14 of series 3 There is a suboptimal evaluation of the intrahepatic ducts    There is no dilation of the intrahepatic ducts seen LIVER:  A T2 hyperintensity noted adjacent to the IVC, measuring about 2 2 cm, bright on the longer Echo sequences, may be a hemangioma or cyst ,  Stable since previous study of 2014  Additional lead T2 hyperintense lesion seen in the lateral segment of the left hepatic lobe, seen in image 46 of series 9, stable since previous study of 4 October 1914  Additional lobulated T2 hyperintensity seen in the right hepatic lobe in the posterior aspect of the segment 8 area, measuring about 19 mm, stable Additional lead lesion seen in inferior and anterior aspect of the liver in image 18 of series 3, stable GALLBLADDER:  Gallbladder appear unremarkable and unchanged PANCREAS:  Evaluation is limited  However there is no pancreatic ductal dilation seen there is no pancreatic atrophy seen ADRENAL GLANDS:  Normal  SPLEEN: Normal  KIDNEYS:  There is mild to moderate left hydronephrosis with with dilation of the left pelvicalyceal system  There is perinephric stranding on the left side ABDOMINAL CAVITY:  No lymphadenopathy or mass  No ascites  BOWEL:  No abnormal dilation of the bowel loops seen OSSEOUS STRUCTURES:  There is scoliosis in the lumbar spine VASCULAR STRUCTURES:  No aneurysm  ABDOMINAL WALL:  Normal  LOWER CHEST:  Unremarkable MRI appearance  Impression: There is no biliary dilation seen and there is no abnormal dilation of the common bile duct  There is no evidence of choledocholithiasis Suboptimal evaluation of the intrahepatic ducts and common hepatic duct, however there are no findings to suggest intrahepatic Mild to moderate left hydronephrosis and hydroureter  Multiple T2 hyperintense hepatic lesions which are stable since the previous study of October 9, 2014 likely hemangiomas Workstation performed: [id number]     Xr Retrograde Pyelogram    Result Date: 10/3/2018  Narrative: RETROGRADE PYELOGRAM INDICATION: Urinary tract calculus  COMPARISON: CT performed October 1, 2018   IMAGES: 7 FLUOROSCOPY TIME: 40 SECONDS CONTRAST: 10 mL of Omnipaque 300 FINDINGS: Fluoroscopic guidance was provided for left-sided retrograde pyelogram  Final film depicts placement of ureteral stent  Impression: Fluoroscopic guidance provided for retrograde study  Please see procedure report for further details  Workstation performed: [id number]     Us Abdomen Limited    Result Date: 10/3/2018  Narrative: RIGHT UPPER QUADRANT ULTRASOUND INDICATION:    shock liver, elevated LFTs    COMPARISON:  Unenhanced CT abdomen and pelvis, renal stone study, from 10/1/2018  Enhanced CT of the abdomen and pelvis from December 31, 2016  TECHNIQUE:   Real-time ultrasound of the right upper quadrant was performed with a curvilinear transducer with both volumetric sweeps and still imaging techniques  FINDINGS: PANCREAS:  Pancreatic tail unremarkable  Body and head of the pancreas difficult to identify  AORTA AND IVC:  Extensive aortic atherosclerosis without aneurysm  IVC patent  LIVER: Size:  Within normal range  The liver measures 15 1 cm in the midclavicular line  Contour:  Surface contour is smooth  Parenchyma:  Echogenicity and echotexture are within normal limits  1 cm hyperechoic mass, most likely hemangioma, in the subcapsular portion of the lateral segment of the left lobe, unchanged in size since the CT from 2016  Several additional similar-appearing subcentimeter masses, most consistent with additional small  hemangiomas  2 1 x 2 2 cm poorly marginated mass in the right lobe, adjacent to the intrahepatic segment of the IVC, primarily hypoechoic to liver  This corresponds to an enhancing mass demonstrated on prior CT  Its appearance is nonspecific but it has not increased in size since 2016  Main portal vein patent with hepatopetal flow  GALLBLADDER: No gallstones  Gallbladder wall thickened, measuring  10 millimeters  Scattered hypoechoic foci in the wall, consistent with edema  Small amount of pericholecystic fluid   Sonographer reports no sonographic Hammond's sign  BILE DUCTS: No extrahepatic bile duct dilatation  CBD measures 2 mm  Mild intrahepatic bile duct dilatation, unchanged since the CT from 2016  No evidence of choledocholithiasis  RIGHT KIDNEY: Right kidney measures 7 9 x 3 7 cm  No mass, calculus or hydronephrosis  ASCITES:   None  Impression: 1  Several small hyperechoic liver masses, most likely hemangiomas  2   Indeterminate 2 1 x 2 2 cm mass in the right lobe, adjacent to the IVC, previously demonstrated on a CT of the abdomen and pelvis from 12/31/2016 and unchanged in size  3   Chronic mild intrahepatic ductal dilatation with no extrahepatic bile duct dilatation  4   Gallbladder wall thickening with small amount of pericholecystic fluid  This is a nonspecific finding that can be seen in acute and chronic cholecystitis, hepatitis, hypoalbuminemia and congestive heart failure, among other etiologies  Workstation performed: [id number]     Ct Renal Stone Study Abdomen Pelvis Wo Contrast    Result Date: 10/1/2018  Narrative: CT ABDOMEN AND PELVIS WITHOUT IV CONTRAST - LOW DOSE RENAL STONE INDICATION:   LT FLANK AND LUQ PAIN  COMPARISON:  None  TECHNIQUE:  Low dose thin section CT examination of the abdomen and pelvis was performed without intravenous or oral contrast according to a protocol specifically designed to evaluate for urinary tract calculus  Axial, sagittal, and coronal 2D reformatted images were created from the source data and submitted for interpretation  Evaluation for pathology in the abdomen and pelvis that is unrelated to urinary tract calculi is limited  Radiation dose length product (DLP) for this visit:  111 11 mGy-cm   This examination, like all CT scans performed in the [facility], was performed utilizing techniques to minimize radiation dose exposure, including the use of iterative  reconstruction and automated exposure control   FINDINGS: RIGHT KIDNEY AND URETER: No urinary tract calculi  No hydronephrosis or hydroureter  Linear calcifications likely vascular  LEFT KIDNEY AND URETER: Distal left ureteral stone identified with resultant mild to moderate left hydronephrosis  The stone measures 7 x 5 x 4 mm  No intrarenal stones are seen  Ej Goody URINARY BLADDER: Unremarkable  No significant abnormality in the visualized lung bases  Limited low radiation dose noncontrast CT evaluation demonstrates no clinically significant abnormality of liver, spleen, pancreas, or adrenal glands  No calcified gallstones or gallbladder wall thickening noted  No ascites or bulky lymphadenopathy on this limited noncontrast study  Colonic diverticula are noted, without evidence to suggest acute diverticulitis  Visualized bowel appears otherwise unremarkable  Limited evaluation demonstrates no evidence to suggest acute appendicitis  No acute fracture or destructive osseous lesion is identified  Impression: Distal left ureteral stone measures approximately 7 x 5 x 4 mm resulting in mild to moderate left hydronephrosis  Colonic diverticulosis  No evidence for acute diverticulitis  Workstation performed: [id number]     Radiology Results    Result Date: 10/4/2018  Narrative: Ordered by an unspecified provider  Incidental Findings:   · Imaging findings as below    Test Results Pending at Discharge (will require follow up):   · As per After Visit Summary     Outpatient Tests Requested:  · CMP with GI    Complications:  None    Hospital Course:     Cha Amador is a 80 y o  female patient with multiple comorbidities including hypertension, CHF, history of significant vascular disease with chronic mesenteric ischemia status post stent, renal artery stenosis, carotid stenosis, Stewart's esophagus, history of a ruptured peptic ulcer, who originally presented to the hospital on 10/1/2018 due to worsening of abdominal pain and left-sided flank pain    Patient reported associated nausea but denied any vomiting diarrhea constipation or bleeding  Patient also reported weight loss and poor appetite  Patient was evaluated in ED and noted to have left-sided nephrolithiasis with hydronephrosis with associated hyponatremia  Patient was also noted to have leukocytosis and acute kidney injury  Patient was subsequently admitted for further evaluation workup, patient was seen by Nephrology and underwent left-sided stent placement and stone extraction on 10/03  Patient continued to have GI symptoms after stone removal and patient was seen by GI  Patient also had LFT hypotension during procedure and subsequently was noted to have elevated LFT which was deemed secondary to hypotensive episode  Patient underwent EGD which showed findings as above and was continued on PPI b i d  GI workup was done as above  Patient was followed by Cardiology during hospitalization due to low EF and moderate diffuse hypokinesis, during hospitalization patient had a new onset of AFib with RVR, patient was stabilized and started on amiodarone which maintain patient on sinus rhythm  Patient was continued on Plavix as patient was poor candidate for anticoagulation due to risk of bleeding  Patient's GI symptom has improved and now tolerating diet without any limiting symptoms  LFT has improved during hospitalization  Cardiac medications were optimized patient was continued on amiodarone and remained in sinus rhythm  Blood pressure a his control on current regimen  Patient was advised to follow up with Cardiology after discharge for further workup patient will also follow up with PMD, GI and urology as outpatient plan of care was explained to the patient in detail  Please see above list of diagnoses and related plan for additional information         Condition at Discharge: stable     Discharge Day Visit / Exam:     Subjective:  Feeling better  Denies any chest pain or abdominal pain  Tolerating diet well  Denies any dizziness palpitation or any other GI symptoms  No further dysuria flank pain or hematuria  Ambulating well    Vitals: Blood Pressure: 146/65 (10/09/18 1107)  Pulse: 71 (10/09/18 1107)  Temperature: 98 1 °F (36 7 °C) (10/09/18 1107)  Temp Source: Temporal (10/09/18 1107)  Respirations: 18 (10/09/18 1107)  Height: 5' 2" (157 5 cm) (10/07/18 1935)  Weight - Scale: 40 9 kg (90 lb 2 7 oz) (10/09/18 0600)  SpO2: 97 % (10/09/18 1107)  Exam:   Physical Exam   Constitutional: She is oriented to person, place, and time  She appears well-developed  She appears cachectic  She is cooperative  No distress  HENT:   Head: Normocephalic and atraumatic  Nose: Nose normal    Eyes: Pupils are equal, round, and reactive to light  Conjunctivae are normal    Neck: Normal range of motion  Neck supple  Cardiovascular: Normal rate, regular rhythm and normal heart sounds  Pulmonary/Chest: Effort normal  No respiratory distress  She has decreased breath sounds  She has no wheezes  She has no rhonchi  She has no rales  Abdominal: Soft  Bowel sounds are normal  She exhibits no distension and no pulsatile liver  There is no tenderness  There is no rebound, no guarding and no CVA tenderness  Musculoskeletal: Normal range of motion  She exhibits no edema  Neurological: She is alert and oriented to person, place, and time  No cranial nerve deficit  Skin: Skin is warm and dry  No rash noted  Discharge instructions/Information to patient and family:(Discharge Medications and Follow up):   See after visit summary for information provided to patient and family  Provisions for Follow-Up Care:  See after visit summary for information related to follow-up care and any pertinent home health orders  Disposition: Home    Planned Readmission:  No     Discharge Statement:  I spent 45 minutes discharging the patient  This time was spent on the day of discharge  I had direct contact with the patient on the day of discharge   Greater than 50% of the total time was spent examining patient, answering all patient questions, arranging and discussing plan of care with patient as well as directly providing post-discharge instructions  Additional time then spent on discharge activities  Coordinated with Cardiology, Dr Dominic Ogden at the time of discharge  Discharge Medications:  See after visit summary for reconciled discharge medications provided to patient and family  ** Please Note: "This note has been constructed using a voice recognition system  Therefore there may be syntax, spelling, and/or grammatical errors   Please call if you have any questions  "**

## 2018-10-09 NOTE — ASSESSMENT & PLAN NOTE
· Na 128->130->131-> 132->132-> 136   · Urine sodium 108, urine Osmo 404, serum osmol 273, uric acid 3 2,TSH normal    · Suspect SIADH in the setting of pain  · Patient was on IV NS which was discontinued  · Lasix 20 mg iv x 1 during blood transfusion on 10/3  · Lasix 40mg iv x 1 per Nephrology on 10/4  · Appreciate Nephrology input    · Monitor

## 2018-10-09 NOTE — ASSESSMENT & PLAN NOTE
· RESOLVED  · Likely multifactorial 2/2 ATN in the setting of obstruction and relative hypotension  · Held losartan initially  · PVR was 0   · Avoid nephrotoxins  · Intake and output  · Appreciate Nephrology input

## 2018-10-09 NOTE — ASSESSMENT & PLAN NOTE
· History of chronic mesenteric ischemia,s/p SMA bypass and stenting in 2015  · Continue Plavix  · Denies any abdominal pain or other GI symptoms  · Tolerating diet well

## 2018-10-09 NOTE — ASSESSMENT & PLAN NOTE
· CT scan of the abdomen and pelvis showed distal left ureteral stone measures approximately 7 x 5 x 4 mm resulting in mild to moderate left hydronephrosis  · S/P Holmium laser,stone extraction, stent placement 10/3/18    · Had some hematuria post procedure but now resolved  · F/U 1 week after discharge for stent removal

## 2018-10-09 NOTE — ASSESSMENT & PLAN NOTE
· MULTIFACTORIAL  She was admitted with abd pain, left side and flank  · GI and Urology consulted  · CT A/P showed 7x5x4  ureteral stone with mild to moderate hydronephrosis  · S/P left ureteral stent placement 10/3  · She has a History of perforated duodenal ulcer so underwent EGD which showed Sloughing mucosa in the distal esophagus  Hiatal hernia  Multiple gastric AVMs  Antral ulcers  · RUQ US on 10/3 showed several small stable hyperechoic 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  · MRI MRCP showed no biliary dilation seen and no abnormal dilation of the common bile duct    There is no evidence of choledocholithiasis  · Continue PPI b i d   · Follow up with GI as outpatient

## 2018-10-09 NOTE — ASSESSMENT & PLAN NOTE
· Admits to poor appetite secondary to generalized abdominal pain with resultant weight loss  · Nutritional consult  · P o   Intake is better  · Follow up with GI as outpatient for further workup

## 2018-10-09 NOTE — ASSESSMENT & PLAN NOTE
· RESOLVED  · Sudden profound elevation of LFTs  Likely 2/2 relative hypotension post urological procedure  · SBP dropped to 100-120's from 170-180's post procedure  · AST 1129,,TB normal,direct bilirubin mild elevated  All indices downtrending  · RUQ US with no acute findings  · Tylenol level normal   · Antihypertensive med changes as noted  · Aim to keep SBP around 140-160 given her chronic mesenteric ischemia per Dr Liberty Urrutia office note in 7/2018    · Follow up with GI, monitor LFT as outpatient

## 2018-10-10 NOTE — NURSING NOTE
Discharge and medication instructions reviewed with patient  Pt verbalized understanding of information

## 2018-10-10 NOTE — UTILIZATION REVIEW
Auth:   [id number]    Notification of Discharge  This is a Notification of Discharge from our facility [address]  Please be advised that this patient has been discharge from our facility  Below you will find the admission and discharge date and time including the patients disposition  PRESENTATION DATE: 10/1/2018  6:20 PM  IP ADMISSION DATE: 10/1/18 2041  DISCHARGE DATE: 10/9/2018  2:52 PM  DISPOSITION: Home/Self Care    [address] [place] [address] in the [facility] Util[facility] Review Department  Phone: [phone]; Fax [phone]  ATTENTION: The Network Utilization Review Department is now centralized for our 9 Facilities  Make a note that we have a new phone and fax numbers for our Department  Please call with any questions or concerns to [phone] and carefully follow the prompts so that you are directed to the right person  All voicemails are confidential  Fax any determinations, approvals, denials, and requests for initial or continue stay review clinical to [phone]  Due to HIGH CALL volume, it would be easier if you could please send faxed requests to expedite your requests and in part, help us provide discharge notifications faster

## 2018-10-12 NOTE — UTILIZATION REVIEW
Continued Stay Review     Date: 10/8/18     Vital Signs: /80 (BP Location: Right arm)   Pulse 65   Temp 98 8 °F (37 1 °C) (Temporal)   Resp 16   Ht 5' 2" (1 575 m)   Wt 39 7 kg (87 lb 8 4 oz)   SpO2 98%   BMI 16 01 kg/m²      Medications:   Scheduled Meds:   Current Facility-Administered Medications:  amiodarone 200 mg Oral TID With Meals Mckenna Raphael PA-C   Followed by           [START ON 10/13/2018] amiodarone 200 mg Oral BID With Meals Mckenna Raphael PA-C   Followed by           [START ON 10/21/2018] amiodarone 200 mg Oral Daily With Breakfast Adriana Shoemaker PA-C   clopidogrel 75 mg Oral Daily STEVEN Alcala   docusate sodium 100 mg Oral Daily STEVEN Marin   isosorbide mononitrate 30 mg Oral Daily STEVEN Fu   levofloxacin 250 mg Oral Q24H Adriana Shoemaker PA-C   losartan 50 mg Oral Daily Adriana Shoemaker PA-C   metoprolol tartrate 50 mg Oral Q12H [facility] Luz Lipoma, STEVEN   multivitamin-minerals 1 tablet Oral Daily STEVEN Alcala   oxyCODONE 2 5 mg Oral Q4H PRN STEVEN Marin   oxyCODONE 5 mg Oral Q4H PRN Yoli Benitez, STEVEN   pantoprazole 40 mg Intravenous Q12H [facility] STEVEN Marin   polyethylene glycol 17 g Oral Daily PRN Yolanda S Garcia, CRNP      Continuous Infusions:    PRN Meds: oxyCODONE    oxyCODONE    polyethylene glycol     Abnormal Labs/Diagnostic Results:      Age/Sex: 80 y o  female      GI  Assessment/Plan:  1  Abdominal pain, anemia- she has a history of perforated duodenal ulcer she is status post evaluation with EGD which revealed esophageal mucosa and distal esophagus, hiatal hernia, multiple gastric AVMs, antral ulcers   Stool is to be checked for H pylori antigen  Lucille Jett will need repeat EGD in the next couple weeks time preferably off Plavix if possible   Hemoglobin has improved   Colonoscopy can also be considered as well once more stable from a cardiac standpoint  2   Elevated liver function tests MRCP done for further evaluation there is no evidence of choledocholithiasis likely secondary to ischemic hepatopathy  3  History of chronic mesenteric ischemia status post stent placement in 2015  4  New onset atrial fibrillation she is being followed by Cardiology not a candidate for anticoagulation secondary to history of bleeding ulcer, and noted multiple AVMs on recent EGD     CARDIOLOGY  Assessment and Plan:   1  Paroxysmal atrial fibrillation:  Patient is currently maintaining sinus rhythm on amiodarone   Amiodarone taper has been written in her MA are for time of discharge   Plan is amiodarone 200 mg t i d  X7 days, then 200 mg b i d  X7 days, then amiodarone 200 mg once a day   Will continue her metoprolol 50 mg b i d     Despite chads Vasc 2 score equaling 5  Patient will not be placed on long-term anticoagulation secondary to her increased risk of bleeding and history of gastric AVMs and antral ulcers  2  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%:  This was a new finding on echocardiogram during this admission   Patient at this time declining any further cardiac workup as inpatient   States she will follow as an outpatient   Patient with history of atherosclerosis and multiple other vascular beds   Patient will be high risk for any type of PCI due to her inability to tolerate anti-platelet agents   3  Hypertension:  Patient recommended to maintain blood pressure systolic greater than 475 for organ perfusion   As patient did experience bout of shock liver after period of hypotension during hospitalization      ATTENDING  New onset atrial fibrillation   · Cardiology already following for new onset systolic heart failure  · Pt converted to NSR at 1015 am on 10/6  · Now on PO Amiodarone  · Pt is a poor candidate for PCI   She has failed anticoagulation in the past  Hx of multiple AVM's and antral ulcers  UTI   · On Levaquin Day 6/7  · UA dirty with trace blood and small leukocytes, unfortunately sample did not reflex to culture

## 2018-10-16 NOTE — UTILIZATION REVIEW
Auth:   [id number]      Notification of Discharge  This is a Notification of Discharge from our facility [address]  Please be advised that this patient has been discharge from our facility  Below you will find the admission and discharge date and time including the patients disposition  PRESENTATION DATE: 10/1/2018  6:20 PM  IP ADMISSION DATE: 10/1/18 2041  DISCHARGE DATE: 10/9/2018  2:52 PM  DISPOSITION: Home/Self Care    [address] [facility] in the [facility] Utilization Review Department  Phone: [phone]; Fax [phone]  ATTENTION: The Network Utilization Review Department is now centralized for our 9 Facilities  Make a note that we have a new phone and fax numbers for our Department  Please call with any questions or concerns to [phone] and carefully follow the prompts so that you are directed to the right person  All voicemails are confidential  Fax any determinations, approvals, denials, and requests for initial or continue stay review clinical to [phone]  Due to HIGH CALL volume, it would be easier if you could please send faxed requests to expedite your requests and in part, help us provide discharge notifications faster

## 2018-10-30 NOTE — TELEPHONE ENCOUNTER
She saw Dr Katalina Wilcox in the hospital  She has questions about her medications   Please call her

## 2018-10-31 NOTE — TELEPHONE ENCOUNTER
S/w pt - imdur refill needed in 8 days - refill medication on 11/5/18; pt seeing Dr Rose Koo on 11/15/18

## 2018-11-14 NOTE — TELEPHONE ENCOUNTER
Pt requesting refill  She last saw Dr Shira Herron 6/26/18 for asymptomatic 70-99% stenosis of R internal carotid  It was decided that the pt will be medically managed due to age and co-morbidities and pt was to continue plavix  Pt will have repeat carotid duplex 12/26

## 2018-11-24 NOTE — PROGRESS NOTES
Progress Note - Cardiology Office  [facility]    Sirisha Garcia Race 80 y o  female MRN: [id number]  : 9/10/1930  Encounter: [id number]      Assessment:     1  Essential hypertension    2  Moderate to severe mitral regurgitation    3  Dilated cardiomyopathy ([facility] [address] )    4  New onset atrial fibrillation (HCC)    5  Elevated liver enzymes    6  Chronic mesenteric ischemia ([facility] [address] )    7  PAD (peripheral artery disease) ([facility] )    8  Stenosis of right internal carotid artery        Discussion Summary and Plan:    1  Paroxysmal atrial fibrillation:  Patient continues to maintain sinus rhythm without amiodarone at this time  Long-term anticoagulation held secondary to history of fall, gastric AVMs and antral ulcers  Patient does not want to take amiodarone has she believe AFib was only 1 isolated episode  We will check 48 hour Holter monitor to rule out any occult atrial fibrillation      2  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%  She does have history of essential hypertension and which is elevated will add Aldactone it will help with her leg edema as well as blood pressure control      3  Hypertension  Need better control  Hence Aldactone has been added  Will help control blood pressure  Will check electrolytes in 7-10 days      4  PVD with mesenteric ischemia  Follow-up by vascular surgery  She has previously stent     5  Antral ulcers and multiple gastric AVMs:  Patient has a history of ischemic mesenteric disease  Continue PPI as per Gastroenterology      6  History of significant peripheral and carotid vascular disease     7  History of at least moderate mitral regurgitation and dilated cardiomyopathy with nuclear stress test shows no ischemia  Will continue monitoring her echo  Continue losartan, metoprolol and Aldactone  Issues related to valvular disease, cardiomyopathy, atrial fibrillation discussed with patient at length      Please call [phone], if any questions  Counseling :  A description of the counseling  As above  Goals and Barriers  Patient's ability to self care: Yes  Medication side effect reviewed with patient in detail and all their questions answered to their satisfaction  HPI :     Crystal Alvarado is a 80y o  year old female who came for follow up  Patient was recently admitted to [facility] with left lower quadrant abdominal pain which radiated to her left flank and was found to have kidney stones  She does have history of chronic mesenteric ischemia for which she had a stent placed at [place]  At that time she had a decrease food intake and she has lost some weight  She is trying to gain her weight back  During admission and further cardiac workup she was found to have dilated cardiomyopathy with EF around 40% at least moderate mitral regurgitation  She also found to have moderate tricuspid regurgitation  She  had an episode of atrial fibrillation and was started on antithrombotic therapy  Since she has history of Stewart's esophagus peptic ulcer disease and previous perforation she is not on antithrombotic therapy  She is doing relatively well  She is feeling better  Heart rate is in 70 and she is staying in sinus rhythm  She denies any chest pain or any other shortness of breath  She is trying to gain her weight back  No other significant complaint at this time  She was on amiodarone however after reading the pamphlet she had many of the side effect and she stopped taking it  Currently she is only taking metoprolol 100 mg at bedtime, losartan and she is not even on water pill  She did have nuclear stress test as well as an echo done which did which shows EF around 40-45%  Fixed inferior wall defect which is due to body attenuation artifact  No ischemia was mentioned  Echo report as mentioned above  Patient had stop taking amiodarone as she felt that it has many side effects    She believes her AFib was an isolated episode and would not like to take antiarrhythmic if she can avoid it she would consider it if AFib reoccur  Review of Systems   Constitutional: Negative for activity change, chills, diaphoresis, fever and unexpected weight change  Has gained some weight as per patient   HENT: Negative for congestion  Eyes: Negative for discharge and redness  Respiratory: Negative for cough, chest tightness, shortness of breath and wheezing  Cardiovascular: Positive for leg swelling  Negative for chest pain and palpitations  Gastrointestinal: Negative for abdominal pain, diarrhea and nausea  Endocrine: Negative  Genitourinary: Negative for decreased urine volume and urgency  Musculoskeletal: Negative  Negative for arthralgias, back pain and gait problem  Skin: Negative for rash and wound  Allergic/Immunologic: Negative  Neurological: Negative for dizziness, seizures, syncope, weakness, light-headedness and headaches  Hematological: Negative  Psychiatric/Behavioral: Negative for agitation and confusion  The patient is nervous/anxious  She is very anxious about taking medications       Historical Information   Past Medical History:   Diagnosis Date    Acid reflux     Arthritis     Stewart's esophagus     [facility]     Duodenal ulcer     Hypertension     PAD (peripheral artery disease) ([facility])     Periodontal disease     Peripheral vascular disease ([address] )     Scoliosis      Past Surgical History:   Procedure Laterality Date    ANAL FISSURECTOMY      AORTA - SUPERIOR MESENTERIC ARTERY BYPASS GRAFT Bilateral     BREAST BIOPSY Right     ESOPHAGOGASTRODUODENOSCOPY N/A 10/5/2018    Procedure: ESOPHAGOGASTRODUODENOSCOPY (EGD);   Surgeon: Semaj Bowen MD;  Location: [address] [place] [address];  Service: Gastroenterology    FEMORAL ARTERY STENT Bilateral     HEMORROIDECTOMY      HYSTERECTOMY      INSERTION STENT ARTERY Left 1/1/2017 Procedure:  mesenteric arteriogram, balloon angioplasty of SMA stent;  Surgeon: Alok Spain MD;  Location: [facility];  Service:     LAPAROTOMY N/A 1/1/2017    Procedure: LAPAROTOMY EXPLORATORY,  abdominal washout, repair of duodenal ulcer;  Surgeon: Saran Marie MD;  Location:  MAIN OR;  Service:     SD CYSTO/URETERO W/LITHOTRIPSY &INDWELL STENT INSRT Left 10/2/2018    Procedure: CYSTOSCOPY URETEROSCOPY WITH LITHOTRIPSY HOLMIUM LASER, RETROGRADE PYELOGRAM AND INSERTION STENT URETERAL;  Surgeon: Ziyad Bridges MD;  Location: [address] [place] [address];  Service: Urology     [address] Right 1/11/2016    Procedure: EXTRACTION INTRACAPSULAR CATARACT PHACO INTRAOCULAR LENS (IOL); Surgeon: Zafar Franklin MD;  Location: [facility] MAIN OR;  Service: Ophthalmology    TONSILECTOMY AND ADNOIDECTOMY       History   Alcohol Use    0 6 oz/week    1 Glasses of wine per week     Comment: a glass wine daily   last drink 6/10/18     History   Drug Use No     History   Smoking Status    Former Smoker    Packs/day: 1 00    Years: 25 00    Quit date: [facility] Never Used     Comment: smoked for 25 years  Family History: History reviewed  No pertinent family history      Meds/Allergies     Allergies   Allergen Reactions    Chlorthalidone Itching    Penicillins Itching     Tolerates zosyn    Sulfa Antibiotics Rash    Amiodarone Other (See Comments)     Legs swollen and itching    Bacitracin Rash    Ciprofloxacin GI Intolerance       Current Outpatient Prescriptions:     clopidogrel (PLAVIX) 75 mg tablet, Take 1 tablet (75 mg total) by mouth daily, Disp: 30 tablet, Rfl: 2    isosorbide mononitrate (IMDUR) 30 mg 24 hr tablet, Take 1 tablet (30 mg total) by mouth daily for 30 days (Patient taking differently: Take 30 mg by mouth every morning  ), Disp: 30 tablet, Rfl: 0    losartan (COZAAR) 50 mg tablet, Take 1 tablet by mouth daily for 30 days (Patient taking differently: Take 50 mg by mouth 2 (two) times a day  ), Disp: 30 tablet, Rfl: 0    metoprolol succinate (TOPROL-XL) 100 mg 24 hr tablet, Take 100 mg by mouth daily at bedtime, Disp: , Rfl:     Multiple Vitamin (MULTIVITAMIN) capsule, Take 1 capsule by mouth daily, Disp: , Rfl:     pantoprazole (PROTONIX) 40 mg tablet, Take 1 tablet (40 mg total) by mouth 2 (two) times a day before meals for 56 doses, Disp: 56 tablet, Rfl: 0    VITAMIN D, ERGOCALCIFEROL, PO, Take 2,000 Units by mouth daily  , Disp: , Rfl:     atorvastatin (LIPITOR) 20 mg tablet, Take 1 tablet (20 mg total) by mouth daily with dinner for 30 days, Disp: 30 tablet, Rfl: 0    spironolactone (ALDACTONE) 25 mg tablet, Take 1 tablet (25 mg total) by mouth daily, Disp: 30 tablet, Rfl: 3    Vitals: Blood pressure 150/72, pulse 78, height 5' 1 25" (1 556 m), weight 42 8 kg (94 lb 6 4 oz), SpO2 98 %  Body mass index is 17 69 kg/m²  Vitals:    11/27/18 1432   Weight: 42 8 kg (94 lb 6 4 oz)     BP Readings from Last 3 Encounters:   11/27/18 150/72   10/09/18 146/65   07/30/18 (!) 180/80       Physical Exam    Neurologic:  Alert & oriented x 3, no new focal deficits, Not in any acute distress, thinly built  Constitutional:  Thinly built,  non-toxic appearance   Eyes:  Pupil equal and reacting to light, conjunctiva normal,   HENT:  Atraumatic, oropharynx moist, Neck- normal range of motion, no tenderness,  Neck supple, No JVP, No LNP   Respiratory:  Bilateral air entry, mostly clear to auscultation  Cardiovascular: S1-S2 regular with a 3/6 pansystolic murmur radiating towards axilla  GI:  Soft, nondistended, normal bowel sounds, nontender, no hepatosplenomegaly appreciated  Musculoskeletal:  No edema, no tenderness, no deformities  Skin:  Well hydrated, no rash   Lymphatic:  No lymphadenopathy noted   Extremities:  1+ lower extremity edema    Diagnostic Studies Review Cardio:    Patient's echo and stress test reviewed with the patient  EKG:   The EKG shows normal sinus rhythm heart rate 79 beats per minute  Nonspecific ST changes baseline artifacts noted  Cardiac testing:   Results for orders placed during the hospital encounter of 10/01/18   Echo complete with contrast if indicated    Narrative [address]  [address] [facility]  [address]  [phone]    Transthoracic Echocardiogram  2D, M-mode, Doppler, and Color Doppler    Study date:  03-Oct-2018    Patient: Vicente WATERS  MR number: [id number]  Account number: [de-identified]  : 10-Sep-1930  Age: 80 years  Gender: Female  Status: Inpatient  Location: Bedside  Height: 61 in  Weight: 88 9 lb  BP: 128/ 60 mmHg    Indications: Congestive Heart Failure    Diagnoses: I50 9 - Heart failure, unspecified    Sonographer:  ABDIRAHMAN Marin RVS  Primary Physician:  Jackie Gomez MD  Referring Physician:  John Steiner MD  Group:  [facility]  Interpreting Physician:  Estela Dennis DO    SUMMARY    LEFT VENTRICLE:  Systolic function was moderately reduced  Ejection fraction was estimated to be 40 %  There was moderate diffuse hypokinesis with no identifiable regional variations  There was moderate concentric hypertrophy  Doppler parameters were consistent with abnormal left ventricular relaxation (grade 1 diastolic dysfunction)  LEFT ATRIUM:  The atrium was mildly dilated  MITRAL VALVE:  There was moderately reduced leaflet separation  There was moderately restricted mobility  There was moderate regurgitation  AORTIC VALVE:  The valve was trileaflet  Leaflets exhibited normal thickness, calcification, and moderately reduced cuspal separation  There was no evidence for stenosis  There was no regurgitation  TRICUSPID VALVE:  There was moderate regurgitation  Pulmonary artery systolic pressure was within the normal range  PULMONIC VALVE:  There was mild to moderate regurgitation      HISTORY: PRIOR HISTORY: PVD, COPD, GERD, Arthritis, Scoliosis    PROCEDURE: The procedure was performed at the bedside  This was a routine study  The transthoracic approach was used  The study included complete 2D imaging, M-mode, complete spectral Doppler, and color Doppler  The heart rate was 81 bpm,  at the start of the study  Images were obtained from the parasternal, apical, and subcostal acoustic windows  Images were not obtained from the suprasternal notch acoustic windows  Image quality was adequate  LEFT VENTRICLE: Size was normal  Systolic function was moderately reduced  Ejection fraction was estimated to be 40 %  There was moderate diffuse hypokinesis with no identifiable regional variations  There was moderate concentric  hypertrophy  DOPPLER: Doppler parameters were consistent with abnormal left ventricular relaxation (grade 1 diastolic dysfunction)  RIGHT VENTRICLE: The size was normal  Systolic function was normal  DOPPLER: Systolic pressure was within the normal range  LEFT ATRIUM: The atrium was mildly dilated  No thrombus was identified  RIGHT ATRIUM: Size was normal     MITRAL VALVE: Valve structure was normal  There was moderately reduced leaflet separation  There was moderately restricted mobility  No echocardiographic evidence for prolapse  DOPPLER: The transmitral velocity was within the normal range  There was no evidence for stenosis  There was moderate regurgitation  AORTIC VALVE: The valve was trileaflet  Leaflets exhibited normal thickness, calcification, and moderately reduced cuspal separation  DOPPLER: Transaortic velocity was within the normal range  There was no evidence for stenosis  There was  no regurgitation  TRICUSPID VALVE: The valve structure was normal  There was normal leaflet separation  DOPPLER: The transtricuspid velocity was within the normal range  There was moderate regurgitation  Pulmonary artery systolic pressure was within the  normal range  Estimated peak PA pressure was 37 mmHg      PULMONIC VALVE: Leaflets exhibited normal thickness, no calcification, and normal cuspal separation  DOPPLER: The transpulmonic velocity was within the normal range  There was mild to moderate regurgitation  PERICARDIUM: There was no thickening  There was no pericardial effusion  AORTA: The root exhibited normal size and fibrocalcific change  PULMONARY ARTERY: The size was normal  The morphology appeared normal     SYSTEM MEASUREMENT TABLES    2D mode  AoR Diam 2D: 2 9 cm  LA Diam (2D): 3 6 cm  LA/Ao (2D): 1 24  FS (2D Teich): 20 %  IVSd (2D): 1 27 cm  LVDEV: 80 8 cm³  LVEDV MOD BP: 96 cm³  LVESV: 47 4 cm³  LVIDd(2D): 4 25 cm  LVISd (2D): 3 4 cm  LVPWd (2D): 1 27 cm  SV (Teich): 33 4 cm³    Apical four chamber  LVEF A4C: 40 %    Apical two chamber  LVEF A2C: 45 %    Unspecified Scan Mode  MV Peak A Manuel: 1380 mm/s  MV Peak E Manuel   Mean: 983 mm/s  MVA (PHT): 4 23 cm squared  PHT: 75 ms  Max P mm[Hg]  V Max: 3080 mm/s  Vmax: 2680 mm/s  RA Area: 20 7 cm squared  RA Volume: 66 9 cm³  TAPSE: 1 6 cm    [facility] Accredited Echocardiography Laboratory    Prepared and electronically signed by    DO Mojgan Mars 03-Oct-2018 14:43:04         Results for orders placed during the hospital encounter of 10/01/18   NM Myocardial Perfusion Spect (Pharmacological Induced Stress and/or Rest)    [facility] [address]  [address] [facility] [address] [phone]    Rest/Stress Gated SPECT Myocardial Perfusion Imaging After Regadenoson    Patient: LORI WATERS  MR number: [id number]  Account number: [de-identified]  : 09/10/1930  Age: 80 years  Gender: Female  Status: Inpatient  Location: Stress lab  Height: 24 in  Weight: 90 lb  BP: 180/ 70 mmHg    Allergies: CHLORTHALIDONE, PENICILLINS, SULFA ANTIBIOTICS, BACITRACIN, CIPROFLOXACIN    Diagnosis: 794 31 - ABNORM ELECTROCARDIOGRAM    Primary Physician:  Lupis Perry MD  RN:  Nova Byrne BSN  Group:  [facility]  Report Prepared By[de-identified]  Nova Byrne, BSN  Interpreting Physician:  Elias Schultz DO    INDICATIONS: Evaluation for ischemia  HISTORY: The patient is a 80year old  female  Chest pain status: no chest pain  Coronary artery disease risk factors: hypertension  Cardiovascular history: peripheral vascular occlusive disease  Medications: a beta blocker,  clopidogrel, and an antihypertensive agent  PHYSICAL EXAM: Baseline physical exam screening: no wheezes audible  REST ECG: Normal sinus rhythm  PROCEDURE: The study was performed in the the Stress lab  A regadenoson infusion pharmacologic stress test was performed  Gated SPECT myocardial perfusion imaging was performed after stress and at rest  Systolic blood pressure was 180  mmHg, at the start of the study  Diastolic blood pressure was 70 mmHg, at the start of the study  The heart rate was 79 bpm, at the start of the study  IV double checked  Regadenoson protocol:  Time HR bpm SBP mmHg DBP mmHg Symptoms Rhythm/conduct  Baseline 14:15 79 180 70 none NSR  Immediate 14:16 88 120 60 dizziness same as above  1 min 14:17 92 124 62 same as above --  2 min 14:18 91 118 62 subsiding --  3 min 14:53 86 138 62 subsiding --  No medications or fluids given  STRESS SUMMARY: Duration of pharmacologic stress was 3 min  Maximal heart rate during stress was 93 bpm  The heart rate response to stress was normal  There was resting hypertension with an appropriate blood pressure response to stress  The rate-pressure product for the peak heart rate and blood pressure was 52704  There was no chest pain during stress  The stress test was terminated due to protocol completion  Pre oxygen saturation: 100 %  Peak oxygen saturation: 100 %  The stress ECG was negative for ischemia and normal  There were no stress arrhythmias or conduction abnormalities      ISOTOPE ADMINISTRATION:  Resting isotope administration Stress isotope administration  Agent Tetrofosmin Tetrofosmin  Dose 10 8 mCi 31 6 mCi  Date 10/04/2018 10/04/2018    The radiopharmaceutical was injected at the peak effect of pharmacologic stress  MYOCARDIAL PERFUSION IMAGING:  The image quality was poor  Rotating projection images reveal marked subdiaphragmatic activity  Left ventricular size was normal     PERFUSION DEFECTS:  -  There was a moderate-sized, severe, fixed myocardial perfusion defect of the entire inferior wall likely due to GI activity  GATED SPECT:  The calculated left ventricular ejection fraction was 44 %  Visually, appears normal  There was no left ventricular regional abnormality  SUMMARY:  -  Stress results: There was resting hypertension with an appropriate blood pressure response to stress  There was no chest pain during stress  -  ECG conclusions: The stress ECG was negative for ischemia and normal   -  Perfusion imaging: There was a moderate-sized, severe, fixed myocardial perfusion defect of the entire inferior wall likely due to GI activity  -  Gated SPECT: The calculated left ventricular ejection fraction was 44 %  Visually, appears normal  There was no left ventricular regional abnormality  IMPRESSIONS: Abnormal study after pharmacologic stress  There was an inferior wall defect that was likely bowel attenuation artifact  Left ventricular systolic function was normal     Prepared and signed by    Jesus Kapoor DO  Signed 10/04/2018 15:43:33         Imaging:  Chest X-Ray:   No Chest XR results available for this patient  CT-scan of the chest:     Cta Head And Neck W Wo Contrast    Result Date: 6/13/2018  Impression Approximately 50% right ICA stenosis at the carotid bifurcation related to atheromatous plaque  Mild left ICA stenosis also noted  Atherosclerotic disease is noted with areas of stenosis in the mid left cervical vertebral artery, proximal basilar artery, intracranial left vertebral artery, distal right P1, and left M1 without large vessel occlusion   Workstation performed: [id number]     Lab Review Lab Results   Component Value Date    WBC 8 76 10/08/2018    HGB 10 4 (L) 10/08/2018    HCT 34 1 (L) 10/08/2018    MCV 80 (L) 10/08/2018    RDW 21 4 (H) 10/08/2018     10/08/2018     BMP:  Lab Results   Component Value Date    SODIUM 138 10/07/2018     08/18/2015    K 4 4 10/07/2018     10/07/2018    CO2 22 10/07/2018    ANIONGAP 9 08/18/2015    BUN 33 (H) 10/07/2018    CREATININE 0 99 10/07/2018    GLUCOSE 124 01/01/2017    GLUF 113 (H) 06/12/2018    CALCIUM 8 4 10/07/2018    EGFR 51 10/07/2018    MG 1 7 10/08/2018     LFT:  Lab Results   Component Value Date    AST 86 (H) 10/07/2018     (H) 10/07/2018    ALKPHOS 57 10/07/2018       Lab Results   Component Value Date    HGBA1C 5 8 06/11/2018     Lipid Profile:   Lab Results   Component Value Date    CHOLESTEROL 234 (H) 06/12/2018    HDL 86 (H) 06/12/2018    LDLCALC 134 (H) 06/12/2018    TRIG 69 06/12/2018     Lab Results   Component Value Date    CHOLESTEROL 234 (H) 06/12/2018     Lab Results   Component Value Date    CKTOTAL 63 01/17/2017    TROPONINI 1 46 (H) 10/06/2018     Lab Results   Component Value Date    NTBNP 13,829 (H) 10/06/2018        Lipid Profile:        Dr Celestine Hernandez MD [facility]      "This note has been constructed using a voice recognition system  Therefore there may be syntax, spelling, and/or grammatical errors   Please call if you have any questions  "

## 2018-11-27 PROBLEM — I42.0 DILATED CARDIOMYOPATHY (HCC): Status: ACTIVE | Noted: 2018-01-01

## 2018-11-27 PROBLEM — I34.0 MODERATE TO SEVERE MITRAL REGURGITATION: Status: ACTIVE | Noted: 2018-01-01

## 2018-11-27 NOTE — PRE-PROCEDURE INSTRUCTIONS
Pre-Surgery Instructions:   Medication Instructions    clopidogrel (PLAVIX) 75 mg tablet Patient was instructed by Physician and understands   isosorbide mononitrate (IMDUR) 30 mg 24 hr tablet Instructed patient per Anesthesia Guidelines   losartan (COZAAR) 50 mg tablet Instructed patient per Anesthesia Guidelines   metoprolol succinate (TOPROL-XL) 100 mg 24 hr tablet Instructed patient per Anesthesia Guidelines   Multiple Vitamin (MULTIVITAMIN) capsule Patient was instructed by Physician and understands   pantoprazole (PROTONIX) 40 mg tablet Patient was instructed by Physician and understands   VITAMIN D, ERGOCALCIFEROL, PO Patient was instructed by Physician and understands

## 2018-11-28 NOTE — ANESTHESIA PREPROCEDURE EVALUATION
Review of Systems/Medical History  Patient summary reviewed  Chart reviewed      Cardiovascular  EKG reviewed, Hypertension , Valvular heart disease , tricuspid regurgitation and pulmonary regurgitation, Cardiac stents    Comment: EF: 40%      Carotid stenosis,  Pulmonary  COPD mild- PRN medicaiton ,        GI/Hepatic    GERD , Liver disease ,   Comment: Liver hemangioma          Endo/Other     GYN       Hematology   Musculoskeletal    Arthritis     Neurology   Psychology           Physical Exam    Airway    Mallampati score: I  TM Distance: >3 FB  Neck ROM: full     Dental       Cardiovascular  Rhythm: regular, Rate: normal, Murmur,     Pulmonary  Breath sounds clear to auscultation,     Other Findings        Anesthesia Plan  ASA Score- 3     Anesthesia Type- IV sedation with anesthesia with ASA Monitors  Additional Monitors:   Airway Plan:         Plan Factors-    Induction- intravenous  Postoperative Plan-     Informed Consent- Anesthetic plan and risks discussed with patient

## 2018-11-28 NOTE — ANESTHESIA POSTPROCEDURE EVALUATION
Post-Op Assessment Note      CV Status:  Stable    Mental Status:  Alert and awake    Hydration Status:  Euvolemic    PONV Controlled:  Controlled    Airway Patency:  Patent    Post Op Vitals Reviewed: Yes          Staff: Anesthesiologist           /65 (11/28/18 1439)    Temp      Pulse 61 (11/28/18 1439)   Resp 16 (11/28/18 1439)    SpO2 94 % (11/28/18 1439)

## 2018-11-28 NOTE — OP NOTE
OPERATIVE REPORT  PATIENT NAME: Sirisha Amador    :  9/10/1930  MRN: [id number]  Pt Location: [facility] GI ROOM 02    SURGERY DATE: 2018    Surgeon(s) and Role:     * Hazel Peterson MD - Primary    Preop Diagnosis:  Acute gastric ulcer without hemorrhage or perforation [K25 3]  Abdominal pain    Post-Op Diagnosis Codes:     * Acute gastric ulcer without hemorrhage or perforation [K25 3]  Hiatal hernia    Procedure(s) (LRB):  ESOPHAGOGASTRODUODENOSCOPY (EGD) (N/A) with biopsy    Specimen(s):  ID Type Source Tests Collected by Time Destination   1 : bx-duodenum Tissue Duodenum TISSUE EXAM Hazel Peterson MD 2018 1423    2 : bx-gastric antrum Tissue Stomach TISSUE EXAM Hazel Peterson MD 2018 142        Estimated Blood Loss:   Minimal    Drains:  Ureteral Drain/Stent Left ureter 6 Fr  (Active)   Number of days: 57       Anesthesia Type:   IV Sedation with Anesthesia    Operative Indications:  Acute gastric ulcer without hemorrhage or perforation [K25 3]  Abdominal pain    Operative Findings:    1  Hiatal hernia  2  Multiple gastric AVMs  3  Antral ulcers again noted without significant healing since 2018  Biopsies obtained  4  Possibly denuded villi in the 1st portion of the duodenum  Biopsies obtained  Recommendations:    1  Follow-up biopsy results  2  Increase pantoprazole back to 40 mg twice daily  3  Begin sucralfate 1 g p o  Q i d  4   If biopsies unrevealing, check H pylori fecal antigen  5  Follow up with me in a few weeks  6  Repeat EGD in 4-6 weeks  Complications:   None    Procedure and Technique:  After adequate sedation by Anesthesia Service, the Olympus gastroscope was passed through the mouth and advanced to the 3rd portion of the duodenum  The endoscope was slowly withdrawn and all mucosal surfaces carefully examined  The esophagus was normal with squamocolumnar junction located at 36 cm  There was a 4-5 cm sliding hiatal hernia    A few scattered, nonbleeding gastric AVMs were noted in the corpus  2 clean based antral ulcers similar in appearance to prior endoscopy on October 5, 2018  Biopsies were obtained  There was suggestion of mildly denuded villi in the 1st portion of the duodenum while the remainder of the duodenum appeared normal   Multiple biopsies were obtained      Patient Disposition:   Stable    SIGNATURE: Alexus Alas MD  DATE: November 28, 2018  TIME: 2:28 PM

## 2018-11-28 NOTE — H&P
H&P EXAM - Outpatient Endoscopy   Maranda Sacks Race 80 y o  female MRN: [id number]    [address]  [facility] ENDOSCOPY   Encounter: [id number]        Impression:  Abdominal pain, history of gastric ulcers  Plan:  EGD    Chief Complaint:  Postprandial abdominal pain    Physical Exam:  No acute distress, abdomen nontender   Chest:  Clear   Heart:  Regular

## 2018-12-24 NOTE — TELEPHONE ENCOUNTER
Received phone call from patient saying that she did not have script for her VAS procedure on 12-26-18 I told her that the script is in EPIC if she has any problems [facility]

## 2019-01-01 ENCOUNTER — ANESTHESIA EVENT (INPATIENT)
Dept: PERIOP | Facility: HOSPITAL | Age: 84
DRG: 246 | End: 2019-01-01
Payer: COMMERCIAL

## 2019-01-01 ENCOUNTER — ANESTHESIA (INPATIENT)
Dept: PERIOP | Facility: HOSPITAL | Age: 84
DRG: 246 | End: 2019-01-01
Payer: COMMERCIAL

## 2019-01-01 ENCOUNTER — APPOINTMENT (INPATIENT)
Dept: RADIOLOGY | Facility: HOSPITAL | Age: 84
DRG: 246 | End: 2019-01-01
Payer: COMMERCIAL

## 2019-01-01 ENCOUNTER — APPOINTMENT (INPATIENT)
Dept: NON INVASIVE DIAGNOSTICS | Facility: HOSPITAL | Age: 84
DRG: 246 | End: 2019-01-01
Payer: COMMERCIAL

## 2019-01-01 ENCOUNTER — APPOINTMENT (EMERGENCY)
Dept: RADIOLOGY | Facility: HOSPITAL | Age: 84
DRG: 315 | End: 2019-01-01
Payer: COMMERCIAL

## 2019-01-01 ENCOUNTER — HOSPITAL ENCOUNTER (INPATIENT)
Facility: HOSPITAL | Age: 84
LOS: 3 days | DRG: 246 | End: 2019-02-01
Attending: SURGERY | Admitting: SURGERY
Payer: COMMERCIAL

## 2019-01-01 ENCOUNTER — TELEPHONE (OUTPATIENT)
Dept: VASCULAR SURGERY | Facility: CLINIC | Age: 84
End: 2019-01-01

## 2019-01-01 ENCOUNTER — TELEPHONE (OUTPATIENT)
Dept: CARDIOLOGY CLINIC | Facility: CLINIC | Age: 84
End: 2019-01-01

## 2019-01-01 ENCOUNTER — TELEPHONE (OUTPATIENT)
Dept: OTHER | Facility: HOSPITAL | Age: 84
End: 2019-01-01

## 2019-01-01 ENCOUNTER — OFFICE VISIT (OUTPATIENT)
Dept: CARDIOLOGY CLINIC | Facility: CLINIC | Age: 84
End: 2019-01-01
Payer: COMMERCIAL

## 2019-01-01 ENCOUNTER — HOSPITAL ENCOUNTER (INPATIENT)
Facility: HOSPITAL | Age: 84
LOS: 1 days | DRG: 315 | End: 2019-01-29
Attending: EMERGENCY MEDICINE | Admitting: FAMILY MEDICINE
Payer: COMMERCIAL

## 2019-01-01 ENCOUNTER — TELEPHONE (OUTPATIENT)
Dept: OTHER | Facility: OTHER | Age: 84
End: 2019-01-01

## 2019-01-01 ENCOUNTER — APPOINTMENT (OUTPATIENT)
Dept: RADIOLOGY | Facility: HOSPITAL | Age: 84
DRG: 315 | End: 2019-01-01
Payer: COMMERCIAL

## 2019-01-01 VITALS
HEART RATE: 89 BPM | DIASTOLIC BLOOD PRESSURE: 78 MMHG | BODY MASS INDEX: 15.86 KG/M2 | WEIGHT: 84 LBS | OXYGEN SATURATION: 99 % | SYSTOLIC BLOOD PRESSURE: 152 MMHG | HEIGHT: 61 IN

## 2019-01-01 VITALS
HEIGHT: 61 IN | SYSTOLIC BLOOD PRESSURE: 126 MMHG | WEIGHT: 105.82 LBS | DIASTOLIC BLOOD PRESSURE: 45 MMHG | BODY MASS INDEX: 19.98 KG/M2 | TEMPERATURE: 99 F | OXYGEN SATURATION: 66 %

## 2019-01-01 VITALS
OXYGEN SATURATION: 97 % | TEMPERATURE: 96.3 F | SYSTOLIC BLOOD PRESSURE: 200 MMHG | DIASTOLIC BLOOD PRESSURE: 84 MMHG | RESPIRATION RATE: 18 BRPM | HEART RATE: 81 BPM

## 2019-01-01 DIAGNOSIS — E87.2 LACTIC ACIDOSIS: ICD-10-CM

## 2019-01-01 DIAGNOSIS — K62.5 RECTAL BLEEDING: ICD-10-CM

## 2019-01-01 DIAGNOSIS — K55.059 ACUTE MESENTERIC ISCHEMIA (HCC): ICD-10-CM

## 2019-01-01 DIAGNOSIS — I50.32 CHRONIC DIASTOLIC CONGESTIVE HEART FAILURE (HCC): Primary | ICD-10-CM

## 2019-01-01 DIAGNOSIS — K55.9 MESENTERIC ISCHEMIA (HCC): ICD-10-CM

## 2019-01-01 DIAGNOSIS — I34.0 MODERATE TO SEVERE MITRAL REGURGITATION: ICD-10-CM

## 2019-01-01 DIAGNOSIS — I65.21 STENOSIS OF RIGHT INTERNAL CAROTID ARTERY: ICD-10-CM

## 2019-01-01 DIAGNOSIS — I21.3 ST ELEVATION MYOCARDIAL INFARCTION (STEMI), UNSPECIFIED ARTERY (HCC): ICD-10-CM

## 2019-01-01 DIAGNOSIS — I48.91 NEW ONSET ATRIAL FIBRILLATION (HCC): ICD-10-CM

## 2019-01-01 DIAGNOSIS — I10 ESSENTIAL HYPERTENSION: ICD-10-CM

## 2019-01-01 DIAGNOSIS — I70.1 LEFT RENAL ARTERY STENOSIS (HCC): ICD-10-CM

## 2019-01-01 DIAGNOSIS — I15.0 RENOVASCULAR HYPERTENSION: ICD-10-CM

## 2019-01-01 DIAGNOSIS — K55.1 CHRONIC MESENTERIC ISCHEMIA (HCC): ICD-10-CM

## 2019-01-01 DIAGNOSIS — I73.9 PAD (PERIPHERAL ARTERY DISEASE) (HCC): ICD-10-CM

## 2019-01-01 DIAGNOSIS — I42.0 DILATED CARDIOMYOPATHY (HCC): ICD-10-CM

## 2019-01-01 DIAGNOSIS — R10.9 ABDOMINAL PAIN: Primary | ICD-10-CM

## 2019-01-01 DIAGNOSIS — K55.059 OCCLUSIVE MESENTERIC ISCHEMIA (HCC): ICD-10-CM

## 2019-01-01 DIAGNOSIS — K26.5 PERFORATED DUODENAL ULCER (HCC): ICD-10-CM

## 2019-01-01 LAB
ABO GROUP BLD: NORMAL
ALBUMIN SERPL BCP-MCNC: 1.9 G/DL (ref 3.5–5)
ALBUMIN SERPL BCP-MCNC: 2.5 G/DL (ref 3.5–5)
ALBUMIN SERPL BCP-MCNC: 2.6 G/DL (ref 3.5–5)
ALBUMIN SERPL BCP-MCNC: 3.8 G/DL (ref 3.5–5)
ALP SERPL-CCNC: 115 U/L (ref 46–116)
ALP SERPL-CCNC: 196 U/L (ref 46–116)
ALP SERPL-CCNC: 49 U/L (ref 46–116)
ALP SERPL-CCNC: 62 U/L (ref 46–116)
ALT SERPL W P-5'-P-CCNC: 2584 U/L (ref 12–78)
ALT SERPL W P-5'-P-CCNC: 2601 U/L (ref 12–78)
ALT SERPL W P-5'-P-CCNC: 3360 U/L (ref 12–78)
ALT SERPL W P-5'-P-CCNC: 34 U/L (ref 12–78)
ANION GAP SERPL CALCULATED.3IONS-SCNC: 13 MMOL/L (ref 4–13)
ANION GAP SERPL CALCULATED.3IONS-SCNC: 14 MMOL/L (ref 4–13)
ANION GAP SERPL CALCULATED.3IONS-SCNC: 15 MMOL/L (ref 4–13)
ANION GAP SERPL CALCULATED.3IONS-SCNC: 16 MMOL/L (ref 4–13)
ANION GAP SERPL CALCULATED.3IONS-SCNC: 16 MMOL/L (ref 4–13)
ANION GAP SERPL CALCULATED.3IONS-SCNC: 17 MMOL/L (ref 4–13)
ANION GAP SERPL CALCULATED.3IONS-SCNC: 18 MMOL/L (ref 4–13)
ANION GAP SERPL CALCULATED.3IONS-SCNC: 18 MMOL/L (ref 4–13)
ANION GAP SERPL CALCULATED.3IONS-SCNC: 21 MMOL/L (ref 4–13)
ANION GAP SERPL CALCULATED.3IONS-SCNC: 24 MMOL/L (ref 4–13)
ANION GAP SERPL CALCULATED.3IONS-SCNC: 30 MMOL/L (ref 4–13)
ANISOCYTOSIS BLD QL SMEAR: PRESENT
APTT PPP: 23 SECONDS (ref 24–33)
APTT PPP: 55 SECONDS (ref 26–38)
APTT PPP: 78 SECONDS (ref 26–38)
APTT PPP: 82 SECONDS (ref 26–38)
APTT PPP: 84 SECONDS (ref 26–38)
APTT PPP: >210 SECONDS (ref 26–38)
APTT PPP: >210 SECONDS (ref 26–38)
ARTERIAL PATENCY WRIST A: YES
ARTERIAL PATENCY WRIST A: YES
AST SERPL W P-5'-P-CCNC: 3859 U/L (ref 5–45)
AST SERPL W P-5'-P-CCNC: 40 U/L (ref 5–45)
AST SERPL W P-5'-P-CCNC: 5031 U/L (ref 5–45)
AST SERPL W P-5'-P-CCNC: 6874 U/L (ref 5–45)
ATRIAL RATE: 102 BPM
ATRIAL RATE: 103 BPM
ATRIAL RATE: 105 BPM
ATRIAL RATE: 111 BPM
ATRIAL RATE: 51 BPM
ATRIAL RATE: 94 BPM
ATRIAL RATE: 95 BPM
ATRIAL RATE: 96 BPM
BASE EX.OXY STD BLDV CALC-SCNC: 65.6 % (ref 60–80)
BASE EX.OXY STD BLDV CALC-SCNC: 85.6 % (ref 60–80)
BASE EXCESS BLDA CALC-SCNC: -0.6 MMOL/L
BASE EXCESS BLDA CALC-SCNC: -1.1 MMOL/L
BASE EXCESS BLDA CALC-SCNC: -1.5 MMOL/L
BASE EXCESS BLDA CALC-SCNC: -11.3 MMOL/L
BASE EXCESS BLDA CALC-SCNC: -13.9 MMOL/L
BASE EXCESS BLDA CALC-SCNC: -15.7 MMOL/L
BASE EXCESS BLDA CALC-SCNC: -19.3 MMOL/L
BASE EXCESS BLDA CALC-SCNC: -2 MMOL/L (ref -2–3)
BASE EXCESS BLDA CALC-SCNC: -22.2 MMOL/L
BASE EXCESS BLDA CALC-SCNC: -5 MMOL/L (ref -2–3)
BASE EXCESS BLDA CALC-SCNC: -5.3 MMOL/L
BASE EXCESS BLDA CALC-SCNC: -8 MMOL/L (ref -2–3)
BASE EXCESS BLDA CALC-SCNC: -8 MMOL/L (ref -2–3)
BASE EXCESS BLDA CALC-SCNC: 1.1 MMOL/L
BASE EXCESS BLDA CALC-SCNC: 1.8 MMOL/L
BASE EXCESS BLDA CALC-SCNC: 2 MMOL/L (ref -2–3)
BASE EXCESS BLDA CALC-SCNC: 2 MMOL/L (ref -2–3)
BASE EXCESS BLDA CALC-SCNC: 3 MMOL/L (ref -2–3)
BASE EXCESS BLDV CALC-SCNC: -13.9 MMOL/L
BASE EXCESS BLDV CALC-SCNC: -19.5 MMOL/L
BASOPHILS # BLD AUTO: 0.03 THOUSANDS/ΜL (ref 0–0.1)
BASOPHILS # BLD AUTO: 0.05 THOUSANDS/ΜL (ref 0–0.1)
BASOPHILS # BLD MANUAL: 0 THOUSAND/UL (ref 0–0.1)
BASOPHILS NFR BLD AUTO: 0 % (ref 0–1)
BASOPHILS NFR BLD AUTO: 0 % (ref 0–1)
BASOPHILS NFR MAR MANUAL: 0 % (ref 0–1)
BILIRUB DIRECT SERPL-MCNC: 0.5 MG/DL (ref 0–0.2)
BILIRUB DIRECT SERPL-MCNC: 2.65 MG/DL (ref 0–0.2)
BILIRUB SERPL-MCNC: 1.1 MG/DL (ref 0.2–1)
BILIRUB SERPL-MCNC: 1.3 MG/DL (ref 0.2–1)
BILIRUB SERPL-MCNC: 2.33 MG/DL (ref 0.2–1)
BILIRUB SERPL-MCNC: 3.88 MG/DL (ref 0.2–1)
BLD GP AB SCN SERPL QL: NEGATIVE
BODY TEMPERATURE: 95.2 DEGREES FEHRENHEIT
BODY TEMPERATURE: 96.3 DEGREES FEHRENHEIT
BUN SERPL-MCNC: 29 MG/DL (ref 5–25)
BUN SERPL-MCNC: 33 MG/DL (ref 5–25)
BUN SERPL-MCNC: 34 MG/DL (ref 5–25)
BUN SERPL-MCNC: 35 MG/DL (ref 5–25)
BUN SERPL-MCNC: 35 MG/DL (ref 5–25)
BUN SERPL-MCNC: 37 MG/DL (ref 5–25)
BUN SERPL-MCNC: 41 MG/DL (ref 5–25)
BUN SERPL-MCNC: 41 MG/DL (ref 5–25)
BUN SERPL-MCNC: 42 MG/DL (ref 5–25)
BUN SERPL-MCNC: 44 MG/DL (ref 5–25)
BUN SERPL-MCNC: 44 MG/DL (ref 5–25)
BUN SERPL-MCNC: 46 MG/DL (ref 5–25)
BUN SERPL-MCNC: 47 MG/DL (ref 5–25)
BUN SERPL-MCNC: 47 MG/DL (ref 5–25)
BURR CELLS BLD QL SMEAR: PRESENT
BURR CELLS BLD QL SMEAR: PRESENT
CA-I BLD-SCNC: 0.9 MMOL/L (ref 1.12–1.32)
CA-I BLD-SCNC: 0.96 MMOL/L (ref 1.12–1.32)
CA-I BLD-SCNC: 0.97 MMOL/L (ref 1.12–1.32)
CA-I BLD-SCNC: 0.97 MMOL/L (ref 1.12–1.32)
CA-I BLD-SCNC: 0.98 MMOL/L (ref 1.12–1.32)
CA-I BLD-SCNC: 0.99 MMOL/L (ref 1.12–1.32)
CA-I BLD-SCNC: 1.02 MMOL/L (ref 1.12–1.32)
CA-I BLD-SCNC: 1.05 MMOL/L (ref 1.12–1.32)
CA-I BLD-SCNC: 1.17 MMOL/L (ref 1.12–1.32)
CA-I BLD-SCNC: 1.4 MMOL/L (ref 1.12–1.32)
CALCIUM SERPL-MCNC: 7.2 MG/DL (ref 8.3–10.1)
CALCIUM SERPL-MCNC: 7.2 MG/DL (ref 8.3–10.1)
CALCIUM SERPL-MCNC: 7.4 MG/DL (ref 8.3–10.1)
CALCIUM SERPL-MCNC: 7.4 MG/DL (ref 8.3–10.1)
CALCIUM SERPL-MCNC: 7.7 MG/DL (ref 8.3–10.1)
CALCIUM SERPL-MCNC: 7.7 MG/DL (ref 8.3–10.1)
CALCIUM SERPL-MCNC: 8.6 MG/DL (ref 8.3–10.1)
CALCIUM SERPL-MCNC: 8.7 MG/DL (ref 8.3–10.1)
CALCIUM SERPL-MCNC: 8.8 MG/DL (ref 8.3–10.1)
CALCIUM SERPL-MCNC: 8.8 MG/DL (ref 8.3–10.1)
CALCIUM SERPL-MCNC: 9.1 MG/DL (ref 8.3–10.1)
CALCIUM SERPL-MCNC: 9.5 MG/DL (ref 8.3–10.1)
CALCIUM SERPL-MCNC: 9.9 MG/DL (ref 8.3–10.1)
CALCIUM SERPL-MCNC: 9.9 MG/DL (ref 8.3–10.1)
CHLORIDE SERPL-SCNC: 109 MMOL/L (ref 100–108)
CHLORIDE SERPL-SCNC: 109 MMOL/L (ref 100–108)
CHLORIDE SERPL-SCNC: 110 MMOL/L (ref 100–108)
CHLORIDE SERPL-SCNC: 111 MMOL/L (ref 100–108)
CHLORIDE SERPL-SCNC: 113 MMOL/L (ref 100–108)
CHLORIDE SERPL-SCNC: 98 MMOL/L (ref 100–108)
CO2 SERPL-SCNC: 10 MMOL/L (ref 21–32)
CO2 SERPL-SCNC: 15 MMOL/L (ref 21–32)
CO2 SERPL-SCNC: 18 MMOL/L (ref 21–32)
CO2 SERPL-SCNC: 21 MMOL/L (ref 21–32)
CO2 SERPL-SCNC: 24 MMOL/L (ref 21–32)
CO2 SERPL-SCNC: 25 MMOL/L (ref 21–32)
CO2 SERPL-SCNC: 25 MMOL/L (ref 21–32)
CO2 SERPL-SCNC: 26 MMOL/L (ref 21–32)
CO2 SERPL-SCNC: 26 MMOL/L (ref 21–32)
CO2 SERPL-SCNC: 27 MMOL/L (ref 21–32)
CORTIS SERPL-MCNC: 33.9 UG/DL
CREAT SERPL-MCNC: 1.17 MG/DL (ref 0.6–1.3)
CREAT SERPL-MCNC: 1.18 MG/DL (ref 0.6–1.3)
CREAT SERPL-MCNC: 1.25 MG/DL (ref 0.6–1.3)
CREAT SERPL-MCNC: 1.31 MG/DL (ref 0.6–1.3)
CREAT SERPL-MCNC: 1.35 MG/DL (ref 0.6–1.3)
CREAT SERPL-MCNC: 1.42 MG/DL (ref 0.6–1.3)
CREAT SERPL-MCNC: 1.45 MG/DL (ref 0.6–1.3)
CREAT SERPL-MCNC: 1.48 MG/DL (ref 0.6–1.3)
CREAT SERPL-MCNC: 1.54 MG/DL (ref 0.6–1.3)
CREAT SERPL-MCNC: 1.55 MG/DL (ref 0.6–1.3)
CREAT SERPL-MCNC: 1.55 MG/DL (ref 0.6–1.3)
CREAT SERPL-MCNC: 1.69 MG/DL (ref 0.6–1.3)
CREAT SERPL-MCNC: 1.92 MG/DL (ref 0.6–1.3)
CREAT SERPL-MCNC: 2.09 MG/DL (ref 0.6–1.3)
EOSINOPHIL # BLD AUTO: 0.01 THOUSAND/ΜL (ref 0–0.61)
EOSINOPHIL # BLD AUTO: 0.01 THOUSAND/ΜL (ref 0–0.61)
EOSINOPHIL # BLD MANUAL: 0 THOUSAND/UL (ref 0–0.4)
EOSINOPHIL NFR BLD AUTO: 0 % (ref 0–6)
EOSINOPHIL NFR BLD AUTO: 0 % (ref 0–6)
EOSINOPHIL NFR BLD MANUAL: 0 % (ref 0–6)
ERYTHROCYTE [DISTWIDTH] IN BLOOD BY AUTOMATED COUNT: 18.4 % (ref 11.6–15.1)
ERYTHROCYTE [DISTWIDTH] IN BLOOD BY AUTOMATED COUNT: 19.6 % (ref 11.6–15.1)
ERYTHROCYTE [DISTWIDTH] IN BLOOD BY AUTOMATED COUNT: 20.1 % (ref 11.6–15.1)
ERYTHROCYTE [DISTWIDTH] IN BLOOD BY AUTOMATED COUNT: 20.6 % (ref 11.6–15.1)
ERYTHROCYTE [DISTWIDTH] IN BLOOD BY AUTOMATED COUNT: 20.6 % (ref 11.6–15.1)
ERYTHROCYTE [DISTWIDTH] IN BLOOD BY AUTOMATED COUNT: 21 % (ref 11.6–15.1)
EXT GLUCOSE BLD: 90
EXTERNAL ALBUMIN: 4.8
EXTERNAL ALK PHOS: 116
EXTERNAL ALT: 12
EXTERNAL AST: 22
EXTERNAL BICARBONATE: 25
EXTERNAL BUN: 20
EXTERNAL CALCIUM: 10.1
EXTERNAL CHLORIDE: 99
EXTERNAL CREATININE: 0.8
EXTERNAL EGFR: >60
EXTERNAL POTASSIUM: 5
EXTERNAL SODIUM: 138
EXTERNAL T.BILIRUBIN: 0.8
EXTERNAL TOTAL PROTEIN: 7.5
GFR SERPL CREATININE-BSD FRML MDRD: 21 ML/MIN/1.73SQ M
GFR SERPL CREATININE-BSD FRML MDRD: 23 ML/MIN/1.73SQ M
GFR SERPL CREATININE-BSD FRML MDRD: 27 ML/MIN/1.73SQ M
GFR SERPL CREATININE-BSD FRML MDRD: 30 ML/MIN/1.73SQ M
GFR SERPL CREATININE-BSD FRML MDRD: 31 ML/MIN/1.73SQ M
GFR SERPL CREATININE-BSD FRML MDRD: 32 ML/MIN/1.73SQ M
GFR SERPL CREATININE-BSD FRML MDRD: 33 ML/MIN/1.73SQ M
GFR SERPL CREATININE-BSD FRML MDRD: 35 ML/MIN/1.73SQ M
GFR SERPL CREATININE-BSD FRML MDRD: 36 ML/MIN/1.73SQ M
GFR SERPL CREATININE-BSD FRML MDRD: 38 ML/MIN/1.73SQ M
GFR SERPL CREATININE-BSD FRML MDRD: 41 ML/MIN/1.73SQ M
GFR SERPL CREATININE-BSD FRML MDRD: 42 ML/MIN/1.73SQ M
GLUCOSE SERPL-MCNC: 108 MG/DL (ref 65–140)
GLUCOSE SERPL-MCNC: 109 MG/DL (ref 65–140)
GLUCOSE SERPL-MCNC: 111 MG/DL (ref 65–140)
GLUCOSE SERPL-MCNC: 114 MG/DL (ref 65–140)
GLUCOSE SERPL-MCNC: 115 MG/DL (ref 65–140)
GLUCOSE SERPL-MCNC: 117 MG/DL (ref 65–140)
GLUCOSE SERPL-MCNC: 120 MG/DL (ref 65–140)
GLUCOSE SERPL-MCNC: 120 MG/DL (ref 65–140)
GLUCOSE SERPL-MCNC: 122 MG/DL (ref 65–140)
GLUCOSE SERPL-MCNC: 124 MG/DL (ref 65–140)
GLUCOSE SERPL-MCNC: 124 MG/DL (ref 65–140)
GLUCOSE SERPL-MCNC: 126 MG/DL (ref 65–140)
GLUCOSE SERPL-MCNC: 128 MG/DL (ref 65–140)
GLUCOSE SERPL-MCNC: 129 MG/DL (ref 65–140)
GLUCOSE SERPL-MCNC: 13 MG/DL (ref 65–140)
GLUCOSE SERPL-MCNC: 132 MG/DL (ref 65–140)
GLUCOSE SERPL-MCNC: 141 MG/DL (ref 65–140)
GLUCOSE SERPL-MCNC: 144 MG/DL (ref 65–140)
GLUCOSE SERPL-MCNC: 156 MG/DL (ref 65–140)
GLUCOSE SERPL-MCNC: 158 MG/DL (ref 65–140)
GLUCOSE SERPL-MCNC: 169 MG/DL (ref 65–140)
GLUCOSE SERPL-MCNC: 20 MG/DL (ref 65–140)
GLUCOSE SERPL-MCNC: 259 MG/DL (ref 65–140)
GLUCOSE SERPL-MCNC: 41 MG/DL (ref 65–140)
GLUCOSE SERPL-MCNC: 44 MG/DL (ref 65–140)
GLUCOSE SERPL-MCNC: 44 MG/DL (ref 65–140)
GLUCOSE SERPL-MCNC: 61 MG/DL (ref 65–140)
GLUCOSE SERPL-MCNC: 66 MG/DL (ref 65–140)
GLUCOSE SERPL-MCNC: 67 MG/DL (ref 65–140)
GLUCOSE SERPL-MCNC: 82 MG/DL (ref 65–140)
GLUCOSE SERPL-MCNC: 83 MG/DL (ref 65–140)
GLUCOSE SERPL-MCNC: 91 MG/DL (ref 65–140)
GLUCOSE SERPL-MCNC: 96 MG/DL (ref 65–140)
GLUCOSE SERPL-MCNC: <20 MG/DL (ref 65–140)
HCO3 BLDA-SCNC: 12.4 MMOL/L (ref 22–28)
HCO3 BLDA-SCNC: 13.1 MMOL/L (ref 22–28)
HCO3 BLDA-SCNC: 14.7 MMOL/L (ref 22–28)
HCO3 BLDA-SCNC: 18.4 MMOL/L (ref 22–28)
HCO3 BLDA-SCNC: 19.4 MMOL/L (ref 22–28)
HCO3 BLDA-SCNC: 20.3 MMOL/L (ref 22–28)
HCO3 BLDA-SCNC: 20.4 MMOL/L (ref 22–28)
HCO3 BLDA-SCNC: 21.9 MMOL/L (ref 22–28)
HCO3 BLDA-SCNC: 23.4 MMOL/L (ref 22–28)
HCO3 BLDA-SCNC: 23.5 MMOL/L (ref 22–28)
HCO3 BLDA-SCNC: 25.1 MMOL/L (ref 24–30)
HCO3 BLDA-SCNC: 26 MMOL/L (ref 22–28)
HCO3 BLDA-SCNC: 26.4 MMOL/L (ref 22–28)
HCO3 BLDA-SCNC: 27 MMOL/L (ref 24–30)
HCO3 BLDA-SCNC: 27.1 MMOL/L (ref 22–28)
HCO3 BLDA-SCNC: 28.2 MMOL/L (ref 22–28)
HCO3 BLDA-SCNC: 8.2 MMOL/L (ref 22–28)
HCO3 BLDA-SCNC: 9.8 MMOL/L (ref 22–28)
HCO3 BLDV-SCNC: 10.2 MMOL/L (ref 24–30)
HCO3 BLDV-SCNC: 13.3 MMOL/L (ref 24–30)
HCT VFR BLD AUTO: 29.1 % (ref 34.8–46.1)
HCT VFR BLD AUTO: 30 % (ref 34.8–46.1)
HCT VFR BLD AUTO: 30.7 % (ref 34.8–46.1)
HCT VFR BLD AUTO: 31.1 % (ref 34.8–46.1)
HCT VFR BLD AUTO: 31.3 % (ref 34.8–46.1)
HCT VFR BLD AUTO: 39.3 % (ref 34.8–46.1)
HCT VFR BLD CALC: 21 % (ref 34.8–46.1)
HCT VFR BLD CALC: 23 % (ref 34.8–46.1)
HCT VFR BLD CALC: 26 % (ref 34.8–46.1)
HCT VFR BLD CALC: 27 % (ref 34.8–46.1)
HCT VFR BLD CALC: 28 % (ref 34.8–46.1)
HGB BLD-MCNC: 11.4 G/DL (ref 11.5–15.4)
HGB BLD-MCNC: 7.9 G/DL (ref 11.5–15.4)
HGB BLD-MCNC: 8.6 G/DL (ref 11.5–15.4)
HGB BLD-MCNC: 8.9 G/DL (ref 11.5–15.4)
HGB BLD-MCNC: 9.4 G/DL (ref 11.5–15.4)
HGB BLD-MCNC: 9.5 G/DL (ref 11.5–15.4)
HGB BLD-MCNC: 9.8 G/DL (ref 11.5–15.4)
HGB BLDA-MCNC: 7.1 G/DL (ref 11.5–15.4)
HGB BLDA-MCNC: 7.8 G/DL (ref 11.5–15.4)
HGB BLDA-MCNC: 8.8 G/DL (ref 11.5–15.4)
HGB BLDA-MCNC: 9.2 G/DL (ref 11.5–15.4)
HGB BLDA-MCNC: 9.5 G/DL (ref 11.5–15.4)
HOROWITZ INDEX BLDA+IHG-RTO: 40 MM[HG]
HOROWITZ INDEX BLDA+IHG-RTO: 80 MM[HG]
HYPERCHROMIA BLD QL SMEAR: PRESENT
HYPERCHROMIA BLD QL SMEAR: PRESENT
IMM GRANULOCYTES # BLD AUTO: 0.16 THOUSAND/UL (ref 0–0.2)
IMM GRANULOCYTES # BLD AUTO: 0.17 THOUSAND/UL (ref 0–0.2)
IMM GRANULOCYTES NFR BLD AUTO: 1 % (ref 0–2)
IMM GRANULOCYTES NFR BLD AUTO: 1 % (ref 0–2)
INR PPP: 1.23 (ref 0.86–1.16)
INR PPP: 2.56 (ref 0.86–1.17)
INR PPP: 3.55 (ref 0.86–1.17)
INR PPP: 4.09 (ref 0.86–1.17)
INR PPP: 5.56 (ref 0.86–1.17)
KCT BLD-ACNC: 248 SEC (ref 89–137)
L PNEUMO1 AG UR QL IA.RAPID: NEGATIVE
LACTATE SERPL-SCNC: 10.5 MMOL/L (ref 0.5–2)
LACTATE SERPL-SCNC: 13.8 MMOL/L (ref 0.5–2)
LACTATE SERPL-SCNC: 14.9 MMOL/L (ref 0.5–2)
LACTATE SERPL-SCNC: 19.8 MMOL/L (ref 0.5–2)
LACTATE SERPL-SCNC: 23.2 MMOL/L (ref 0.5–2)
LACTATE SERPL-SCNC: 30.5 MMOL/L (ref 0.5–2)
LACTATE SERPL-SCNC: 4.7 MMOL/L (ref 0.5–2)
LACTATE SERPL-SCNC: 5.1 MMOL/L (ref 0.5–2)
LACTATE SERPL-SCNC: 5.8 MMOL/L (ref 0.5–2)
LACTATE SERPL-SCNC: 7.1 MMOL/L (ref 0.5–2)
LACTATE SERPL-SCNC: 7.6 MMOL/L (ref 0.5–2)
LACTATE SERPL-SCNC: 9.2 MMOL/L (ref 0.5–2)
LACTATE SERPL-SCNC: 9.3 MMOL/L (ref 0.5–2)
LIPASE SERPL-CCNC: 372 U/L (ref 73–393)
LYMPHOCYTES # BLD AUTO: 1.02 THOUSANDS/ΜL (ref 0.6–4.47)
LYMPHOCYTES # BLD AUTO: 1.19 THOUSAND/UL (ref 0.6–4.47)
LYMPHOCYTES # BLD AUTO: 1.52 THOUSAND/UL (ref 0.6–4.47)
LYMPHOCYTES # BLD AUTO: 1.79 THOUSAND/UL (ref 0.6–4.47)
LYMPHOCYTES # BLD AUTO: 1.93 THOUSANDS/ΜL (ref 0.6–4.47)
LYMPHOCYTES # BLD AUTO: 11 % (ref 14–44)
LYMPHOCYTES # BLD AUTO: 11 % (ref 14–44)
LYMPHOCYTES # BLD AUTO: 19 % (ref 14–44)
LYMPHOCYTES NFR BLD AUTO: 10 % (ref 14–44)
LYMPHOCYTES NFR BLD AUTO: 7 % (ref 14–44)
MAGNESIUM SERPL-MCNC: 1.7 MG/DL (ref 1.6–2.6)
MAGNESIUM SERPL-MCNC: 2.1 MG/DL (ref 1.6–2.6)
MAGNESIUM SERPL-MCNC: 3 MG/DL (ref 1.6–2.6)
MAGNESIUM SERPL-MCNC: 3.2 MG/DL (ref 1.6–2.6)
MAGNESIUM SERPL-MCNC: 3.3 MG/DL (ref 1.6–2.6)
MCH RBC QN AUTO: 23.8 PG (ref 26.8–34.3)
MCH RBC QN AUTO: 25.1 PG (ref 26.8–34.3)
MCH RBC QN AUTO: 25.3 PG (ref 26.8–34.3)
MCH RBC QN AUTO: 25.5 PG (ref 26.8–34.3)
MCH RBC QN AUTO: 25.5 PG (ref 26.8–34.3)
MCH RBC QN AUTO: 25.6 PG (ref 26.8–34.3)
MCHC RBC AUTO-ENTMCNC: 27.1 G/DL (ref 31.4–37.4)
MCHC RBC AUTO-ENTMCNC: 28.6 G/DL (ref 31.4–37.4)
MCHC RBC AUTO-ENTMCNC: 28.7 G/DL (ref 31.4–37.4)
MCHC RBC AUTO-ENTMCNC: 29 G/DL (ref 31.4–37.4)
MCHC RBC AUTO-ENTMCNC: 30.9 G/DL (ref 31.4–37.4)
MCHC RBC AUTO-ENTMCNC: 31.3 G/DL (ref 31.4–37.4)
MCV RBC AUTO: 81 FL (ref 82–98)
MCV RBC AUTO: 82 FL (ref 82–98)
MCV RBC AUTO: 83 FL (ref 82–98)
MCV RBC AUTO: 88 FL (ref 82–98)
MCV RBC AUTO: 89 FL (ref 82–98)
MCV RBC AUTO: 94 FL (ref 82–98)
METAMYELOCYTES NFR BLD MANUAL: 6 % (ref 0–1)
MICROCYTES BLD QL AUTO: PRESENT
MICROCYTES BLD QL AUTO: PRESENT
MONOCYTES # BLD AUTO: 0 THOUSAND/UL (ref 0–1.22)
MONOCYTES # BLD AUTO: 0.14 THOUSAND/UL (ref 0–1.22)
MONOCYTES # BLD AUTO: 0.65 THOUSAND/UL (ref 0–1.22)
MONOCYTES # BLD AUTO: 0.82 THOUSAND/ΜL (ref 0.17–1.22)
MONOCYTES # BLD AUTO: 0.96 THOUSAND/ΜL (ref 0.17–1.22)
MONOCYTES NFR BLD AUTO: 5 % (ref 4–12)
MONOCYTES NFR BLD AUTO: 5 % (ref 4–12)
MONOCYTES NFR BLD: 0 % (ref 4–12)
MONOCYTES NFR BLD: 1 % (ref 4–12)
MONOCYTES NFR BLD: 6 % (ref 4–12)
MYELOCYTES NFR BLD MANUAL: 1 % (ref 0–1)
NASAL CANNULA: ABNORMAL
NEUTROPHILS # BLD AUTO: 13.7 THOUSANDS/ΜL (ref 1.85–7.62)
NEUTROPHILS # BLD AUTO: 16.02 THOUSANDS/ΜL (ref 1.85–7.62)
NEUTROPHILS # BLD MANUAL: 11.16 THOUSAND/UL (ref 1.85–7.62)
NEUTROPHILS # BLD MANUAL: 7.05 THOUSAND/UL (ref 1.85–7.62)
NEUTROPHILS # BLD MANUAL: 8.95 THOUSAND/UL (ref 1.85–7.62)
NEUTS BAND NFR BLD MANUAL: 2 % (ref 0–8)
NEUTS BAND NFR BLD MANUAL: 4 % (ref 0–8)
NEUTS BAND NFR BLD MANUAL: 7 % (ref 0–8)
NEUTS SEG NFR BLD AUTO: 71 % (ref 43–75)
NEUTS SEG NFR BLD AUTO: 74 % (ref 43–75)
NEUTS SEG NFR BLD AUTO: 81 % (ref 43–75)
NEUTS SEG NFR BLD AUTO: 84 % (ref 43–75)
NEUTS SEG NFR BLD AUTO: 87 % (ref 43–75)
NRBC BLD AUTO-RTO: 0 /100 WBCS
NRBC BLD AUTO-RTO: 1 /100 WBCS
NRBC BLD AUTO-RTO: 19 /100 WBC (ref 0–2)
NRBC BLD AUTO-RTO: 4 /100 WBCS
O2 CT BLDA-SCNC: 10.8 ML/DL (ref 16–23)
O2 CT BLDA-SCNC: 11.1 ML/DL (ref 16–23)
O2 CT BLDA-SCNC: 12.3 ML/DL (ref 16–23)
O2 CT BLDA-SCNC: 13.2 ML/DL (ref 16–23)
O2 CT BLDA-SCNC: 13.4 ML/DL (ref 16–23)
O2 CT BLDA-SCNC: 13.6 ML/DL (ref 16–23)
O2 CT BLDA-SCNC: 13.7 ML/DL (ref 16–23)
O2 CT BLDA-SCNC: 13.8 ML/DL (ref 16–23)
O2 CT BLDA-SCNC: 14.5 ML/DL (ref 16–23)
O2 CT BLDA-SCNC: 15.6 ML/DL (ref 16–23)
O2 CT BLDA-SCNC: 9.1 ML/DL (ref 16–23)
O2 CT BLDV-SCNC: 12 ML/DL
O2 CT BLDV-SCNC: 8.5 ML/DL
OSMOLALITY UR/SERPL-RTO: 329 MMOL/KG (ref 282–298)
OSMOLALITY UR: 335 MMOL/KG
OVALOCYTES BLD QL SMEAR: PRESENT
OVALOCYTES BLD QL SMEAR: PRESENT
OXYHGB MFR BLDA: 76.5 % (ref 94–97)
OXYHGB MFR BLDA: 84 % (ref 94–97)
OXYHGB MFR BLDA: 90.2 % (ref 94–97)
OXYHGB MFR BLDA: 90.3 % (ref 94–97)
OXYHGB MFR BLDA: 91.7 % (ref 94–97)
OXYHGB MFR BLDA: 91.8 % (ref 94–97)
OXYHGB MFR BLDA: 91.8 % (ref 94–97)
OXYHGB MFR BLDA: 92.9 % (ref 94–97)
OXYHGB MFR BLDA: 94 % (ref 94–97)
OXYHGB MFR BLDA: 95.5 % (ref 94–97)
OXYHGB MFR BLDA: 96.9 % (ref 94–97)
P AXIS: -13 DEGREES
P AXIS: -31 DEGREES
P AXIS: -40 DEGREES
P AXIS: 69 DEGREES
P AXIS: 80 DEGREES
PCO2 BLD: 20 MMOL/L (ref 21–32)
PCO2 BLD: 21 MMOL/L (ref 21–32)
PCO2 BLD: 22 MMOL/L (ref 21–32)
PCO2 BLD: 27 MMOL/L (ref 21–32)
PCO2 BLD: 28 MMOL/L (ref 21–32)
PCO2 BLD: 28 MMOL/L (ref 21–32)
PCO2 BLD: 29 MMOL/L (ref 21–32)
PCO2 BLD: 38.9 MM HG (ref 36–44)
PCO2 BLD: 39 MM HG (ref 36–44)
PCO2 BLD: 41.6 MM HG (ref 42–50)
PCO2 BLD: 43.9 MM HG (ref 36–44)
PCO2 BLD: 44.9 MM HG (ref 36–44)
PCO2 BLD: 50.8 MM HG (ref 36–44)
PCO2 BLD: 53.7 MM HG (ref 42–50)
PCO2 BLDA: 32.3 MM HG (ref 36–44)
PCO2 BLDA: 33.3 MM HG (ref 36–44)
PCO2 BLDA: 34.7 MM HG (ref 36–44)
PCO2 BLDA: 35.8 MM HG (ref 36–44)
PCO2 BLDA: 35.9 MM HG (ref 36–44)
PCO2 BLDA: 36.8 MM HG (ref 36–44)
PCO2 BLDA: 37.4 MM HG (ref 36–44)
PCO2 BLDA: 39 MM HG (ref 36–44)
PCO2 BLDA: 39 MM HG (ref 36–44)
PCO2 BLDA: 41 MM HG (ref 36–44)
PCO2 BLDA: 45.5 MM HG (ref 36–44)
PCO2 BLDV: 35.8 MM HG (ref 42–50)
PCO2 BLDV: 39.9 MM HG (ref 42–50)
PCO2 TEMP ADJ BLDA: 35.9 MM HG (ref 36–44)
PEEP RESPIRATORY: 10 CM[H2O]
PEEP RESPIRATORY: 5 CM[H2O]
PEEP RESPIRATORY: 6 CM[H2O]
PH BLD: 7.19 [PH] (ref 7.35–7.45)
PH BLD: 7.28 [PH] (ref 7.35–7.45)
PH BLD: 7.28 [PH] (ref 7.3–7.4)
PH BLD: 7.33 [PH] (ref 7.35–7.45)
PH BLD: 7.39 [PH] (ref 7.35–7.45)
PH BLD: 7.42 [PH] (ref 7.35–7.45)
PH BLD: 7.42 [PH] (ref 7.3–7.4)
PH BLD: 7.43 [PH] (ref 7.35–7.45)
PH BLDA: 6.97 [PH] (ref 7.35–7.45)
PH BLDA: 7.05 [PH] (ref 7.35–7.45)
PH BLDA: 7.14 [PH] (ref 7.35–7.45)
PH BLDA: 7.2 [PH] (ref 7.35–7.45)
PH BLDA: 7.26 [PH] (ref 7.35–7.45)
PH BLDA: 7.32 [PH] (ref 7.35–7.45)
PH BLDA: 7.38 [PH] (ref 7.35–7.45)
PH BLDA: 7.4 [PH] (ref 7.35–7.45)
PH BLDA: 7.43 [PH] (ref 7.35–7.45)
PH BLDA: 7.44 [PH] (ref 7.35–7.45)
PH BLDA: 7.45 [PH] (ref 7.35–7.45)
PH BLDV: 7.03 [PH] (ref 7.3–7.4)
PH BLDV: 7.19 [PH] (ref 7.3–7.4)
PHOSPHATE SERPL-MCNC: 4.4 MG/DL (ref 2.3–4.1)
PHOSPHATE SERPL-MCNC: 4.8 MG/DL (ref 2.3–4.1)
PHOSPHATE SERPL-MCNC: 5.4 MG/DL (ref 2.3–4.1)
PHOSPHATE SERPL-MCNC: 5.8 MG/DL (ref 2.3–4.1)
PHOSPHATE SERPL-MCNC: 6.1 MG/DL (ref 2.3–4.1)
PLATELET # BLD AUTO: 112 THOUSANDS/UL (ref 149–390)
PLATELET # BLD AUTO: 113 THOUSANDS/UL (ref 149–390)
PLATELET # BLD AUTO: 144 THOUSANDS/UL (ref 149–390)
PLATELET # BLD AUTO: 163 THOUSANDS/UL (ref 149–390)
PLATELET # BLD AUTO: 458 THOUSANDS/UL (ref 149–390)
PLATELET # BLD AUTO: 99 THOUSANDS/UL (ref 149–390)
PLATELET BLD QL SMEAR: ABNORMAL
PLATELET BLD QL SMEAR: ADEQUATE
PLATELET BLD QL SMEAR: ADEQUATE
PMV BLD AUTO: 10.1 FL (ref 8.9–12.7)
PMV BLD AUTO: 10.4 FL (ref 8.9–12.7)
PMV BLD AUTO: 11 FL (ref 8.9–12.7)
PMV BLD AUTO: 12.3 FL (ref 8.9–12.7)
PMV BLD AUTO: 8.9 FL (ref 8.9–12.7)
PMV BLD AUTO: 9.7 FL (ref 8.9–12.7)
PO2 BLD: 112 MM HG (ref 75–129)
PO2 BLD: 123.5 MM HG (ref 75–129)
PO2 BLD: 158 MM HG (ref 75–129)
PO2 BLD: 164 MM HG (ref 75–129)
PO2 BLD: 172 MM HG (ref 35–45)
PO2 BLD: 191 MM HG (ref 75–129)
PO2 BLD: 231 MM HG (ref 35–45)
PO2 BLD: 58 MM HG (ref 75–129)
PO2 BLDA: 101.3 MM HG (ref 75–129)
PO2 BLDA: 134.7 MM HG (ref 75–129)
PO2 BLDA: 62.5 MM HG (ref 75–129)
PO2 BLDA: 69.2 MM HG (ref 75–129)
PO2 BLDA: 70.4 MM HG (ref 75–129)
PO2 BLDA: 72.1 MM HG (ref 75–129)
PO2 BLDA: 76.8 MM HG (ref 75–129)
PO2 BLDA: 80.5 MM HG (ref 75–129)
PO2 BLDA: 85.6 MM HG (ref 75–129)
PO2 BLDA: 85.9 MM HG (ref 75–129)
PO2 BLDA: 98.1 MM HG (ref 75–129)
PO2 BLDV: 50 MM HG (ref 35–45)
PO2 BLDV: 71.6 MM HG (ref 35–45)
POIKILOCYTOSIS BLD QL SMEAR: PRESENT
POIKILOCYTOSIS BLD QL SMEAR: PRESENT
POLYCHROMASIA BLD QL SMEAR: PRESENT
POLYCHROMASIA BLD QL SMEAR: PRESENT
POTASSIUM BLD-SCNC: 3.7 MMOL/L (ref 3.5–5.3)
POTASSIUM BLD-SCNC: 3.8 MMOL/L (ref 3.5–5.3)
POTASSIUM BLD-SCNC: 4.4 MMOL/L (ref 3.5–5.3)
POTASSIUM BLD-SCNC: 4.4 MMOL/L (ref 3.5–5.3)
POTASSIUM BLD-SCNC: 5.3 MMOL/L (ref 3.5–5.3)
POTASSIUM SERPL-SCNC: 3.5 MMOL/L (ref 3.5–5.3)
POTASSIUM SERPL-SCNC: 3.9 MMOL/L (ref 3.5–5.3)
POTASSIUM SERPL-SCNC: 4 MMOL/L (ref 3.5–5.3)
POTASSIUM SERPL-SCNC: 4.1 MMOL/L (ref 3.5–5.3)
POTASSIUM SERPL-SCNC: 4.1 MMOL/L (ref 3.5–5.3)
POTASSIUM SERPL-SCNC: 4.2 MMOL/L (ref 3.5–5.3)
POTASSIUM SERPL-SCNC: 4.5 MMOL/L (ref 3.5–5.3)
POTASSIUM SERPL-SCNC: 4.6 MMOL/L (ref 3.5–5.3)
POTASSIUM SERPL-SCNC: 4.6 MMOL/L (ref 3.5–5.3)
POTASSIUM SERPL-SCNC: 5.5 MMOL/L (ref 3.5–5.3)
PR INTERVAL: 196 MS
PR INTERVAL: 208 MS
PR INTERVAL: 321 MS
PR INTERVAL: 333 MS
PR INTERVAL: 350 MS
PROT SERPL-MCNC: 2.9 G/DL (ref 6.4–8.2)
PROT SERPL-MCNC: 3.8 G/DL (ref 6.4–8.2)
PROT SERPL-MCNC: 4 G/DL (ref 6.4–8.2)
PROT SERPL-MCNC: 7.3 G/DL (ref 6.4–8.2)
PROTHROMBIN TIME: 12.8 SECONDS (ref 9.4–11.7)
PROTHROMBIN TIME: 27.6 SECONDS (ref 11.8–14.2)
PROTHROMBIN TIME: 35.5 SECONDS (ref 11.8–14.2)
PROTHROMBIN TIME: 39.7 SECONDS (ref 11.8–14.2)
PROTHROMBIN TIME: 50.3 SECONDS (ref 11.8–14.2)
QRS AXIS: -82 DEGREES
QRS AXIS: -86 DEGREES
QRS AXIS: 262 DEGREES
QRS AXIS: 267 DEGREES
QRS AXIS: 92 DEGREES
QRS AXIS: 93 DEGREES
QRS AXIS: 93 DEGREES
QRS AXIS: 94 DEGREES
QRSD INTERVAL: 100 MS
QRSD INTERVAL: 100 MS
QRSD INTERVAL: 104 MS
QRSD INTERVAL: 108 MS
QRSD INTERVAL: 129 MS
QT INTERVAL: 325 MS
QT INTERVAL: 325 MS
QT INTERVAL: 333 MS
QT INTERVAL: 371 MS
QT INTERVAL: 379 MS
QT INTERVAL: 492 MS
QTC INTERVAL: 417 MS
QTC INTERVAL: 430 MS
QTC INTERVAL: 430 MS
QTC INTERVAL: 436 MS
QTC INTERVAL: 442 MS
QTC INTERVAL: 454 MS
QTC INTERVAL: 469 MS
QTC INTERVAL: 477 MS
RBC # BLD AUTO: 3.09 MILLION/UL (ref 3.81–5.12)
RBC # BLD AUTO: 3.37 MILLION/UL (ref 3.81–5.12)
RBC # BLD AUTO: 3.55 MILLION/UL (ref 3.81–5.12)
RBC # BLD AUTO: 3.72 MILLION/UL (ref 3.81–5.12)
RBC # BLD AUTO: 3.87 MILLION/UL (ref 3.81–5.12)
RBC # BLD AUTO: 4.78 MILLION/UL (ref 3.81–5.12)
RBC MORPH BLD: PRESENT
RBC MORPH BLD: PRESENT
RH BLD: POSITIVE
S PNEUM AG UR QL: NEGATIVE
SAO2 % BLD FROM PO2: 100 % (ref 95–98)
SAO2 % BLD FROM PO2: 100 % (ref 95–98)
SAO2 % BLD FROM PO2: 89 % (ref 95–98)
SAO2 % BLD FROM PO2: 98 % (ref 95–98)
SAO2 % BLD FROM PO2: 99 % (ref 95–98)
SODIUM 24H UR-SCNC: 136 MOL/L
SODIUM BLD-SCNC: 144 MMOL/L (ref 136–145)
SODIUM BLD-SCNC: 146 MMOL/L (ref 136–145)
SODIUM BLD-SCNC: 148 MMOL/L (ref 136–145)
SODIUM BLD-SCNC: 149 MMOL/L (ref 136–145)
SODIUM SERPL-SCNC: 136 MMOL/L (ref 136–145)
SODIUM SERPL-SCNC: 148 MMOL/L (ref 136–145)
SODIUM SERPL-SCNC: 149 MMOL/L (ref 136–145)
SODIUM SERPL-SCNC: 149 MMOL/L (ref 136–145)
SODIUM SERPL-SCNC: 150 MMOL/L (ref 136–145)
SODIUM SERPL-SCNC: 151 MMOL/L (ref 136–145)
SODIUM SERPL-SCNC: 152 MMOL/L (ref 136–145)
SODIUM SERPL-SCNC: 153 MMOL/L (ref 136–145)
SODIUM SERPL-SCNC: 157 MMOL/L (ref 136–145)
SPECIMEN EXPIRATION DATE: NORMAL
SPECIMEN SOURCE: ABNORMAL
SPHEROCYTES BLD QL SMEAR: PRESENT
T WAVE AXIS: 107 DEGREES
T WAVE AXIS: 122 DEGREES
T WAVE AXIS: 24 DEGREES
T WAVE AXIS: 256 DEGREES
T WAVE AXIS: 76 DEGREES
T WAVE AXIS: 77 DEGREES
T WAVE AXIS: 80 DEGREES
T WAVE AXIS: 93 DEGREES
TOTAL CELLS COUNTED SPEC: 100
TOXIC GRANULES BLD QL SMEAR: PRESENT
TROPONIN I SERPL-MCNC: 32.6 NG/ML
TROPONIN I SERPL-MCNC: 34.3 NG/ML
TROPONIN I SERPL-MCNC: >40 NG/ML
VARIANT LYMPHS # BLD AUTO: 6 %
VENT AC: 14
VENT- AC: AC
VENTRICULAR RATE: 100 BPM
VENTRICULAR RATE: 103 BPM
VENTRICULAR RATE: 105 BPM
VENTRICULAR RATE: 111 BPM
VENTRICULAR RATE: 51 BPM
VENTRICULAR RATE: 94 BPM
VENTRICULAR RATE: 95 BPM
VENTRICULAR RATE: 96 BPM
VT SETTING VENT: 40 ML
VT SETTING VENT: 40 ML
VT SETTING VENT: 400 ML
VT SETTING VENT: 450 ML
WBC # BLD AUTO: 10.78 THOUSAND/UL (ref 4.31–10.16)
WBC # BLD AUTO: 13.66 THOUSAND/UL (ref 4.31–10.16)
WBC # BLD AUTO: 13.78 THOUSAND/UL (ref 4.31–10.16)
WBC # BLD AUTO: 15.74 THOUSAND/UL (ref 4.31–10.16)
WBC # BLD AUTO: 19.14 THOUSAND/UL (ref 4.31–10.16)
WBC # BLD AUTO: 9.4 THOUSAND/UL (ref 4.31–10.16)
WBC TOXIC VACUOLES BLD QL SMEAR: PRESENT

## 2019-01-01 PROCEDURE — 82330 ASSAY OF CALCIUM: CPT | Performed by: SURGERY

## 2019-01-01 PROCEDURE — 92920 PRQ TRLUML C ANGIOP 1ART&/BR: CPT | Performed by: INTERNAL MEDICINE

## 2019-01-01 PROCEDURE — 85610 PROTHROMBIN TIME: CPT | Performed by: SURGERY

## 2019-01-01 PROCEDURE — 83735 ASSAY OF MAGNESIUM: CPT | Performed by: SURGERY

## 2019-01-01 PROCEDURE — 80048 BASIC METABOLIC PNL TOTAL CA: CPT | Performed by: SURGERY

## 2019-01-01 PROCEDURE — C1769 GUIDE WIRE: HCPCS | Performed by: INTERNAL MEDICINE

## 2019-01-01 PROCEDURE — C1725 CATH, TRANSLUMIN NON-LASER: HCPCS | Performed by: INTERNAL MEDICINE

## 2019-01-01 PROCEDURE — 94760 N-INVAS EAR/PLS OXIMETRY 1: CPT

## 2019-01-01 PROCEDURE — 99024 POSTOP FOLLOW-UP VISIT: CPT | Performed by: SURGERY

## 2019-01-01 PROCEDURE — 44120 REMOVAL OF SMALL INTESTINE: CPT | Performed by: SURGERY

## 2019-01-01 PROCEDURE — 71045 X-RAY EXAM CHEST 1 VIEW: CPT

## 2019-01-01 PROCEDURE — 83605 ASSAY OF LACTIC ACID: CPT | Performed by: SURGERY

## 2019-01-01 PROCEDURE — C1769 GUIDE WIRE: HCPCS | Performed by: SURGERY

## 2019-01-01 PROCEDURE — 82948 REAGENT STRIP/BLOOD GLUCOSE: CPT

## 2019-01-01 PROCEDURE — C1894 INTRO/SHEATH, NON-LASER: HCPCS | Performed by: INTERNAL MEDICINE

## 2019-01-01 PROCEDURE — 96375 TX/PRO/DX INJ NEW DRUG ADDON: CPT

## 2019-01-01 PROCEDURE — 94003 VENT MGMT INPAT SUBQ DAY: CPT

## 2019-01-01 PROCEDURE — 85027 COMPLETE CBC AUTOMATED: CPT | Performed by: EMERGENCY MEDICINE

## 2019-01-01 PROCEDURE — 85730 THROMBOPLASTIN TIME PARTIAL: CPT | Performed by: SURGERY

## 2019-01-01 PROCEDURE — 82947 ASSAY GLUCOSE BLOOD QUANT: CPT

## 2019-01-01 PROCEDURE — B2111ZZ FLUOROSCOPY OF MULTIPLE CORONARY ARTERIES USING LOW OSMOLAR CONTRAST: ICD-10-PCS | Performed by: INTERNAL MEDICINE

## 2019-01-01 PROCEDURE — 99291 CRITICAL CARE FIRST HOUR: CPT | Performed by: SURGERY

## 2019-01-01 PROCEDURE — C1725 CATH, TRANSLUMIN NON-LASER: HCPCS | Performed by: SURGERY

## 2019-01-01 PROCEDURE — C9113 INJ PANTOPRAZOLE SODIUM, VIA: HCPCS | Performed by: SURGERY

## 2019-01-01 PROCEDURE — 84300 ASSAY OF URINE SODIUM: CPT | Performed by: SURGERY

## 2019-01-01 PROCEDURE — 0WJP0ZZ INSPECTION OF GASTROINTESTINAL TRACT, OPEN APPROACH: ICD-10-PCS | Performed by: SURGERY

## 2019-01-01 PROCEDURE — 96361 HYDRATE IV INFUSION ADD-ON: CPT

## 2019-01-01 PROCEDURE — 87449 NOS EACH ORGANISM AG IA: CPT | Performed by: EMERGENCY MEDICINE

## 2019-01-01 PROCEDURE — 84484 ASSAY OF TROPONIN QUANT: CPT | Performed by: SURGERY

## 2019-01-01 PROCEDURE — 027034Z DILATION OF CORONARY ARTERY, ONE ARTERY WITH DRUG-ELUTING INTRALUMINAL DEVICE, PERCUTANEOUS APPROACH: ICD-10-PCS | Performed by: INTERNAL MEDICINE

## 2019-01-01 PROCEDURE — 82805 BLOOD GASES W/O2 SATURATION: CPT | Performed by: PHYSICIAN ASSISTANT

## 2019-01-01 PROCEDURE — 99223 1ST HOSP IP/OBS HIGH 75: CPT | Performed by: FAMILY MEDICINE

## 2019-01-01 PROCEDURE — 96374 THER/PROPH/DIAG INJ IV PUSH: CPT

## 2019-01-01 PROCEDURE — 85007 BL SMEAR W/DIFF WBC COUNT: CPT | Performed by: PHYSICIAN ASSISTANT

## 2019-01-01 PROCEDURE — 83605 ASSAY OF LACTIC ACID: CPT | Performed by: NURSE PRACTITIONER

## 2019-01-01 PROCEDURE — 87205 SMEAR GRAM STAIN: CPT | Performed by: EMERGENCY MEDICINE

## 2019-01-01 PROCEDURE — C1894 INTRO/SHEATH, NON-LASER: HCPCS

## 2019-01-01 PROCEDURE — 93454 CORONARY ARTERY ANGIO S&I: CPT | Performed by: INTERNAL MEDICINE

## 2019-01-01 PROCEDURE — 70450 CT HEAD/BRAIN W/O DYE: CPT

## 2019-01-01 PROCEDURE — 85610 PROTHROMBIN TIME: CPT | Performed by: GENERAL PRACTICE

## 2019-01-01 PROCEDURE — 93005 ELECTROCARDIOGRAM TRACING: CPT

## 2019-01-01 PROCEDURE — 99222 1ST HOSP IP/OBS MODERATE 55: CPT | Performed by: SURGERY

## 2019-01-01 PROCEDURE — 0DQ80ZZ REPAIR SMALL INTESTINE, OPEN APPROACH: ICD-10-PCS | Performed by: SURGERY

## 2019-01-01 PROCEDURE — 85014 HEMATOCRIT: CPT

## 2019-01-01 PROCEDURE — 85025 COMPLETE CBC W/AUTO DIFF WBC: CPT | Performed by: SURGERY

## 2019-01-01 PROCEDURE — 82330 ASSAY OF CALCIUM: CPT | Performed by: PHYSICIAN ASSISTANT

## 2019-01-01 PROCEDURE — 83605 ASSAY OF LACTIC ACID: CPT | Performed by: PHYSICIAN ASSISTANT

## 2019-01-01 PROCEDURE — 87186 SC STD MICRODIL/AGAR DIL: CPT | Performed by: EMERGENCY MEDICINE

## 2019-01-01 PROCEDURE — 80076 HEPATIC FUNCTION PANEL: CPT | Performed by: EMERGENCY MEDICINE

## 2019-01-01 PROCEDURE — 99223 1ST HOSP IP/OBS HIGH 75: CPT | Performed by: INTERNAL MEDICINE

## 2019-01-01 PROCEDURE — 93227 XTRNL ECG REC<48 HR R&I: CPT | Performed by: INTERNAL MEDICINE

## 2019-01-01 PROCEDURE — 82805 BLOOD GASES W/O2 SATURATION: CPT | Performed by: EMERGENCY MEDICINE

## 2019-01-01 PROCEDURE — 87040 BLOOD CULTURE FOR BACTERIA: CPT | Performed by: EMERGENCY MEDICINE

## 2019-01-01 PROCEDURE — 82805 BLOOD GASES W/O2 SATURATION: CPT | Performed by: SURGERY

## 2019-01-01 PROCEDURE — 83930 ASSAY OF BLOOD OSMOLALITY: CPT | Performed by: SURGERY

## 2019-01-01 PROCEDURE — C1751 CATH, INF, PER/CENT/MIDLINE: HCPCS

## 2019-01-01 PROCEDURE — 85730 THROMBOPLASTIN TIME PARTIAL: CPT | Performed by: EMERGENCY MEDICINE

## 2019-01-01 PROCEDURE — 85347 COAGULATION TIME ACTIVATED: CPT

## 2019-01-01 PROCEDURE — 84484 ASSAY OF TROPONIN QUANT: CPT | Performed by: INTERNAL MEDICINE

## 2019-01-01 PROCEDURE — 84132 ASSAY OF SERUM POTASSIUM: CPT

## 2019-01-01 PROCEDURE — 93308 TTE F-UP OR LMTD: CPT | Performed by: INTERNAL MEDICINE

## 2019-01-01 PROCEDURE — 85027 COMPLETE CBC AUTOMATED: CPT | Performed by: PHYSICIAN ASSISTANT

## 2019-01-01 PROCEDURE — C9113 INJ PANTOPRAZOLE SODIUM, VIA: HCPCS | Performed by: EMERGENCY MEDICINE

## 2019-01-01 PROCEDURE — 86901 BLOOD TYPING SEROLOGIC RH(D): CPT | Performed by: SURGERY

## 2019-01-01 PROCEDURE — 84100 ASSAY OF PHOSPHORUS: CPT | Performed by: SURGERY

## 2019-01-01 PROCEDURE — 36569 INSJ PICC 5 YR+ W/O IMAGING: CPT

## 2019-01-01 PROCEDURE — 36600 WITHDRAWAL OF ARTERIAL BLOOD: CPT

## 2019-01-01 PROCEDURE — 82805 BLOOD GASES W/O2 SATURATION: CPT | Performed by: NURSE PRACTITIONER

## 2019-01-01 PROCEDURE — 83605 ASSAY OF LACTIC ACID: CPT | Performed by: EMERGENCY MEDICINE

## 2019-01-01 PROCEDURE — 83735 ASSAY OF MAGNESIUM: CPT | Performed by: PHYSICIAN ASSISTANT

## 2019-01-01 PROCEDURE — 71250 CT THORAX DX C-: CPT

## 2019-01-01 PROCEDURE — 83735 ASSAY OF MAGNESIUM: CPT | Performed by: EMERGENCY MEDICINE

## 2019-01-01 PROCEDURE — 84295 ASSAY OF SERUM SODIUM: CPT

## 2019-01-01 PROCEDURE — 82330 ASSAY OF CALCIUM: CPT

## 2019-01-01 PROCEDURE — C9606 PERC D-E COR REVASC W AMI S: HCPCS | Performed by: INTERNAL MEDICINE

## 2019-01-01 PROCEDURE — 0DB80ZZ EXCISION OF SMALL INTESTINE, OPEN APPROACH: ICD-10-PCS | Performed by: SURGERY

## 2019-01-01 PROCEDURE — 02HV33Z INSERTION OF INFUSION DEVICE INTO SUPERIOR VENA CAVA, PERCUTANEOUS APPROACH: ICD-10-PCS | Performed by: SURGERY

## 2019-01-01 PROCEDURE — 93325 DOPPLER ECHO COLOR FLOW MAPG: CPT | Performed by: INTERNAL MEDICINE

## 2019-01-01 PROCEDURE — 76937 US GUIDE VASCULAR ACCESS: CPT

## 2019-01-01 PROCEDURE — 88307 TISSUE EXAM BY PATHOLOGIST: CPT | Performed by: PATHOLOGY

## 2019-01-01 PROCEDURE — 85027 COMPLETE CBC AUTOMATED: CPT | Performed by: SURGERY

## 2019-01-01 PROCEDURE — 74176 CT ABD & PELVIS W/O CONTRAST: CPT

## 2019-01-01 PROCEDURE — 87081 CULTURE SCREEN ONLY: CPT | Performed by: EMERGENCY MEDICINE

## 2019-01-01 PROCEDURE — 74022 RADEX COMPL AQT ABD SERIES: CPT

## 2019-01-01 PROCEDURE — 99285 EMERGENCY DEPT VISIT HI MDM: CPT

## 2019-01-01 PROCEDURE — 85018 HEMOGLOBIN: CPT | Performed by: EMERGENCY MEDICINE

## 2019-01-01 PROCEDURE — 85007 BL SMEAR W/DIFF WBC COUNT: CPT | Performed by: SURGERY

## 2019-01-01 PROCEDURE — 87070 CULTURE OTHR SPECIMN AEROBIC: CPT | Performed by: EMERGENCY MEDICINE

## 2019-01-01 PROCEDURE — 5A1945Z RESPIRATORY VENTILATION, 24-96 CONSECUTIVE HOURS: ICD-10-PCS | Performed by: ANESTHESIOLOGY

## 2019-01-01 PROCEDURE — 94002 VENT MGMT INPAT INIT DAY: CPT

## 2019-01-01 PROCEDURE — 36245 INS CATH ABD/L-EXT ART 1ST: CPT | Performed by: RADIOLOGY

## 2019-01-01 PROCEDURE — 85025 COMPLETE CBC W/AUTO DIFF WBC: CPT | Performed by: EMERGENCY MEDICINE

## 2019-01-01 PROCEDURE — 74174 CTA ABD&PLVS W/CONTRAST: CPT

## 2019-01-01 PROCEDURE — P9016 RBC LEUKOCYTES REDUCED: HCPCS

## 2019-01-01 PROCEDURE — 83690 ASSAY OF LIPASE: CPT | Performed by: EMERGENCY MEDICINE

## 2019-01-01 PROCEDURE — 75726 ARTERY X-RAYS ABDOMEN: CPT | Performed by: SURGERY

## 2019-01-01 PROCEDURE — 99214 OFFICE O/P EST MOD 30 MIN: CPT | Performed by: INTERNAL MEDICINE

## 2019-01-01 PROCEDURE — 85610 PROTHROMBIN TIME: CPT | Performed by: INTERNAL MEDICINE

## 2019-01-01 PROCEDURE — 0DBA0ZZ EXCISION OF JEJUNUM, OPEN APPROACH: ICD-10-PCS | Performed by: SURGERY

## 2019-01-01 PROCEDURE — 75625 CONTRAST EXAM ABDOMINL AORTA: CPT

## 2019-01-01 PROCEDURE — 86923 COMPATIBILITY TEST ELECTRIC: CPT

## 2019-01-01 PROCEDURE — 83935 ASSAY OF URINE OSMOLALITY: CPT | Performed by: SURGERY

## 2019-01-01 PROCEDURE — 80053 COMPREHEN METABOLIC PANEL: CPT | Performed by: SURGERY

## 2019-01-01 PROCEDURE — 93308 TTE F-UP OR LMTD: CPT

## 2019-01-01 PROCEDURE — 37236 OPEN/PERQ PLACE STENT 1ST: CPT | Performed by: RADIOLOGY

## 2019-01-01 PROCEDURE — 80048 BASIC METABOLIC PNL TOTAL CA: CPT | Performed by: EMERGENCY MEDICINE

## 2019-01-01 PROCEDURE — C1725 CATH, TRANSLUMIN NON-LASER: HCPCS

## 2019-01-01 PROCEDURE — 86900 BLOOD TYPING SEROLOGIC ABO: CPT | Performed by: SURGERY

## 2019-01-01 PROCEDURE — 99222 1ST HOSP IP/OBS MODERATE 55: CPT | Performed by: INTERNAL MEDICINE

## 2019-01-01 PROCEDURE — 36415 COLL VENOUS BLD VENIPUNCTURE: CPT | Performed by: EMERGENCY MEDICINE

## 2019-01-01 PROCEDURE — 93010 ELECTROCARDIOGRAM REPORT: CPT | Performed by: INTERNAL MEDICINE

## 2019-01-01 PROCEDURE — 82803 BLOOD GASES ANY COMBINATION: CPT

## 2019-01-01 PROCEDURE — 04753DZ DILATION OF SUPERIOR MESENTERIC ARTERY WITH INTRALUMINAL DEVICE, PERCUTANEOUS APPROACH: ICD-10-PCS | Performed by: RADIOLOGY

## 2019-01-01 PROCEDURE — 85007 BL SMEAR W/DIFF WBC COUNT: CPT | Performed by: EMERGENCY MEDICINE

## 2019-01-01 PROCEDURE — C1876 STENT, NON-COA/NON-COV W/DEL: HCPCS

## 2019-01-01 PROCEDURE — 87077 CULTURE AEROBIC IDENTIFY: CPT | Performed by: EMERGENCY MEDICINE

## 2019-01-01 PROCEDURE — 84100 ASSAY OF PHOSPHORUS: CPT | Performed by: PHYSICIAN ASSISTANT

## 2019-01-01 PROCEDURE — 85610 PROTHROMBIN TIME: CPT | Performed by: EMERGENCY MEDICINE

## 2019-01-01 PROCEDURE — 93321 DOPPLER ECHO F-UP/LMTD STD: CPT | Performed by: INTERNAL MEDICINE

## 2019-01-01 PROCEDURE — 86850 RBC ANTIBODY SCREEN: CPT | Performed by: SURGERY

## 2019-01-01 PROCEDURE — 82533 TOTAL CORTISOL: CPT | Performed by: EMERGENCY MEDICINE

## 2019-01-01 PROCEDURE — 80048 BASIC METABOLIC PNL TOTAL CA: CPT | Performed by: PHYSICIAN ASSISTANT

## 2019-01-01 PROCEDURE — C1887 CATHETER, GUIDING: HCPCS | Performed by: INTERNAL MEDICINE

## 2019-01-01 PROCEDURE — 80053 COMPREHEN METABOLIC PANEL: CPT | Performed by: PHYSICIAN ASSISTANT

## 2019-01-01 RX ORDER — ALBUMIN, HUMAN INJ 5% 5 %
SOLUTION INTRAVENOUS CONTINUOUS PRN
Status: DISCONTINUED | OUTPATIENT
Start: 2019-01-01 | End: 2019-01-01 | Stop reason: SURG

## 2019-01-01 RX ORDER — CALCIUM CHLORIDE 100 MG/ML
INJECTION INTRAVENOUS; INTRAVENTRICULAR AS NEEDED
Status: DISCONTINUED | OUTPATIENT
Start: 2019-01-01 | End: 2019-01-01 | Stop reason: SURG

## 2019-01-01 RX ORDER — SODIUM CHLORIDE, SODIUM GLUCONATE, SODIUM ACETATE, POTASSIUM CHLORIDE, MAGNESIUM CHLORIDE, SODIUM PHOSPHATE, DIBASIC, AND POTASSIUM PHOSPHATE .53; .5; .37; .037; .03; .012; .00082 G/100ML; G/100ML; G/100ML; G/100ML; G/100ML; G/100ML; G/100ML
1000 INJECTION, SOLUTION INTRAVENOUS ONCE
Status: COMPLETED | OUTPATIENT
Start: 2019-01-01 | End: 2019-01-01

## 2019-01-01 RX ORDER — CALCIUM CHLORIDE 100 MG/ML
1 INJECTION INTRAVENOUS; INTRAVENTRICULAR ONCE
Status: COMPLETED | OUTPATIENT
Start: 2019-01-01 | End: 2019-01-01

## 2019-01-01 RX ORDER — POTASSIUM CHLORIDE 14.9 MG/ML
20 INJECTION INTRAVENOUS ONCE
Status: COMPLETED | OUTPATIENT
Start: 2019-01-01 | End: 2019-01-01

## 2019-01-01 RX ORDER — MORPHINE SULFATE 4 MG/ML
4 INJECTION, SOLUTION INTRAMUSCULAR; INTRAVENOUS ONCE
Status: COMPLETED | OUTPATIENT
Start: 2019-01-01 | End: 2019-01-01

## 2019-01-01 RX ORDER — PANTOPRAZOLE SODIUM 40 MG/1
40 INJECTION, POWDER, FOR SOLUTION INTRAVENOUS ONCE
Status: COMPLETED | OUTPATIENT
Start: 2019-01-01 | End: 2019-01-01

## 2019-01-01 RX ORDER — CLOPIDOGREL BISULFATE 75 MG/1
75 TABLET ORAL DAILY
Status: DISCONTINUED | OUTPATIENT
Start: 2019-01-01 | End: 2019-01-01

## 2019-01-01 RX ORDER — ROCURONIUM BROMIDE 10 MG/ML
INJECTION, SOLUTION INTRAVENOUS AS NEEDED
Status: DISCONTINUED | OUTPATIENT
Start: 2019-01-01 | End: 2019-01-01 | Stop reason: SURG

## 2019-01-01 RX ORDER — POTASSIUM CHLORIDE 29.8 MG/ML
40 INJECTION INTRAVENOUS ONCE
Status: DISCONTINUED | OUTPATIENT
Start: 2019-01-01 | End: 2019-01-01

## 2019-01-01 RX ORDER — SODIUM CHLORIDE 9 MG/ML
100 INJECTION, SOLUTION INTRAVENOUS CONTINUOUS
Status: DISCONTINUED | OUTPATIENT
Start: 2019-01-01 | End: 2019-01-01 | Stop reason: HOSPADM

## 2019-01-01 RX ORDER — LABETALOL 20 MG/4 ML (5 MG/ML) INTRAVENOUS SYRINGE
10 ONCE
Status: COMPLETED | OUTPATIENT
Start: 2019-01-01 | End: 2019-01-01

## 2019-01-01 RX ORDER — SUCCINYLCHOLINE CHLORIDE 20 MG/ML
INJECTION INTRAMUSCULAR; INTRAVENOUS AS NEEDED
Status: DISCONTINUED | OUTPATIENT
Start: 2019-01-01 | End: 2019-01-01 | Stop reason: SURG

## 2019-01-01 RX ORDER — SODIUM CHLORIDE, SODIUM GLUCONATE, SODIUM ACETATE, POTASSIUM CHLORIDE, MAGNESIUM CHLORIDE, SODIUM PHOSPHATE, DIBASIC, AND POTASSIUM PHOSPHATE .53; .5; .37; .037; .03; .012; .00082 G/100ML; G/100ML; G/100ML; G/100ML; G/100ML; G/100ML; G/100ML
500 INJECTION, SOLUTION INTRAVENOUS ONCE
Status: COMPLETED | OUTPATIENT
Start: 2019-01-01 | End: 2019-01-01

## 2019-01-01 RX ORDER — DEXTROSE MONOHYDRATE 25 G/50ML
INJECTION, SOLUTION INTRAVENOUS
Status: COMPLETED
Start: 2019-01-01 | End: 2019-01-01

## 2019-01-01 RX ORDER — ALBUMIN (HUMAN) 12.5 G/50ML
SOLUTION INTRAVENOUS
Status: DISPENSED
Start: 2019-01-01 | End: 2019-01-01

## 2019-01-01 RX ORDER — ISOSORBIDE MONONITRATE 30 MG/1
30 TABLET, EXTENDED RELEASE ORAL EVERY MORNING
Status: DISCONTINUED | OUTPATIENT
Start: 2019-01-01 | End: 2019-01-01 | Stop reason: HOSPADM

## 2019-01-01 RX ORDER — SODIUM CHLORIDE 9 MG/ML
100 INJECTION, SOLUTION INTRAVENOUS CONTINUOUS
Status: DISCONTINUED | OUTPATIENT
Start: 2019-01-01 | End: 2019-01-01

## 2019-01-01 RX ORDER — HEPARIN SODIUM 1000 [USP'U]/ML
INJECTION, SOLUTION INTRAVENOUS; SUBCUTANEOUS CODE/TRAUMA/SEDATION MEDICATION
Status: COMPLETED | OUTPATIENT
Start: 2019-01-01 | End: 2019-01-01

## 2019-01-01 RX ORDER — CLINDAMYCIN PHOSPHATE 900 MG/50ML
900 INJECTION INTRAVENOUS
Status: COMPLETED | OUTPATIENT
Start: 2019-01-01 | End: 2019-01-01

## 2019-01-01 RX ORDER — ONDANSETRON 2 MG/ML
4 INJECTION INTRAMUSCULAR; INTRAVENOUS ONCE
Status: COMPLETED | OUTPATIENT
Start: 2019-01-01 | End: 2019-01-01

## 2019-01-01 RX ORDER — HEPARIN SODIUM 5000 [USP'U]/ML
5000 INJECTION, SOLUTION INTRAVENOUS; SUBCUTANEOUS ONCE
Status: DISCONTINUED | OUTPATIENT
Start: 2019-01-01 | End: 2019-01-01

## 2019-01-01 RX ORDER — MAGNESIUM HYDROXIDE 1200 MG/15ML
LIQUID ORAL AS NEEDED
Status: DISCONTINUED | OUTPATIENT
Start: 2019-01-01 | End: 2019-01-01 | Stop reason: HOSPADM

## 2019-01-01 RX ORDER — PROPOFOL 10 MG/ML
5-50 INJECTION, EMULSION INTRAVENOUS
Status: DISCONTINUED | OUTPATIENT
Start: 2019-01-01 | End: 2019-01-01

## 2019-01-01 RX ORDER — ETOMIDATE 2 MG/ML
INJECTION INTRAVENOUS AS NEEDED
Status: DISCONTINUED | OUTPATIENT
Start: 2019-01-01 | End: 2019-01-01 | Stop reason: SURG

## 2019-01-01 RX ORDER — EPHEDRINE SULFATE 50 MG/ML
INJECTION, SOLUTION INTRAVENOUS AS NEEDED
Status: DISCONTINUED | OUTPATIENT
Start: 2019-01-01 | End: 2019-01-01 | Stop reason: SURG

## 2019-01-01 RX ORDER — HEPARIN SODIUM 1000 [USP'U]/ML
2400 INJECTION, SOLUTION INTRAVENOUS; SUBCUTANEOUS AS NEEDED
Status: DISCONTINUED | OUTPATIENT
Start: 2019-01-01 | End: 2019-01-01 | Stop reason: HOSPADM

## 2019-01-01 RX ORDER — CLOPIDOGREL BISULFATE 75 MG/1
TABLET ORAL CODE/TRAUMA/SEDATION MEDICATION
Status: COMPLETED | OUTPATIENT
Start: 2019-01-01 | End: 2019-01-01

## 2019-01-01 RX ORDER — NOREPINEPHRINE BITARTRATE 1 MG/ML
INJECTION, SOLUTION INTRAVENOUS
Status: COMPLETED
Start: 2019-01-01 | End: 2019-01-01

## 2019-01-01 RX ORDER — PROPOFOL 10 MG/ML
INJECTION, EMULSION INTRAVENOUS CONTINUOUS PRN
Status: DISCONTINUED | OUTPATIENT
Start: 2019-01-01 | End: 2019-01-01 | Stop reason: SURG

## 2019-01-01 RX ORDER — LIDOCAINE HYDROCHLORIDE 10 MG/ML
INJECTION, SOLUTION INFILTRATION; PERINEURAL CODE/TRAUMA/SEDATION MEDICATION
Status: COMPLETED | OUTPATIENT
Start: 2019-01-01 | End: 2019-01-01

## 2019-01-01 RX ORDER — CHLORHEXIDINE GLUCONATE 0.12 MG/ML
15 RINSE ORAL EVERY 12 HOURS SCHEDULED
Status: DISCONTINUED | OUTPATIENT
Start: 2019-01-01 | End: 2019-01-01 | Stop reason: HOSPADM

## 2019-01-01 RX ORDER — ONDANSETRON 2 MG/ML
4 INJECTION INTRAMUSCULAR; INTRAVENOUS EVERY 6 HOURS PRN
Status: DISCONTINUED | OUTPATIENT
Start: 2019-01-01 | End: 2019-01-01 | Stop reason: HOSPADM

## 2019-01-01 RX ORDER — HEPARIN SODIUM 10000 [USP'U]/100ML
3-20 INJECTION, SOLUTION INTRAVENOUS
Status: DISCONTINUED | OUTPATIENT
Start: 2019-01-01 | End: 2019-01-01

## 2019-01-01 RX ORDER — SODIUM CHLORIDE 9 MG/ML
INJECTION, SOLUTION INTRAVENOUS CONTINUOUS PRN
Status: DISCONTINUED | OUTPATIENT
Start: 2019-01-01 | End: 2019-01-01 | Stop reason: SURG

## 2019-01-01 RX ORDER — PANTOPRAZOLE SODIUM 40 MG/1
40 TABLET, DELAYED RELEASE ORAL 2 TIMES DAILY
Refills: 0 | COMMUNITY
Start: 2019-01-01 | End: 2019-01-01 | Stop reason: HOSPADM

## 2019-01-01 RX ORDER — FUROSEMIDE 10 MG/ML
INJECTION INTRAMUSCULAR; INTRAVENOUS AS NEEDED
Status: DISCONTINUED | OUTPATIENT
Start: 2019-01-01 | End: 2019-01-01 | Stop reason: SURG

## 2019-01-01 RX ORDER — ASPIRIN 81 MG/1
TABLET, CHEWABLE ORAL CODE/TRAUMA/SEDATION MEDICATION
Status: COMPLETED | OUTPATIENT
Start: 2019-01-01 | End: 2019-01-01

## 2019-01-01 RX ORDER — HYDROMORPHONE HCL/PF 1 MG/ML
0.2 SYRINGE (ML) INJECTION
Status: DISCONTINUED | OUTPATIENT
Start: 2019-01-01 | End: 2019-01-01 | Stop reason: HOSPADM

## 2019-01-01 RX ORDER — SODIUM CHLORIDE 9 MG/ML
INJECTION, SOLUTION INTRAVENOUS
Status: COMPLETED | OUTPATIENT
Start: 2019-01-01 | End: 2019-01-01

## 2019-01-01 RX ORDER — HEPARIN SODIUM 10000 [USP'U]/100ML
3-20 INJECTION, SOLUTION INTRAVENOUS
Status: DISCONTINUED | OUTPATIENT
Start: 2019-01-01 | End: 2019-01-01 | Stop reason: HOSPADM

## 2019-01-01 RX ORDER — ALBUMIN, HUMAN INJ 5% 5 %
12.5 SOLUTION INTRAVENOUS ONCE
Status: COMPLETED | OUTPATIENT
Start: 2019-01-01 | End: 2019-01-01

## 2019-01-01 RX ORDER — DEXTROSE MONOHYDRATE 25 G/50ML
50 INJECTION, SOLUTION INTRAVENOUS AS NEEDED
Status: DISCONTINUED | OUTPATIENT
Start: 2019-01-01 | End: 2019-01-01 | Stop reason: HOSPADM

## 2019-01-01 RX ORDER — PROPOFOL 10 MG/ML
INJECTION, EMULSION INTRAVENOUS AS NEEDED
Status: DISCONTINUED | OUTPATIENT
Start: 2019-01-01 | End: 2019-01-01 | Stop reason: SURG

## 2019-01-01 RX ORDER — HEPARIN SODIUM 10000 [USP'U]/100ML
3-30 INJECTION, SOLUTION INTRAVENOUS
Status: DISCONTINUED | OUTPATIENT
Start: 2019-01-01 | End: 2019-01-01 | Stop reason: HOSPADM

## 2019-01-01 RX ORDER — FENTANYL CITRATE 50 UG/ML
INJECTION, SOLUTION INTRAMUSCULAR; INTRAVENOUS AS NEEDED
Status: DISCONTINUED | OUTPATIENT
Start: 2019-01-01 | End: 2019-01-01 | Stop reason: SURG

## 2019-01-01 RX ORDER — ALBUMIN (HUMAN) 12.5 G/50ML
12.5 SOLUTION INTRAVENOUS ONCE
Status: COMPLETED | OUTPATIENT
Start: 2019-01-01 | End: 2019-01-01

## 2019-01-01 RX ORDER — HYDROMORPHONE HCL/PF 1 MG/ML
1 SYRINGE (ML) INJECTION ONCE
Status: COMPLETED | OUTPATIENT
Start: 2019-01-01 | End: 2019-01-01

## 2019-01-01 RX ORDER — CALCIUM CHLORIDE 100 MG/ML
INJECTION INTRAVENOUS; INTRAVENTRICULAR
Status: COMPLETED
Start: 2019-01-01 | End: 2019-01-01

## 2019-01-01 RX ORDER — HEPARIN SODIUM 1000 [USP'U]/ML
1200 INJECTION, SOLUTION INTRAVENOUS; SUBCUTANEOUS AS NEEDED
Status: DISCONTINUED | OUTPATIENT
Start: 2019-01-01 | End: 2019-01-01 | Stop reason: HOSPADM

## 2019-01-01 RX ORDER — MAGNESIUM SULFATE HEPTAHYDRATE 40 MG/ML
2 INJECTION, SOLUTION INTRAVENOUS ONCE
Status: COMPLETED | OUTPATIENT
Start: 2019-01-01 | End: 2019-01-01

## 2019-01-01 RX ORDER — HEPARIN SODIUM 1000 [USP'U]/ML
5000 INJECTION, SOLUTION INTRAVENOUS; SUBCUTANEOUS ONCE
Status: COMPLETED | OUTPATIENT
Start: 2019-01-01 | End: 2019-01-01

## 2019-01-01 RX ORDER — ASPIRIN 81 MG/1
81 TABLET, CHEWABLE ORAL DAILY
Status: DISCONTINUED | OUTPATIENT
Start: 2019-01-01 | End: 2019-01-01 | Stop reason: HOSPADM

## 2019-01-01 RX ORDER — HEPARIN SODIUM 5000 [USP'U]/ML
5000 INJECTION, SOLUTION INTRAVENOUS; SUBCUTANEOUS EVERY 8 HOURS SCHEDULED
Status: DISCONTINUED | OUTPATIENT
Start: 2019-01-01 | End: 2019-01-01 | Stop reason: DRUGHIGH

## 2019-01-01 RX ORDER — DEXTROSE AND SODIUM CHLORIDE 5; .45 G/100ML; G/100ML
100 INJECTION, SOLUTION INTRAVENOUS CONTINUOUS
Status: DISCONTINUED | OUTPATIENT
Start: 2019-01-01 | End: 2019-01-01

## 2019-01-01 RX ORDER — HYDROMORPHONE HCL/PF 1 MG/ML
1 SYRINGE (ML) INJECTION ONCE
Status: DISCONTINUED | OUTPATIENT
Start: 2019-01-01 | End: 2019-01-01

## 2019-01-01 RX ORDER — EPINEPHRINE 1 MG/ML
INJECTION, SOLUTION, CONCENTRATE INTRAVENOUS AS NEEDED
Status: DISCONTINUED | OUTPATIENT
Start: 2019-01-01 | End: 2019-01-01 | Stop reason: SURG

## 2019-01-01 RX ORDER — DEXTROSE MONOHYDRATE 25 G/50ML
INJECTION, SOLUTION INTRAVENOUS AS NEEDED
Status: DISCONTINUED | OUTPATIENT
Start: 2019-01-01 | End: 2019-01-01 | Stop reason: SURG

## 2019-01-01 RX ORDER — HEPARIN SODIUM 1000 [USP'U]/ML
3200 INJECTION, SOLUTION INTRAVENOUS; SUBCUTANEOUS ONCE
Status: DISCONTINUED | OUTPATIENT
Start: 2019-01-01 | End: 2019-01-01

## 2019-01-01 RX ORDER — DEXTROSE MONOHYDRATE 25 G/50ML
50 INJECTION, SOLUTION INTRAVENOUS ONCE
Status: COMPLETED | OUTPATIENT
Start: 2019-01-01 | End: 2019-01-01

## 2019-01-01 RX ORDER — HEPARIN SODIUM 1000 [USP'U]/ML
2400 INJECTION, SOLUTION INTRAVENOUS; SUBCUTANEOUS AS NEEDED
Status: DISCONTINUED | OUTPATIENT
Start: 2019-01-01 | End: 2019-01-01

## 2019-01-01 RX ORDER — METOPROLOL SUCCINATE 100 MG/1
100 TABLET, EXTENDED RELEASE ORAL
Status: DISCONTINUED | OUTPATIENT
Start: 2019-01-01 | End: 2019-01-01 | Stop reason: HOSPADM

## 2019-01-01 RX ORDER — SODIUM CHLORIDE, SODIUM GLUCONATE, SODIUM ACETATE, POTASSIUM CHLORIDE, MAGNESIUM CHLORIDE, SODIUM PHOSPHATE, DIBASIC, AND POTASSIUM PHOSPHATE .53; .5; .37; .037; .03; .012; .00082 G/100ML; G/100ML; G/100ML; G/100ML; G/100ML; G/100ML; G/100ML
75 INJECTION, SOLUTION INTRAVENOUS CONTINUOUS
Status: DISCONTINUED | OUTPATIENT
Start: 2019-01-01 | End: 2019-01-01

## 2019-01-01 RX ORDER — CLOPIDOGREL BISULFATE 75 MG/1
75 TABLET ORAL DAILY
Status: DISCONTINUED | OUTPATIENT
Start: 2019-01-01 | End: 2019-01-01 | Stop reason: HOSPADM

## 2019-01-01 RX ORDER — HEPARIN SODIUM 1000 [USP'U]/ML
1200 INJECTION, SOLUTION INTRAVENOUS; SUBCUTANEOUS AS NEEDED
Status: DISCONTINUED | OUTPATIENT
Start: 2019-01-01 | End: 2019-01-01

## 2019-01-01 RX ORDER — CLOPIDOGREL BISULFATE 75 MG/1
300 TABLET ORAL ONCE
Status: COMPLETED | OUTPATIENT
Start: 2019-01-01 | End: 2019-01-01

## 2019-01-01 RX ORDER — PANTOPRAZOLE SODIUM 40 MG/1
40 INJECTION, POWDER, FOR SOLUTION INTRAVENOUS EVERY 12 HOURS SCHEDULED
Status: DISCONTINUED | OUTPATIENT
Start: 2019-01-01 | End: 2019-01-01 | Stop reason: HOSPADM

## 2019-01-01 RX ADMIN — NOREPINEPHRINE BITARTRATE 4000 MCG: 1 INJECTION INTRAVENOUS at 03:45

## 2019-01-01 RX ADMIN — SODIUM BICARBONATE 50 MEQ: 84 INJECTION, SOLUTION INTRAVENOUS at 03:15

## 2019-01-01 RX ADMIN — FENTANYL CITRATE 50 MCG/HR: 50 INJECTION, SOLUTION INTRAMUSCULAR; INTRAVENOUS at 16:00

## 2019-01-01 RX ADMIN — LIDOCAINE HYDROCHLORIDE 2 ML: 10 INJECTION, SOLUTION INFILTRATION; PERINEURAL at 03:31

## 2019-01-01 RX ADMIN — Medication 50 MEQ: at 14:39

## 2019-01-01 RX ADMIN — SODIUM CHLORIDE, SODIUM GLUCONATE, SODIUM ACETATE, POTASSIUM CHLORIDE, MAGNESIUM CHLORIDE, SODIUM PHOSPHATE, DIBASIC, AND POTASSIUM PHOSPHATE 500 ML: .53; .5; .37; .037; .03; .012; .00082 INJECTION, SOLUTION INTRAVENOUS at 02:02

## 2019-01-01 RX ADMIN — SODIUM CHLORIDE, SODIUM GLUCONATE, SODIUM ACETATE, POTASSIUM CHLORIDE, MAGNESIUM CHLORIDE, SODIUM PHOSPHATE, DIBASIC, AND POTASSIUM PHOSPHATE 100 ML/HR: .53; .5; .37; .037; .03; .012; .00082 INJECTION, SOLUTION INTRAVENOUS at 04:30

## 2019-01-01 RX ADMIN — ROCURONIUM BROMIDE 30 MG: 10 INJECTION INTRAVENOUS at 13:49

## 2019-01-01 RX ADMIN — SODIUM BICARBONATE 50 MEQ: 84 INJECTION, SOLUTION INTRAVENOUS at 00:05

## 2019-01-01 RX ADMIN — DEXTROSE MONOHYDRATE 50 ML: 25 INJECTION, SOLUTION INTRAVENOUS at 05:52

## 2019-01-01 RX ADMIN — SODIUM CHLORIDE: 0.9 INJECTION, SOLUTION INTRAVENOUS at 23:46

## 2019-01-01 RX ADMIN — SODIUM BICARBONATE 50 MEQ: 84 INJECTION, SOLUTION INTRAVENOUS at 02:21

## 2019-01-01 RX ADMIN — HEPARIN SODIUM 4000 UNITS: 1000 INJECTION INTRAVENOUS; SUBCUTANEOUS at 04:05

## 2019-01-01 RX ADMIN — EPHEDRINE SULFATE 20 MG: 50 INJECTION, SOLUTION INTRAMUSCULAR; INTRAVENOUS; SUBCUTANEOUS at 23:57

## 2019-01-01 RX ADMIN — SODIUM CHLORIDE: 0.9 INJECTION, SOLUTION INTRAVENOUS at 03:05

## 2019-01-01 RX ADMIN — MAGNESIUM SULFATE HEPTAHYDRATE 2 G: 40 INJECTION, SOLUTION INTRAVENOUS at 05:17

## 2019-01-01 RX ADMIN — FENTANYL CITRATE 50 MCG/HR: 50 INJECTION, SOLUTION INTRAMUSCULAR; INTRAVENOUS at 04:30

## 2019-01-01 RX ADMIN — PANTOPRAZOLE SODIUM 40 MG: 40 INJECTION, POWDER, FOR SOLUTION INTRAVENOUS at 08:06

## 2019-01-01 RX ADMIN — SODIUM BICARBONATE 50 MEQ: 84 INJECTION, SOLUTION INTRAVENOUS at 16:06

## 2019-01-01 RX ADMIN — CALCIUM CHLORIDE 1 G: 100 INJECTION PARENTERAL at 00:23

## 2019-01-01 RX ADMIN — VASOPRESSIN 0.04 UNITS/MIN: 20 INJECTION INTRAVENOUS at 13:41

## 2019-01-01 RX ADMIN — EPHEDRINE SULFATE 10 MG: 50 INJECTION, SOLUTION INTRAMUSCULAR; INTRAVENOUS; SUBCUTANEOUS at 00:15

## 2019-01-01 RX ADMIN — CALCIUM CHLORIDE 1 G: 100 INJECTION INTRAVENOUS; INTRAVENTRICULAR at 03:30

## 2019-01-01 RX ADMIN — SODIUM BICARBONATE 50 MEQ: 84 INJECTION, SOLUTION INTRAVENOUS at 03:18

## 2019-01-01 RX ADMIN — DEXTROSE MONOHYDRATE 50 ML: 25 INJECTION, SOLUTION INTRAVENOUS at 11:49

## 2019-01-01 RX ADMIN — PROPOFOL 100 MG: 10 INJECTION, EMULSION INTRAVENOUS at 13:49

## 2019-01-01 RX ADMIN — CLINDAMYCIN PHOSPHATE 600 MG: 900 INJECTION, SOLUTION INTRAVENOUS at 23:50

## 2019-01-01 RX ADMIN — SODIUM CHLORIDE, SODIUM GLUCONATE, SODIUM ACETATE, POTASSIUM CHLORIDE, MAGNESIUM CHLORIDE, SODIUM PHOSPHATE, DIBASIC, AND POTASSIUM PHOSPHATE 100 ML/HR: .53; .5; .37; .037; .03; .012; .00082 INJECTION, SOLUTION INTRAVENOUS at 17:47

## 2019-01-01 RX ADMIN — MAGNESIUM SULFATE HEPTAHYDRATE 2 G: 40 INJECTION, SOLUTION INTRAVENOUS at 08:20

## 2019-01-01 RX ADMIN — HEPARIN SODIUM AND DEXTROSE 12 UNITS/KG/HR: 10000; 5 INJECTION INTRAVENOUS at 22:02

## 2019-01-01 RX ADMIN — EPHEDRINE SULFATE 10 MG: 50 INJECTION, SOLUTION INTRAMUSCULAR; INTRAVENOUS; SUBCUTANEOUS at 23:59

## 2019-01-01 RX ADMIN — Medication 50 MEQ: at 15:07

## 2019-01-01 RX ADMIN — LIDOCAINE HYDROCHLORIDE 0.5 ML: 10 INJECTION, SOLUTION INFILTRATION; PERINEURAL at 03:54

## 2019-01-01 RX ADMIN — SODIUM BICARBONATE 50 MEQ: 84 INJECTION, SOLUTION INTRAVENOUS at 00:40

## 2019-01-01 RX ADMIN — PIPERACILLIN SODIUM,TAZOBACTAM SODIUM 2.25 G: 2; .25 INJECTION, POWDER, FOR SOLUTION INTRAVENOUS at 08:47

## 2019-01-01 RX ADMIN — HYDROMORPHONE HYDROCHLORIDE 0.2 MG: 1 INJECTION, SOLUTION INTRAMUSCULAR; INTRAVENOUS; SUBCUTANEOUS at 10:25

## 2019-01-01 RX ADMIN — HEPARIN SODIUM AND DEXTROSE 12 UNITS/KG/HR: 10000; 5 INJECTION INTRAVENOUS at 02:41

## 2019-01-01 RX ADMIN — SODIUM BICARBONATE 100 ML/HR: 84 INJECTION, SOLUTION INTRAVENOUS at 11:35

## 2019-01-01 RX ADMIN — ALBUMIN (HUMAN): 12.5 SOLUTION INTRAVENOUS at 13:42

## 2019-01-01 RX ADMIN — EPINEPHRINE 0.1 MG: 1 INJECTION, SOLUTION INTRAMUSCULAR; SUBCUTANEOUS at 00:03

## 2019-01-01 RX ADMIN — ALBUMIN (HUMAN): 12.5 SOLUTION INTRAVENOUS at 00:10

## 2019-01-01 RX ADMIN — PANTOPRAZOLE SODIUM 40 MG: 40 INJECTION, POWDER, FOR SOLUTION INTRAVENOUS at 10:47

## 2019-01-01 RX ADMIN — CHLORHEXIDINE GLUCONATE 0.12% ORAL RINSE 15 ML: 1.2 LIQUID ORAL at 20:15

## 2019-01-01 RX ADMIN — NOREPINEPHRINE BITARTRATE 16 MCG/MIN: 1 INJECTION INTRAVENOUS at 17:15

## 2019-01-01 RX ADMIN — CHLORHEXIDINE GLUCONATE 0.12% ORAL RINSE 15 ML: 1.2 LIQUID ORAL at 21:30

## 2019-01-01 RX ADMIN — HEPARIN SODIUM 5000 UNITS: 5000 INJECTION INTRAVENOUS; SUBCUTANEOUS at 17:08

## 2019-01-01 RX ADMIN — SODIUM CHLORIDE 100 ML/HR: 0.9 INJECTION, SOLUTION INTRAVENOUS at 21:16

## 2019-01-01 RX ADMIN — NOREPINEPHRINE BITARTRATE 5 MCG/MIN: 1 INJECTION, SOLUTION, CONCENTRATE INTRAVENOUS at 13:41

## 2019-01-01 RX ADMIN — CALCIUM CHLORIDE 0.5 G: 100 INJECTION PARENTERAL at 02:00

## 2019-01-01 RX ADMIN — SODIUM BICARBONATE 50 MEQ: 84 INJECTION, SOLUTION INTRAVENOUS at 15:07

## 2019-01-01 RX ADMIN — SODIUM BICARBONATE 50 MEQ: 84 INJECTION, SOLUTION INTRAVENOUS at 00:06

## 2019-01-01 RX ADMIN — SODIUM CHLORIDE, SODIUM GLUCONATE, SODIUM ACETATE, POTASSIUM CHLORIDE, MAGNESIUM CHLORIDE, SODIUM PHOSPHATE, DIBASIC, AND POTASSIUM PHOSPHATE 1000 ML: .53; .5; .37; .037; .03; .012; .00082 INJECTION, SOLUTION INTRAVENOUS at 00:31

## 2019-01-01 RX ADMIN — HYDROMORPHONE HYDROCHLORIDE 1 MG: 1 INJECTION, SOLUTION INTRAMUSCULAR; INTRAVENOUS; SUBCUTANEOUS at 15:19

## 2019-01-01 RX ADMIN — CLOPIDOGREL BISULFATE 300 MG: 75 TABLET ORAL at 17:05

## 2019-01-01 RX ADMIN — SODIUM CHLORIDE, SODIUM GLUCONATE, SODIUM ACETATE, POTASSIUM CHLORIDE, MAGNESIUM CHLORIDE, SODIUM PHOSPHATE, DIBASIC, AND POTASSIUM PHOSPHATE 100 ML/HR: .53; .5; .37; .037; .03; .012; .00082 INJECTION, SOLUTION INTRAVENOUS at 16:00

## 2019-01-01 RX ADMIN — PROPOFOL 50 MCG/KG/MIN: 10 INJECTION, EMULSION INTRAVENOUS at 03:30

## 2019-01-01 RX ADMIN — NOREPINEPHRINE BITARTRATE 8 MCG/MIN: 1 INJECTION INTRAVENOUS at 11:38

## 2019-01-01 RX ADMIN — CALCIUM GLUCONATE 2 G: 98 INJECTION, SOLUTION INTRAVENOUS at 07:32

## 2019-01-01 RX ADMIN — DEXAMETHASONE SODIUM PHOSPHATE 5 MG: 10 INJECTION INTRAMUSCULAR; INTRAVENOUS at 00:40

## 2019-01-01 RX ADMIN — CALCIUM CHLORIDE 1 G: 100 INJECTION INTRAVENOUS; INTRAVENTRICULAR at 12:24

## 2019-01-01 RX ADMIN — FENTANYL CITRATE 50 MCG: 50 INJECTION, SOLUTION INTRAMUSCULAR; INTRAVENOUS at 13:55

## 2019-01-01 RX ADMIN — SODIUM CHLORIDE 50 ML/HR: 9 INJECTION, SOLUTION INTRAVENOUS at 03:27

## 2019-01-01 RX ADMIN — HEPARIN SODIUM AND DEXTROSE 12 UNITS/KG/HR: 10000; 5 INJECTION INTRAVENOUS at 05:58

## 2019-01-01 RX ADMIN — ASPIRIN 324 MG: 81 TABLET, CHEWABLE ORAL at 04:45

## 2019-01-01 RX ADMIN — SODIUM CHLORIDE, SODIUM GLUCONATE, SODIUM ACETATE, POTASSIUM CHLORIDE, MAGNESIUM CHLORIDE, SODIUM PHOSPHATE, DIBASIC, AND POTASSIUM PHOSPHATE 100 ML/HR: .53; .5; .37; .037; .03; .012; .00082 INJECTION, SOLUTION INTRAVENOUS at 05:13

## 2019-01-01 RX ADMIN — SODIUM CHLORIDE, SODIUM LACTATE, POTASSIUM CHLORIDE, AND CALCIUM CHLORIDE 500 ML: .6; .31; .03; .02 INJECTION, SOLUTION INTRAVENOUS at 06:28

## 2019-01-01 RX ADMIN — DEXTROSE MONOHYDRATE 50 ML: 25 INJECTION, SOLUTION INTRAVENOUS at 00:31

## 2019-01-01 RX ADMIN — IOHEXOL 150 ML: 350 INJECTION, SOLUTION INTRAVENOUS at 04:49

## 2019-01-01 RX ADMIN — CLOPIDOGREL 75 MG: 75 TABLET, FILM COATED ORAL at 04:45

## 2019-01-01 RX ADMIN — NOREPINEPHRINE BITARTRATE 3 MCG/MIN: 1 INJECTION INTRAVENOUS at 02:33

## 2019-01-01 RX ADMIN — SODIUM CHLORIDE, SODIUM GLUCONATE, SODIUM ACETATE, POTASSIUM CHLORIDE, MAGNESIUM CHLORIDE, SODIUM PHOSPHATE, DIBASIC, AND POTASSIUM PHOSPHATE 500 ML: .53; .5; .37; .037; .03; .012; .00082 INJECTION, SOLUTION INTRAVENOUS at 22:52

## 2019-01-01 RX ADMIN — MORPHINE SULFATE 2 MG: 2 INJECTION, SOLUTION INTRAMUSCULAR; INTRAVENOUS at 19:57

## 2019-01-01 RX ADMIN — VASOPRESSIN 2 UNITS: 20 INJECTION, SOLUTION INTRAMUSCULAR; SUBCUTANEOUS at 23:58

## 2019-01-01 RX ADMIN — CHLORHEXIDINE GLUCONATE 0.12% ORAL RINSE 15 ML: 1.2 LIQUID ORAL at 08:43

## 2019-01-01 RX ADMIN — CLOPIDOGREL BISULFATE 75 MG: 75 TABLET ORAL at 10:47

## 2019-01-01 RX ADMIN — SODIUM CHLORIDE, SODIUM LACTATE, POTASSIUM CHLORIDE, AND CALCIUM CHLORIDE 500 ML: .6; .31; .03; .02 INJECTION, SOLUTION INTRAVENOUS at 10:00

## 2019-01-01 RX ADMIN — HEPARIN SODIUM 5000 UNITS: 1000 INJECTION, SOLUTION INTRAVENOUS; SUBCUTANEOUS at 19:53

## 2019-01-01 RX ADMIN — CALCIUM CHLORIDE 1 G: 100 INJECTION INTRAVENOUS; INTRAVENTRICULAR at 15:27

## 2019-01-01 RX ADMIN — PROPOFOL 20 MCG/KG/MIN: 10 INJECTION, EMULSION INTRAVENOUS at 15:13

## 2019-01-01 RX ADMIN — EPINEPHRINE 0.2 MG: 1 INJECTION, SOLUTION INTRAMUSCULAR; SUBCUTANEOUS at 00:04

## 2019-01-01 RX ADMIN — IODIXANOL 75 ML: 320 INJECTION, SOLUTION INTRAVASCULAR at 18:43

## 2019-01-01 RX ADMIN — PANTOPRAZOLE SODIUM 40 MG: 40 INJECTION, POWDER, FOR SOLUTION INTRAVENOUS at 20:15

## 2019-01-01 RX ADMIN — PIPERACILLIN SODIUM,TAZOBACTAM SODIUM 2.25 G: 2; .25 INJECTION, POWDER, FOR SOLUTION INTRAVENOUS at 14:53

## 2019-01-01 RX ADMIN — SODIUM CHLORIDE: 9 INJECTION, SOLUTION INTRAVENOUS at 13:43

## 2019-01-01 RX ADMIN — VASOPRESSIN 0.04 UNITS/MIN: 20 INJECTION INTRAVENOUS at 12:58

## 2019-01-01 RX ADMIN — SODIUM CHLORIDE, SODIUM GLUCONATE, SODIUM ACETATE, POTASSIUM CHLORIDE, MAGNESIUM CHLORIDE, SODIUM PHOSPHATE, DIBASIC, AND POTASSIUM PHOSPHATE 1000 ML: .53; .5; .37; .037; .03; .012; .00082 INJECTION, SOLUTION INTRAVENOUS at 07:05

## 2019-01-01 RX ADMIN — ASPIRIN 81 MG 81 MG: 81 TABLET ORAL at 17:06

## 2019-01-01 RX ADMIN — FUROSEMIDE 10 MG: 10 INJECTION, SOLUTION INTRAMUSCULAR; INTRAVENOUS at 02:45

## 2019-01-01 RX ADMIN — NOREPINEPHRINE BITARTRATE 2.5 MCG/MIN: 1 INJECTION INTRAVENOUS at 04:38

## 2019-01-01 RX ADMIN — SODIUM BICARBONATE 50 MEQ: 84 INJECTION, SOLUTION INTRAVENOUS at 00:42

## 2019-01-01 RX ADMIN — ALBUMIN (HUMAN) 12.5 G: 0.25 INJECTION, SOLUTION INTRAVENOUS at 08:06

## 2019-01-01 RX ADMIN — VASOPRESSIN 3 UNITS: 20 INJECTION, SOLUTION INTRAMUSCULAR; SUBCUTANEOUS at 00:04

## 2019-01-01 RX ADMIN — SODIUM CHLORIDE, SODIUM GLUCONATE, SODIUM ACETATE, POTASSIUM CHLORIDE, MAGNESIUM CHLORIDE, SODIUM PHOSPHATE, DIBASIC, AND POTASSIUM PHOSPHATE 100 ML/HR: .53; .5; .37; .037; .03; .012; .00082 INJECTION, SOLUTION INTRAVENOUS at 08:50

## 2019-01-01 RX ADMIN — PROPOFOL 30 MCG/KG/MIN: 10 INJECTION, EMULSION INTRAVENOUS at 02:45

## 2019-01-01 RX ADMIN — SODIUM CHLORIDE: 0.9 INJECTION, SOLUTION INTRAVENOUS at 02:15

## 2019-01-01 RX ADMIN — HEPARIN SODIUM 5000 UNITS: 5000 INJECTION INTRAVENOUS; SUBCUTANEOUS at 21:01

## 2019-01-01 RX ADMIN — ASPIRIN 81 MG 81 MG: 81 TABLET ORAL at 10:47

## 2019-01-01 RX ADMIN — SODIUM BICARBONATE 50 MEQ: 84 INJECTION, SOLUTION INTRAVENOUS at 15:25

## 2019-01-01 RX ADMIN — DEXTROSE MONOHYDRATE 25 G: 25 INJECTION, SOLUTION INTRAVENOUS at 00:22

## 2019-01-01 RX ADMIN — VASOPRESSIN 2 UNITS: 20 INJECTION, SOLUTION INTRAMUSCULAR; SUBCUTANEOUS at 00:00

## 2019-01-01 RX ADMIN — SODIUM BICARBONATE 50 MEQ: 84 INJECTION, SOLUTION INTRAVENOUS at 00:07

## 2019-01-01 RX ADMIN — DEXTROSE AND SODIUM CHLORIDE 100 ML/HR: 5; .45 INJECTION, SOLUTION INTRAVENOUS at 05:56

## 2019-01-01 RX ADMIN — HEPARIN SODIUM AND DEXTROSE 12 UNITS/KG/HR: 10000; 5 INJECTION INTRAVENOUS at 19:37

## 2019-01-01 RX ADMIN — CHLORHEXIDINE GLUCONATE 0.12% ORAL RINSE 15 ML: 1.2 LIQUID ORAL at 10:47

## 2019-01-01 RX ADMIN — HEPARIN SODIUM AND DEXTROSE 18 UNITS/KG/HR: 10000; 5 INJECTION INTRAVENOUS at 19:56

## 2019-01-01 RX ADMIN — ALBUMIN (HUMAN): 12.5 SOLUTION INTRAVENOUS at 00:00

## 2019-01-01 RX ADMIN — ONDANSETRON 4 MG: 2 INJECTION INTRAMUSCULAR; INTRAVENOUS at 14:48

## 2019-01-01 RX ADMIN — CHLORHEXIDINE GLUCONATE 0.12% ORAL RINSE 15 ML: 1.2 LIQUID ORAL at 08:06

## 2019-01-01 RX ADMIN — DEXTROSE MONOHYDRATE 50 ML: 25 INJECTION, SOLUTION INTRAVENOUS at 02:23

## 2019-01-01 RX ADMIN — NOREPINEPHRINE BITARTRATE 5 MCG/MIN: 1 INJECTION INTRAVENOUS at 04:51

## 2019-01-01 RX ADMIN — ROCURONIUM BROMIDE 50 MG: 10 INJECTION INTRAVENOUS at 00:05

## 2019-01-01 RX ADMIN — EPHEDRINE SULFATE 20 MG: 50 INJECTION, SOLUTION INTRAMUSCULAR; INTRAVENOUS; SUBCUTANEOUS at 00:01

## 2019-01-01 RX ADMIN — HEPARIN SODIUM 2000 UNITS: 1000 INJECTION INTRAVENOUS; SUBCUTANEOUS at 04:29

## 2019-01-01 RX ADMIN — ALBUMIN (HUMAN) 12.5 G: 12.5 SOLUTION INTRAVENOUS at 01:28

## 2019-01-01 RX ADMIN — POTASSIUM CHLORIDE 20 MEQ: 200 INJECTION, SOLUTION INTRAVENOUS at 07:25

## 2019-01-01 RX ADMIN — MORPHINE SULFATE 4 MG: 4 INJECTION INTRAVENOUS at 14:48

## 2019-01-01 RX ADMIN — SODIUM BICARBONATE 50 MEQ: 84 INJECTION, SOLUTION INTRAVENOUS at 02:20

## 2019-01-01 RX ADMIN — SODIUM BICARBONATE 50 MEQ: 84 INJECTION, SOLUTION INTRAVENOUS at 15:46

## 2019-01-01 RX ADMIN — SODIUM CHLORIDE, SODIUM GLUCONATE, SODIUM ACETATE, POTASSIUM CHLORIDE, MAGNESIUM CHLORIDE, SODIUM PHOSPHATE, DIBASIC, AND POTASSIUM PHOSPHATE 100 ML/HR: .53; .5; .37; .037; .03; .012; .00082 INJECTION, SOLUTION INTRAVENOUS at 07:25

## 2019-01-01 RX ADMIN — PANTOPRAZOLE SODIUM 40 MG: 40 INJECTION, POWDER, FOR SOLUTION INTRAVENOUS at 08:30

## 2019-01-01 RX ADMIN — EPHEDRINE SULFATE 10 MG: 50 INJECTION, SOLUTION INTRAMUSCULAR; INTRAVENOUS; SUBCUTANEOUS at 00:40

## 2019-01-01 RX ADMIN — SODIUM BICARBONATE 50 MEQ: 84 INJECTION, SOLUTION INTRAVENOUS at 00:41

## 2019-01-01 RX ADMIN — Medication 50 MEQ: at 15:25

## 2019-01-01 RX ADMIN — SODIUM BICARBONATE 50 MEQ: 84 INJECTION, SOLUTION INTRAVENOUS at 02:37

## 2019-01-01 RX ADMIN — SODIUM CHLORIDE 1000 ML: 0.9 INJECTION, SOLUTION INTRAVENOUS at 16:08

## 2019-01-01 RX ADMIN — SODIUM CHLORIDE 1000 ML: 0.9 INJECTION, SOLUTION INTRAVENOUS at 18:57

## 2019-01-01 RX ADMIN — PANTOPRAZOLE SODIUM 40 MG: 40 INJECTION, POWDER, FOR SOLUTION INTRAVENOUS at 21:28

## 2019-01-01 RX ADMIN — FENTANYL CITRATE 50 MCG/HR: 50 INJECTION, SOLUTION INTRAMUSCULAR; INTRAVENOUS at 01:19

## 2019-01-01 RX ADMIN — CALCIUM CHLORIDE 1 G: 100 INJECTION INTRAVENOUS; INTRAVENTRICULAR at 14:43

## 2019-01-01 RX ADMIN — MORPHINE SULFATE 4 MG: 4 INJECTION INTRAVENOUS at 16:06

## 2019-01-01 RX ADMIN — ETOMIDATE 8 MG: 2 INJECTION INTRAVENOUS at 23:57

## 2019-01-01 RX ADMIN — PROPOFOL 5 MCG/KG/MIN: 10 INJECTION, EMULSION INTRAVENOUS at 16:00

## 2019-01-01 RX ADMIN — ALBUMIN (HUMAN): 12.5 SOLUTION INTRAVENOUS at 00:35

## 2019-01-01 RX ADMIN — LABETALOL 20 MG/4 ML (5 MG/ML) INTRAVENOUS SYRINGE 10 MG: at 16:07

## 2019-01-01 RX ADMIN — SODIUM BICARBONATE 50 MEQ: 84 INJECTION, SOLUTION INTRAVENOUS at 16:07

## 2019-01-01 RX ADMIN — SODIUM CHLORIDE, SODIUM GLUCONATE, SODIUM ACETATE, POTASSIUM CHLORIDE, MAGNESIUM CHLORIDE, SODIUM PHOSPHATE, DIBASIC, AND POTASSIUM PHOSPHATE 1000 ML: .53; .5; .37; .037; .03; .012; .00082 INJECTION, SOLUTION INTRAVENOUS at 22:05

## 2019-01-01 RX ADMIN — Medication 50 MEQ: at 03:15

## 2019-01-01 RX ADMIN — PANTOPRAZOLE SODIUM 40 MG: 40 INJECTION, POWDER, FOR SOLUTION INTRAVENOUS at 14:48

## 2019-01-01 RX ADMIN — Medication 50 MEQ: at 02:37

## 2019-01-01 RX ADMIN — HYDROCORTISONE SODIUM SUCCINATE 100 MG: 100 INJECTION, POWDER, FOR SOLUTION INTRAMUSCULAR; INTRAVENOUS at 10:47

## 2019-01-01 RX ADMIN — HYDROMORPHONE HYDROCHLORIDE 0.2 MG: 1 INJECTION, SOLUTION INTRAMUSCULAR; INTRAVENOUS; SUBCUTANEOUS at 10:12

## 2019-01-01 RX ADMIN — SODIUM BICARBONATE 50 MEQ: 84 INJECTION, SOLUTION INTRAVENOUS at 14:39

## 2019-01-01 RX ADMIN — LIDOCAINE HYDROCHLORIDE 100 MG: 20 INJECTION, SOLUTION INTRAVENOUS at 23:57

## 2019-01-01 RX ADMIN — SUCCINYLCHOLINE CHLORIDE 40 MG: 20 INJECTION, SOLUTION INTRAMUSCULAR; INTRAVENOUS at 23:57

## 2019-01-01 RX ADMIN — CALCIUM GLUCONATE 3 G: 98 INJECTION, SOLUTION INTRAVENOUS at 08:19

## 2019-01-01 RX ADMIN — ROCURONIUM BROMIDE 30 MG: 10 INJECTION INTRAVENOUS at 13:45

## 2019-01-03 NOTE — TELEPHONE ENCOUNTER
----- Message from Patt Walker MD sent at 1/3/2019 10:04 AM EST -----  Pt's Holter shows no significant arrhthymias  Pt can keep his appointment  Please call patient

## 2019-01-20 NOTE — PROGRESS NOTES
Progress Note - Cardiology Office  [facility]    Jazmine Giraldo Race 80 y o  female MRN: [id number]  : 9/10/1930  Encounter: [id number]      Assessment:     1  Essential hypertension    2  Renovascular hypertension    3  Left renal artery stenosis (HCC)    4  Stenosis of right internal carotid artery    5  Chronic mesenteric ischemia ([facility] )    6  PAD (peripheral artery disease) ([facility] )    7  New onset atrial fibrillation ([facility] )    8  Moderate to severe mitral regurgitation    9  Dilated cardiomyopathy ([facility] )        Discussion Summary and Plan:    1  Paroxysmal atrial fibrillation:  Patient has been staying in sinus rhythm  She is not using amiodarone and does not like to use it  She is also not on long-term antithrombotic therapy due to history of falls and gastric AVMs  Holter monitor results reviewed with the patient  No evidence of occult atrial fibrillation  Continue Plavix        2  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%  Patient has also moderate mitral regurgitation  She has done well with Aldactone  No leg edema  Lab reviewed done at primary care doctor's office  Potassium is acceptable BUN creatinine is acceptable  Continue same medications  No evidence of any fluid overload at this time          3  Hypertension  Her blood pressure runs between 140-160  Patient is pretty satisfied with that would not like to increase the medications  Continue same medications      4  PVD with mesenteric ischemia  Follow-up by vascular surgery  She has previously stent stents and has multiple other vascular disease      5  Antral ulcers and multiple gastric AVMs:  Patient has a history of ischemic mesenteric disease  Continue PPI as per Gastroenterology      6  History of significant peripheral and carotid vascular disease  She need to be on statin therapy  She was started on Lipitor 20 mg  She quit taking it    Understand increase risk of heart attack stroke and even death  7  History of at least moderate mitral regurgitation and dilated cardiomyopathy with nuclear stress test shows no ischemia  Will continue monitoring her echo  Continue losartan, metoprolol and Aldactone  Labs reviewed with the patient  She is overall happy with the results  Continue same Rx  Heart rate is acceptable  Will follow echo after next visit  Please call [phone], if any questions  Counseling :  A description of the counseling  As above  Goals and Barriers  Patient's ability to self care: Yes  Medication side effect reviewed with patient in detail and all their questions answered to their satisfaction  HPI :     Jane Sharma is a 80y o  year old female who came for follow up  Patient was recently admitted to [facility] with left lower quadrant abdominal pain which radiated to her left flank and was found to have kidney stones  She does have history of chronic mesenteric ischemia for which she had a stent placed at [place]  At that time she had a decrease food intake and she has lost some weight  She is trying to gain her weight back  During admission and further cardiac workup she was found to have dilated cardiomyopathy with EF around 40% at least moderate mitral regurgitation  She also found to have moderate tricuspid regurgitation  She  had an episode of atrial fibrillation and was started on antithrombotic therapy  Since she has history of Stewart's esophagus peptic ulcer disease and previous perforation she is not on antithrombotic therapy  She is doing relatively well  She is feeling better  Heart rate is in 70 and she is staying in sinus rhythm  She denies any chest pain or any other shortness of breath  She is trying to gain her weight back  No other significant complaint at this time  She was on amiodarone however after reading the pamphlet she had many of the side effect and she stopped taking it    Currently she is only taking metoprolol 100 mg at bedtime, losartan and she is not even on water pill  She did have nuclear stress test as well as an echo done which did which shows EF around 40-45%  Fixed inferior wall defect which is due to body attenuation artifact  No ischemia was mentioned  Echo report as mentioned above  Patient had stop taking amiodarone as she felt that it has many side effects  She believes her AFib was an isolated episode and would not like to take antiarrhythmic if she can avoid it she would consider it if AFib reoccur  01/22/2019  Above reviewed  Patient is doing well from cardiac point of view  She has no more episodes of palpitation  She still struggling with her gastric issues  She follows up with Dr Jessica Zheng  She has stents in her abdominal and mesenteric arteries as she was thought to have chronic mesenteric ischemia  Still she cannot eat as she expected  Blood pressure has been between 140-160  With addition of Aldactone her blood pressure is better and leg edema is gone  She had 1 episode of atrial fibrillation in the past and says she has not had any more episodes  She could not tolerate amiodarone and hence she stopped  No fever no chills no nausea no vomiting no PND no orthopnea no other significant issues at this time  In October her liver enzymes were found to be slightly elevated  She regularly follow up with GI  Review of Systems   Constitutional: Negative for activity change, chills, diaphoresis, fever and unexpected weight change  Has gained some weight as per patient   HENT: Negative for congestion  Eyes: Negative for discharge and redness  Respiratory: Negative for cough, chest tightness, shortness of breath and wheezing  Cardiovascular: Positive for leg swelling  Negative for chest pain and palpitations  Gastrointestinal: Negative for abdominal pain, diarrhea and nausea  Endocrine: Negative      Genitourinary: Negative for decreased urine volume and urgency  Musculoskeletal: Negative  Negative for arthralgias, back pain and gait problem  Skin: Negative for rash and wound  Allergic/Immunologic: Negative  Neurological: Negative for dizziness, seizures, syncope, weakness, light-headedness and headaches  Hematological: Negative  Psychiatric/Behavioral: Negative for agitation and confusion  The patient is nervous/anxious  She is very anxious about taking medications       Historical Information   Past Medical History:   Diagnosis Date    Acid reflux     Arthritis     Stewart's esophagus     [facility]     Duodenal ulcer     Hypertension     PAD [facility]peripheral artery disease) [facility]     Periodontal disease     Peripheral vascular disease ([facility] )     Scoliosis      Past Surgical History:   Procedure Laterality Date    ANAL FISSURECTOMY      AORTA - SUPERIOR MESENTERIC ARTERY BYPASS GRAFT Bilateral     BREAST BIOPSY Right     ESOPHAGOGASTRODUODENOSCOPY N/A 10/5/2018    Procedure: ESOPHAGOGASTRODUODENOSCOPY (EGD); Surgeon: Theresa Ahmadi MD;  Location: [address] [place] [address];  Service: Gastroenterology    FEMORAL ARTERY STENT Bilateral     HEMORROIDECTOMY      HYSTERECTOMY      INSERTION STENT ARTERY Left 1/1/2017    Procedure:  mesenteric arteriogram, balloon angioplasty of SMA stent;  Surgeon: Sonya Spain MD;  Location[facility];  Service:    [place] LAPAROTOMY N/A 1/1/2017    Procedure: LAPAROTOMY EXPLORATORY,  abdominal washout, repair of duodenal ulcer;  Surgeon: Sebas Ramsey MD;  Location: [facility];  Service:     MS CYSTO/URETERO W/LITHOTRIPSY &INDWELL STENT INSRT Left 10/2/2018    Procedure: CYSTOSCOPY URETEROSCOPY WITH LITHOTRIPSY HOLMIUM LASER, RETROGRADE PYELOGRAM AND INSERTION STENT URETERAL;  Surgeon: Keny Ramirez MD;  Location: [address] [place] [address];  Service: Urology    MS EGD TRANSORAL BIOPSY SINGLE/MULTIPLE N/A 11/28/2018    Procedure: ESOPHAGOGASTRODUODENOSCOPY (EGD);   Surgeon: Emili Ortega MD;  Location: [facility]; Service: Gastroenterology     [facility] CATARACT EXTRACAP,INSERT LENS Right 1/11/2016    Procedure: EXTRACTION INTRACAPSULAR CATARACT PHACO INTRAOCULAR LENS (IOL); Surgeon: Wes Stafford MD;  Location: [facility] MAIN OR;  Service: Ophthalmology    TONSILECTOMY AND ADNOIDECTOMY       History   Alcohol Use    0 6 oz/week    1 Glasses of wine per week     Comment: a glass wine daily   last drink 6/10/18     History   Drug Use No     History   Smoking Status    Former Smoker    Packs/day: 1 00    Years: 25 00    Quit date: [address] [facility] Never Used     Comment: smoked for 25 years  Family History: History reviewed  No pertinent family history      Meds/Allergies     Allergies   Allergen Reactions    Chlorthalidone Itching    Penicillins Itching     Tolerates zosyn    Sulfa Antibiotics Rash    Amiodarone Other (See Comments)     Legs swollen and itching    Bacitracin Rash    Ciprofloxacin GI Intolerance       Current Outpatient Prescriptions:     clopidogrel (PLAVIX) 75 mg tablet, Take 1 tablet (75 mg total) by mouth daily, Disp: 30 tablet, Rfl: 2    co-enzyme Q-10 30 MG capsule, Take 30 mg by mouth daily, Disp: , Rfl:     isosorbide mononitrate (IMDUR) 30 mg 24 hr tablet, Take 1 tablet (30 mg total) by mouth daily for 30 days (Patient taking differently: Take 30 mg by mouth every morning  ), Disp: 30 tablet, Rfl: 0    losartan (COZAAR) 50 mg tablet, Take 1 tablet by mouth daily for 30 days, Disp: 30 tablet, Rfl: 0    metoprolol succinate (TOPROL-XL) 100 mg 24 hr tablet, Take 100 mg by mouth daily at bedtime, Disp: , Rfl:     Multiple Vitamin (MULTIVITAMIN) capsule, Take 1 capsule by mouth daily, Disp: , Rfl:     pantoprazole (PROTONIX) 40 mg tablet, Take 40 mg by mouth daily  , Disp: , Rfl: 0    spironolactone (ALDACTONE) 25 mg tablet, Take 1 tablet (25 mg total) by mouth daily, Disp: 30 tablet, Rfl: 3    VITAMIN D, ERGOCALCIFEROL, PO, Take 2,000 Units by mouth daily  , Disp: , Rfl:     Vitals: Blood pressure 152/78, pulse 89, height 5' 1" (1 549 m), weight 38 1 kg (84 lb), SpO2 99 %  Body mass index is 15 87 kg/m²  Vitals:    01/22/19 1510   Weight: 38 1 kg (84 lb)     BP Readings from Last 3 Encounters:   01/22/19 152/78   11/28/18 (!) 181/77   11/27/18 150/72       Physical Exam   Constitutional: She is oriented to person, place, and time  No distress  Thinly built cachectic   HENT:   Head: Normocephalic and atraumatic  Eyes: Pupils are equal, round, and reactive to light  Neck: Normal range of motion  Neck supple  No JVD present  No thyromegaly present  Cardiovascular: Normal rate and regular rhythm  Murmur heard  Pulmonary/Chest: Effort normal and breath sounds normal  No respiratory distress  She has no wheezes  She has no rales  Abdominal: Soft  Bowel sounds are normal  She exhibits no distension  There is no tenderness  There is no rebound  Musculoskeletal: Normal range of motion  She exhibits no edema or tenderness  Neurological: She is alert and oriented to person, place, and time  Skin: Skin is warm and dry  She is not diaphoretic  Psychiatric: She has a normal mood and affect  Her behavior is normal  Judgment normal            Diagnostic Studies Review Cardio:    Holter monitor  Holter monitor done on 12/17/2018 was normal   No evidence of any occult atrial fibrillation  Average heart rate was 69 beats per minute  Patient's echo and stress test reviewed with the patient  EKG: The EKG shows normal sinus rhythm heart rate 79 beats per minute  Nonspecific ST changes baseline artifacts noted    Cardiac testing:   Results for orders placed during the hospital encounter of 10/01/18   Echo complete with contrast if indicated    Narrative [address]  [address] [address]  [phone]    Transthoracic Echocardiogram  2D, M-mode, Doppler, and Color Doppler    Study date: 03-Oct-2018    Patient: Kavitha WATERS  MR number: [id number]  Account number: [de-identified]  : 10-Sep-1930  Age: 80 years  Gender: Female  Status: Inpatient  Location: Bedside  Height: 61 in  Weight: 88 9 lb  BP: 128/ 60 mmHg    Indications: Congestive Heart Failure    Diagnoses: I50 9 - Heart failure, unspecified    Sonographer:  ABDIRAHMAN Bro RVS  Primary Physician:  Kelsey Graham MD  Referring Physician:  Margaux Reyes MD  Group:  [facility]odit Dun[facility]  Interpreting Physician:  Amilcar Manzano DO    SUMMARY    LEFT VENTRICLE:  Systolic function was moderately reduced  Ejection fraction was estimated to be 40 %  There was moderate diffuse hypokinesis with no identifiable regional variations  There was moderate concentric hypertrophy  Doppler parameters were consistent with abnormal left ventricular relaxation (grade 1 diastolic dysfunction)  LEFT ATRIUM:  The atrium was mildly dilated  MITRAL VALVE:  There was moderately reduced leaflet separation  There was moderately restricted mobility  There was moderate regurgitation  AORTIC VALVE:  The valve was trileaflet  Leaflets exhibited normal thickness, calcification, and moderately reduced cuspal separation  There was no evidence for stenosis  There was no regurgitation  TRICUSPID VALVE:  There was moderate regurgitation  Pulmonary artery systolic pressure was within the normal range  PULMONIC VALVE:  There was mild to moderate regurgitation  HISTORY: PRIOR HISTORY: PVD, COPD, GERD, Arthritis, Scoliosis    PROCEDURE: The procedure was performed at the bedside  This was a routine study  The transthoracic approach was used  The study included complete 2D imaging, M-mode, complete spectral Doppler, and color Doppler  The heart rate was 81 bpm,  at the start of the study  Images were obtained from the parasternal, apical, and subcostal acoustic windows  Images were not obtained from the suprasternal notch acoustic windows  Image quality was adequate  LEFT VENTRICLE: Size was normal  Systolic function was moderately reduced  Ejection fraction was estimated to be 40 %  There was moderate diffuse hypokinesis with no identifiable regional variations  There was moderate concentric  hypertrophy  DOPPLER: Doppler parameters were consistent with abnormal left ventricular relaxation (grade 1 diastolic dysfunction)  RIGHT VENTRICLE: The size was normal  Systolic function was normal  DOPPLER: Systolic pressure was within the normal range  LEFT ATRIUM: The atrium was mildly dilated  No thrombus was identified  RIGHT ATRIUM: Size was normal     MITRAL VALVE: Valve structure was normal  There was moderately reduced leaflet separation  There was moderately restricted mobility  No echocardiographic evidence for prolapse  DOPPLER: The transmitral velocity was within the normal range  There was no evidence for stenosis  There was moderate regurgitation  AORTIC VALVE: The valve was trileaflet  Leaflets exhibited normal thickness, calcification, and moderately reduced cuspal separation  DOPPLER: Transaortic velocity was within the normal range  There was no evidence for stenosis  There was  no regurgitation  TRICUSPID VALVE: The valve structure was normal  There was normal leaflet separation  DOPPLER: The transtricuspid velocity was within the normal range  There was moderate regurgitation  Pulmonary artery systolic pressure was within the  normal range  Estimated peak PA pressure was 37 mmHg  PULMONIC VALVE: Leaflets exhibited normal thickness, no calcification, and normal cuspal separation  DOPPLER: The transpulmonic velocity was within the normal range  There was mild to moderate regurgitation  PERICARDIUM: There was no thickening  There was no pericardial effusion  AORTA: The root exhibited normal size and fibrocalcific change      PULMONARY ARTERY: The size was normal  The morphology appeared normal     SYSTEM MEASUREMENT TABLES    2D mode  AoR Diam 2D: 2 9 cm  LA Diam (2D): 3 6 cm  LA/Ao (2D): 1 24  FS (2D Teich): 20 %  IVSd (2D): 1 27 cm  LVDEV: 80 8 cm³  LVEDV MOD BP: 96 cm³  LVESV: 47 4 cm³  LVIDd(2D): 4 25 cm  LVISd (2D): 3 4 cm  LVPWd (2D): 1 27 cm  SV (Teich): 33 4 cm³    Apical four chamber  LVEF A4C: 40 %    Apical two chamber  LVEF A2C: 45 %    Unspecified Scan Mode  MV Peak A Manuel: 1380 mm/s  MV Peak E Manuel  Mean: 983 mm/s  MVA (PHT): 4 23 cm squared  PHT: 75 ms  Max P mm[Hg]  V Max: 3080 mm/s  Vmax: 2680 mm/s  RA Area: 20 7 cm squared  RA Volume: 66 9 cm³  TAPSE: 1 6 cm    [facility]    Prepared and electronically signed by    Reji Clarke DO  Signed 03-Oct-2018 14:43:04         Results for orders placed during the hospital encounter of 10/01/18   NM Myocardial Perfusion Spect (Pharmacological Induced Stress and/or Rest)    [facility]  [address] [address]  [phone]    Rest/Stress Gated SPECT Myocardial Perfusion Imaging After Regadenoson    Patient: LORI WATERS  MR number: [id number]  Account number: [de-identified]  : 09/10/1930  Age: 80 years  Gender: Female  Status: Inpatient  Location: Stress lab  Height: 24 in  Weight: 90 lb  BP: 180/ 70 mmHg    Allergies: CHLORTHALIDONE, PENICILLINS, SULFA ANTIBIOTICS, BACITRACIN, CIPROFLOXACIN    Diagnosis: 794 31 - ABNORM ELECTROCARDIOGRAM    Primary Physician:  Emmanuel Morales MD  RN:  PATRICIA Frances  Group:  Irish Reese  Report Prepared By[de-identified]  PATRICIA Frances  Interpreting Physician:  Reji Clarke DO    INDICATIONS: Evaluation for ischemia  HISTORY: The patient is a 80year old  female  Chest pain status: no chest pain  Coronary artery disease risk factors: hypertension  Cardiovascular history: peripheral vascular occlusive disease  Medications: a beta blocker,  clopidogrel, and an antihypertensive agent      PHYSICAL EXAM: Baseline physical exam screening: no wheezes audible  REST ECG: Normal sinus rhythm  PROCEDURE: The study was performed in the the Stress lab  A regadenoson infusion pharmacologic stress test was performed  Gated SPECT myocardial perfusion imaging was performed after stress and at rest  Systolic blood pressure was 180  mmHg, at the start of the study  Diastolic blood pressure was 70 mmHg, at the start of the study  The heart rate was 79 bpm, at the start of the study  IV double checked  Regadenoson protocol:  Time HR bpm SBP mmHg DBP mmHg Symptoms Rhythm/conduct  Baseline 14:15 79 180 70 none NSR  Immediate 14:16 88 120 60 dizziness same as above  1 min 14:17 92 124 62 same as above --  2 min 14:18 91 118 62 subsiding --  3 min 14:53 86 138 62 subsiding --  No medications or fluids given  STRESS SUMMARY: Duration of pharmacologic stress was 3 min  Maximal heart rate during stress was 93 bpm  The heart rate response to stress was normal  There was resting hypertension with an appropriate blood pressure response to stress  The rate-pressure product for the peak heart rate and blood pressure was 97051  There was no chest pain during stress  The stress test was terminated due to protocol completion  Pre oxygen saturation: 100 %  Peak oxygen saturation: 100 %  The stress ECG was negative for ischemia and normal  There were no stress arrhythmias or conduction abnormalities  ISOTOPE ADMINISTRATION:  Resting isotope administration Stress isotope administration  Agent Tetrofosmin Tetrofosmin  Dose 10 8 mCi 31 6 mCi  Date 10/04/2018 10/04/2018    The radiopharmaceutical was injected at the peak effect of pharmacologic stress  MYOCARDIAL PERFUSION IMAGING:  The image quality was poor  Rotating projection images reveal marked subdiaphragmatic activity   Left ventricular size was normal     PERFUSION DEFECTS:  -  There was a moderate-sized, severe, fixed myocardial perfusion defect of the entire inferior wall likely due to GI activity  GATED SPECT:  The calculated left ventricular ejection fraction was 44 %  Visually, appears normal  There was no left ventricular regional abnormality  SUMMARY:  -  Stress results: There was resting hypertension with an appropriate blood pressure response to stress  There was no chest pain during stress  -  ECG conclusions: The stress ECG was negative for ischemia and normal   -  Perfusion imaging: There was a moderate-sized, severe, fixed myocardial perfusion defect of the entire inferior wall likely due to GI activity  -  Gated SPECT: The calculated left ventricular ejection fraction was 44 %  Visually, appears normal  There was no left ventricular regional abnormality  IMPRESSIONS: Abnormal study after pharmacologic stress  There was an inferior wall defect that was likely bowel attenuation artifact  Left ventricular systolic function was normal     Prepared and signed by    Patrice Machuca,   Signed 10/04/2018 15:43:33         Imaging:  Chest X-Ray:   No Chest XR results available for this patient  CT-scan of the chest:     Cta Head And Neck W Wo Contrast    Result Date: 6/13/2018  Impression Approximately 50% right ICA stenosis at the carotid bifurcation related to atheromatous plaque  Mild left ICA stenosis also noted  Atherosclerotic disease is noted with areas of stenosis in the mid left cervical vertebral artery, proximal basilar artery, intracranial left vertebral artery, distal right P1, and left M1 without large vessel occlusion   Workstation performed: [id number]     Lab Review   Lab Results   Component Value Date    WBC 8 76 10/08/2018    HGB 10 4 (L) 10/08/2018    HCT 34 1 (L) 10/08/2018    MCV 80 (L) 10/08/2018    RDW 21 4 (H) 10/08/2018     10/08/2018     BMP:  Lab Results   Component Value Date    SODIUM 138 10/07/2018     08/18/2015    K 4 4 10/07/2018     10/07/2018    CO2 22 10/07/2018    ANIONGAP 9 08/18/2015    BUN 33 (H) 10/07/2018    CREATININE 0 99 10/07/2018    GLUCOSE 124 01/01/2017    GLUF 113 (H) 06/12/2018    CALCIUM 8 4 10/07/2018    EGFR 51 10/07/2018    MG 1 7 10/08/2018     LFT:  Lab Results   Component Value Date    AST 86 (H) 10/07/2018     (H) 10/07/2018    ALKPHOS 57 10/07/2018       Lab Results   Component Value Date    HGBA1C 5 8 06/11/2018     Lipid Profile:   Lab Results   Component Value Date    CHOLESTEROL 234 (H) 06/12/2018    HDL 86 (H) 06/12/2018    LDLCALC 134 (H) 06/12/2018    TRIG 69 06/12/2018     Lab Results   Component Value Date    CHOLESTEROL 234 (H) 06/12/2018     Lab Results   Component Value Date    CKTOTAL 63 01/17/2017    TROPONINI 1 46 (H) 10/06/2018       Lipid Profile:        Dr Lynn See MD [facility]      "This note has been constructed using a voice recognition system  Therefore there may be syntax, spelling, and/or grammatical errors   Please call if you have any questions  "

## 2019-01-29 PROBLEM — E87.2 LACTIC ACIDOSIS: Status: ACTIVE | Noted: 2019-01-01

## 2019-01-29 PROBLEM — N17.9 AKI (ACUTE KIDNEY INJURY) (HCC): Status: ACTIVE | Noted: 2019-01-01

## 2019-01-29 PROBLEM — K92.2 GI BLEED: Status: ACTIVE | Noted: 2019-01-01

## 2019-01-29 NOTE — ED PROVIDER NOTES
History  Chief Complaint   Patient presents with    Rectal Bleeding     Pt was here for EGD  hx of perforated duodenal ulcer  prior to procedure pt was noted to have severe abdominal pain and small amounts of bright red blood during BM    Hypertension     24-year-old female presents with the acute onset diffuse abdominal pain that started when she was getting her EGD done at the outpatient surgery clinic  She had some associated bright red blood per rectum  The pain currently is 10/10 sharp nothing makes it better or worse  Patient has a history of SMA stent and mesenteric ischemia in the past   She said she stopped her Plavix for the EGD for 2 days  Denies any nausea vomiting fevers chills dysuria urgency frequency or any other symptoms  Did take her blood pressure medicine this morning  History provided by:  Patient   used: No        Prior to Admission Medications   Prescriptions Last Dose Informant Patient Reported? Taking?    Coenzyme Q10 (CO Q 10 PO)   Yes No   Sig: Take 200 mg by mouth every morning   Multiple Vitamin (MULTIVITAMIN) capsule  Self Yes No   Sig: Take 1 capsule by mouth every morning     VITAMIN D, ERGOCALCIFEROL, PO  Self Yes No   Sig: Take 2,000 Units by mouth every morning     clopidogrel (PLAVIX) 75 mg tablet  Self No No   Sig: Take 1 tablet (75 mg total) by mouth daily   Patient taking differently: Take 75 mg by mouth every morning     isosorbide mononitrate (IMDUR) 30 mg 24 hr tablet  Self No No   Sig: Take 1 tablet (30 mg total) by mouth daily for 30 days   Patient taking differently: Take 30 mg by mouth every morning     losartan (COZAAR) 50 mg tablet  Self No No   Sig: Take 1 tablet by mouth daily for 30 days   Patient taking differently: Take 50 mg by mouth every morning     metoprolol succinate (TOPROL-XL) 100 mg 24 hr tablet  Self Yes No   Sig: Take 100 mg by mouth daily at bedtime   pantoprazole (PROTONIX) 40 mg tablet  Self Yes No   Sig: Take 40 mg by mouth 2 (two) times a day     spironolactone (ALDACTONE) 25 mg tablet  Self No No   Sig: Take 1 tablet (25 mg total) by mouth daily   Patient taking differently: Take 25 mg by mouth every morning        Facility-Administered Medications: None       Past Medical History:   Diagnosis Date    Acid reflux     Arthritis     Stewart's esophagus     COPD [facility]chronic obstructive [facility] disease) [facility]     Duodenal ulcer     Hypertension     Kidney stone 10/01/2018    [facility]peripheral [facility] disease) [facility]     Periodontal disease     controlled    Peripheral vascular disease ([facility] )     Scoliosis     Wears glasses     for reading       Past Surgical History:   Procedure Laterality Date    ANAL FISSURECTOMY      AORTA - SUPERIOR MESENTERIC ARTERY BYPASS GRAFT Bilateral     BREAST BIOPSY Right     CATARACT EXTRACTION Right     COLONOSCOPY      ESOPHAGOGASTRODUODENOSCOPY N/A 10/5/2018    Procedure: ESOPHAGOGASTRODUODENOSCOPY (EGD); Surgeon: Daryl Morales MD;  Location: [address] [place] [address];  Service: Gastroenterology    FEMORAL ARTERY STENT Bilateral     HEMORROIDECTOMY      HYSTERECTOMY      partial-ovaries remain    INSERTION STENT ARTERY Left 1/1/2017    Procedure:  mesenteric arteriogram, balloon angioplasty of SMA stent;  Surgeon: Alma Hutson MD;  Location: [facility];  Service:    [facility] LAPAROTOMY N/A 1/1/2017    Procedure: LAPAROTOMY EXPLORATORY,  abdominal washout, repair of duodenal ulcer;  Surgeon: Dione Fisher MD;  Location[facility];  Service:     MD CYSTO/URETERO W/LITHOTRIPSY &INDWELL STENT INSRT Left 10/2/2018    Procedure: CYSTOSCOPY URETEROSCOPY WITH LITHOTRIPSY HOLMIUM LASER, RETROGRADE PYELOGRAM AND INSERTION STENT URETERAL;  Surgeon: Sterling Bunch MD;  Location: [address] [place] [address];  Service: Urology    MD EGD TRANSORAL BIOPSY SINGLE/MULTIPLE N/A 11/28/2018    Procedure: ESOPHAGOGASTRODUODENOSCOPY (EGD); Surgeon: Daryl Morales MD;  Location: [facility];   Service: Gastroenterology    MD [facility] CATARACT EXTRACAP,INSERT LENS Right 1/11/2016    Procedure: EXTRACTION INTRACAPSULAR CATARACT PHACO INTRAOCULAR LENS (IOL); Surgeon: Elias Varghese MD;  Location: [facility] MAIN OR;  Service: Ophthalmology    TONSILECTOMY AND ADNOIDECTOMY      TONSILLECTOMY         Family History   Problem Relation Age of Onset    Hypertension Mother     Other Father         PVD?  Heart disease Father         MI     I have reviewed and agree with the history as documented  Social History   Substance Use Topics    Smoking status: Former Smoker     Packs/day: 1 00     Years: 25 00     Quit date: 1985    Smokeless tobacco: Never Used      Comment: smoked for 25 years   Alcohol use 3 0 oz/week     5 Glasses of wine per week        Review of Systems   All other systems reviewed and are negative  Physical Exam  Physical Exam   Constitutional: She is oriented to person, place, and time  She appears well-developed and well-nourished  HENT:   Head: Normocephalic and atraumatic  Eyes: Pupils are equal, round, and reactive to light  EOM are normal    Neck: Normal range of motion  Neck supple  Cardiovascular: Normal rate and regular rhythm  Pulmonary/Chest: Effort normal and breath sounds normal    Abdominal: Soft  Bowel sounds are normal  She exhibits no distension and no mass  There is tenderness  There is no rebound and no guarding  No hernia  Abdomen is soft pain is out of proportion to physical findings  Non peritoneal abdomen  Musculoskeletal: Normal range of motion  Neurological: She is alert and oriented to person, place, and time  Skin: Skin is warm and dry  Psychiatric: She has a normal mood and affect  Nursing note and vitals reviewed        Vital Signs  ED Triage Vitals   Temperature Pulse Respirations Blood Pressure SpO2   01/29/19 1541 01/29/19 1435 01/29/19 1435 01/29/19 1435 01/29/19 1435   (!) 96 8 °F (36 °C) 82 18 (!) 238/124 98 %      Temp Source Heart Rate Source Patient Position - Orthostatic VS BP Location FiO2 (%)   01/29/19 1850 01/29/19 1435 01/29/19 1435 01/29/19 1435 --   Temporal Monitor Sitting Right arm       Pain Score       01/29/19 1435       Worst Possible Pain           Vitals:    01/29/19 1615 01/29/19 1630 01/29/19 1700 01/29/19 1850   BP: (!) 188/79 (!) 183/78 (!) 212/90 (!) 200/84   Pulse: 78 76 80 81   Patient Position - Orthostatic VS:    Sitting       Visual Acuity      ED Medications  Medications   morphine (PF) 4 mg/mL injection 4 mg (4 mg Intravenous Given 1/29/19 1448)   ondansetron (ZOFRAN) injection 4 mg (4 mg Intravenous Given 1/29/19 1448)   pantoprazole (PROTONIX) injection 40 mg (40 mg Intravenous Given 1/29/19 1448)   HYDROmorphone (DILAUDID) injection 1 mg (1 mg Intravenous Given 1/29/19 1519)   sodium chloride 0 9 % bolus 1,000 mL (0 mL Intravenous Stopped 1/29/19 1740)   Labetalol HCl (NORMODYNE) injection 10 mg (10 mg Intravenous Given 1/29/19 1607)   morphine (PF) 4 mg/mL injection 4 mg (4 mg Intravenous Given 1/29/19 1606)   sodium chloride 0 9 % bolus 1,000 mL (1,000 mL Intravenous New Bag 1/29/19 1857)   iodixanol (VISIPAQUE) 320 MG/ML injection 75 mL (75 mL Intravenous Given 1/29/19 1843)   heparin (porcine) injection 5,000 Units (5,000 Units Intravenous Given 1/29/19 1953)       Diagnostic Studies  Results Reviewed     Procedure Component Value Units Date/Time    Lactic acid, plasma [[id number]]  (Abnormal) Collected:  01/29/19 1720    Lab Status:  Final result Specimen:  Blood from Arm, Left Updated:  01/29/19 1750     LACTIC ACID 5 1 (HH) mmol/L     Narrative:         Result may be elevated if tourniquet was used during collection  Lactic acid, plasma [[id number]]  (Abnormal) Collected:  01/29/19 1507    Lab Status:  Final result Specimen:  Blood from Arm, Right Updated:  01/29/19 1538     LACTIC ACID 4 7 (HH) mmol/L     Narrative:         Result may be elevated if tourniquet was used during collection      APTT [[id number]]  (Abnormal) Collected: 01/29/19 1507    Lab Status:  Final result Specimen:  Blood from Arm, Right Updated:  01/29/19 1537     PTT 23 (L) seconds     Protime-INR [[id number]]  (Abnormal) Collected:  01/29/19 1507    Lab Status:  Final result Specimen:  Blood from Arm, Right Updated:  01/29/19 1537     Protime 12 8 (H) seconds      INR 1 23 (H)    Lipase [[id number]]  (Normal) Collected:  01/29/19 1507    Lab Status:  Final result Specimen:  Blood from Arm, Right Updated:  01/29/19 1531     Lipase 372 u/L     Basic metabolic panel [[id number]]  (Abnormal) Collected:  01/29/19 1507    Lab Status:  Final result Specimen:  Blood from Arm, Right Updated:  01/29/19 1531     Sodium 136 mmol/L      Potassium 4 6 mmol/L      Chloride 98 (L) mmol/L      CO2 21 mmol/L      ANION GAP 17 (H) mmol/L      BUN 29 (H) mg/dL      Creatinine 1 42 (H) mg/dL      Glucose 83 mg/dL      Calcium 9 9 mg/dL      eGFR 33 ml/min/1 73sq m     Narrative:         National Kidney Disease Education Program recommendations are as follows:  GFR calculation is accurate only with a steady state creatinine  Chronic Kidney disease less than 60 ml/min/1 73 sq  meters  Kidney failure less than 15 ml/min/1 73 sq  meters      Hepatic function panel [[id number]]  (Abnormal) Collected:  01/29/19 1507    Lab Status:  Final result Specimen:  Blood from Arm, Right Updated:  01/29/19 1531     Total Bilirubin 1 10 (H) mg/dL      Bilirubin, Direct 0 50 (H) mg/dL      Alkaline Phosphatase 115 U/L      AST 40 U/L      ALT 34 U/L      Total Protein 7 3 g/dL      Albumin 3 8 g/dL     Magnesium [[id number]]  (Normal) Collected:  01/29/19 1507    Lab Status:  Final result Specimen:  Blood from Arm, Right Updated:  01/29/19 1531     Magnesium 2 1 mg/dL     CBC and differential [[id number]]  (Abnormal) Collected:  01/29/19 1507    Lab Status:  Final result Specimen:  Blood from Arm, Right Updated:  01/29/19 1515     WBC 19 14 (H) Thousand/uL      RBC 4 78 Million/uL      Hemoglobin 11 4 (L) g/dL Hematocrit 39 3 %      MCV 82 fL      MCH 23 8 (L) pg      MCHC 29 0 (L) g/dL      RDW 21 0 (H) %      MPV 8 9 fL      Platelets 666 (H) Thousands/uL      nRBC 0 /100 WBCs      Neutrophils Relative 84 (H) %      Immat GRANS % 1 %      Lymphocytes Relative 10 (L) %      Monocytes Relative 5 %      Eosinophils Relative 0 %      Basophils Relative 0 %      Neutrophils Absolute 16 02 (H) Thousands/µL      Immature Grans Absolute 0 17 Thousand/uL      Lymphocytes Absolute 1 93 Thousands/µL      Monocytes Absolute 0 96 Thousand/µL      Eosinophils Absolute 0 01 Thousand/µL      Basophils Absolute 0 05 Thousands/µL     Urine culture [[id number]]     Lab Status:  No result Specimen:  Urine                  CTA abdomen pelvis w wo contrast   Final Result by Shyam Acuna MD (01/29 2033)      1  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  Tiny collateral arteries arising from the aorta in the region of the celiac are present, though distal vessels are not well opacified  JARROD not visualized  Evaluation of bowel wall is    limited on the current study, but the appearance of the vessels would certainly predispose to ischemic enterocolitis if the patient has elevated lactate and abdominal pain  2   Heterogeneous striated enhancement of the kidneys could relate to ostial renal arterial atheromatous disease, multifocal embolic infarcts, pyelonephritis, tubular necrosis, or hypotension  Correlate with urinalysis and renal function testing  3   Question distal esophageal ring/web versus patulous vestibule  Correlate for dysphasia symptoms  Correlate for history of systemic sclerosis  Workstation performed: [id number]         CT abdomen pelvis wo contrast   Final Result by Kayode Sarmiento MD (01/29 5334)         1  Sigmoid diverticulosis without acute diverticulitis or bowel obstruction  No wall thickening, pneumatosis intestinalis, or portal venous gas to suggest mesenteric ischemia  2   Fecal impaction at the rectum  3   Gaseous distention of the stomach with no wall thickening  Workstation performed: [id number]         XR abdomen obstruction series   Final Result by Claudia Coronado MD (01/29 1545)      Paucity of bowel gas throughout the abdomen and pelvis with no evidence for pneumoperitoneum  Mild gastric distention  Emphysema           Workstation performed: [id number]                    Procedures  ECG 12 Lead Documentation  Performed by: Cecilia Pham by: Poppy Garzon     ECG reviewed by me, the ED Provider: yes    Patient location:  ED  Previous ECG:     Previous ECG:  Unavailable    Comparison to cardiac monitor: Yes    Interpretation:     Interpretation: non-specific    Rate:     ECG rate assessment: normal    Rhythm:     Rhythm: sinus rhythm    Ectopy:     Ectopy: none    QRS:     QRS axis:  Normal  Conduction:     Conduction: normal    ST segments:     ST segments:  Non-specific  T waves:     T waves: non-specific    CriticalCare Time  Performed by: Cecilia Pham by: Poppy Garzon     Critical care provider statement:     Critical care time (minutes):  60    Critical care was necessary to treat or prevent imminent or life-threatening deterioration of the following conditions:  Circulatory failure    Critical care was time spent personally by me on the following activities:  Evaluation of patient's response to treatment, discussions with consultants, ordering and review of laboratory studies, ordering and review of radiographic studies, re-evaluation of patient's condition, ordering and performing treatments and interventions, blood draw for specimens and development of treatment plan with patient or surrogate           Phone Contacts  ED Phone Contact    ED Course                               MDM  Number of Diagnoses or Management Options  Abdominal pain:   Lactic acidosis:   Occlusive mesenteric ischemia [facility]):   Rectal bleeding: Diagnosis management comments: Patient initially evaluated with labs  I spoke to radiologist about getting contrast CT and after discussion given her GFR 33, recommendation of CT of the abdomen pelvis without contrast to see if any pneumatosis, CT results No pneumatosis or concerns for a big vessel occlusion   Patient's initial lactic acid was 4 5 repeated after IV fluids was 5 1  I decided to do a CT angiogram of the abdomen and pelvis  I spoke to the on-call radiologist and ordered the test   I went and spoke to the patient and explained the risks vs benefit of contrast nephropathy and missing a mesenteric ischemia  She agreed to the CT scan  I followed up with a CT scan which showed an SMA occlusion  I immediately paged Dr Nicholas Stokes the vascular surgeon who accepted the patient in transfer to [facility] to the ER  He recommended I start her on heparin drip following a heparin bolus  I made arrangements for the patient to be transferred  I spoke to the patient and the family was notified by the nurse  She agreed with the plan  Patient transferred to [facility] were [facility] for further evaluation and management by vascular surgery  Patient remained hemodynamically stable during her transfer         Amount and/or Complexity of Data Reviewed  Clinical lab tests: ordered and reviewed  Tests in the radiology section of CPT®: ordered and reviewed  Tests in the medicine section of CPT®: ordered and reviewed    Patient Progress  Patient progress: stable      Disposition  Final diagnoses:   Abdominal pain   Rectal bleeding   Lactic acidosis   Occlusive mesenteric ischemia ([address] )     Time reflects when diagnosis was documented in both MDM as applicable and the Disposition within this note     Time User Action Codes Description Comment    1/29/2019  5:17 PM Jeannine Miles Add [R10 9] Abdominal pain     1/29/2019  5:17 PM Norah Miles Add [K62 5] Rectal bleeding     1/29/2019  5:17 PM Jeannine Miles Add [E87 2] Lactic acidosis     1/30/2019  6:08 AM Norah Miles Add [K55 059] Occlusive mesenteric ischemia [facility])       ED Disposition     ED Disposition Condition Date/Time Comment    Discharge  Tue Jan 29, 2019  7:14 PM Luz Amador should be transferred out to [facility]      MD Documentation      Most Recent Value   Patient Condition  The patient has been stabilized such that within reasonable medical probability, no material deterioration of the patient condition or the condition of the unborn child(kamron) is likely to result from the transfer   Reason for Transfer  Level of Care needed not available at this facility   Benefits of Transfer  Specialized equipment and/or services available at the receiving facility (Include comment)________________________   Risks of Transfer  Potential for delay in receiving treatment   Accepting Physician  Lan Alfaro   Sending MD Dr Arturo Wiggins   Provider Certification  General risk, such as traffic hazards, adverse weather conditions, rough terrain or turbulence, possible failure of equipment (including vehicle or aircraft), or consequences of actions of persons outside the control of the transport personnel      RN Documentation      Most Recent Value   Accepting Facility Name, [facility]      Follow-up Information    None         Discharge Medication List as of 1/29/2019  8:23 PM      CONTINUE these medications which have NOT CHANGED    Details   clopidogrel (PLAVIX) 75 mg tablet Take 1 tablet (75 mg total) by mouth daily, Starting Wed 11/14/2018, Normal      Coenzyme Q10 (CO Q 10 PO) Take 200 mg by mouth every morning, Historical Med      isosorbide mononitrate (IMDUR) 30 mg 24 hr tablet Take 1 tablet (30 mg total) by mouth daily for 30 days, Starting Wed 10/10/2018, Until Fri 1/25/2019, Normal      losartan (COZAAR) 50 mg tablet Take 1 tablet by mouth daily for 30 days, Starting Sat 1/21/2017, Until Fri 1/25/2019, Print      metoprolol succinate (TOPROL-XL) 100 mg 24 hr tablet Take 100 mg by mouth daily at bedtime, Historical Med      Multiple Vitamin (MULTIVITAMIN) capsule Take 1 capsule by mouth every morning  , Historical Med      pantoprazole (PROTONIX) 40 mg tablet Take 40 mg by mouth 2 (two) times a day  , Starting Fri 1/4/2019, Historical Med      spironolactone (ALDACTONE) 25 mg tablet Take 1 tablet (25 mg total) by mouth daily, Starting Tue 11/27/2018, Normal      VITAMIN D, ERGOCALCIFEROL, PO Take 2,000 Units by mouth every morning  , Historical Med           No discharge procedures on file      ED Provider  Electronically Signed by           [place]ia Petit, DO  01/29/19 9805       Norah Miles, DO  01/30/19 7186       Norah Miles, DO  01/30/19 0864

## 2019-01-29 NOTE — ASSESSMENT & PLAN NOTE
Admit patient for further management  Patient does report chronic, intermittent abdominal pain for years"  CT abdomen/pelvis done without contrast in the ER showed fecal impaction but no pneumatosis intestinalis or portal vein gas or other findings to suggest mesenteric ischemia  Lactic acid done in the ER was elevated and repeat lab work showed worsening lactic acidosis despite getting fluid bolus  Doubtful sepsis  Patient will now get CTA abdomen/pelvis for further evaluation    Patient understands risks of getting contrast with her Elicia  Keep patient NPO for now

## 2019-01-29 NOTE — H&P
History and Physical - [facility] Medicine    Patient Information: Yenny Cox Race 80 y o  female MRN: [id number]  Unit/Bed#: [address] [place] [address] Encounter: [id number]  Admitting Physician: Fara Joaquin DO  PCP: Shira Sanford MD  Date of Admission:  01/29/19        Hospital Problem List:     Principal Problem:    Generalized abdominal pain  Active Problems:    GI bleed    GROVER (acute kidney injury) ([facility] [address] )    Chronic mesenteric ischemia (HCC)    Essential hypertension    Chronic anemia    Leukocytosis      Assessment/Plan:    * Generalized abdominal pain   Assessment & Plan    Admit patient for further management  Patient does report chronic, intermittent abdominal pain for years"  CT abdomen/pelvis done without contrast in the ER showed fecal impaction but no pneumatosis intestinalis or portal vein gas or other findings to suggest mesenteric ischemia  Lactic acid done in the ER was elevated and repeat lab work showed worsening lactic acidosis despite getting fluid bolus  Doubtful sepsis  Patient will now get CTA abdomen/pelvis for further evaluation  Patient understands risks of getting contrast with her Grover  Keep patient NPO for now     GI bleed   Assessment & Plan    Patient with hematochezia while awaiting EGD  H/H is stable  Repeat H/H later today and again in a m  Consult GI  Keep patient NPO  IV Protonix b i d  Plan is for EGD tomorrow if no acute abdominal pathology     GROVER (acute kidney injury) ([facility] [address] )   Assessment & Plan    Patient's creatinine noted to be elevated compared to prior values  Likely pre renal in nature + Aldactone use at home  place patient on IV fluids and get repeat lab work in a m  Leukocytosis   Assessment & Plan    Unclear etiology  UA is pending  Repeat lab work in a m  And follow up on CTA abdomen/pelvis as noted above     Chronic anemia   Assessment & Plan    Patient's H/H is stable    As noted above get repeat lab work later today     Essential hypertension   Assessment & Plan    Patient's blood pressure noted to be elevated in the ER  Likely due to pain  Continue Toprol-XL  Hold losartan, Aldactone due to Elicia     Chronic mesenteric ischemia [facility])   Assessment & Plan    Patient with history of chronic mesentery ischemia status post SMA bypass and stenting in 2015  Plavix was on hold for EGD             VTE Prophylaxis: Pharmacologic VTE Prophylaxis contraindicated due to GI bleed  / sequential compression device   Code Status: Level 1 - Full Code    Anticipated Length of Stay:  Patient will be admitted on an Inpatient basis with an anticipated length of stay of at least 2 midnights  Justification for Hospital Stay:  Abdominal pain, GI bleed, Elicia    Total Time for Visit, including Counseling / Coordination of Care: 45 minutes  Greater than 50% of this total time spent on direct patient counseling and coordination of care  Chief Complaint:     Rectal Bleeding (Pt was here for EGD  hx of perforated duodenal ulcer  prior to procedure pt was noted to have severe abdominal pain and small amounts of bright red blood during BM) and Hypertension    of Present Illness:    Radha Keenan is a 80 y o  female who presents with abdominal pain  Patient was scheduled for EGD today  While there, she developed severe abdominal pain and then had hematochezia  patient with history of prior perforated duodenal ulcer and chronic nonhealing gastric ulcers  She also reports weight loss, anorexia  States that her last colonoscopy was almost 10 years ago  patient states that the abdominal pain is 10/10 in intensity, constant in nature  Patient states that the abdominal pain has been ongoing for years and is intermittent in nature  Patient was therefore sent to the ER    Review of Systems:    Review of Systems   Constitutional: Positive for unexpected weight change  Negative for chills and fever  HENT: Negative for congestion, sore throat and trouble swallowing      Eyes: Negative for photophobia and visual disturbance  Respiratory: Negative for cough, chest tightness and shortness of breath  Cardiovascular: Negative for chest pain and leg swelling  Gastrointestinal: Positive for abdominal pain and anal bleeding  Negative for diarrhea and vomiting  Genitourinary: Negative for dysuria, frequency and hematuria  Musculoskeletal: Negative for back pain  Skin: Negative for wound  Neurological: Negative for dizziness, syncope, speech difficulty, light-headedness and headaches  Hematological: Does not bruise/bleed easily  Psychiatric/Behavioral: Negative for agitation  Past Medical and Surgical History:     Past Medical History:   Diagnosis Date    Acid reflux     Arthritis     Stewart's esophagus     COPD [facility]chronic obstructive pulmonary disease) ([facility] )     Duodenal ulcer     Hypertension     Kidney stone 10/01/2018    PAD (peripheral artery disease) ([place] [address] )     Periodontal disease     controlled    Peripheral vascular disease ([place] [address] )     Scoliosis     Wears glasses     for reading       Past Surgical History:   Procedure Laterality Date    ANAL FISSURECTOMY      AORTA - SUPERIOR MESENTERIC ARTERY BYPASS GRAFT Bilateral     BREAST BIOPSY Right     CATARACT EXTRACTION Right     COLONOSCOPY      ESOPHAGOGASTRODUODENOSCOPY N/A 10/5/2018    Procedure: ESOPHAGOGASTRODUODENOSCOPY (EGD);   Surgeon: Batsheva Coe MD;  Location: [address] [place] [address];  Service: Gastroenterology    FEMORAL ARTERY STENT Bilateral     HEMORROIDECTOMY      HYSTERECTOMY      partial-ovaries remain    INSERTION STENT ARTERY Left 1/1/2017    Procedure:  mesenteric arteriogram, balloon angioplasty of SMA stent;  Surgeon: iNshi Jenkins MD;  Location: [facility];  Service:    [facility] LAPAROTOMY N/A 1/1/2017    Procedure: LAPAROTOMY EXPLORATORY,  abdominal washout, repair of duodenal ulcer;  Surgeon: Claudine Garrett MD;  Location: [facility];  Service:     MO CYSTO/URETERO W/LITHOTRIPSY &INDWELL STENT INSRT Left 10/2/2018    Procedure: CYSTOSCOPY URETEROSCOPY WITH LITHOTRIPSY HOLMIUM LASER, RETROGRADE PYELOGRAM AND INSERTION STENT URETERAL;  Surgeon: Lorrayne Cooks, MD;  Location: [address] [place] [address];  Service: Urology    LA EGD TRANSORAL BIOPSY SINGLE/MULTIPLE N/A 11/28/2018    Procedure: ESOPHAGOGASTRODUODENOSCOPY (EGD); Surgeon: Isai Moody MD;  Location: [facility] GI LAB; Service: Gastroenterology     [address] CATARACT EXTRACAP,INSERT LENS Right 1/11/2016    Procedure: EXTRACTION INTRACAPSULAR CATARACT PHACO INTRAOCULAR LENS (IOL); Surgeon: Linnette Rivera MD;  Location: [facility] MAIN OR;  Service: Ophthalmology    TONSILECTOMY AND ADNOIDECTOMY      TONSILLECTOMY         Meds/Allergies:    PTA meds:   Prior to Admission Medications   Prescriptions Last Dose Informant Patient Reported? Taking?    Coenzyme Q10 (CO Q 10 PO)   Yes No   Sig: Take 200 mg by mouth every morning   Multiple Vitamin (MULTIVITAMIN) capsule  Self Yes No   Sig: Take 1 capsule by mouth every morning     VITAMIN D, ERGOCALCIFEROL, PO  Self Yes No   Sig: Take 2,000 Units by mouth every morning     clopidogrel (PLAVIX) 75 mg tablet  Self No No   Sig: Take 1 tablet (75 mg total) by mouth daily   Patient taking differently: Take 75 mg by mouth every morning     isosorbide mononitrate (IMDUR) 30 mg 24 hr tablet  Self No No   Sig: Take 1 tablet (30 mg total) by mouth daily for 30 days   Patient taking differently: Take 30 mg by mouth every morning     losartan (COZAAR) 50 mg tablet  Self No No   Sig: Take 1 tablet by mouth daily for 30 days   Patient taking differently: Take 50 mg by mouth every morning     metoprolol succinate (TOPROL-XL) 100 mg 24 hr tablet  Self Yes No   Sig: Take 100 mg by mouth daily at bedtime   pantoprazole (PROTONIX) 40 mg tablet  Self Yes No   Sig: Take 40 mg by mouth 2 (two) times a day     spironolactone (ALDACTONE) 25 mg tablet  Self No No   Sig: Take 1 tablet (25 mg total) by mouth daily   Patient taking differently: Take 25 mg by mouth every morning        Facility-Administered Medications: None       Allergies: Allergies   Allergen Reactions    Amiodarone Other (See Comments)     Legs swollen and itching    Chlorthalidone Itching    Penicillins Itching     Tolerates zosyn    Sulfa Antibiotics Rash    Bacitracin Rash    Ciprofloxacin GI Intolerance     History:     Marital Status: /Civil Union     Substance Use History:   History   Alcohol Use    3 0 oz/week    5 Glasses of wine per week     History   Smoking Status    Former Smoker    Packs/day: 1 00    Years: 25 00    Quit date: 1985   Smokeless Tobacco    Never Used     Comment: smoked for 25 years  History   Drug Use No       Family History:    Family History   Problem Relation Age of Onset    Hypertension Mother     Other Father         PVD?  Heart disease Father         MI       Physical Exam:     Vitals:   Blood Pressure: (!) 200/84 (01/29/19 1850)  Pulse: 81 (01/29/19 1850)  Temperature: (!) 96 3 °F (35 7 °C) (01/29/19 1850)  Temp Source: Temporal (01/29/19 1850)  Respirations: 18 (01/29/19 1850)  SpO2: 95 % (01/29/19 1850)    Physical Exam   Constitutional: She is oriented to person, place, and time  No distress  Thin female   HENT:   Head: Normocephalic and atraumatic  Eyes: EOM are normal  Right eye exhibits no discharge  Left eye exhibits no discharge  No scleral icterus  Neck: Neck supple  Cardiovascular: Normal rate and regular rhythm  Pulmonary/Chest: Effort normal  No respiratory distress  She has no wheezes  She has no rales  Decreased breath sounds   Abdominal: Soft  She exhibits no distension  There is tenderness (Generalized)  There is guarding  Musculoskeletal: She exhibits no edema  Neurological: She is alert and oriented to person, place, and time  No cranial nerve deficit  Skin: Skin is dry  She is not diaphoretic  Lab Results: I have personally reviewed pertinent reports          Results from last 7 days  Lab Units 01/29/19  1507   WBC Thousand/uL 19 14*   HEMOGLOBIN g/dL 11 4*   HEMATOCRIT % 39 3   PLATELETS Thousands/uL 458*   NEUTROS PCT % 84*   LYMPHS PCT % 10*   MONOS PCT % 5   EOS PCT % 0       Results from last 7 days  Lab Units 01/29/19  1507   POTASSIUM mmol/L 4 6   CHLORIDE mmol/L 98*   CO2 mmol/L 21   BUN mg/dL 29*   CREATININE mg/dL 1 42*   CALCIUM mg/dL 9 9   ALK PHOS U/L 115   ALT U/L 34   AST U/L 40       Results from last 7 days  Lab Units 01/29/19  1507   INR  1 23*       Imaging: I have personally reviewed pertinent reports  Ct Abdomen Pelvis Wo Contrast    Result Date: 1/29/2019  Narrative: CT ABDOMEN AND PELVIS WITHOUT IV CONTRAST INDICATION:   abdominal pain  Suspect mesenteric ischemia  COMPARISON:  CT abdomen pelvis 10/1/2018 TECHNIQUE:  CT examination of the abdomen and pelvis was performed without intravenous contrast   Axial, sagittal, and coronal 2D reformatted images were created from the source data and submitted for interpretation  Radiation dose length product (DLP) for this visit:  356 75 mGy-cm   This examination, like all CT scans performed in the [facility], was performed utilizing techniques to minimize radiation dose exposure, including the use of iterative  reconstruction and automated exposure control  Enteric contrast was not administered  FINDINGS: ABDOMEN LOWER CHEST:  No clinically significant abnormality identified in the visualized lower chest  LIVER/BILIARY TREE:  Unremarkable  GALLBLADDER:  No calcified gallstones  No pericholecystic inflammatory change  SPLEEN:  Unremarkable  PANCREAS:  Unremarkable  ADRENAL GLANDS:  Unremarkable  KIDNEYS/URETERS:  Unremarkable  No hydronephrosis  STOMACH AND BOWEL:  Sigmoid diverticulosis identified without acute diverticulitis or bowel obstruction    There is no evidence for pneumatosis intestinalis throughout the bowel to suggest acute ischemia with abundant fecal debris in the rectum concerning  for impaction  Gaseous distention of the stomach is noted with no abnormal wall thickening  APPENDIX:  No findings to suggest appendicitis  ABDOMINOPELVIC CAVITY:  No ascites or free intraperitoneal air  No lymphadenopathy  VESSELS:  Atherosclerotic changes are present  No evidence of aneurysm  Intravascular wall stents within the SMA and bilateral iliac arteries again noted  PELVIS REPRODUCTIVE ORGANS:  Patient is status post hysterectomy  A tiny right adnexal cyst is a stable, benign finding  URINARY BLADDER:  Unremarkable  ABDOMINAL WALL/INGUINAL REGIONS:  Unremarkable  OSSEOUS STRUCTURES:  No acute fracture or destructive osseous lesion  Impression: 1  Sigmoid diverticulosis without acute diverticulitis or bowel obstruction  No wall thickening, pneumatosis intestinalis, or portal venous gas to suggest mesenteric ischemia  2   Fecal impaction at the rectum  3   Gaseous distention of the stomach with no wall thickening  Workstation performed: [id number]     Xr Abdomen Obstruction Series    Result Date: 1/29/2019  Narrative: OBSTRUCTION SERIES INDICATION:   free air  COMPARISON:  Chest series 10/1/2018 EXAM PERFORMED/VIEWS:  XR ABDOMEN OBSTRUCTION SERIES Images: 3 FINDINGS: Mild gastric distention noted with a paucity of bowel gas throughout the abdomen and pelvis  There is fecal debris in the rectum  No free air beneath the hemidiaphragms  Atherosclerotic calcifications in the splenic artery noted with a vascular stent in the SMA  Osseous structures are unremarkable  Bilateral iliac artery stents are noted  Examination of the chest reveals a prominent cardiomediastinal silhouette  Lungs are hyperinflated but clear  Impression: Paucity of bowel gas throughout the abdomen and pelvis with no evidence for pneumoperitoneum  Mild gastric distention  Emphysema  Workstation performed: [id number]       CT abdomen pelvis wo contrast   Final Result         1    Sigmoid diverticulosis without acute diverticulitis or bowel obstruction  No wall thickening, pneumatosis intestinalis, or portal venous gas to suggest mesenteric ischemia  2   Fecal impaction at the rectum  3   Gaseous distention of the stomach with no wall thickening  Workstation performed: [id number]         XR abdomen obstruction series   Final Result      Paucity of bowel gas throughout the abdomen and pelvis with no evidence for pneumoperitoneum  Mild gastric distention  Emphysema  Workstation performed: [id number]         CTA abdomen pelvis w wo contrast    (Results Pending)       EKG, Pathology, and Other Studies Reviewed on Admission:   · No EKG done    Allscripts/EPIC Records Reviewed: Yes     ** Please Note: "This note has been constructed using a voice recognition system  Therefore there may be syntax, spelling, and/or grammatical errors   Please call if you have any questions  "**

## 2019-01-29 NOTE — ASSESSMENT & PLAN NOTE
Patient with hematochezia while awaiting EGD  H/H is stable  Repeat H/H later today and again in a m  Consult GI  Keep patient NPO  IV Protonix b i d    Plan is for EGD tomorrow if no acute abdominal pathology

## 2019-01-29 NOTE — TELEPHONE ENCOUNTER
AP Dr Jessica Gar to Dr Clark at [facility] ED for  [facility]  
Abdomen soft, non-tender, no guarding.

## 2019-01-30 PROBLEM — K55.059 ACUTE MESENTERIC ISCHEMIA (HCC): Status: ACTIVE | Noted: 2019-01-01

## 2019-01-30 PROBLEM — K55.9 MESENTERIC ISCHEMIA (HCC): Status: ACTIVE | Noted: 2019-01-01

## 2019-01-30 NOTE — ANESTHESIA POSTPROCEDURE EVALUATION
Post-Op Assessment Note      CV Status:  Stable    Mental Status:  Somnolent (sedated)    Hydration Status:  Euvolemic    PONV Controlled:  Controlled    Airway Patency:  Patent (ETT in  situ)  Airway: intubated    Post Op Vitals Reviewed: Yes          Staff: CRNA           BP  124/52   Temp  97 5   Pulse  91   Resp   16   SpO2   100%

## 2019-01-30 NOTE — UTILIZATION REVIEW
Initial Clinical Review    Admission: Date/Time/Statement: 1/29/19 @ 2122   TRANSFER FROM [facility] TO [facility] MICU FOR URGENT SURGICAL AND VASCULAR INTERVENTION    Orders Placed This Encounter   Procedures    Inpatient Admission     Standing Status:   Standing     Number of Occurrences:   1     Order Specific Question:   Admitting Physician     Answer:   Srikanth Ocasio [[id number]]     Order Specific Question:   Level of Care     Answer:   Critical Care [15]     Order Specific Question:   Estimated length of stay     Answer:   More than 2 Midnights     Order Specific Question:   Certification     Answer:   I certify that inpatient services are medically necessary for this patient for a duration of greater than two midnights  See H&P and MD Progress Notes for additional information about the patient's course of treatment  ED: Date/Time/Mode of Arrival:   ED Arrival Information     Expected Arrival Acuity Means of Arrival Escorted By Service Admission Type    1/29/2019 1/29/2019 20:52 Emergent Ambulance [facility]  [facility]ang) Surgery-General Emergency    Arrival Complaint    SMA occlusion        Chief Complaint:   Chief Complaint   Patient presents with    Medical Problem - Major     SMA occlusion, will go to IR for vascular surgery     History of Illness:  Luis Cleaning is a 80 y o  female who presents with acute abdominal pain  She has a history of mesenteric ischemia and is s/p the following procedures:   --8/17/15 SMA stent placement x2 (Ekta)   --1/1/17 Perforated Duodenal ulcer repair Angelica [place]) with balloon PTA of SMA stent (Doctor)  She presents today with abdominal pain since 11 AM  She had a scheduled EGD at [address] [place] [address] and while she was there she had a bright red BM, at which point she was transferred to the ED> She had an elevated lactate to 5  CTA was performed which showed SMA stent  Occlusion  She was urgently transferred to [facility] for IR intervention    Upon arrival she states her abdominal pain has continued to worsen   Her ABG on arrival showed a PH of 7 1 and a BE of -15      ED Vital Signs:   ED Triage Vitals   Temperature Pulse Respirations Blood Pressure SpO2   01/29/19 2226 01/29/19 2103 01/29/19 2103 01/29/19 2103 01/29/19 2103   (!) 96 3 °F (35 7 °C) 77 18 (!) 212/91 93 %      Temp Source Heart Rate Source Patient Position - Orthostatic VS BP Location FiO2 (%)   01/29/19 2226 01/29/19 2103 01/29/19 2103 01/29/19 2103 --   Tympanic Monitor Lying Left arm       Pain Score       01/29/19 2103       9        Wt Readings from Last 1 Encounters:   01/29/19 38 kg (83 lb 12 4 oz)     Vital Signs (abnormal):   01/30/19 0600   95 7 °F (35 4 °C)  92   23  --  118/42  72 mmHg  99 %  --   01/30/19 0500   93 9 °F (34 4 °C)  102  20  --  98/42  62 mmHg  99 %  --   01/30/19 0400  --   106  16  --  148/54  92 mmHg  94 %  --   01/30/19 0347  --  96  --  --  76/34  50 mmHg   88 %  --   01/30/19 0345  --  96  --  --  78/34  52 mmHg   79 %  --   01/30/19 0341  --  96  --  --  82/30  52 mmHg   81 %  --   01/30/19 0339  --  90  --  --  86/34  56 mmHg   81 %  --   01/30/19 0316  --  76  15   126/45  --  --  97 %  --   01/29/19 2230  --  74  22   212/88  --  --  97 %  Nasal cannula   01/29/19 2226   96 3 °F (35 7 °C)  --  --  --  --  --  --  --   01/29/19 2130  --  72  14   178/73  --  --  97 %  Nasal cannula   01/29/19 2103  --  77  18   212/91  --  --  93 %  None (Room air)     Pertinent Labs:   1/29 1/30    pH, Arterial 7 138 7 382 7 398   PH ART TC   7 426   PCO2 (TC) Arterial   35 9   pCO2, Arterial 37 4 45 5 39 0   pO2, Arterial 98 1 85 6 134 7   HCO3, Arterial 12 4 26 4 23 5   Base Excess, Arterial -15 7 1 1 -1 1   O2 Content, Arterial 14 5 13 7 15 6   O2 HGB,Arterial 92 9 94 0 96 9      1/30     Sodium 157    153 152   Chloride 113 111 110   Anion Gap 17 18 18   BUN 33 34 35   AST 5,031     ALT 2,584     Total Protein 3 8     Albumin 2 5     TOTAL BILIRUBIN 1 30     Phosphorus 6 1  5 4     LACTIC ACID 7 6, 13 8, 10 5  SERUM OSMO 329  WBC 15 74  H/H 9 5/30 7  PLATELETS 084  PT/INR 39 7/4 09  PTT 84  IONIZED MIGUEL 1 05    Diagnostic Test Results:     1/29 Xray obstruction series - Paucity of bowel gas throughout the abdomen and pelvis with no evidence for pneumoperitoneum  Mild gastric distention  Emphysema  1/29 CT abd, pelvis w/o contrast - 1  Sigmoid diverticulosis without acute diverticulitis or bowel obstruction  No wall thickening, pneumatosis intestinalis, or portal venous gas to suggest mesenteric ischemia  2   Fecal impaction at the rectum  3   Gaseous distention of the stomach with no wall thickening  1/29 CTA abd, pelvis - 1  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  Tiny collateral arteries arising from the aorta in the region of the celiac are present, though distal vessels are not well opacified  JARROD not visualized  Evaluation of bowel wall is limited on the current study, but the appearance of the vessels would certainly predispose to ischemic enterocolitis if the patient has elevated lactate and abdominal pain  2   Heterogeneous striated enhancement of the kidneys could relate to ostial renal arterial atheromatous disease, multifocal embolic infarcts, pyelonephritis, tubular necrosis, or hypotension  Correlate with urinalysis and renal function testing  3   Question distal esophageal ring/web versus patulous vestibule  Correlate for dysphasia symptoms  Correlate for history of systemic sclerosis     1/30 IR visceral angiography/intervention - Successful recanalization of the occluded SMA with angioplasty and origin stenting  The SMA is now patent  1/30 CXR - Pulmonary abnormalities likely representing pulmonary edema      1/29 ECG - Sinus with nonspecific ST and T wave changes      ED Treatment:   Medication Administration from 01/29/2019 2025 to 01/29/2019 2336       Date/Time Order Dose Route Action     01/29/2019 2116 sodium chloride 0 9 % infusion 100 mL/hr Intravenous New Bag     01/29/2019 2225 heparin (porcine) 25,000 units in 250 mL infusion (premix) 18 Units/kg/hr Intravenous Rate/Dose Change     01/29/2019 2202 heparin (porcine) 25,000 units in 250 mL infusion (premix) 12 Units/kg/hr Intravenous New Bag     01/29/2019 2333 clindamycin (CLEOCIN) IVPB (premix) 900 mg   Intravenous MAR Hold        Past Medical/Surgical History:    Active Ambulatory Problems     Diagnosis Date Noted    Acid reflux     Periodontal disease     Stewart's esophagus     PAD (peripheral artery disease) (HCC)     Perforated duodenal ulcer ([facility] ) 01/01/2017    Chronic mesenteric ischemia (HCC) 01/01/2017    Pleural effusion, bilateral 01/17/2017    Hypoalbuminemia due to protein-calorie malnutrition ([address] ) 01/17/2017    Stenosis of right internal carotid artery 06/11/2018    Essential hypertension 07/30/2018    Left renal artery stenosis ([facility] ) 07/30/2018    Renovascular hypertension 07/30/2018    Generalized abdominal pain 10/01/2018    Weight loss, unintentional 10/01/2018    Left ureteral stone 10/01/2018    Chronic anemia 10/02/2018    Leukocytosis 10/02/2018    New onset atrial fibrillation (HCC) 10/06/2018    Moderate to severe mitral regurgitation 11/27/2018    Dilated cardiomyopathy ([address] ) 11/27/2018    GI bleed 01/29/2019    Lactic acidosis 01/29/2019    GROVER (acute kidney injury) ([facility] ) 01/29/2019     Resolved Ambulatory Problems     Diagnosis Date Noted    Peripheral vascular disease ([facility] )     Bilateral cellulitis of lower leg 01/17/2017    Elevated brain natriuretic peptide (BNP) level 01/17/2017    Hypertensive urgency 06/11/2018    Acute on chronic combined systolic and diastolic heart failure ([address] ) 06/11/2018    Nephrolithiasis 10/01/2018    Hyponatremia 10/01/2018    Acute kidney injury superimposed on chronic kidney disease ([address] ) 10/02/2018    Shock liver 10/03/2018    UTI (urinary tract infection) 10/03/2018    Electrolyte imbalance 10/05/2018     Past Medical History:   Diagnosis Date    Acid reflux     Arthritis     Stewart's esophagus     COPD (chronic obstructive pulmonary disease) ([facility])     Duodenal ulcer     Hypertension     Kidney stone 10/01/2018    PAD (peripheral artery disease) ([facility])     Periodontal disease     Peripheral vascular disease ([facility] [address] )     Scoliosis     Wears glasses      Admitting Diagnosis: Chronic diastolic congestive heart failure ([facility]) [I50 32]  Mesenteric ischemia ([facility]) [K55 9]  Known medical problems [Z78 9]  Age/Sex: 80 y o  female     Assessment/Plan:   79 yo F with chronic mesenteric ischemia s/p stenting, now with acute abdominal pain and CT evidence of stent stenosis   Abdominal exam and labs concerning for acute bowel ischemia      Plan:  OR emergently for ex lap, possible bowel resection, SMA exploration vs angioplasty  Discussed risks w/ patient and her , consent obtained  Will remain intubated to ICU post op    Admission Orders:  Scheduled Meds:   Current Facility-Administered Medications:  calcium gluconate 3 g Intravenous Once Last Rate: 3 g (01/30/19 0819)   chlorhexidine 15 mL [employer] Spit Q12H [address]    clopidogrel 75 mg Oral Daily    fentaNYL 30 mcg/hr Intravenous Continuous Last Rate: 30 mcg/hr (01/30/19 0658)   heparin (porcine) 3-20 Units/kg/hr (Order-Specific) Intravenous Titrated Last Rate: 18 Units/kg/hr (01/29/19 2345)   HYDROmorphone 0 2 mg Intravenous Q3H PRN    magnesium sulfate 2 g Intravenous Once    multi-electrolyte 100 mL/hr Intravenous Continuous Last Rate: 100 mL/hr (01/30/19 0725)   pantoprazole 40 mg Intravenous Q12H [address]    propofol 5-50 mcg/kg/min Intravenous Titrated Last Rate: Stopped (01/30/19 0720)   vasopressin (PITRESSIN) in 0 9 % sodium chloride 100 mL 0 04 Units/min Intravenous Continuous Last Rate: Stopped (01/30/19 0400)     Continuous Infusions:   fentaNYL 30 mcg/hr Last Rate: 30 mcg/hr (01/30/19 0658)   heparin (porcine) 3-20 Units/kg/hr (Order-Specific) Last Rate: 18 Units/kg/hr (01/29/19 2813)   multi-electrolyte 100 mL/hr Last Rate: 100 mL/hr (01/30/19 2839)     Norepinephrine  titrating  Stopped 1/30 @ 0902   propofol 5-50 mcg/kg/min Last Rate: Stopped (01/30/19 0720)   vasopressin (PITRESSIN) in 0 9 % sodium chloride 100 mL 0 04 Units/min Last Rate: Stopped (01/30/19 0400)     PRN Meds: HYDROmorphone    MICU critical care   Mechanical ventilation   SCDs  Diet NPO   Hourly neuro checks   Vitals q 4 hr   Complete bedrest   BMP q 2 hr   Cons Surgery, Critical Care    1/29 Vascular Surgery Consult   79 y/o F w/ hx of chronic mesenteric ischemia, now p/w abdominal pain, occluded SMA stent on CTA     Plan:  --Urgent IR consult for SMA angioplasty  --STAT repeat lactate, ABG  --Heparin VTE gtt, hold for 6 hours post-procedure  --Acute Care Surgery consult for possible ischemic bowel  --Critical Care Surgery consult for SICU monitoring post-IR procedure  --Medicine consult for hx of CHF, EF 40%, COPD, HTN in setting of acute-on-chronic mesenteric ischemia  __________________________  1/29 Operative Report   Surgeon(s) and Role:  Panel 1:     * Dewayne Hernandez MD - Primary     * Davy Gaona MD - Assisting     * Carmen Ca MD - Assisting     Panel 2:     * Florentino De La Cruz MD - Primary     * Katrin Palomares MD - Assisting     Procedure(s) (LRB):  LAPAROTOMY EXPLORATORY, resection of jejunum, (N/A)  abdominal aortagram, SMA arteriogram, SMA angioplasty and SMA stenting (N/A)  APPLICATION VAC DRESSING ABDOMEN (ABTHERA) (N/A)     Operative Findings:  Severely dusky bowel upon initial entry  Resection approx 80 cm small bowel     Anesthesia: General   _______________________  1/30 Critical Care Progress Note  Events since OR:  Patient returned to ICU hypothermic (92 degrees) and hypoxemic w/ sats around 80  Patient warmed and FiO2 increased to 100 and PEEP to 8  2amps bicarb and 1g calcium gluconate given   First hour urine output was 30ml then dumped 300ml in a few minutes likely 2/2 lasix given at the end of the OR  Patient became hypotensive  Sedation weaned, levo started and isolyte given wide open with improvement  Levo quickly weaned off  Patient continues with large volume urine output about 700 in last 4 hours  Vent settings decreased         Network Utilization Review Department  Phone: [phone]; Fax [phone]  [email or web address]  ATTENTION: Please call with any questions or concerns to [phone]  and carefully listen to the prompts so that you are directed to the right person  Send all requests for admission clinical reviews, approved or denied determinations and any other requests to fax [phone]   All voicemails are confidential

## 2019-01-30 NOTE — UTILIZATION REVIEW
Initial Clinical Review    Admission: Date/Time/Statement: 1/29/19 @ 1845   Orders Placed This Encounter   Procedures    Place in Observation (expected length of stay for this patient is less than two midnights)     Standing Status:   Standing     Number of Occurrences:   1     Order Specific Question:   Admitting Physician     Answer:   Mahalia Aase [[id number]]     Order Specific Question:   Level of Care     Answer:   Med Surg [[id number]]    Inpatient Admission     Standing Status:   Standing     Number of Occurrences:   1     Order Specific Question:   Admitting Physician     Answer:   Mahalia Aase [[id number]]     Order Specific Question:   Level of Care     Answer:   Med Surg [16]     Order Specific Question:   Estimated length of stay     Answer:   More than 2 Midnights     Order Specific Question:   Certification     Answer:   I certify that inpatient services are medically necessary for this patient for a duration of greater than two midnights  See H&P and MD Progress Notes for additional information about the patient's course of treatment  ED: Date/Time/Mode of Arrival:   ED Arrival Information     Expected Arrival Acuity Means of Arrival Escorted By Service Admission Type    - 1/29/2019 14:31 Urgent Wheelchair Other  (nurse) General Medicine Urgent    Arrival Complaint    abdominal pain        Chief Complaint:   Chief Complaint   Patient presents with    Rectal Bleeding     Pt was here for EGD  hx of perforated duodenal ulcer  prior to procedure pt was noted to have severe abdominal pain and small amounts of bright red blood during BM    Hypertension     History of Illness: Luis Cleaning is a 80 y o  female who presents with abdominal pain  Patient was scheduled for EGD today  While there, she developed severe abdominal pain and then had hematochezia  patient with history of prior perforated duodenal ulcer and chronic nonhealing gastric ulcers  She also reports weight loss, anorexia    States that her last colonoscopy was almost 10 years ago  patient states that the abdominal pain is 10/10 in intensity, constant in nature  Patient states that the abdominal pain has been ongoing for years and is intermittent in nature  Patient was therefore sent to the ER   There is tenderness (Generalized)  There is guarding  ED Vital Signs:   ED Triage Vitals   Temperature Pulse Respirations Blood Pressure SpO2   01/29/19 1541 01/29/19 1435 01/29/19 1435 01/29/19 1435 01/29/19 1435   (!) 96 8 °F (36 °C) 82 18 (!) 238/124 98 %      Temp Source Heart Rate Source Patient Position - Orthostatic VS BP Location FiO2 (%)   01/29/19 1850 01/29/19 1435 01/29/19 1435 01/29/19 1435 --   Temporal Monitor Sitting Right arm       Pain Score       01/29/19 1435       Worst Possible Pain        Wt Readings from Last 1 Encounters:   01/29/19 38 kg (83 lb 12 4 oz)     Pertinent Labs/Diagnostic Test Results: LACTIC ACID 5 1 TOTAL BILI 1 10 DIRECT BILI 0 50  WBC Thousand/uL 19 14*   HEMOGLOBIN g/dL 11 4*     PLATELETS Thousands/uL 458*   NEUTROS PCT % 84*   LYMPHS PCT % 10*     CHLORIDE mmol/L 98*   BUN mg/dL 29*   CREATININE mg/dL 1 42*     INR   1 23*   Ct abdomen pelvis Sigmoid diverticulosis without acute diverticulitis or bowel obstruction  No wall thickening, pneumatosis intestinalis, or portal venous gas to suggest mesenteric ischemia  2   Fecal impaction at the rectum  3   Gaseous distention of the stomach with no wall thickening  XRAY ABDOMEN OBSTRUCTION SERIES Paucity of bowel gas throughout the abdomen and pelvis with no evidence for pneumoperitoneum  Mild gastric distention  Emphysema          ED Treatment:   Medication Administration from 01/29/2019 1431 to 01/29/2019 1758       Date/Time Order Dose Route Action Action by Comments     01/29/2019 1448 morphine (PF) 4 mg/mL injection 4 mg 4 mg Intravenous Given Laura Ley RN      01/29/2019 1448 ondansetron (ZOFRAN) injection 4 mg 4 mg Intravenous Given Laura Ley RN 01/29/2019 1448 pantoprazole (PROTONIX) injection 40 mg 40 mg Intravenous Given Yousif Rodney RN      01/29/2019 1519 HYDROmorphone (DILAUDID) injection 1 mg 1 mg Intravenous Given Yousif Rodney, RN      01/29/2019 1740 sodium chloride 0 9 % bolus 1,000 mL 0 mL Intravenous Stopped Yousif Rodney RN      01/29/2019 1608 sodium chloride 0 9 % bolus 1,000 mL 1,000 mL Intravenous New Bag Yousif Rodney RN      01/29/2019 1610 HYDROmorphone (DILAUDID) injection 1 mg 1 mg Intravenous Not Given Yousif Rodney RN      01/29/2019 1607 Labetalol HCl (NORMODYNE) injection 10 mg 10 mg Intravenous Given Yousif Rodney RN      01/29/2019 1606 morphine (PF) 4 mg/mL injection 4 mg 4 mg Intravenous Given Yousif Rodney RN         Past Medical/Surgical History:    Active Ambulatory Problems     Diagnosis Date Noted    Acid reflux     Periodontal disease     Stewart's esophagus     PAD (peripheral artery disease) (HCC)     [facility] 01/01/2017    Chronic mesenteric ischemia (HCC) 01/01/2017    Pleural effusion, bilateral 01/17/2017    Hypoalbuminemia due to protein-calorie malnutrition ([facility] ) 01/17/2017    Stenosis of right internal carotid artery 06/11/2018    Essential hypertension 07/30/2018    Left renal artery stenosis ([facility] ) 07/30/2018    Renovascular hypertension 07/30/2018    Generalized abdominal pain 10/01/2018    Weight loss, unintentional 10/01/2018    Left ureteral stone 10/01/2018    Chronic anemia 10/02/2018    Leukocytosis 10/02/2018    New onset atrial fibrillation ([facility] ) 10/06/2018    Moderate to severe mitral regurgitation 11/27/2018    Dilated cardiomyopathy ([facility] ) 11/27/2018    Lactic acidosis 01/29/2019    Acute mesenteric ischemia ([facility] ) 01/30/2019    Mesenteric ischemia [facility] 01/29/2019     Resolved Ambulatory Problems     Diagnosis Date Noted    Peripheral vascular disease ([facility]     Bilateral cellulitis of lower leg 01/17/2017    Elevated brain natriuretic peptide (BNP) level 01/17/2017    Hypertensive urgency 06/11/2018    Acute on chronic combined systolic and diastolic heart failure ([facility] ) 06/11/2018    Nephrolithiasis 10/01/2018    Hyponatremia 10/01/2018    Acute kidney injury superimposed on chronic kidney disease ([facility] ) 10/02/2018    Shock liver 10/03/2018    UTI (urinary tract infection) 10/03/2018    Electrolyte imbalance 10/05/2018     Past Medical History:   Diagnosis Date    Acid reflux     Arthritis     Stewart's esophagus     COPD (chronic obstructive pulmonary disease) (HCC)     Duodenal ulcer     Hypertension     Kidney stone 10/01/2018    PAD (peripheral artery disease) (HCC)     Periodontal disease     [facility]     Scoliosis     Wears glasses      Admitting Diagnosis: Lactic acidosis [E87 2]  Rectal bleeding [K62 5]  Abdominal pain [R10 9]  Age/Sex: 80 y o  female  Assessment/Plan:   Generalized abdominal pain   Assessment & Plan     Admit patient for further management  Patient does report chronic, intermittent abdominal pain for years"  CT abdomen/pelvis done without contrast in the ER showed fecal impaction but no pneumatosis intestinalis or portal vein gas or other findings to suggest mesenteric ischemia  Lactic acid done in the ER was elevated and repeat lab work showed worsening lactic acidosis despite getting fluid bolus  Doubtful sepsis  Patient will now get CTA abdomen/pelvis for further evaluation  Patient understands risks of getting contrast with her Elicia  Keep patient NPO for now      GI bleed   Assessment & Plan     Patient with hematochezia while awaiting EGD  H/H is stable  Repeat H/H later today and again in a m  Consult GI  Keep patient NPO  IV Protonix b i d  Plan is for EGD tomorrow if no acute abdominal pathology      ELICIA (acute kidney injury) ([facility] )   Assessment & Plan     Patient's creatinine noted to be elevated compared to prior values    Likely pre renal in nature + Aldactone use at home  place patient on IV fluids and get repeat lab work in a m       Leukocytosis   Assessment & Plan     Unclear etiology  UA is pending  Repeat lab work in a m  And follow up on CTA abdomen/pelvis as noted above      Chronic anemia   Assessment & Plan     Patient's H/H is stable  As noted above get repeat lab work later today      Essential hypertension   Assessment & Plan     Patient's blood pressure noted to be elevated in the ER  Likely due to pain  Continue Toprol-XL  Hold losartan, Aldactone due to Elicia      Chronic mesenteric ischemia [facility])   Assessment & Plan     Patient with history of chronic mesentery ischemia status post SMA bypass and stenting in 2015  Plavix was on hold for EGD     DISCHARGE SUMMARY   Patient was seen in the ER with generalized abdominal pain and GI bleed  While in the ER, repeat lactic acid came back further elevated despite fluid bolus  Decision was then made to get a stat CTA abdomen/pelvis for further evaluation  ER physician received a call from Radiology that SMA is occluded  She discussed the case with vascular surgery and patient started on heparin drip and being transferred to [facility]    Refer to H and P for details     EMTALA  Condition Necessitating Transfer      Patient Condition: The patient has been stabilized such that within reasonable medical probability, no material deterioration of the patient condition or the condition of the unborn child(kamron) is likely to result from the transfer     Reason for Transfer: Level of Care needed not available at this facility     Transfer Requirements: [address]   · Space available and qualified personnel available for treatment as acknowledged by    · Agreed to accept transfer and to provide appropriate medical treatment as acknowledged by       Dr Sherman Simpler  Admission Orders:  Scheduled Meds:   Facility-Administered Medications Ordered in Other Encounters:  chlorhexidine 15 mL Swish [employer] Spit Q12H [address] Mendel Stagers, MD    clopidogrel 75 mg Oral Daily Padma Pack MD    fentaNYL 30 mcg/hr Intravenous Continuous Osman Garcia MD Last Rate: 30 mcg/hr (01/30/19 1794)   heparin (porcine) 3-20 Units/kg/hr (Order-Specific) Intravenous Titrated Padma Pack MD Last Rate: 18 Units/kg/hr (01/29/19 8925)   [MAR Hold] HYDROmorphone 0 2 mg Intravenous Q3H PRN Chiquis Cole PA-C    multi-electrolyte 100 mL/hr Intravenous Continuous Osman Garcia MD Last Rate: 100 mL/hr (01/30/19 0725)   [MAR Hold] pantoprazole 40 mg Intravenous Q12H Robb Valencia MD    propofol 5-50 mcg/kg/min Intravenous Titrated Mendel Stagers, MD Last Rate: 20 mcg/kg/min (01/30/19 0840)   vasopressin (PITRESSIN) in 0 9 % sodium chloride 100 mL 0 04 Units/min Intravenous Continuous Osman Garcia MD Last Rate: Stopped (01/30/19 0400)     Continuous Infusions:   No current facility-administered medications for this encounter     PRN Meds:

## 2019-01-30 NOTE — PHYSICAL THERAPY NOTE
PT Cancellation    PT orders received and chart reviewed  Per RN, pt is scheduled to go back to the OR today and is not currently appropriate for PT evaluation at this time  Will follow and see as appropriate       Melly Prieto, PT, DPT

## 2019-01-30 NOTE — ANESTHESIA PROCEDURE NOTES
Arterial Line Insertion  Date/Time: 1/30/2019 1:05 AM  Performed by: Ana Reynolds  Authorized by: Greta TRAN   Consent: Written consent obtained  Risks and benefits: risks, benefits and alternatives were discussed  Consent given by: patient, spouse and power of   Patient understanding: patient states understanding of the procedure being performed  Patient consent: the patient's understanding of the procedure matches consent given  Procedure consent: procedure consent matches procedure scheduled  Relevant documents: relevant documents present and verified  Test results: test results available and properly labeled  Site marked: the operative site was marked  Radiology Images: Radiology Images displayed and confirmed  If images not available, report reviewed  Required items: required blood products, implants, devices, and special equipment available  Patient identity confirmed: arm band  Time out: Immediately prior to procedure a "time out" was called to verify the correct patient, procedure, equipment, support staff and site/side marked as required  Preparation: Patient was prepped and draped in the usual sterile fashion  Indications: multiple ABGs and hemodynamic monitoring  Orientation:  Right  Location: brachial artery  Anesthesia: see MAR for details  Sedation:  Patient sedated: yes  Sedation type: (GA)  Analgesia: see MAR for details  Vitals: Vital signs were monitored during sedation      Procedure Details:  Needle gauge: 20  Number of attempts: 2    Post-procedure:  Post-procedure: dressing applied  Waveform: good waveform  Patient tolerance: Patient tolerated the procedure well with no immediate complications

## 2019-01-30 NOTE — CONSULTS
H&P Exam - Vascular Surgery   Chuy Agee Race 80 y o  female MRN: [id number]  Unit/Bed#: CRB Encounter: [id number]    Assessment/Plan     Assessment:  81 y/o F w/ hx of chronic mesenteric ischemia, now p/w abdominal pain, occluded SMA stent on CTA    Plan:  --Urgent IR consult for SMA angioplasty  --STAT repeat lactate, ABG  --Heparin VTE gtt, hold for 6 hours post-procedure  --Acute Care Surgery consult for possible ischemic bowel  --Critical Care Surgery consult for SICU monitoring post-IR procedure  --Medicine consult for hx of CHF, EF 40%, COPD, HTN in setting of acute-on-chronic mesenteric ischemia    History of Present Illness     HPI:  Mervat Churchill is a 80 y o  female w/ hx of COPD, CHF, HTN, PAD, perforated duodenal ulcer, chronic mesenteric ischemia, placement of SMA stent in 08/2015, balloon PTA of SMA stent in 01/2017, who presents w/ acute abdominal pain, CTA showing occluded SMA stent  Per pt, her abdominal pain began around 11am this morning and progressively worsened throughout the day  She initially presented to [address] [place] [address] earlier today for EGD  While there, she reported bright red bloody bowel movement, which was a new sx  She was then transferred to the [facility] ED, where she was found to have a lactic acid of 4 7, WBC count of 19 with ANC of 16  Was administered fluid bolus, but repeat lactate was again elevated at 5 1  CTA was obtained, showing SMA stent occlusion  She was transferred urgently to [facility] for stent angioplasty with Interventional Radiology  Currently, pt c/o 10/10 diffuse abdominal pain, similar to when she had prior SMA stent occlusion in 2017  Denies any N/V, or further episodes of hematochezia  Takes Plavix 75mg at home  Started on VTE Heparin gtt prior to transfer from [facility]  Review of Systems   Constitutional: Negative for activity change, appetite change, chills, diaphoresis, fatigue, fever and unexpected weight change     HENT: Negative for congestion, ear discharge, ear pain, facial swelling, hearing loss, nosebleeds, postnasal drip, rhinorrhea, sinus pain, sinus pressure, sneezing, sore throat, tinnitus, trouble swallowing and voice change  Eyes: Negative for photophobia, pain, discharge, redness, itching and visual disturbance  Respiratory: Negative for apnea, cough, choking, chest tightness, shortness of breath, wheezing and stridor  Cardiovascular: Negative for chest pain, palpitations and leg swelling  Gastrointestinal: Positive for abdominal pain and blood in stool  Negative for abdominal distention, anal bleeding, constipation, diarrhea, nausea, rectal pain and vomiting  Endocrine: Negative for cold intolerance, heat intolerance, polydipsia, polyphagia and polyuria  Genitourinary: Negative for decreased urine volume, difficulty urinating, dysuria, enuresis, flank pain, frequency, genital sores, hematuria and urgency  Musculoskeletal: Negative for arthralgias, back pain, gait problem, joint swelling, myalgias, neck pain and neck stiffness  Skin: Negative for color change, pallor, rash and wound  Allergic/Immunologic: Negative for environmental allergies, food allergies and immunocompromised state  Neurological: Negative for dizziness, tremors, seizures, syncope, facial asymmetry, speech difficulty, weakness, light-headedness, numbness and headaches  Hematological: Negative for adenopathy  Does not bruise/bleed easily  Psychiatric/Behavioral: Negative for agitation, behavioral problems, confusion, decreased concentration, dysphoric mood, hallucinations, self-injury, sleep disturbance and suicidal ideas  The patient is not nervous/anxious and is not hyperactive          Historical Information   Past Medical History:   Diagnosis Date    Acid reflux     Arthritis     Stewart's esophagus     COPD (chronic obstructive pulmonary disease) (HCC)     Duodenal ulcer     Hypertension     Kidney stone 10/01/2018    [facility]peripheral [facility] disease) [facility] [address] [facility]     Periodontal disease     controlled    Peripheral vascular disease ([facility] [address] [facility]     Scoliosis     Wears glasses     for reading     Past Surgical History:   Procedure Laterality Date    ANAL FISSURECTOMY      AORTA - SUPERIOR MESENTERIC ARTERY BYPASS GRAFT Bilateral     BREAST BIOPSY Right     CATARACT EXTRACTION Right     COLONOSCOPY      ESOPHAGOGASTRODUODENOSCOPY N/A 10/5/2018    Procedure: ESOPHAGOGASTRODUODENOSCOPY (EGD); Surgeon: Ama Lopez MD;  Location: [address] [place] [address];  Service: Gastroenterology    FEMORAL ARTERY STENT Bilateral     HEMORROIDECTOMY      HYSTERECTOMY      partial-ovaries remain    INSERTION STENT ARTERY Left 1/1/2017    Procedure:  mesenteric arteriogram, balloon angioplasty of SMA stent;  Surgeon: Anthony Mosher MD;  Location: [facility];  Service:    Earna Angela LAPAROTOMY N/A 1/1/2017    Procedure: LAPAROTOMY EXPLORATORY,  abdominal washout, repair of duodenal ulcer;  Surgeon: Keron Hou MD;  Location:  MAIN OR;  Service:     HI CYSTO/URETERO W/LITHOTRIPSY &INDWELL STENT INSRT Left 10/2/2018    Procedure: CYSTOSCOPY URETEROSCOPY WITH LITHOTRIPSY HOLMIUM LASER, RETROGRADE PYELOGRAM AND INSERTION STENT URETERAL;  Surgeon: Latia Peter MD;  Location: [address] [place] [address];  Service: Urology    HI EGD TRANSORAL BIOPSY SINGLE/MULTIPLE N/A 11/28/2018    Procedure: ESOPHAGOGASTRODUODENOSCOPY (EGD); Surgeon: Ama Lopez MD;  Location: [facility] GI [facility]; Service: Gastroenterology     [facility] [address] CATARACT EXTRACAP,INSERT LENS Right 1/11/2016    Procedure: EXTRACTION INTRACAPSULAR CATARACT PHACO INTRAOCULAR LENS (IOL);   Surgeon: Alejandra Mckee MD;  Location: [facility] MAIN OR;  Service: Ophthalmology    TONSILECTOMY AND ADNOIDECTOMY      TONSILLECTOMY       Social History   History   Alcohol Use    3 0 oz/week    5 Glasses of wine per week     History   Drug Use No     History   Smoking Status    Former Smoker    Packs/day: 1 00    Years: 25 00    Quit date: 1985   Smokeless Tobacco    Never Used     Comment: smoked for 25 years  Family History:   Family History   Problem Relation Age of Onset    Hypertension Mother     Other Father         PVD?  Heart disease Father         MI       Meds/Allergies   PTA meds:   Prior to Admission Medications   Prescriptions Last Dose Informant Patient Reported? Taking?    Coenzyme Q10 (CO Q 10 PO)   Yes Yes   Sig: Take 200 mg by mouth every morning   Multiple Vitamin (MULTIVITAMIN) capsule  Self Yes Yes   Sig: Take 1 capsule by mouth every morning     VITAMIN D, ERGOCALCIFEROL, PO  Self Yes Yes   Sig: Take 2,000 Units by mouth every morning     clopidogrel (PLAVIX) 75 mg tablet  Self No Yes   Sig: Take 1 tablet (75 mg total) by mouth daily   Patient taking differently: Take 75 mg by mouth every morning     isosorbide mononitrate (IMDUR) 30 mg 24 hr tablet  Self No Yes   Sig: Take 1 tablet (30 mg total) by mouth daily for 30 days   Patient taking differently: Take 30 mg by mouth every morning     losartan (COZAAR) 50 mg tablet  Self No Yes   Sig: Take 1 tablet by mouth daily for 30 days   Patient taking differently: Take 50 mg by mouth every morning     metoprolol succinate (TOPROL-XL) 100 mg 24 hr tablet  Self Yes Yes   Sig: Take 100 mg by mouth daily at bedtime   pantoprazole (PROTONIX) 40 mg tablet  Self Yes Yes   Sig: Take 40 mg by mouth 2 (two) times a day     spironolactone (ALDACTONE) 25 mg tablet  Self No Yes   Sig: Take 1 tablet (25 mg total) by mouth daily   Patient taking differently: Take 25 mg by mouth every morning        Facility-Administered Medications: None     Allergies   Allergen Reactions    Amiodarone Other (See Comments)     Legs swollen and itching    Chlorthalidone Itching    Penicillins Itching     Tolerates zosyn    Sulfa Antibiotics Rash    Bacitracin Rash    Ciprofloxacin GI Intolerance       Objective   First Vitals:   Blood Pressure: (!) 212/91 (01/29/19 2103)  Pulse: 77 (01/29/19 2103)  Respirations: 18 (01/29/19 2103)  SpO2: 93 % (01/29/19 2103)    Current Vitals:   Blood Pressure: (!) 212/91 (01/29/19 2103)  Pulse: 77 (01/29/19 2103)  Respirations: 18 (01/29/19 2103)  SpO2: 93 % (01/29/19 2103)    No intake or output data in the 24 hours ending 01/29/19 2116    Invasive Devices     Peripheral Intravenous Line            Peripheral IV 01/29/19 Right Antecubital less than 1 day          Drain            Ureteral Drain/Stent Left ureter 6 Fr  119 days                Physical Exam   Constitutional: She is oriented to person, place, and time  She appears well-developed and well-nourished  No distress  HENT:   Head: Normocephalic and atraumatic  Eyes: Pupils are equal, round, and reactive to light  Neck: Normal range of motion  Neck supple  Cardiovascular: Normal rate, regular rhythm and normal heart sounds  Exam reveals no gallop and no friction rub  No murmur heard  Pulses:       Radial pulses are 2+ on the right side, and 2+ on the left side  Femoral pulses are 0 on the right side, and 0 on the left side  Dorsalis pedis pulses are 0 on the right side, and 0 on the left side  Posterior tibial pulses are 0 on the right side, and 0 on the left side  Pulse exam:  Bilateral nondopplerable DP/PT   Pulmonary/Chest: Effort normal and breath sounds normal  No respiratory distress  She has no wheezes  She has no rales  She exhibits no tenderness  Abdominal: Soft  Bowel sounds are normal  She exhibits no distension and no mass  There is tenderness  There is no rebound and no guarding  Musculoskeletal: Normal range of motion  Bilateral lower extremities cool to touch    Neurological: She is alert and oriented to person, place, and time  Skin: Skin is warm and dry  No rash noted  She is not diaphoretic  No erythema  Psychiatric: She has a normal mood and affect   Her behavior is normal  Judgment and thought content normal        Lab Results:   CBC:   Lab Results   Component Value Date WBC 19 14 (H) 01/29/2019    HGB 11 4 (L) 01/29/2019    HCT 39 3 01/29/2019    MCV 82 01/29/2019     (H) 01/29/2019    MCH 23 8 (L) 01/29/2019    MCHC 29 0 (L) 01/29/2019    RDW 21 0 (H) 01/29/2019    MPV 8 9 01/29/2019    NRBC 0 01/29/2019   , CMP:   Lab Results   Component Value Date    SODIUM 136 01/29/2019    K 4 6 01/29/2019    CL 98 (L) 01/29/2019    CO2 21 01/29/2019    BUN 29 (H) 01/29/2019    CREATININE 1 42 (H) 01/29/2019    CALCIUM 9 9 01/29/2019    AST 40 01/29/2019    ALT 34 01/29/2019    ALKPHOS 115 01/29/2019    EGFR 33 01/29/2019   , Coagulation:   Lab Results   Component Value Date    INR 1 23 (H) 01/29/2019   , Urinalysis: No results found for: Cate Stain, SPECGRAV, PHUR, LEUKOCYTESUR, NITRITE, PROTEINUA, GLUCOSEU, KETONESU, BILIRUBINUR, BLOODU, Amylase: No results found for: AMYLASE, Lipase:   Lab Results   Component Value Date    LIPASE 372 01/29/2019     Imaging:     IR consult    (Results Pending)     Code Status: Prior  Advance Directive and Living Will:      Power of :    POLST:

## 2019-01-30 NOTE — INTERVAL H&P NOTE
Update: (This section must be completed if the H&P was completed greater than 24 hrs to procedure or admission)    H&P reviewed  After examining the patient, I find no changed to the H&P since it had been written  Patient re-evaluated   Accept as history and physical     Carla Salazar MD/January 30, 2019/2:39 AM

## 2019-01-30 NOTE — ASSESSMENT & PLAN NOTE
Unclear etiology  UA is pending  Repeat lab work in a m   And follow up on CTA abdomen/pelvis as noted above

## 2019-01-30 NOTE — H&P (VIEW-ONLY)
History and Physical - [facility] Internal Medicine    Patient Information: Heena Yuan Race 80 y o  female MRN: [id number]  Unit/Bed#: [address] [place] [address] Encounter: [id number]  Admitting Physician: Adriana Sampson DO  PCP: León Terry MD  Date of Admission:  01/29/19        Hospital Problem List:     Principal Problem:    Generalized abdominal pain  Active Problems:    GI bleed    GROVER (acute kidney injury) ([facility] [address] )    Chronic mesenteric ischemia (HCC)    Essential hypertension    Chronic anemia    Leukocytosis      Assessment/Plan:    * Generalized abdominal pain   Assessment & Plan    Admit patient for further management  Patient does report chronic, intermittent abdominal pain for years"  CT abdomen/pelvis done without contrast in the ER showed fecal impaction but no pneumatosis intestinalis or portal vein gas or other findings to suggest mesenteric ischemia  Lactic acid done in the ER was elevated and repeat lab work showed worsening lactic acidosis despite getting fluid bolus  Doubtful sepsis  Patient will now get CTA abdomen/pelvis for further evaluation  Patient understands risks of getting contrast with her Grover  Keep patient NPO for now     GI bleed   Assessment & Plan    Patient with hematochezia while awaiting EGD  H/H is stable  Repeat H/H later today and again in a m  Consult GI  Keep patient NPO  IV Protonix b i d  Plan is for EGD tomorrow if no acute abdominal pathology     GROVER (acute kidney injury) ([facility] [address] )   Assessment & Plan    Patient's creatinine noted to be elevated compared to prior values  Likely pre renal in nature + Aldactone use at home  place patient on IV fluids and get repeat lab work in a m  Leukocytosis   Assessment & Plan    Unclear etiology  UA is pending  Repeat lab work in a m  And follow up on CTA abdomen/pelvis as noted above     Chronic anemia   Assessment & Plan    Patient's H/H is stable    As noted above get repeat lab work later today     Essential hypertension   Assessment & Plan    Patient's blood pressure noted to be elevated in the ER  Likely due to pain  Continue Toprol-XL  Hold losartan, Aldactone due to Elicia     Chronic mesenteric ischemia [facility])   Assessment & Plan    Patient with history of chronic mesentery ischemia status post SMA bypass and stenting in 2015  Plavix was on hold for EGD             VTE Prophylaxis: Pharmacologic VTE Prophylaxis contraindicated due to GI bleed  / sequential compression device   Code Status: Level 1 - Full Code    Anticipated Length of Stay:  Patient will be admitted on an Inpatient basis with an anticipated length of stay of at least 2 midnights  Justification for Hospital Stay:  Abdominal pain, GI bleed, Elicia    Total Time for Visit, including Counseling / Coordination of Care: 45 minutes  Greater than 50% of this total time spent on direct patient counseling and coordination of care  Chief Complaint:     Rectal Bleeding (Pt was here for EGD  hx of perforated duodenal ulcer  prior to procedure pt was noted to have severe abdominal pain and small amounts of bright red blood during BM) and Hypertension    of Present Illness:    James Dye is a 80 y o  female who presents with abdominal pain  Patient was scheduled for EGD today  While there, she developed severe abdominal pain and then had hematochezia  patient with history of prior perforated duodenal ulcer and chronic nonhealing gastric ulcers  She also reports weight loss, anorexia  States that her last colonoscopy was almost 10 years ago  patient states that the abdominal pain is 10/10 in intensity, constant in nature  Patient states that the abdominal pain has been ongoing for years and is intermittent in nature  Patient was therefore sent to the ER    Review of Systems:    Review of Systems   Constitutional: Positive for unexpected weight change  Negative for chills and fever  HENT: Negative for congestion, sore throat and trouble swallowing      Eyes: Negative for photophobia and visual disturbance  Respiratory: Negative for cough, chest tightness and shortness of breath  Cardiovascular: Negative for chest pain and leg swelling  Gastrointestinal: Positive for abdominal pain and anal bleeding  Negative for diarrhea and vomiting  Genitourinary: Negative for dysuria, frequency and hematuria  Musculoskeletal: Negative for back pain  Skin: Negative for wound  Neurological: Negative for dizziness, syncope, speech difficulty, light-headedness and headaches  Hematological: Does not bruise/bleed easily  Psychiatric/Behavioral: Negative for agitation  Past Medical and Surgical History:     Past Medical History:   Diagnosis Date    Acid reflux     Arthritis     Stewart's esophagus     COPD [facility]chronic obstructive pulmonary disease) ([facility] )     Duodenal ulcer     Hypertension     Kidney stone 10/01/2018    PAD (peripheral artery disease) ([place] [address] )     Periodontal disease     controlled    Peripheral vascular disease ([place] [address] )     Scoliosis     Wears glasses     for reading       Past Surgical History:   Procedure Laterality Date    ANAL FISSURECTOMY      AORTA - SUPERIOR MESENTERIC ARTERY BYPASS GRAFT Bilateral     BREAST BIOPSY Right     CATARACT EXTRACTION Right     COLONOSCOPY      ESOPHAGOGASTRODUODENOSCOPY N/A 10/5/2018    Procedure: ESOPHAGOGASTRODUODENOSCOPY (EGD);   Surgeon: Ama Lopez MD;  Location: [address] [place] [address];  Service: Gastroenterology    FEMORAL ARTERY STENT Bilateral     HEMORROIDECTOMY      HYSTERECTOMY      partial-ovaries remain    INSERTION STENT ARTERY Left 1/1/2017    Procedure:  mesenteric arteriogram, balloon angioplasty of SMA stent;  Surgeon: Anthony Mosher MD;  Location: [facility];  Service:    Chris Miller LAPAROTOMY N/A 1/1/2017    Procedure: LAPAROTOMY EXPLORATORY,  abdominal washout, repair of duodenal ulcer;  Surgeon: Keron Hou MD;  Location: [facility];  Service:     NM CYSTO/URETERO W/LITHOTRIPSY &INDWELL STENT INSRT Left 10/2/2018    Procedure: CYSTOSCOPY URETEROSCOPY WITH LITHOTRIPSY HOLMIUM LASER, RETROGRADE PYELOGRAM AND INSERTION STENT URETERAL;  Surgeon: Babita Bedolla MD;  Location: [address] [place] [address];  Service: Urology    CA EGD TRANSORAL BIOPSY SINGLE/MULTIPLE N/A 11/28/2018    Procedure: ESOPHAGOGASTRODUODENOSCOPY (EGD); Surgeon: John Denis MD;  Location: [facility]; Service: Gastroenterology     [address] CATARACT EXTRACAP,INSERT LENS Right 1/11/2016    Procedure: EXTRACTION INTRACAPSULAR CATARACT PHACO INTRAOCULAR LENS (IOL); Surgeon: Bereket German MD;  Location: [facility] MA[facility] OR;  Service: Ophthalmology    TONSILECTOMY AND ADNOIDECTOMY      TONSILLECTOMY         Meds/Allergies:    PTA meds:   Prior to Admission Medications   Prescriptions Last Dose Informant Patient Reported? Taking?    Coenzyme Q10 (CO Q 10 PO)   Yes No   Sig: Take 200 mg by mouth every morning   Multiple Vitamin (MULTIVITAMIN) capsule  Self Yes No   Sig: Take 1 capsule by mouth every morning     VITAMIN D, ERGOCALCIFEROL, PO  Self Yes No   Sig: Take 2,000 Units by mouth every morning     clopidogrel (PLAVIX) 75 mg tablet  Self No No   Sig: Take 1 tablet (75 mg total) by mouth daily   Patient taking differently: Take 75 mg by mouth every morning     isosorbide mononitrate (IMDUR) 30 mg 24 hr tablet  Self No No   Sig: Take 1 tablet (30 mg total) by mouth daily for 30 days   Patient taking differently: Take 30 mg by mouth every morning     losartan (COZAAR) 50 mg tablet  Self No No   Sig: Take 1 tablet by mouth daily for 30 days   Patient taking differently: Take 50 mg by mouth every morning     metoprolol succinate (TOPROL-XL) 100 mg 24 hr tablet  Self Yes No   Sig: Take 100 mg by mouth daily at bedtime   pantoprazole (PROTONIX) 40 mg tablet  Self Yes No   Sig: Take 40 mg by mouth 2 (two) times a day     spironolactone (ALDACTONE) 25 mg tablet  Self No No   Sig: Take 1 tablet (25 mg total) by mouth daily   Patient taking differently: Take 25 mg by mouth every morning        Facility-Administered Medications: None       Allergies: Allergies   Allergen Reactions    Amiodarone Other (See Comments)     Legs swollen and itching    Chlorthalidone Itching    Penicillins Itching     Tolerates zosyn    Sulfa Antibiotics Rash    Bacitracin Rash    Ciprofloxacin GI Intolerance     History:     Marital Status: /Civil Union     Substance Use History:   History   Alcohol Use    3 0 oz/week    5 Glasses of wine per week     History   Smoking Status    Former Smoker    Packs/day: 1 00    Years: 25 00    Quit date: 1985   Smokeless Tobacco    Never Used     Comment: smoked for 25 years  History   Drug Use No       Family History:    Family History   Problem Relation Age of Onset    Hypertension Mother     Other Father         PVD?  Heart disease Father         MI       Physical Exam:     Vitals:   Blood Pressure: (!) 200/84 (01/29/19 1850)  Pulse: 81 (01/29/19 1850)  Temperature: (!) 96 3 °F (35 7 °C) (01/29/19 1850)  Temp Source: Temporal (01/29/19 1850)  Respirations: 18 (01/29/19 1850)  SpO2: 95 % (01/29/19 1850)    Physical Exam   Constitutional: She is oriented to person, place, and time  No distress  Thin female   HENT:   Head: Normocephalic and atraumatic  Eyes: EOM are normal  Right eye exhibits no discharge  Left eye exhibits no discharge  No scleral icterus  Neck: Neck supple  Cardiovascular: Normal rate and regular rhythm  Pulmonary/Chest: Effort normal  No respiratory distress  She has no wheezes  She has no rales  Decreased breath sounds   Abdominal: Soft  She exhibits no distension  There is tenderness (Generalized)  There is guarding  Musculoskeletal: She exhibits no edema  Neurological: She is alert and oriented to person, place, and time  No cranial nerve deficit  Skin: Skin is dry  She is not diaphoretic  Lab Results: I have personally reviewed pertinent reports          Results from last 7 days  Lab Units 01/29/19  1507   WBC Thousand/uL 19 14*   HEMOGLOBIN g/dL 11 4*   HEMATOCRIT % 39 3   PLATELETS Thousands/uL 458*   NEUTROS PCT % 84*   LYMPHS PCT % 10*   MONOS PCT % 5   EOS PCT % 0       Results from last 7 days  Lab Units 01/29/19  1507   POTASSIUM mmol/L 4 6   CHLORIDE mmol/L 98*   CO2 mmol/L 21   BUN mg/dL 29*   CREATININE mg/dL 1 42*   CALCIUM mg/dL 9 9   ALK PHOS U/L 115   ALT U/L 34   AST U/L 40       Results from last 7 days  Lab Units 01/29/19  1507   INR  1 23*       Imaging: I have personally reviewed pertinent reports  Ct Abdomen Pelvis Wo Contrast    Result Date: 1/29/2019  Narrative: CT ABDOMEN AND PELVIS WITHOUT IV CONTRAST INDICATION:   abdominal pain  Suspect mesenteric ischemia  COMPARISON:  CT abdomen pelvis 10/1/2018 TECHNIQUE:  CT examination of the abdomen and pelvis was performed without intravenous contrast   Axial, sagittal, and coronal 2D reformatted images were created from the source data and submitted for interpretation  Radiation dose length product (DLP) for this visit:  356 75 mGy-cm   This examination, like all CT scans performed in the [facility], was performed utilizing techniques to minimize radiation dose exposure, including the use of iterative  reconstruction and automated exposure control  Enteric contrast was not administered  FINDINGS: ABDOMEN LOWER CHEST:  No clinically significant abnormality identified in the visualized lower chest  LIVER/BILIARY TREE:  Unremarkable  GALLBLADDER:  No calcified gallstones  No pericholecystic inflammatory change  SPLEEN:  Unremarkable  PANCREAS:  Unremarkable  ADRENAL GLANDS:  Unremarkable  KIDNEYS/URETERS:  Unremarkable  No hydronephrosis  STOMACH AND BOWEL:  Sigmoid diverticulosis identified without acute diverticulitis or bowel obstruction    There is no evidence for pneumatosis intestinalis throughout the bowel to suggest acute ischemia with abundant fecal debris in the rectum concerning  for impaction  Gaseous distention of the stomach is noted with no abnormal wall thickening  APPENDIX:  No findings to suggest appendicitis  ABDOMINOPELVIC CAVITY:  No ascites or free intraperitoneal air  No lymphadenopathy  VESSELS:  Atherosclerotic changes are present  No evidence of aneurysm  Intravascular wall stents within the SMA and bilateral iliac arteries again noted  PELVIS REPRODUCTIVE ORGANS:  Patient is status post hysterectomy  A tiny right adnexal cyst is a stable, benign finding  URINARY BLADDER:  Unremarkable  ABDOMINAL WALL/INGUINAL REGIONS:  Unremarkable  OSSEOUS STRUCTURES:  No acute fracture or destructive osseous lesion  Impression: 1  Sigmoid diverticulosis without acute diverticulitis or bowel obstruction  No wall thickening, pneumatosis intestinalis, or portal venous gas to suggest mesenteric ischemia  2   Fecal impaction at the rectum  3   Gaseous distention of the stomach with no wall thickening  Workstation performed: [id number]     Xr Abdomen Obstruction Series    Result Date: 1/29/2019  Narrative: OBSTRUCTION SERIES INDICATION:   free air  COMPARISON:  Chest series 10/1/2018 EXAM PERFORMED/VIEWS:  XR ABDOMEN OBSTRUCTION SERIES Images: 3 FINDINGS: Mild gastric distention noted with a paucity of bowel gas throughout the abdomen and pelvis  There is fecal debris in the rectum  No free air beneath the hemidiaphragms  Atherosclerotic calcifications in the splenic artery noted with a vascular stent in the SMA  Osseous structures are unremarkable  Bilateral iliac artery stents are noted  Examination of the chest reveals a prominent cardiomediastinal silhouette  Lungs are hyperinflated but clear  Impression: Paucity of bowel gas throughout the abdomen and pelvis with no evidence for pneumoperitoneum  Mild gastric distention  Emphysema  Workstation performed: [id number]       CT abdomen pelvis wo contrast   Final Result         1    Sigmoid diverticulosis without acute diverticulitis or bowel obstruction  No wall thickening, pneumatosis intestinalis, or portal venous gas to suggest mesenteric ischemia  2   Fecal impaction at the rectum  3   Gaseous distention of the stomach with no wall thickening  Workstation performed: [id number]         XR abdomen obstruction series   Final Result      Paucity of bowel gas throughout the abdomen and pelvis with no evidence for pneumoperitoneum  Mild gastric distention  Emphysema  Workstation performed: [id number]         CTA abdomen pelvis w wo contrast    (Results Pending)       EKG, Pathology, and Other Studies Reviewed on Admission:   · No EKG done    Allscripts/EPIC Records Reviewed: Yes     ** Please Note: "This note has been constructed using a voice recognition system  Therefore there may be syntax, spelling, and/or grammatical errors   Please call if you have any questions  "**

## 2019-01-30 NOTE — PROGRESS NOTES
S: Anjum eHnnessy is a 80 y o  female who returns to the ICU s/p 2nd abdominal wash out and partial small bowel resection  EBL 25 cc     O:     Vitals:    01/30/19 1200   BP:    Pulse: 86   Resp:    Temp: (!) 97 2 °F (36 2 °C)   SpO2: 97%       General: Resting comfortably, nad, non-toxic appearing  HEENT: normocephalic, atraumatic, perrla, neck supple, no tracheal deviation, mmm  Cardiac: rrr, no m/r/g, 2+ radial and DP pulses b/l  Pulm: lungs cta, no w/r/r, intubated w vent settings AC 14/400/60/5  Abd: soft, mildly tender midline, non-distended, wound vac present midline  : ward placed w adequate urine output  MSK: equal arom of ue and le  Neuro: gcs e1, v1, m 5, s/p anesthesia   Skin: lines c/d/i    A: Returned from OR s/p 2nd wash out with additional resection of small bowel due to mesenteric ischemia, VS stable, patient is s/p anesthesia unable to perform an accurate neuro exam     P: As stated this morning, in addition will continue to monitor patient post-operatively, attempt weaning vent o2 to 40%, attempt daily SBT and wean sedation as able  General surgery following, plan for OR tomorrow  Repeat CBC & BMP with morning labs  Continue fentanyl drip for pain management, PRN dilaudid

## 2019-01-30 NOTE — MALNUTRITION/BMI
This medical record reflects one or more clinical indicators suggestive of malnutrition  Malnutrition Findings:   Malnutrition type: Chronic illness (In the setting of 10% (4 2 kg) wt loss in 2 months since 11/28/18 (42 2 kg) noted in Encounter; orbital hollow look to suggest body fat loss & temples slight depresion & clavicle protrusion to suggest muscle mass loss; treated with pending nutr support )  Degree of Malnutrition: Other severe protein calorie malnutrition  Malnutrition Characteristics: Muscle loss, Fat loss    BMI Findings:  BMI Classifications: Underweight < 18 5     Body mass index is 15 83 kg/m²  See Nutrition note dated 1/30/2019 for additional details  Completed nutrition assessment is viewable in the nutrition documentation

## 2019-01-30 NOTE — PROGRESS NOTES
tomi updated about findings from surgery and need to take more small intestine  Told that we will go back to OR tomorrow afternoon to attempt to restore continuity  He may come and visit patient this evening

## 2019-01-30 NOTE — NURSING NOTE
Patient discharged to [facility] ER for evaluation of SMA occlusion  Transported by ALS transport team  Heparin gtt infusing and all belongings sent with patient

## 2019-01-30 NOTE — ASSESSMENT & PLAN NOTE
Patient's blood pressure noted to be elevated in the ER  Likely due to pain  Continue Toprol-XL    Hold losartan, Aldactone due to Elicia

## 2019-01-30 NOTE — DISCHARGE SUMMARY
Patient was seen in the ER with generalized abdominal pain and GI bleed  While in the ER, repeat lactic acid came back further elevated despite fluid bolus  Decision was then made to get a stat CTA abdomen/pelvis for further evaluation  ER physician received a call from Radiology that SMA is occluded  She discussed the case with vascular surgery and patient started on heparin drip and being transferred to [facility]    Refer to ANDREA baird P for details

## 2019-01-30 NOTE — PROGRESS NOTES
Progress Note - Vascular Surgery   William Chain Race 80 y o  female MRN: [id number]  Unit/Bed#: MICU 03 Encounter: [id number]    Assessment:  81 y/o F w/ hx of chronic mesenteric ischemia, p/w occluded SMA stent on CTA, s/p ex-lap, SBR, SMA angioplasty and stent, Abthera VAC closure    Plan:  --Return to OR w/ Acute Care Surgery today for Second Look Laparotomy  --Continue Sedation/Analgesia, [ressor support PRN  --Heparin VTE gtt  --Continue Plavix  --Serial exams of LUE, ensure no hematoma at L brachial IR access site  --Critical Care needs per SICU team    Subjective/Objective     Subjective: This morning, pt stable, off pressors  Objective:     Blood pressure (!) 126/45, pulse 76, temperature (!) 96 3 °F (35 7 °C), temperature source Tympanic, resp  rate 15, SpO2 94 %  ,There is no height or weight on file to calculate BMI  Intake/Output Summary (Last 24 hours) at 01/30/19 0459  Last data filed at 01/30/19 0305   Gross per 24 hour   Intake             3700 ml   Output              550 ml   Net             3150 ml       Invasive Devices     Peripheral Intravenous Line            Peripheral IV 01/29/19 Right Antecubital less than 1 day    Peripheral IV 01/29/19 Right;Dorsal (posterior) Forearm less than 1 day    Peripheral IV 01/30/19 Left Forearm less than 1 day          Arterial Line            Arterial Line 01/30/19 Right Brachial less than 1 day          Line            Venous Sheath Other (Comment) Right Femoral less than 1 day          Drain            Ureteral Drain/Stent Left ureter 6 Fr  119 days    NG/OG/Enteral Tube Orogastric 18 Fr Center mouth less than 1 day    Negative Pressure Wound Therapy (V A C ) Abdomen Mid less than 1 day    Urethral Catheter Latex 16 Fr  less than 1 day          Airway            ETT  Hi-Lo; Cuffed;Oral 7 mm less than 1 day                Physical Exam:    GEN: NAD  HEENT: MMM  CV: RRR  Lung: normal effort, intubated   Ab: Soft, ND, open abdomen w/ Abthera VAC in place  Extrem: No CCE  Neuro:  A+Ox3, motor and sensation grossly intact      Lab, Imaging and other studies:  CBC:   Lab Results   Component Value Date    WBC 19 14 (H) 01/29/2019    HGB 8 8 (L) 01/30/2019    HCT 26 (L) 01/30/2019    MCV 82 01/29/2019     (H) 01/29/2019    MCH 23 8 (L) 01/29/2019    MCHC 29 0 (L) 01/29/2019    RDW 21 0 (H) 01/29/2019    MPV 8 9 01/29/2019    NRBC 0 01/29/2019   , CMP:   Lab Results   Component Value Date    SODIUM 157 (H) 01/30/2019    K 3 5 01/30/2019     (H) 01/30/2019    CO2 27 01/30/2019    CO2 20 (L) 01/30/2019    BUN 33 (H) 01/30/2019    CREATININE 1 17 01/30/2019    GLUCOSE 144 (H) 01/30/2019    CALCIUM 7 7 (L) 01/30/2019    AST 5,031 (H) 01/30/2019    ALT 2,584 (H) 01/30/2019    ALKPHOS 49 01/30/2019    EGFR 42 01/30/2019   , Coagulation:   Lab Results   Component Value Date    INR 4 09 (H) 01/30/2019   , Urinalysis: No results found for: Cate Stain, SPECGRAV, PHUR, LEUKOCYTESUR, NITRITE, PROTEINUA, GLUCOSEU, KETONESU, BILIRUBINUR, BLOODU, Amylase: No results found for: AMYLASE, Lipase:   Lab Results   Component Value Date    LIPASE 372 01/29/2019     VTE Pharmacologic Prophylaxis: Heparin gtt  VTE Mechanical Prophylaxis: sequential compression device

## 2019-01-30 NOTE — ANESTHESIA POSTPROCEDURE EVALUATION
Post-Op Assessment Note      CV Status:  Stable    Mental Status:  Somnolent (sedated)    Hydration Status:  Euvolemic    PONV Controlled:  Controlled    Airway Patency:  Patent (ett )    Post Op Vitals Reviewed: Yes          Staff: CRNA       Comments: ett in situ    Christina Garcia propofol gtt   icu rn received pt          BP (!) 126/45 (01/30/19 0316)    Temp     Pulse 76 (01/30/19 0316)   Resp 15 (01/30/19 0316)    SpO2 97 % (01/30/19 0316)

## 2019-01-30 NOTE — RESPIRATORY THERAPY NOTE
RT Ventilator Management Note  Juarez Semen Race 80 y o  female MRN: [id number]  [facility] Encounter: [id number]      Daily Screen     No data found  Physical Exam:   Respiratory Pattern: Normal, Assisted  Chest Assessment: Chest expansion symmetrical  Bilateral Breath Sounds: Clear  Suction: ET Tube  O2 Device: vent      Resp Comments: vent changes as ordred, 14/400/100%/+8, will con't to monitor

## 2019-01-30 NOTE — ASSESSMENT & PLAN NOTE
Patient with history of chronic mesentery ischemia status post SMA bypass and stenting in 2015  Plavix was on hold for EGD

## 2019-01-30 NOTE — BRIEF OP NOTE (RAD/CATH)
SMA Angioplasty and Stenting -  Procedure Note    PATIENT NAME: Alber Amador  : 9/10/1930  MRN: [id number]     Pre-op Diagnosis:   1  Chronic diastolic congestive heart failure ([facility] [address] )    2  Mesenteric ischemia (HCC)      Post-op Diagnosis:   1  Chronic diastolic congestive heart failure ([facility] [address] )    2  Mesenteric ischemia [facility])        Surgeon:   Michele Siegel MD  Assistants:     No qualified resident was available, Resident is only observing    Estimated Blood Loss: minimal  Findings: Successful recanalization of the occluded SMA with angioplasty and origin stenting  The SMA is now patent      Specimens: none    Complications:  None immediate    Anesthesia: General    Michele Siegel MD     Date: 2019  Time: 2:40 AM

## 2019-01-30 NOTE — OCCUPATIONAL THERAPY NOTE
OT CANCEL NOTE;    Pt is currently intubated and unable to participate in OT evaluation  OT will continue to follow and evaluate as appropriate

## 2019-01-30 NOTE — ANESTHESIA PREPROCEDURE EVALUATION
Review of Systems/Medical History  Patient summary reviewed  Chart reviewed      Cardiovascular  EKG reviewed, Hypertension , Dysrhythmias , atrial fibrillation,    Pulmonary  COPD ,        GI/Hepatic    PUD, GERD ,        Kidney stones,        Endo/Other  Negative endo/other ROS      GYN       Hematology  Anemia ,     Musculoskeletal    Arthritis     Neurology  Negative neurology ROS      Psychology   Negative psychology ROS              Physical Exam    Airway  Comment: intubated           Dental       Cardiovascular  Cardiovascular exam normal    Pulmonary  Pulmonary exam normal     Other Findings        Anesthesia Plan  ASA Score- 4     Anesthesia Type- general with ASA Monitors  Additional Monitors:   Airway Plan: ETT  Plan Factors-  Patient did not smoke on day of surgery  Induction- intravenous  Postoperative Plan-     Informed Consent- Anesthetic plan and risks discussed with patient  I personally reviewed this patient with the CRNA  Discussed and agreed on the Anesthesia Plan with the CRNA  Nancy Cha

## 2019-01-30 NOTE — ANESTHESIA PROCEDURE NOTES
Arterial Line Insertion  Date/Time: 1/30/2019 12:13 AM  Performed by: Elpidio Carter  Authorized by: Vinay TRAN   Consent: Written consent obtained  Risks and benefits: risks, benefits and alternatives were discussed  Consent given by: patient, power of  and spouse  Patient understanding: patient states understanding of the procedure being performed  Patient consent: the patient's understanding of the procedure matches consent given  Procedure consent: procedure consent matches procedure scheduled  Relevant documents: relevant documents present and verified  Test results: test results available and properly labeled  Site marked: the operative site was marked  Radiology Images: Radiology Images displayed and confirmed  If images not available, report reviewed  Required items: required blood products, implants, devices, and special equipment available  Patient identity confirmed: arm band  Time out: Immediately prior to procedure a "time out" was called to verify the correct patient, procedure, equipment, support staff and site/side marked as required  Preparation: Patient was prepped and draped in the usual sterile fashion  Indications: multiple ABGs and hemodynamic monitoring  Orientation:  Left  Location: brachial artery  Anesthesia: see MAR for details  Sedation:  Patient sedated: yes  Sedation type: (GA)  Vitals: Vital signs were monitored during sedation      Procedure Details:  Needle gauge: 20  Number of attempts: 1    Post-procedure:  Post-procedure: dressing applied  Waveform: good waveform  Patient tolerance: Patient tolerated the procedure well with no immediate complications

## 2019-01-30 NOTE — RESPIRATORY THERAPY NOTE
RT Ventilator Management Note  Ian Demopolis Race 80 y o  female MRN: [id number]  Unit/Bed#: [facility] 03 Encounter: [id number]      Daily Screen     No data found  Physical Exam:   Assessment Type: Assess only  General Appearance: Sedated  Respiratory Pattern: Assisted, Normal  Chest Assessment: Chest expansion symmetrical  Bilateral Breath Sounds: Clear  R Breath Sounds: Clear  L Breath Sounds: Clear  Cough: None  Suction: ET Tube  O2 Device: ventilator      Resp Comments: Pt tolerating current vent settings and appears to be resting comfortably  No vent changes at this time  Will continue to monitor and assess per respiratory protocol

## 2019-01-30 NOTE — OP NOTE
OPERATIVE REPORT  PATIENT NAME: Jose Enrique Amador    :  9/10/1930  MRN: [id number]  Pt Location: [facility] HYBRID OR ROOM 02    SURGERY DATE: 2019 - 2019    Surgeon(s) and Role:  Panel 1:     * Damon Warren MD - Primary     * Cecilia Barragan MD - Ino Armando MD - Assisting    Panel 2:     * Alexandre Canchola MD - Primary     * Chandan Wheatley MD - Assisting    Preop Diagnosis:  * No pre-op diagnosis entered *    * No Diagnosis Codes entered *    Procedure(s) (LRB):  LAPAROTOMY EXPLORATORY, resection of jejunum, (N/A)  abdominal aortagram, SMA arteriogram, SMA angioplasty and SMA stenting (N/A)  APPLICATION VAC DRESSING ABDOMEN (ABTHERA) (N/A)    Specimen(s):  ID Type Source Tests Collected by Time Destination   1 : jejunum Tissue Small Bowel, NOS TISSUE EXAM Damon Warren MD 2019 0033        Estimated Blood Loss:   100 mL    Drains:  Negative Pressure Wound Therapy (V A C ) Abdomen Mid (Active)   Unit Type Abthera 2019  2:26 AM   Blue foam- # applied 3 2019  2:26 AM   Cycle Continuous 2019  2:26 AM   Target Pressure (mmHg) 125 2019  2:26 AM   Canister Changed Yes 2019  2:26 AM   Dressing Status Clean;Dry; Intact 2019  2:26 AM   Number of days: 0       NG/OG/Enteral Tube Orogastric 18 Fr Center mouth (Active)   Number of days: 0       Urethral Catheter Latex 16 Fr  (Active)   Number of days: 0       Ureteral Drain/Stent Left ureter 6 Fr  (Active)   Number of days: 120       Anesthesia Type:   General    Operative Indications:  * Acute SMA thrombosis with mesenteric ischemia*      Operative Findings:  Severely dusky bowel upon initial entry  Resection approx 80 cm small bowel    Complications:   None    Procedure and Technique:    After informed consent was obtained explaining the risks/benefits/alternatives of the procedure, the patient was taken to the OR  General anesthesia was induced and the patient was prepped and draped in the usual sterile fashion   A time out was performed to verify correct site and procedure  A midline incision was made along the scar from her previous exploratory laparotomy  The dissection was carried out with Bovie cautery down the midline, through the linea alba  The peritoneum was incised with Metzenbaum scissors and opened  It was noted that even the skin and fascia were not bleeding upon incision  Upon entry to the abdominal cavity, we immediately noted a foul odor, and the majority of the small bowel appeared to be dark purple and extremely dusky  Approximately 80 cm of the darkness portion of the bowel was resected with a EUGENIO stapler  The mesentery was dissected using EnSeal cautery  There were some adhesions in the right lower quadrant which were lysed  The distal small bowel and colon appeared viable  At this point, we returned the small bowel to the abdominal cavity, covered with a moist lap pad, and turned the case over to our interventional Radiology colleagues, this portion of the procedure is dictated separately  After IR had completed the SMA angioplasty and stent placement, the surgical team re scrubbed and reinspected the abdomen  We were able to immediately see that the remaining small bowel had returned to a near normal  pinkish color  We again ran the bowel from the ligament of Treitz to the ileocecal valve  All remaining bowel appeared viable  There was approximately 240 cm remaining We then returned the bowel to the abdominal cavity and applied an ABThera VAC  The patient was taken intubated to the ICU/    Dr Lisa Hu was present and scrubbed through the entire case       Patient Disposition:  PACU  and Critical Care Unit    SIGNATURE: Mary Moore MD  DATE: January 30, 2019  TIME: 3:17 AM

## 2019-01-30 NOTE — ASSESSMENT & PLAN NOTE
Patient's creatinine noted to be elevated compared to prior values  Likely pre renal in nature + Aldactone use at home  place patient on IV fluids and get repeat lab work in a m

## 2019-01-30 NOTE — OP NOTE
OPERATIVE REPORT  PATIENT NAME: William Aamdor    :  9/10/1930  MRN: [id number]  Pt Location: [facility] OR ROOM 03    SURGERY DATE: 2019    Surgeon(s) and Role:     * Fernandez Saeed DO - Primary     * Saida Peguero MD - Assisting     * Gabriel Kruger MD - Assisting    Preop Diagnosis:  Mesenteric ischemia ([address] ) [K55 9]    Post-Op Diagnosis Codes:     * Mesenteric ischemia ([address] ) [K55 9]    Procedure(s) (LRB):  LAPAROTOMY EXPLORATORY 2nd look, resection of approx  40 cm of distal small bowel, abthera vac (N/A)    Specimen(s):  ID Type Source Tests Collected by Time Destination   1 : distal small bowel Tissue Small Bowel, NOS TISSUE EXAM Fernandez Saeed DO 2019 1416        Estimated Blood Loss:   25 mL    Drains:  Negative Pressure Wound Therapy (V A C ) Abdomen Mid (Active)   Unit Type abthera 2019  4:00 PM   Blue foam- # applied 3 2019  2:26 PM   Cycle Continuous 2019  4:00 PM   Target Pressure (mmHg) 125 2019  4:00 PM   Canister Changed No 2019  4:00 PM   Dressing Status Clean;Dry; Intact 2019  4:00 PM   Blue foam- # removed 3 2019  1:49 PM   Output (mL) 100 mL 2019  8:00 AM   Number of days: 0       NG/OG/Enteral Tube Orogastric 18 Fr Center mouth (Active)   Placement Reverification Auscultation 2019  4:00 PM   Site Assessment Clean;Dry; Intact 2019  4:00 PM   Status Suction-low continuous 2019  4:00 PM   Drainage Appearance Dark red 2019  8:00 AM   Output (mL) 200 mL 2019  8:00 AM   Number of days: 0       Urethral Catheter Latex 16 Fr   (Active)   Reasons to continue Urinary Catheter  Accurate I&O assessment in critically ill patients (48 hr  max) 2019  7:32 AM   Site Assessment Clean;Skin intact 2019  8:00 AM   Collection Container Standard drainage bag 2019  8:00 AM   Securement Method Securing device (Describe) 2019  8:00 AM   Output (mL) 115 mL 2019  4:00 PM   Number of days: 0       Ureteral Drain/Stent Left ureter 6 Fr  (Active)   Number of days: 120       Anesthesia Type:   General    Operative Indications:  Mesenteric ischemia ([address] ) [K55 9]      Operative Findings: There was approximately 40 cm of ischemic small bowel  This area of ischemia was located approximately 20 cm from the ileocecal valve    Complications:   None    Procedure and Technique:  Patient was identified in preoperative holding then brought the operating suite and placed under general anesthesia by the anesthesia department  The existing ABThera VAC was removed in its entirety  Her abdomen was then prepped and draped in usual sterile fashion  The abdomen was then fully explored  The proximal small bowel was all viable  The distal small bowel, approximately 20 cm from the ileocecal valve in a proximal direction, approximately 40 cm was somewhat ischemic and because of this it was felt that we should not reanastomose her small bowel today  It was decided this 40 cm would be resected  A small hole was made in the mesentery using a hemostat and then a GI stapler was placed across the small bowel and fired  The mesentery was then divided using 0 silk sutures  The specimen was handed off field  The abdomen was copiously irrigated and a new ABThera VAC was then placed and hooked to suction  There was a good seal   Patient tolerated procedure well  Our plan will be to return to the operating room in 24 hr to re-evaluate the bowel and possibly reanastomose her  She went back to the intensive care unit in critical condition  I was present for the procedure    All needle, instrument, and sponge counts were correct at the end the procedure    Patient Disposition:  Critical Care Unit    SIGNATURE: Fernandez Saeed DO  DATE: January 30, 2019  TIME: 4:54 PM

## 2019-01-30 NOTE — SOCIAL WORK
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

## 2019-01-30 NOTE — PROGRESS NOTES
Progress Note - Critical Care   Charis Mcguire Race 80 y o  female MRN: [id number]  Unit/Bed#: [facility] 03 Encounter: [id number]    Assessment: 88yF w/ occluded SMA stent w/ dead bowel s/p small bowel resection and stent angioplasty    Events since OR:  Patient returned to ICU hypothermic (92 degrees) and hypoxemic w/ sats around 80  Patient warmed and FiO2 increased to 100 and PEEP to 8  2amps bicarb and 1g calcium gluconate given  First hour urine output was 30ml then dumped 300ml in a few minutes likely 2/2 lasix given at the end of the OR  Patient became hypotensive  Sedation weaned, levo started and isolyte given wide open with improvement  Levo quickly weaned off  Patient continues with large volume urine output about 700 in last 4 hours  Vent settings decreased  Plan:          Neuro:  Continue w/ balancing sedation with hypotension  Propofol and fentanyl  CV:   CHF, HTN, PAD, carotid stenosis, pAfib  Recent echo (10/3/18) w/ 40% ED and moderate diffuse hypokinesis  - Home meds of losartan, imdur, lopressor and spironolactone held   - Goal MAP >65  Levo ready if needed  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   - s/p stent angioplasty 1/29-1/30 AM by IR w/ simultaneous exlap and small bowel resection ~80cm   - Plavix (had previously been on this) to continue   - ACS heparin   - Lactate 13 8 in setting of shock liver w/ AST 5000, ALT 2500    - 2nd look exlap today                 Lung:   History of COPD not on nebulizers per chart review  Intubated on PS -60-6   - ABG at 6AM 7 43 - 36 - 135 - 23 5 - BE -1 1   - Vent settings decreased from 80% and PEEP 8   - Continue intubation through OR today                 GI:   Shock liver  NPO  Small bowel resection 80cm  History of gastric AVM    - NGT w/ dark output ~100ml   - Continue Protonix 40 BID                 FEN:   Since OR given 2L Iso bolus for HoTN   Suspect still volume deplete - patient had open abdomen, abdominal VAC and large volume urine output following lasix given by anesthesia for frothy sputum   - Baseline [email or web address] after bolus done running  Hypernatremia of 157 at 4AM   - Suspect hypovolemic hyponatremia    - Recheck at 6:30 is 153   - Goal correction 2-3 mEq/Lh correction w/ max of 12mEq/L/day   - Recheck in another 2 hours  Hypomag 1 7   - Given 2g  Hypocalcemia 1 05   - Given 1d calcium chloride  Hypokalemia 3 5   - Given 20 IV K  Hyperphosphatemia 6 1 at 4AM   - Recheck                  :   Following urine output    - 450ml during OR  Since OR for last 4 hours has been 725ml  - Holding home spironolactone                 ID:   No issues                 Heme:   Hgb 9 5 from 11 4 after receiving 2U in OR   - Trend Hgb  Coagulopathy   - On heparin ACS for SMA stent  Also PT-INR 4 1 from     - Observe PT-INR, follow PTT for Hep ACS                 Endo:   No acute issues                            Msk/Skin:  Abdominal VAC in place                 Disposition: ICU    Chief Complaint: None    HPI/24hr events: As above    Physical Exam:     Neuro: Follows commands  11T  CV: Rate controlled afib, MAP ~65 not on pressor  Pulm: intubated on PS, no distress  Abd: Soft, tender, VAC in place w/ 400ml light serosang drainage      Vitals:    19 2226 19 2230 19 0316 19 0400   BP:  (!) 212/88 (!) 126/45    BP Location:  Left arm     Pulse:  74 76    Resp:  22 15    Temp: (!) 96 3 °F (35 7 °C)      TempSrc: Tympanic      SpO2:  97% 97% 94%             Temperature:   Temp (24hrs), Av 7 °F (35 9 °C), Min:96 3 °F (35 7 °C), Max:97 4 °F (36 3 °C)    Current: Temperature: (!) 96 3 °F (35 7 °C)    Weights: There is no height or weight on file to calculate BMI    Weight (last 2 days)     None          Hemodynamic Monitoring:  N/A     Non-Invasive/Invasive Ventilation Settings:  Respiratory    Lab Data (Last 4 hours)       0352  0542          pH, Arterial       7 382          7 398 pCO2, Arterial       (!)45 5          39 0          pO2, Arterial       85 6          (!)134 7          HCO3, Arterial       26 4          23 5          Base Excess, Arterial       1 1          -1 1               O2/Vent Data       01/30 0316 01/30 0400   Most Recent       Vent Mode AC/VC AC/VC  AC/VC     Resp Rate (BPM) (BPM) 14 14  14     Vt (mL) (mL) 400 400  400     FIO2 (%) (%) 80 100  100     PEEP (cmH2O) (cmH2O) 5 8  8     MV 6 36 6 33  6 33               Lab Results   Component Value Date    PHART 7 398 01/30/2019    [id number] 39 0 01/30/2019    PO2ART 134 7 (H) 01/30/2019    [id number] 23 5 01/30/2019    BEART -1 1 01/30/2019    SOURCE Line, Arterial 01/30/2019     SpO2: SpO2: 96 %    Intake and Outputs:  I/O       01/28 0701 - 01/29 0700 01/29 0701 - 01/30 0700    I V   2000    Blood  700    IV Piggyback  1000    Total Intake   3700    Urine  1175    Blood  100    Total Output   1275    Net   +2425              Nutrition:        Diet Orders            Start     Ordered    01/30/19 0320  Diet NPO  Diet effective now     Question Answer Comment   Diet Type NPO    RD to adjust diet per protocol? Yes        01/30/19 0319        Labs:     Results from last 7 days  Lab Units 01/30/19  0351 01/30/19 0219 01/30/19 0128  01/29/19  1507   WBC Thousand/uL 15 74*  --   --   --  19 14*   HEMOGLOBIN g/dL 9 5*  --   --   --  11 4*   I STAT HEMOGLOBIN g/dl  --  8 8* 7 1*  < >  --    HEMATOCRIT % 30 7*  --   --   --  39 3   HEMATOCRIT, ISTAT %  --  26* 21*  < >  --    PLATELETS Thousands/uL 144*  --   --   --  458*   NEUTROS PCT % 87*  --   --   --  84*   MONOS PCT % 5  --   --   --  5   < > = values in this interval not displayed     Results from last 7 days  Lab Units 01/30/19  0352 01/30/19  0219 01/30/19  0128 01/30/19  0040  01/29/19  1507   SODIUM mmol/L 157*  --   --   --   --  136   POTASSIUM mmol/L 3 5  --   --   --   --  4 6   CHLORIDE mmol/L 113*  --   --   --   --  98*   CO2 mmol/L 27  --   --   --   --  21 CO2, I-STAT mmol/L  --  20* 22 21  < >  --    BUN mg/dL 33*  --   --   --   --  29*   CREATININE mg/dL 1 17  --   --   --   --  1 42*   CALCIUM mg/dL 7 7*  --   --   --   --  9 9   ALK PHOS U/L 49  --   --   --   --  115   ALT U/L 2,584*  --   --   --   --  34   AST U/L 5,031*  --   --   --   --  40   GLUCOSE, ISTAT mg/dl  --  144* 158* 259*  < >  --    < > = values in this interval not displayed  Results from last 7 days  Lab Units 01/30/19  0352 01/29/19  1507   MAGNESIUM mg/dL 1 7 2 1       Results from last 7 days  Lab Units 01/30/19  0352   PHOSPHORUS mg/dL 6 1*        Results from last 7 days  Lab Units 01/30/19  0351 01/29/19  1507   INR  4 09* 1 23*   PTT seconds  --  23*       Results from last 7 days  Lab Units 01/30/19  0352   LACTIC ACID mmol/L 13 8*     Allergies:    Allergies   Allergen Reactions    Amiodarone Other (See Comments)     Legs swollen and itching    Chlorthalidone Itching    Penicillins Itching     Tolerates zosyn    Sulfa Antibiotics Rash    Bacitracin Rash    Ciprofloxacin GI Intolerance       Medications:   Scheduled Meds:  Current Facility-Administered Medications:  chlorhexidine 15 mL Swish & Spit Q12H Jeanna Walker MD    clopidogrel 75 mg Oral Daily Adam Ruiz MD    fentaNYL 30 mcg/hr Intravenous Continuous Itzel Montague MD Last Rate: 50 mcg/hr (01/30/19 0430)   heparin (porcine) 3-20 Units/kg/hr (Order-Specific) Intravenous Titrated Adam Ruiz MD Last Rate: 18 Units/kg/hr (01/29/19 2025)   magnesium sulfate 2 g Intravenous Once Itzel Montague MD    multi-electrolyte 100 mL/hr Intravenous Continuous Itzel Montague MD Last Rate: 100 mL/hr (01/30/19 0430)   norepinephrine 1-30 mcg/min Intravenous Titrated Itzel Montague MD Last Rate: Stopped (01/30/19 0452)   propofol 5-50 mcg/kg/min Intravenous Titrated Chanell Schafer MD Last Rate: 30 mcg/kg/min (01/30/19 0604)   vasopressin (PITRESSIN) in 0 9 % sodium chloride 100 mL 0 04 Units/min Intravenous Continuous Prashant Sethi MD Last Rate: Stopped (01/30/19 0400)     Continuous Infusions:  fentaNYL 30 mcg/hr Last Rate: 50 mcg/hr (01/30/19 0430)   heparin (porcine) 3-20 Units/kg/hr (Order-Specific) Last Rate: 18 Units/kg/hr (01/29/19 2345)   multi-electrolyte 100 mL/hr Last Rate: 100 mL/hr (01/30/19 0430)   norepinephrine 1-30 mcg/min Last Rate: Stopped (01/30/19 0452)   propofol 5-50 mcg/kg/min Last Rate: 30 mcg/kg/min (01/30/19 0604)   vasopressin (PITRESSIN) in 0 9 % sodium chloride 100 mL 0 04 Units/min Last Rate: Stopped (01/30/19 0400)     PRN Meds:       VTE Pharmacologic Prophylaxis: Heparin  VTE Mechanical Prophylaxis: sequential compression device    Invasive lines and devices: Invasive Devices     Peripheral Intravenous Line            Peripheral IV 01/29/19 Right Antecubital less than 1 day    Peripheral IV 01/29/19 Right;Dorsal (posterior) Forearm less than 1 day    Peripheral IV 01/30/19 Left Forearm less than 1 day          Arterial Line            Arterial Line 01/30/19 Right Brachial less than 1 day          Line            Venous Sheath Other (Comment) Right Femoral less than 1 day          Drain            Ureteral Drain/Stent Left ureter 6 Fr  119 days    NG/OG/Enteral Tube Orogastric 18 Fr Center mouth less than 1 day    Negative Pressure Wound Therapy (V A C ) Abdomen Mid less than 1 day    Urethral Catheter Latex 16 Fr  less than 1 day          Airway            ETT  Hi-Lo; Cuffed;Oral 7 mm less than 1 day                   Counseling / Coordination of Care  Total Critical Care time spent 45 minutes excluding procedures, teaching and family updates  Code Status: Level 1 - Full Code     Portions of the record may have been created with voice recognition software  Occasional wrong word or "sound a like" substitutions may have occurred due to the inherent limitations of voice recognition software    Read the chart carefully and recognize, using context, where substitutions have occurred       Tory Aguilar MD

## 2019-01-30 NOTE — EMTALA/ACUTE CARE TRANSFER
[address] [facility] [address]  Dept: [phone]      ACUTE CARE TRANSFER CONSENT    NAME Saundra Amador                                         9/10/1930                              MRN [id number]    I have been informed of my rights regarding examination, treatment, and transfer   by Dr Adams Snare att  providers found    Benefits: Specialized equipment and/or services available at the receiving facility (Include comment)________________________    Risks: Potential for delay in receiving treatment      Consent for Transfer:  I acknowledge that my medical condition has been evaluated and explained to me by the treating physician or other qualified medical person and/or my attending physician, who has recommended that I be transferred to the service of  Accepting Physician: Dr Garcia at [address] [place] [address] Name, [facility] : [address]  The above potential benefits of such transfer, the potential risks associated with such transfer, and the probable risks of not being transferred have been explained to me, and I fully understand them  The doctor has explained that, in my case, the benefits of transfer outweigh the risks  I agree to be transferred  I authorize the performance of emergency medical procedures and treatments upon me in both transit and upon arrival at the receiving facility  Additionally, I authorize the release of any and all medical records to the receiving facility and request they be transported with me, if possible  I understand that the safest mode of transportation during a medical emergency is an ambulance and that the Hospital advocates the use of this mode of transport  Risks of traveling to the receiving facility by car, including absence of medical control, life sustaining equipment, such as oxygen, and medical personnel has been explained to me and I fully understand them      (ARON CORRECT BOX BELOW)  [  ]  I consent to the stated transfer and to be transported by ambulance/helicopter  [  ]  I consent to the stated transfer, but refuse transportation by ambulance and accept full responsibility for my transportation by car  I understand the risks of non-ambulance transfers and I exonerate the Hospital and its staff from any deterioration in my condition that results from this refusal     X___________________________________________    DATE  19  TIME________  Signature of patient or legally responsible individual signing on patient behalf           RELATIONSHIP TO PATIENT_________________________          Provider Certification    NAME Jazmine Amador                                         9/10/1930                              MRN [id number]    A medical screening exam was performed on the above named patient  Based on the examination:    Condition Necessitating Transfer     Patient Condition: The patient has been stabilized such that within reasonable medical probability, no material deterioration of the patient condition or the condition of the unborn child(kamron) is likely to result from the transfer    Reason for Transfer: Level of Care needed not available at this facility    Transfer Requirements: [address]   · Space available and qualified personnel available for treatment as acknowledged by    · Agreed to accept transfer and to provide appropriate medical treatment as acknowledged by       Dr Chang Bose  · Appropriate medical records of the examination and treatment of the patient are provided at the time of transfer   [address] _______  · Transfer will be performed by qualified personnel from    and appropriate transfer equipment as required, including the use of necessary and appropriate life support measures      Provider Certification: I have examined the patient and explained the following risks and benefits of being transferred/refusing transfer to the patient/family:  General risk, such as traffic hazards, adverse weather conditions, rough terrain or turbulence, possible failure of equipment (including vehicle or aircraft), or consequences of actions of persons outside the control of the transport personnel      Based on these reasonable risks and benefits to the patient and/or the unborn child(kamron), and based upon the information available at the time of the patients examination, I certify that the medical benefits reasonably to be expected from the provision of appropriate medical treatments at another medical facility outweigh the increasing risks, if any, to the individuals medical condition, and in the case of labor to the unborn child, from effecting the transfer      X____________________________________________ DATE 01/29/19        TIME_______      ORIGINAL - SEND TO MEDICAL RECORDS   COPY - SEND WITH PATIENT DURING TRANSFER

## 2019-01-30 NOTE — CONSULTS
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  She was in [facility] for an EGD and while there had a bloody BM  Lactate 4 7 w/ WBC 19  CTA showing an occlusing of SMA stent for which she was transferred her to IR for stent angioplasty  She was taken emergently to OR and she underwent an emergent exlap  Necrotic small bowel was removed about 80cm  IR then reopened the SMA stent  Bowel visually looked better perfused after this and there was no further resection  2U blood given intraop     History obtained from chart review  Past Medical History:  Past Medical History:   Diagnosis Date    Acid reflux     Arthritis     Stewart's esophagus     [facility] obstructive [facility]) [facility]     Duodenal ulcer     Hypertension     Kidney stone 10/01/2018    PAD (peripheral artery disease) [facility]     Periodontal disease     controlled    Peripheral vascular disease ([facility]     Scoliosis     Wears glasses     for reading        Past Surgical History:  Past Surgical History:   Procedure Laterality Date    ANAL FISSURECTOMY      AORTA - SUPERIOR MESENTERIC ARTERY BYPASS GRAFT Bilateral     BREAST BIOPSY Right     CATARACT EXTRACTION Right     COLONOSCOPY      ESOPHAGOGASTRODUODENOSCOPY N/A 10/5/2018    Procedure: ESOPHAGOGASTRODUODENOSCOPY (EGD);   Surgeon: Isai Moody MD;  Location: [address] [place] [address];  Service: Gastroenterology    FEMORAL ARTERY STENT Bilateral     HEMORROIDECTOMY      HYSTERECTOMY      partial-ovaries remain    INSERTION STENT ARTERY Left 1/1/2017    Procedure:  mesenteric arteriogram, balloon angioplasty of SMA stent;  Surgeon: Joanie Spain MD;  Location:  MAIN OR;  Service:    Jacob Lemme LAPAROTOMY N/A 1/1/2017    Procedure: LAPAROTOMY EXPLORATORY,  abdominal washout, repair of duodenal ulcer;  Surgeon: Landry Clifton MD;  Location:  MAIN OR;  Service:     CO CYSTO/URETERO W/LITHOTRIPSY &INDWELL STENT INSRT Left 10/2/2018    Procedure: CYSTOSCOPY URETEROSCOPY WITH LITHOTRIPSY HOLMIUM LASER, RETROGRADE PYELOGRAM AND INSERTION STENT URETERAL;  Surgeon: Rubi Conway MD;  Location: [address] [place] [address];  Service: Urology    CO EGD TRANSORAL BIOPSY SINGLE/MULTIPLE N/A 11/28/2018    Procedure: ESOPHAGOGASTRODUODENOSCOPY (EGD); Surgeon: Krissy Romero MD;  Location: [facility]; Service: Gastroenterology     [facility] CATARACT EXTRACAP,INSERT LENS Right 1/11/2016    Procedure: EXTRACTION INTRACAPSULAR CATARACT PHACO INTRAOCULAR LENS (IOL); Surgeon: Chester Workman MD;  Location: [facility] MAIN OR;  Service: Ophthalmology    TONSILECTOMY AND ADNOIDECTOMY      TONSILLECTOMY          Past Family History:  Family History   Problem Relation Age of Onset    Hypertension Mother     Other Father         PVD?  Heart disease Father         MI        Social History:  History   Smoking Status    Former Smoker    Packs/day: 1 00    Years: 25 00    Quit date: [facility] Never Used     Comment: smoked for 25 years  History   Alcohol Use    3 0 oz/week    5 Glasses of wine per week     History   Drug Use No     Marital Status: /Civil Union     Home Medications:   Prior to Admission medications    Medication Sig Start Date End Date Taking?  Authorizing Provider   clopidogrel (PLAVIX) 75 mg tablet Take 1 tablet (75 mg total) by mouth daily  Patient taking differently: Take 75 mg by mouth every morning   11/14/18  Yes Ari Mitchell STEVEN Doss   Coenzyme Q10 (CO Q 10 PO) Take 200 mg by mouth every morning   Yes Historical Provider, MD   isosorbide mononitrate (IMDUR) 30 mg 24 hr tablet Take 1 tablet (30 mg total) by mouth daily for 30 days  Patient taking differently: Take 30 mg by mouth every morning   10/10/18 1/29/19 Yes Ceasar Rubinstein, MD   losartan (COZAAR) 50 mg tablet Take 1 tablet by mouth daily for 30 days  Patient taking differently: Take 50 mg by mouth every morning   1/21/17 1/29/19 Yes Janell Pierre MD   metoprolol succinate (TOPROL-XL) 100 mg 24 hr tablet Take 100 mg by mouth daily at bedtime   Yes Historical Provider, MD   Multiple Vitamin (MULTIVITAMIN) capsule Take 1 capsule by mouth every morning     Yes Historical Provider, MD   pantoprazole (PROTONIX) 40 mg tablet Take 40 mg by mouth 2 (two) times a day   1/4/19  Yes Historical Provider, MD   spironolactone (ALDACTONE) 25 mg tablet Take 1 tablet (25 mg total) by mouth daily  Patient taking differently: Take 25 mg by mouth every morning   11/27/18  Yes Alfredito Saavedra MD   VITAMIN D, ERGOCALCIFEROL, PO Take 2,000 Units by mouth every morning     Yes Historical Provider, MD        Inpatient Medications:  Scheduled Meds:  Current Facility-Administered Medications:  heparin (porcine) 3-20 Units/kg/hr (Order-Specific) Intravenous Titrated Candy Gamboa MD Last Rate: 12 Units/kg/hr (01/29/19 2202)   [START ON 1/30/2019] sodium chloride 100 mL/hr Intravenous Continuous Candy Gamboa MD Last Rate: 100 mL/hr (01/29/19 2116)     Continuous Infusions:  heparin (porcine) 3-20 Units/kg/hr (Order-Specific) Last Rate: 12 Units/kg/hr (01/29/19 2202)   [START ON 1/30/2019] sodium chloride 100 mL/hr Last Rate: 100 mL/hr (01/29/19 2116)     PRN Meds:        Allergies:   Allergies   Allergen Reactions    Amiodarone Other (See Comments)     Legs swollen and itching    Chlorthalidone Itching    Penicillins Itching     Tolerates zosyn    Sulfa Antibiotics Rash    Bacitracin Rash    Ciprofloxacin GI Intolerance        ROS:   Review of Systems   Unable to perform ROS: Intubated        Vitals:  Vitals:    19 2103 19 2130   BP: (!) 212/91 (!) 178/73   BP Location: Left arm Left arm   Pulse: 77 72   Resp: 18 14   SpO2: 93% 97%     Temperature:   Temp (24hrs), Av 8 °F (36 °C), Min:96 3 °F (35 7 °C), Max:97 4 °F (36 3 °C)    Current       Weights: There is no height or weight on file to calculate BMI  Hemodynamic Monitoring:  N/A     Non-Invasive/Invasive Ventilation Settings:  Respiratory    Lab Data (Last 4 hours)    None         O2/Vent Data (Last 4 hours)    None              No results found for: PHART, TNT3SQH, PO2ART, BMW0KNL, V6SRKBTP, BEART, SOURCE  SpO2: SpO2: 97 %     Physical Exam:  Physical Exam   Constitutional: No distress  HENT:   Head: Normocephalic and atraumatic  Neck: No JVD present  No tracheal deviation present  Cardiovascular: Normal rate and intact distal pulses  Pulmonary/Chest: No respiratory distress  She has no wheezes  Intubated   Abdominal: Soft  There is no rebound and no guarding  Musculoskeletal: She exhibits no edema or deformity  Neurological:   Heavily sedated   Skin: No rash noted  She is not diaphoretic  Cold   Vitals reviewed         Labs:    Results from last 7 days  Lab Units 19  1507   WBC Thousand/uL 19 14*   HEMOGLOBIN g/dL 11 4*   HEMATOCRIT % 39 3   PLATELETS Thousands/uL 458*   NEUTROS PCT % 84*   MONOS PCT % 5      Results from last 7 days  Lab Units 19  1507   SODIUM mmol/L 136   POTASSIUM mmol/L 4 6   CHLORIDE mmol/L 98*   CO2 mmol/L 21   BUN mg/dL 29*   CREATININE mg/dL 1 42*   CALCIUM mg/dL 9 9   ALK PHOS U/L 115   ALT U/L 34   AST U/L 40       Results from last 7 days  Lab Units 19  1507   MAGNESIUM mg/dL 2 1            Results from last 7 days  Lab Units 19  1507   INR  1 23*   PTT seconds 23*       Results from last 7 days  Lab Units 19  1720   LACTIC ACID mmol/L 5 1* 0  Lab Value Date/Time   TROPONINI 1 46 (H) 10/06/2018 1058   TROPONINI 1 59 (H) 10/06/2018 0813   TROPONINI 1 27 (H) 10/06/2018 0511   TROPONINI <0 02 06/11/2018 2330   TROPONINI <0 02 06/11/2018 2004   TROPONINI <0 02 06/11/2018 1452   TROPONINI <0 02 01/17/2017 1529        Imaging: Ct Abdomen Pelvis Wo Contrast    Result Date: 1/29/2019  Impression: 1  Sigmoid diverticulosis without acute diverticulitis or bowel obstruction  No wall thickening, pneumatosis intestinalis, or portal venous gas to suggest mesenteric ischemia  2   Fecal impaction at the rectum  3   Gaseous distention of the stomach with no wall thickening  Workstation performed: [id number]     Xr Abdomen Obstruction Series    Result Date: 1/29/2019  Impression: Paucity of bowel gas throughout the abdomen and pelvis with no evidence for pneumoperitoneum  Mild gastric distention  Emphysema  Workstation performed: [id number]     Cta Abdomen Pelvis W Wo Contrast    Result Date: 1/29/2019  Impression: 1  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  Tiny collateral arteries arising from the aorta in the region of the celiac are present, though distal vessels are not well opacified  JARROD not visualized  Evaluation of bowel wall is limited on the current study, but the appearance of the vessels would certainly predispose to ischemic enterocolitis if the patient has elevated lactate and abdominal pain  2   Heterogeneous striated enhancement of the kidneys could relate to ostial renal arterial atheromatous disease, multifocal embolic infarcts, pyelonephritis, tubular necrosis, or hypotension  Correlate with urinalysis and renal function testing  3   Question distal esophageal ring/web versus patulous vestibule  Correlate for dysphasia symptoms  Correlate for history of systemic sclerosis  Workstation performed: [id number]  I have personally reviewed pertinent reports  EKG: N/A This was personally reviewed by myself  Micro:  Lab Results   Component Value Date    BLOODCX No Growth After 5 Days  10/03/2018    BLOODCX No Growth After 5 Days  10/03/2018    BLOODCX No Growth After 5 Days  01/17/2017    BLOODCX No Growth After 5 Days  01/17/2017    WOUNDCULT (A) 01/17/2017     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       Assessment: 88yF w/ thrombosis of SMA stent        Plan:                  Neuro:   Fentanyl infusion                 CV:   CHF, HTN, PAD, Paroxysmal Afib   - Echo on 40% w/ moderate diffuse hypokinesis   - On losartan 50qd, imdur 30qd, lopressor 100qhs, spironolactone 25qd    - Holding home meds  Chronic mesenteric ischemia w/ thrombosis of SMA stent   - Cont ACS heparin   - Hold daily plavix    - Trend lactate and abdominal exams                 Lung:   COPD   - No nebulizers   - Resp protocol  Intubated   - Remains intubated and back to OR today for 2nd look   - PS 14 -400 -80 - 5                 GI/FEN:   History of gastric AVM   - Protonix 40 BID  Diet - NPO  [employer]  Statin                 :   Trend UO for resuscitation                 ID:   No active infection                Heme:   Hep ACS  Trend Hgb  2U given intraop  Endo:   No issues                Msk/Skin:  No issues                  Disposition: ICU     VTE Pharmacologic Prophylaxis: Heparin  VTE Mechanical Prophylaxis: sequential compression device     Invasive lines and devices: Invasive Devices     Peripheral Intravenous Line            Peripheral IV 01/29/19 Right Antecubital less than 1 day    Peripheral IV 01/29/19 Right;Dorsal (posterior) Forearm less than 1 day          Drain            Ureteral Drain/Stent Left ureter 6 Fr  119 days                 Code Status: Level 1 - Full Code  POA:    POLST:       Given critical illness, patient length of stay will require greater than two midnights       Counseling / Coordination of Care  Total Critical Care time spent 40 minutes excluding procedures, teaching and family updates  Portions of the record may have been created with voice recognition software  Occasional wrong word or "sound a like" substitutions may have occurred due to the inherent limitations of voice recognition software  Read the chart carefully and recognize, using context, where substitutions have occurred          Barbara Radford MD

## 2019-01-30 NOTE — CONSULTS
Consultation - General Surgery   Femi Negro Race 80 y o  female MRN: [id number]  Unit/Bed#: ELISE Encounter: [id number]    Assessment/Plan     Assessment:  79 yo F with chronic mesenteric ischemia s/p stenting, now with acute abdominal pain and CT evidence of stent stenosis  Abdominal exam and labs concerning for acute bowel ischemia      Plan:  OR emergently for ex lap, possible bowel resection, SMA exploration vs angioplasty  Discussed risks w/ patient and her , consent obtained  Will remain intubated to ICU post op    History of Present Illness     HPI:  Rossy Mendoza is a 80 y o  female who presents with acute abdominal pain  She has a history of mesenteric ischemia and is s/p the following procedures:   --8/17/15 SMA stent placement x2 (Ekta)   --1/1/17 Perforated Duodenal ulcer repair Leena Julio) with balloon PTA of SMA stent (Doctor)    She presents today with abdominal pain since 11 AM  She had a scheduled EGD at [address] [place] [address] and while she was there she had a bright red BM, at which point she was transferred to the ED> She had an elevated lactate to 5  CTA was performed which showed SMA stent  Occlusion  She was urgently transferred to B for IR intervention    Upon arrival she states her abdominal pain has continued to worsen   Her ABG on arrival showed a PH of 7 1 and a BE of -15      Consults    Review of Systems   Unable to perform ROS: Acuity of condition       Historical Information   Past Medical History:   Diagnosis Date    Acid reflux     Arthritis     Stewart's esophagus     COPD (chronic obstructive pulmonary disease) ([address]     Duodenal ulcer     Hypertension     Kidney stone 10/01/2018    PAD (peripheral artery disease) ([address]     Periodontal disease     controlled    Peripheral vascular disease [facility]     Scoliosis     Wears glasses     for reading     Past Surgical History:   Procedure Laterality Date    ANAL FISSURECTOMY      AORTA - SUPERIOR MESENTERIC ARTERY BYPASS GRAFT Bilateral     BREAST BIOPSY Right     CATARACT EXTRACTION Right     COLONOSCOPY      ESOPHAGOGASTRODUODENOSCOPY N/A 10/5/2018    Procedure: ESOPHAGOGASTRODUODENOSCOPY (EGD); Surgeon: Krissy Romero MD;  Location: [address] [place] [address];  Service: Gastroenterology    FEMORAL ARTERY STENT Bilateral     HEMORROIDECTOMY      HYSTERECTOMY      partial-ovaries remain    INSERTION STENT ARTERY Left 1/1/2017    Procedure:  mesenteric arteriogram, balloon angioplasty of SMA stent;  Surgeon: Ryan Rojas MD;  Location:  MAIN OR;  Service:    Jacob Lemme LAPAROTOMY N/A 1/1/2017    Procedure: LAPAROTOMY EXPLORATORY,  abdominal washout, repair of duodenal ulcer;  Surgeon: Landry Clifton MD;  Location:  MAIN OR;  Service:     NE CYSTO/URETERO W/LITHOTRIPSY &INDWELL STENT INSRT Left 10/2/2018    Procedure: CYSTOSCOPY URETEROSCOPY WITH LITHOTRIPSY HOLMIUM LASER, RETROGRADE PYELOGRAM AND INSERTION STENT URETERAL;  Surgeon: Rubi Conway MD;  Location: [address] [place] [address];  Service: Urology    NE EGD TRANSORAL BIOPSY SINGLE/MULTIPLE N/A 11/28/2018    Procedure: ESOPHAGOGASTRODUODENOSCOPY (EGD); Surgeon: Krissy Romero MD;  Location: [facility]; Service: Gastroenterology     [facility] [address] CATARACT EXTRACAP,INSERT LENS Right 1/11/2016    Procedure: EXTRACTION INTRACAPSULAR CATARACT PHACO INTRAOCULAR LENS (IOL); Surgeon: Chester Workman MD;  Location: [facility] MAIN OR;  Service: Ophthalmology    TONSILECTOMY AND ADNOIDECTOMY      TONSILLECTOMY       Social History   History   Alcohol Use    3 0 oz/week    5 Glasses of wine per week     History   Drug Use No     History   Smoking Status    Former Smoker    Packs/day: 1 00    Years: 25 00    Quit date: 1985   Smokeless Tobacco    Never Used     Comment: smoked for 25 years  Family History:   Family History   Problem Relation Age of Onset    Hypertension Mother     Other Father         PVD?     Heart disease Father         MI       Meds/Allergies   all current active meds have been reviewed, current meds:   Current Facility-Administered Medications   Medication Dose Route Frequency    [START ON 1/30/2019] clindamycin (CLEOCIN) IVPB (premix) 900 mg  900 mg Intravenous On Call To OR    heparin (porcine) 25,000 units in 250 mL infusion (premix)  3-20 Units/kg/hr (Order-Specific) Intravenous Titrated    [START ON 1/30/2019] sodium chloride 0 9 % infusion  100 mL/hr Intravenous Continuous    and PTA meds:   Prior to Admission Medications   Prescriptions Last Dose Informant Patient Reported? Taking?    Coenzyme Q10 (CO Q 10 PO)   Yes Yes   Sig: Take 200 mg by mouth every morning   Multiple Vitamin (MULTIVITAMIN) capsule  Self Yes Yes   Sig: Take 1 capsule by mouth every morning     VITAMIN D, ERGOCALCIFEROL, PO  Self Yes Yes   Sig: Take 2,000 Units by mouth every morning     clopidogrel (PLAVIX) 75 mg tablet  Self No Yes   Sig: Take 1 tablet (75 mg total) by mouth daily   Patient taking differently: Take 75 mg by mouth every morning     isosorbide mononitrate (IMDUR) 30 mg 24 hr tablet  Self No Yes   Sig: Take 1 tablet (30 mg total) by mouth daily for 30 days   Patient taking differently: Take 30 mg by mouth every morning     losartan (COZAAR) 50 mg tablet  Self No Yes   Sig: Take 1 tablet by mouth daily for 30 days   Patient taking differently: Take 50 mg by mouth every morning     metoprolol succinate (TOPROL-XL) 100 mg 24 hr tablet  Self Yes Yes   Sig: Take 100 mg by mouth daily at bedtime   pantoprazole (PROTONIX) 40 mg tablet  Self Yes Yes   Sig: Take 40 mg by mouth 2 (two) times a day     spironolactone (ALDACTONE) 25 mg tablet  Self No Yes   Sig: Take 1 tablet (25 mg total) by mouth daily   Patient taking differently: Take 25 mg by mouth every morning        Facility-Administered Medications: None     Allergies   Allergen Reactions    Amiodarone Other (See Comments)     Legs swollen and itching    Chlorthalidone Itching    Penicillins Itching     Tolerates zosyn    Sulfa Antibiotics Rash    Bacitracin Rash    Ciprofloxacin GI Intolerance       Objective   First Vitals:   Blood Pressure: (!) 212/91 (01/29/19 2103)  Pulse: 77 (01/29/19 2103)  Temperature: (!) 96 3 °F (35 7 °C) (01/29/19 2226)  Temp Source: Tympanic (01/29/19 2226)  Respirations: 18 (01/29/19 2103)  SpO2: 93 % (01/29/19 2103)    Current Vitals:   Blood Pressure: (!) 212/88 (01/29/19 2230)  Pulse: 74 (01/29/19 2230)  Temperature: (!) 96 3 °F (35 7 °C) (01/29/19 2226)  Temp Source: Tympanic (01/29/19 2226)  Respirations: 22 (01/29/19 2230)  SpO2: 97 % (01/29/19 2230)    No intake or output data in the 24 hours ending 01/29/19 2319    Invasive Devices     Peripheral Intravenous Line            Peripheral IV 01/29/19 Right Antecubital less than 1 day    Peripheral IV 01/29/19 Right;Dorsal (posterior) Forearm less than 1 day          Drain            Ureteral Drain/Stent Left ureter 6 Fr  119 days                Physical Exam   Constitutional: She is oriented to person, place, and time  She appears well-developed and well-nourished  No distress  Appears ill and pale   HENT:   Head: Normocephalic and atraumatic  Eyes: Pupils are equal, round, and reactive to light  Neck: Normal range of motion  Neck supple  Cardiovascular: Normal rate, regular rhythm and normal heart sounds  Pulmonary/Chest: Effort normal  No respiratory distress  She has no wheezes  She exhibits no tenderness  Abdominal: Soft  There is tenderness  There is no rebound and no guarding  Neurological: She is alert and oriented to person, place, and time  Skin: Skin is warm and dry  Psychiatric: She has a normal mood and affect         Lab Results:   CBC:   Lab Results   Component Value Date    WBC 19 14 (H) 01/29/2019    HGB 11 4 (L) 01/29/2019    HCT 39 3 01/29/2019    MCV 82 01/29/2019     (H) 01/29/2019    MCH 23 8 (L) 01/29/2019    MCHC 29 0 (L) 01/29/2019    RDW 21 0 (H) 01/29/2019    MPV 8 9 01/29/2019    NRBC 0 01/29/2019   , CMP:   Lab Results   Component Value Date    SODIUM 136 01/29/2019    K 4 6 01/29/2019    CL 98 (L) 01/29/2019    CO2 21 01/29/2019    BUN 29 (H) 01/29/2019    CREATININE 1 42 (H) 01/29/2019    CALCIUM 9 9 01/29/2019    AST 40 01/29/2019    ALT 34 01/29/2019    ALKPHOS 115 01/29/2019    EGFR 33 01/29/2019   , Coagulation:   Lab Results   Component Value Date    INR 1 23 (H) 01/29/2019     Imaging: I have personally reviewed pertinent reports  IR consult    (Results Pending)   IMPRESSION:     1  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  Tiny collateral arteries arising from the aorta in the region of the celiac are present, though distal vessels are not well opacified  JARROD not visualized  Evaluation of bowel wall is   limited on the current study, but the appearance of the vessels would certainly predispose to ischemic enterocolitis if the patient has elevated lactate and abdominal pain      2  Heterogeneous striated enhancement of the kidneys could relate to ostial renal arterial atheromatous disease, multifocal embolic infarcts, pyelonephritis, tubular necrosis, or hypotension  Correlate with urinalysis and renal function testing      3   Question distal esophageal ring/web versus patulous vestibule  Correlate for dysphasia symptoms  Correlate for history of systemic sclerosis        EKG, Pathology, and Other Studies: I have personally reviewed pertinent reports  Counseling / Coordination of Care  Total floor / unit time spent today 45 minutes  Greater than 50% of total time was spent with the patient and / or family counseling and / or coordination of care  A description of the counseling / coordination of care: Ovidio Miller

## 2019-01-30 NOTE — PROGRESS NOTES
Progress Note - General Surgery   Charis Mcguire Race 80 y o  female MRN: [id number]  Unit/Bed#: MICU 03 Encounter: [id number]    Assessment:  79 y/o F w/ hx of chronic mesenteric ischemia, p/w occluded SMA stent on CTA, s/p ex-lap, SBR, SMA angioplasty and stent, Abthera VAC closure    Plan:  --Return to OR later today for Second Look Laparotomy  --Continue sedation/analgesia  --NPO, IVF  --Heparin VTE gtt  --Abthera VAC to continuous 125mmHg suction  --Serial endpoints  --f/u AM labs    Subjective/Objective     Subjective: This morning, pt stable, remains off pressors  Objective:     Blood pressure (!) 126/45, pulse 76, temperature (!) 96 3 °F (35 7 °C), temperature source Tympanic, resp  rate 15, SpO2 94 %  ,There is no height or weight on file to calculate BMI  I/O       01/28 0701 - 01/29 0700 01/29 0701 - 01/30 0700    I V   2000    Blood  700    IV Piggyback  1000    Total Intake   3700    Urine  450    Blood  100    Total Output   550    Net   +3150                  Invasive Devices     Peripheral Intravenous Line            Peripheral IV 01/29/19 Right Antecubital less than 1 day    Peripheral IV 01/29/19 Right;Dorsal (posterior) Forearm less than 1 day    Peripheral IV 01/30/19 Left Forearm less than 1 day          Arterial Line            Arterial Line 01/30/19 Right Brachial less than 1 day          Line            Venous Sheath Other (Comment) Right Femoral less than 1 day          Drain            Ureteral Drain/Stent Left ureter 6 Fr  119 days    NG/OG/Enteral Tube Orogastric 18 Fr Center mouth less than 1 day    Negative Pressure Wound Therapy (V A C ) Abdomen Mid less than 1 day    Urethral Catheter Latex 16 Fr  less than 1 day          Airway            ETT  Hi-Lo; Cuffed;Oral 7 mm less than 1 day                Physical Exam:     GEN: NAD  HEENT: MMM  CV: RRR  Lung: intubated  Ab: Soft, ND, open abdomen w/ Abthera VAC in place to 125mmHg suction w/ serosanguinous output  Extrem: No CCE  Neuro: A+Ox3, motor and sensation grossly intact    Lab, Imaging and other studies:  CBC:   Lab Results   Component Value Date    WBC 19 14 (H) 01/29/2019    HGB 8 8 (L) 01/30/2019    HCT 26 (L) 01/30/2019    MCV 82 01/29/2019     (H) 01/29/2019    MCH 23 8 (L) 01/29/2019    MCHC 29 0 (L) 01/29/2019    RDW 21 0 (H) 01/29/2019    MPV 8 9 01/29/2019    NRBC 0 01/29/2019   , CMP:   Lab Results   Component Value Date    SODIUM 157 (H) 01/30/2019    K 3 5 01/30/2019     (H) 01/30/2019    CO2 27 01/30/2019    CO2 20 (L) 01/30/2019    BUN 33 (H) 01/30/2019    CREATININE 1 17 01/30/2019    GLUCOSE 144 (H) 01/30/2019    CALCIUM 7 7 (L) 01/30/2019    AST 5,031 (H) 01/30/2019    ALT 2,584 (H) 01/30/2019    ALKPHOS 49 01/30/2019    EGFR 42 01/30/2019   , Coagulation:   Lab Results   Component Value Date    INR 1 23 (H) 01/29/2019   , Urinalysis: No results found for: Dorthey Docker, SPECGRAV, PHUR, LEUKOCYTESUR, NITRITE, PROTEINUA, GLUCOSEU, KETONESU, BILIRUBINUR, BLOODU, Amylase: No results found for: AMYLASE, Lipase:   Lab Results   Component Value Date    LIPASE 372 01/29/2019     VTE Pharmacologic Prophylaxis: Heparin gtt  VTE Mechanical Prophylaxis: sequential compression device

## 2019-01-30 NOTE — EMTALA/ACUTE CARE TRANSFER
[address] [address]  Dept: [phone]      ACUTE CARE TRANSFER CONSENT    NAME Nellie Casas Race                                         9/10/1930                              MRN [id number]    I have been informed of my rights regarding examination, treatment, and transfer   by Dr Chiquis Green att  providers found    Benefits: Specialized equipment and/or services available at the receiving facility (Include comment)________________________    Risks: Potential for delay in receiving treatment      Consent for Transfer:  I acknowledge that my medical condition has been evaluated and explained to me by the treating physician or other qualified medical person and/or my attending physician, who has recommended that I be transferred to the service of  Accepting Physician: Dr Xochilt Wolf at [facility] Name, [address] : [facility]  The above potential benefits of such transfer, the potential risks associated with such transfer, and the probable risks of not being transferred have been explained to me, and I fully understand them  The doctor has explained that, in my case, the benefits of transfer outweigh the risks  I agree to be transferred  I authorize the performance of emergency medical procedures and treatments upon me in both transit and upon arrival at the receiving facility  Additionally, I authorize the release of any and all medical records to the receiving facility and request they be transported with me, if possible  I understand that the safest mode of transportation during a medical emergency is an ambulance and that the Hospital advocates the use of this mode of transport  Risks of traveling to the receiving facility by car, including absence of medical control, life sustaining equipment, such as oxygen, and medical personnel has been explained to me and I fully understand them      (ARON CORRECT BOX BELOW)  [  ]  I consent to the stated transfer and to be transported by ambulance/helicopter  [  ]  I consent to the stated transfer, but refuse transportation by ambulance and accept full responsibility for my transportation by car  I understand the risks of non-ambulance transfers and I exonerate the Hospital and its staff from any deterioration in my condition that results from this refusal     X___________________________________________    DATE  19  TIME________  Signature of patient or legally responsible individual signing on patient behalf           RELATIONSHIP TO PATIENT_________________________          Provider Certification    NAME Nellie Casas Race                                         9/10/1930                              MRN [id number]    A medical screening exam was performed on the above named patient  Based on the examination:    Condition Necessitating Transfer need vascular surgery    Patient Condition: The patient has been stabilized such that within reasonable medical probability, no material deterioration of the patient condition or the condition of the unborn child(kamron) is likely to result from the transfer    Reason for Transfer: Level of Care needed not available at this facility    Transfer Requirements: [address]   · Space available and qualified personnel available for treatment as acknowledged by    · Agreed to accept transfer and to provide appropriate medical treatment as acknowledged by       Dr Xochilt Wolf  · Appropriate medical records of the examination and treatment of the patient are provided at the time of transfer   [address], [address] _______  · Transfer will be performed by qualified personnel from    and appropriate transfer equipment as required, including the use of necessary and appropriate life support measures      Provider Certification: I have examined the patient and explained the following risks and benefits of being transferred/refusing transfer to the patient/family:  General risk, such as traffic hazards, adverse weather conditions, rough terrain or turbulence, possible failure of equipment (including vehicle or aircraft), or consequences of actions of persons outside the control of the transport personnel      Based on these reasonable risks and benefits to the patient and/or the unborn child(kamron), and based upon the information available at the time of the patients examination, I certify that the medical benefits reasonably to be expected from the provision of appropriate medical treatments at another medical facility outweigh the increasing risks, if any, to the individuals medical condition, and in the case of labor to the unborn child, from effecting the transfer      X____________________________________________ DATE 01/29/19        TIME_______      ORIGINAL - SEND TO MEDICAL RECORDS   COPY - SEND WITH PATIENT DURING TRANSFER

## 2019-01-30 NOTE — ANESTHESIA PREPROCEDURE EVALUATION
Review of Systems/Medical History  Patient summary reviewed  Chart reviewed  No history of anesthetic complications     Cardiovascular  EKG reviewed, Hypertension , Dysrhythmias , atrial fibrillation, CHF , PVD,   Comment: Mesenteric ischemia s/p stent now with probable occlusion and lactic acidosis  EF 40%   Mod TR,  Pulmonary  COPD ,        GI/Hepatic    GI bleeding , GERD , Esophageal disease urbano esophagus,        Kidney stones, Kidney disease ARF,        Endo/Other  Negative endo/other ROS      GYN  Negative gynecology ROS          Hematology  Anemia ,     Musculoskeletal    Arthritis     Neurology  Negative neurology ROS      Psychology   Negative psychology ROS              Physical Exam    Airway    Mallampati score: II  TM Distance: >3 FB  Neck ROM: full     Dental   No notable dental hx     Cardiovascular  Rhythm: regular, Rate: normal, Cardiovascular exam normal    Pulmonary  Pulmonary exam normal Breath sounds clear to auscultation,     Other Findings        Anesthesia Plan  ASA Score- 5 Emergent    Anesthesia Type- general with ASA Monitors  Additional Monitors: arterial line  Airway Plan: ETT  Plan Factors-    Induction- intravenous and rapid sequence induction  Postoperative Plan- Plan for postoperative opioid use  Informed Consent- Anesthetic plan and risks discussed with patient, spouse, healthcare power of  and daughter  I personally reviewed this patient with the CRNA  Discussed and agreed on the Anesthesia Plan with the CRNA  Ej Churchill           Recent labs personally reviewed:  Lab Results   Component Value Date    WBC 19 14 (H) 01/29/2019    HGB 11 4 (L) 01/29/2019     (H) 01/29/2019     Lab Results   Component Value Date     08/18/2015    K 4 6 01/29/2019    BUN 29 (H) 01/29/2019    CREATININE 1 42 (H) 01/29/2019    GLUCOSE 124 01/01/2017     Lab Results   Component Value Date    PTT 23 (L) 01/29/2019      Lab Results   Component Value Date    INR 1 23 (H) 01/29/2019       Blood type A    Lab Results   Component Value Date    HGBA1C 5 8 06/11/2018       I, Charis Roberts MD, have personally seen and evaluated the patient prior to anesthetic care  I have reviewed the pre-anesthetic record, and other medical records if appropriate to the anesthetic care  If a CRNA is involved in the case, I have reviewed the CRNA assessment, if present, and agree  Risks/benefits and alternatives discussed with patient including possible PONV, sore throat, and possibility of rare anesthetic and surgical emergencies

## 2019-01-31 PROBLEM — E43 SEVERE PROTEIN-CALORIE MALNUTRITION (HCC): Status: ACTIVE | Noted: 2019-01-01

## 2019-01-31 PROBLEM — R74.01 TRANSAMINITIS: Status: ACTIVE | Noted: 2019-01-01

## 2019-01-31 NOTE — PROGRESS NOTES
S: Taurus Chapa is a 80 y o  female who returns to the ICU s/p closure of small bowel resection  Received Albumin, Vasopressin and Geoffrey during procedure  O:    Vitals:    01/31/19 1608   BP:    Pulse: 90   Resp: 13   Temp: 97 9 °F (36 6 °C)   SpO2: 99%       General: Resting comfortably, nad, non-toxic appearing  HEENT: normocephalic, atraumatic, perrla, neck supple, no tracheal deviation, mmm  Cardiac: rrr, no m/r/g, 2+ radial and DP pulses b/l  Pulm: lungs cta, mild crackles at bases, intubated w vent settings 14/450/40/10  Abd: soft, non-tender, non- distended  : ward in place with good urine output, 40-50 cc/hr   MSK: equal arom of ue and le  Neuro: gcs 10 this morning, patient is s/p general anesthesia, GCS 7 at this time, will reassess once patient is more arousable  Skin: lines c/d/i    A: Mesenteric ischemia s/p ex lap x 2 with small bowel resection 01/29 & 01/30 and anastomosis this afternoon  Patient appears at baseline as she returned from the OR, will continue morning plan as written and reassess based on patient needs  P:   Reassess Neuro status once patient is more awake  O2 saturation is 99% on current settings   Repeat ABG at 1600  Wean PEEP and TV back to initial settings based on ABG with hope to extubate patient in the morning   Patient remains off pressors at this time, blood pressure is stable, MAP > 65, will continue to monitor  Continue propofol gtt for light sedation   Continue fentanyl gtt for pain management   Heparin gtt was turned off as per surgical and vascular team, will remain off and treat with ASA and plavix as well as SQ heparin     Family was updated, spoke to , Krishna who will be stopping in tonight

## 2019-01-31 NOTE — UTILIZATION REVIEW
Auth:   [id number]  Notification of Discharge  This is a Notification of Discharge from our facility [address]  Please be advised that this patient has been discharge from our facility  Below you will find the admission and discharge date and time including the patients disposition  PRESENTATION DATE: 1/29/2019  2:42 PM  IP ADMISSION DATE: 1/29/19 1845  DISCHARGE DATE: 1/29/2019  8:22 PM  DISPOSITION: [facility] Review Department  Phone: [phone]; Fax [phone]  [email or web address]  ATTENTION: Please call with any questions or concerns to [phone]  and carefully listen to the prompts so that you are directed to the right person  Send all requests for admission clinical reviews, approved or denied determinations and any other requests to fax [phone]   All voicemails are confidential

## 2019-01-31 NOTE — PHYSICAL THERAPY NOTE
Physical Therapy Cancellation Note    PT CONSULT RECEIVED  PATIENT IS INTUBATED/SEDATED AND NOT MEDICALLY APPROPRIATE FOR PARTICIPATION IN SKILLED PT PER RN AT THIS TIME  PT WILL CONTINUE TO FOLLOW ON CASELOAD AND PERFORM INITIAL EVALUATION AS MEDICALLY APPROPRIATE      Neha Ken, PT

## 2019-01-31 NOTE — OCCUPATIONAL THERAPY NOTE
OT CANCEL NOTE    OT orders received  Chart reviewed  Pt is currently in OR for "LAPAROTOMY EXPLORATORY W/ BOWEL RESECTION, abthera VAC removal, abdominal wound closure, SPY angiography"  Will hold initial OT evaluation  Will continue to follow pt on caseload and see pt when medically stable and as clinically appropriate      Dione CHIANG, OTR/L

## 2019-01-31 NOTE — RESPIRATORY THERAPY NOTE
RT Ventilator Management Note  Myles Ureña Race 80 y o  female MRN: [id number]  Unit/Bed#: OR POOL Encounter: [id number]      Daily Screen       1/31/2019 0654             Spont breathing trial outcome[de-identified] Failed    Spont breathing trial reason failed: Tidal volume < 4ml/Kg of ideal body weight or VE <5 or  >15 LPM;RSBI > 100    Previous settings resumed: Yes            Physical Exam:   Assessment Type: Assess only  General Appearance: Sedated  Respiratory Pattern: Assisted  Chest Assessment: Chest expansion symmetrical  Bilateral Breath Sounds: Clear  Suction: ET Tube  O2 Device: vent  Subjective Data: i assessed pt for resp protocol, pt has cta bs, in no distress, takes no resp meds, I dc'd protocol      Resp Comments: Pt returned from OR  Remains intubated  Reported that prior to transport  SpO2 and pO2 dropped  Vt and PEEP increased  ABG ordered to assess; reassess vent settings  BS clear  Currently SpO2 96%

## 2019-01-31 NOTE — RESPIRATORY THERAPY NOTE
RT Ventilator Management Note  Danielle Felder Race 80 y o  female MRN: [id number]  [facility] Encounter: [id number]      Daily Screen     No data found              Physical Exam:   Assessment Type: Assess only  General Appearance: Sedated  Respiratory Pattern: Assisted  Chest Assessment: Chest expansion symmetrical  Bilateral Breath Sounds: Clear  R Breath Sounds: Clear  L Breath Sounds: Clear  Cough: None  Suction: ET Tube  O2 Device: vent      Resp Comments: pt cont on full vent support and appears to be donte well I will perform sbt later this am once the sedation has been turned off

## 2019-01-31 NOTE — PROCEDURES
Insert PICC line  Date/Time: 1/31/2019 11:08 AM  Performed by: Juan Hennessy  Authorized by: Joanna Rodas     Patient location:  Bedside  Other Assisting Provider: Yes (comment) Cherise Arteaga Infusion Tech)    Consent:     Consent obtained:  Verbal    Consent given by:  Spouse    Procedural risks discussed: with physician obtaining consent  Alternatives discussed: with physician obtaining consent  Universal protocol:     Procedure explained and questions answered to patient or proxy's satisfaction: yes      Relevant documents present and verified: yes      Test results available and properly labeled: yes      Radiology Images displayed and confirmed  If images not available, report reviewed: yes      Required blood products, implants, devices, and special equipment available: yes      Site/side marked: yes      Immediately prior to procedure, a time out was called: yes      Patient identity confirmed:  Verbally with patient and arm band  Pre-procedure details:     Hand hygiene: Hand hygiene performed prior to insertion      Sterile barrier technique: All elements of maximal sterile technique followed      Skin preparation:  ChloraPrep    Skin preparation agent: Skin preparation agent completely dried prior to procedure    Indications:     PICC line indications: medications requiring central line    Anesthesia (see MAR for exact dosages):      Anesthesia method:  Local infiltration    Local anesthetic:  Lidocaine 1% w/o epi (2 mls)  Procedure details:     Location:  Basilic    Vessel type: vein      Laterality:  Left    Approach: percutaneous technique used      Patient position:  Flat    Procedural supplies:  Triple lumen    Catheter size:  5 Fr    Landmarks identified: yes      Ultrasound guidance: yes      Sterile ultrasound techniques: Sterile gel and sterile probe covers were used      Number of attempts:  1    Successful placement: yes      Vessel of catheter tip end:  Sherlock 3CG confirmed (ok to use, no xray required)    Total catheter length (cm):  38    Catheter out on skin (cm):  0    Max flow rate:  999    Arm circumference:  24  Post-procedure details:     Post-procedure:  Dressing applied and securement device placed    Assessment:  Blood return through all ports    Post-procedure complications: none      Patient tolerance of procedure:   Tolerated well, no immediate complications

## 2019-01-31 NOTE — PROGRESS NOTES
Progress Note - Vascular Surgery   Sharmin Lout Race 80 y o  female MRN: [id number]  Unit/Bed#: [facility] 03 Encounter: [id number]    Assessment/Plan:  80 y o  female with acute mesenteric ischemia  - s/p Ex-lap, resection of jejunum, abdominal aortogram, SMA arteriogram, SMA angioplasty and SMA stenting, Abthera VAC placement 1/30  - s/p Second look laparotomy, SBR, Abthera VAC 1/30    To OR today for third look laparotomy with general surgery  Continue heparin gtt  Check coags this AM  Pressors on hold this AM  Care per ICU team    Subjective/Objective     Subjective: Patient on low dose levo overnight, now off  Remains afebrile  Objective:     Vitals: Blood pressure (!) 126/45, pulse 94, temperature 98 2 °F (36 8 °C), resp  rate 14, height 5' 1" (1 549 m), SpO2 96 %  ,Body mass index is 15 83 kg/m²      I/O       01/29 0701 - 01/30 0700 01/30 0701 - 01/31 0700    I V  3411 6 4520    Blood 700     IV Piggyback 1050 500    Total Intake 5161 6 5020    Urine 1300 1245    Emesis/NG output  600    Drains 400 650    Blood 100 25    Total Output 1800 2520    Net +3361 6 +2500                Physical Exam:  GEN: critically ill  HEENT: MMM  CV: RRR  Lung: on vent AC 14 400 40% 5  Ab: Soft, NT/ND, VAC in place  Extrem: No CCE  Neuro: intubated and sedated    Lab, Imaging and other studies:   CBC with diff:   Lab Results   Component Value Date    HGB 9 4 (L) 01/31/2019   , BMP/CMP:   Lab Results   Component Value Date    SODIUM 152 (H) 01/31/2019    K 4 1 01/31/2019     (H) 01/31/2019    CO2 26 01/31/2019    BUN 41 (H) 01/31/2019    CREATININE 1 45 (H) 01/31/2019    CALCIUM 7 4 (L) 01/31/2019    EGFR 32 01/31/2019   , Magnesium: No components found for: MAG  VTE Pharmacologic Prophylaxis: Heparin  VTE Mechanical Prophylaxis: sequential compression device

## 2019-01-31 NOTE — PLAN OF CARE
Problem: Potential for Falls  Goal: Patient will remain free of falls  INTERVENTIONS:  - Assess patient frequently for physical needs  -  Identify cognitive and physical deficits and behaviors that affect risk of falls    -  [facility] fall precautions as indicated by assessment   - Educate patient/family on patient safety including physical limitations  - Instruct patient to call for assistance with activity based on assessment  - Modify environment to reduce risk of injury  - Consider OT/PT consult to assist with strengthening/mobility   Outcome: Progressing      Problem: Prexisting or High Potential for Compromised Skin Integrity  Goal: Skin integrity is maintained or improved  INTERVENTIONS:  - Identify patients at risk for skin breakdown  - Assess and monitor skin integrity  - Assess and monitor nutrition and hydration status  - Monitor labs (i e  albumin)  - Assess for incontinence   - Turn and reposition patient  - Assist with mobility/ambulation  - Relieve pressure over bony prominences  - Avoid friction and shearing  - Provide appropriate hygiene as needed including keeping skin clean and dry  - Evaluate need for skin moisturizer/barrier cream  - Collaborate with interdisciplinary team (i e  Nutrition, Rehabilitation, etc )   - Patient/family teaching   Outcome: Progressing      Problem: DISCHARGE PLANNING - CARE MANAGEMENT  Goal: Discharge to post-acute care or home with appropriate resources  INTERVENTIONS:  - Conduct assessment to determine patient/family and health care team treatment goals, and need for post-acute services based on payer coverage, community resources, and patient preferences, and barriers to discharge  - Address psychosocial, clinical, and financial barriers to discharge as identified in assessment in conjunction with the patient/family and health care team  - Arrange appropriate level of post-acute services according to patient's   needs and preference and payer coverage in collaboration with the physician and health care team  - Communicate with and update the patient/family, physician, and health care team regarding progress on the discharge plan  - Arrange appropriate transportation to post-acute venues  - Pt to d/c with appropriate resources when medically stable  Outcome: Progressing      Problem: Nutrition/Hydration-ADULT  Goal: Nutrient/Hydration intake appropriate for improving, restoring or maintaining nutritional needs  Monitor and assess patient's nutrition/hydration status for malnutrition (ex- brittle hair, bruises, dry skin, pale skin and conjunctiva, muscle wasting, smooth red tongue, and disorientation)  Collaborate with interdisciplinary team and initiate plan and interventions as ordered  Monitor patient's weight and dietary intake as ordered or per policy  Utilize nutrition screening tool and intervene per policy  Determine patient's food preferences and provide high-protein, high-caloric foods as appropriate       INTERVENTIONS:  - Monitor oral intake, urinary output, labs, and treatment plans  - Assess nutrition and hydration status and recommend course of action  - Evaluate amount of meals eaten  - Assist patient with eating if necessary   - Allow adequate time for meals  - Recommend/ encourage appropriate diets, oral nutritional supplements, and vitamin/mineral supplements  - Order, calculate, and assess calorie counts as needed  - Recommend, monitor, and adjust tube feedings and TPN/PPN based on assessed needs  - Assess need for intravenous fluids  - Provide specific nutrition/hydration education as appropriate  - Include patient/family/caregiver in decisions related to nutrition   Outcome: Progressing

## 2019-01-31 NOTE — ANESTHESIA PREPROCEDURE EVALUATION
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,        Kidney stones,        Endo/Other     GYN       Hematology  Anemia acute blood loss anemia,     Musculoskeletal    Arthritis     Neurology   Psychology           Physical Exam    Airway  Comment: INTUBATED from MICU           Dental       Cardiovascular  Rhythm: regular, Rate: normal,     Pulmonary  Breath sounds clear to auscultation,     Other Findings        Anesthesia Plan  ASA Score- 4     Anesthesia Type- general with ASA Monitors  Additional Monitors:   Airway Plan:         Plan Factors-    Induction- intravenous  Postoperative Plan- Plan for postoperative opioid use  Informed Consent- Anesthetic plan and risks discussed with spouse  I personally reviewed this patient with the CRNA  Discussed and agreed on the Anesthesia Plan with the CRNA  Clementine Ortiz

## 2019-01-31 NOTE — PROGRESS NOTES
Progress Note - Critical Care   James Agarwal Race 80 y o  female MRN: [id number]  [facility] Encounter: [id number]    Assessment:   Principal Problem:    Acute mesenteric ischemia ([facility] [address] )  Active Problems:    Acid reflux    Stewart's esophagus    PAD (peripheral artery disease) (HCC)    Moderate to severe mitral regurgitation    Lactic acidosis    GROVER (acute kidney injury) ([facility] [address] )    Mesenteric ischemia (HCC)    Severe protein-calorie malnutrition (HCC)    Transaminitis  Resolved Problems:    * No resolved hospital problems  *      Plan  Neuro: Pain control   · Pain controlled with: fentanyl gtt, PRN dilaudid, does not appear to be in any discomfort, mild grimace upon palpation of left quadrant  · Continue propofol gtt, hold for sedation break this morning and SBT  · RASS goal: -2 Light Sedation  · Delirium Precautions  · CAM ICU per protocol  · Regulate sleep/wake cycle+  · Trend neuro exam      CV: Hx of PAD, MR, afib  · Hemodynamic infusions: Levophed, 3 mcg/min, Heparin gtt   · Wean levophed as able  · MAP goal > 65  · Rhythm: NSR  · Follow rhythm on telemetry  · EF was 40% on ECHO from 10/2018, moderate to diffuse hypokinesis and moderate concentric hypertrophy  Atrium was dilated, MR, Tricuspid valve with moderate regurgitation     · Will monitor patients fluid level closely, as she may require diureses prior to extubation    Lung: B/l pleural effusions  · Daily SBT   · Chlorhexidine/HOB>30degrees ordered: yes  · Pulmonary toileting  · Continue mechanical ventilation today due to return to OR this morning/afternoon for possible closure   · Patient is on [facility] -40-5, will continue with current settings   · Continue respiratory protocol   · Cxr - b/l pleural effusions     GI: Hx GERD, Stewart's esophagus, Shock liver, Lactic acidosis, Mesenteric ischemia s/p Ex-lap, resection of jejunum, SMA angioplasty and SMA stenting, Abthera VAC placement 1/30, Second look laparotomy, SBR, Abthera VAC 1/30  · Wound vac in place mid abdomen, moderate amount of serosanguinous drainage overnight   · Stress ulcer prophylaxis: for GERD, Protonix q12 hrs   · Bowel regimen: None currently   · Continue NGT to suction, moderate amount of dark red output  · Plan for OR today, with possible closure   · General surgery is following   · Continue Heparin gtt to aid with reperfusion and prevent stent clotting  · Elevated LFT's; AST 3,859 trending down from 5, 031, ALT 2,601 up from 2,584, T-bili - 2 33 up from 1 30  · Will continue to trend LFT's, avoid hepatotoxic drugs and administer IVF  · Lactic acid is slowly trending down 7 1 this morning, will trend      FEN: Hypocalcemia, Hypernatremia, Hypoalbuminemia   · Goal 24 hour fluid balance: Maintain positive fluid balance due to frequent OR interventions and insensible losses   Fluid/Diuretic plan: Continue Isolyte at 100 ml/hr  · Patient given 2 L of Isolyte overnight, 1 L this morning   · Net positive is 6 Liters and 2 6 L over 24 hrs  · Nutrition/diet plan: NPO, patient returning to the OR today   · Replete electrolytes with goals: K >4 0, Mag >2 0, and Phos >3 0  · 2 g Calcium gluconate administered for Ca of 7 7  · Na was 149 this morning is slowly trending down from 152, may be due to dehydration, will continue to monitor and repeat labs this afternoon, patient may require IVF with dextrose, will reassess with repeat labs   · 50 cc's of albumin administered     : Acute kidney injury  · Indwelling Jalloh present: yes  · Trend UOP and BUN/creat  · Strict I and O  · Cr is elevated 1 55, baseline Cr is 0 8-1 0  · Continue IVF at this time     ID: Leukocytosis   · No antibiotics at this time   · Patient has been afebrile  · Trend temps and WBC count  · WBC was mildly elevated this morning 10 78    Heme: Acute blood loss anemia   · Trend hgb and plts  · Transfuse as needed for goal hgb >7 0   · Hgb was 9 8 this morning, is stable   · Plt 163  · Plan for PICC today     Endo: No issues at this time · Glycemic control plan: Blood glucose controlled on current regimen  · Will initiate q6 hours glucose checks due to NPO status and liver cell injury    MSK/Skin: Midline incision s/p ex lap  with wound vac in place   · Mobility goal: Bedrest, passive ROM  · Will consult PT/OT once patient is more stable   · Frequent turning and pressure off-loading  · Wound VAC is intact with serosanguinous output overnight     Family:  · Family updated within 24 hours: yes     Lines:  · Central venous access: triple lumen catheter - 20 cm  · Keep central line today for meds requiring central line  · Arterial line: Assessed  Continued for the following reasons hypotension  VTE Prophylaxis:  · Pharmacologic Prophylaxis:Heparin Drip  · Mechanical Prophylaxis: sequential compression device    Disposition: Continue ICU care      ______________________________________________________________________  Chief Complaint: Mesenteric ischemia       HPI/24hr events: Persistent hypotension not responsive to IVF, hypothermia - placed on bear hugger, received 2 L isolyte and started on levo      Review of Systems   Unable to perform ROS: Intubated     ______________________________________________________________________  Temperature:   Temp (24hrs), Av 5 °F (36 4 °C), Min:96 1 °F (35 6 °C), Max:98 2 °F (36 8 °C)    Current Temperature: 98 2 °F (36 8 °C)    Vitals:    19 0540 19 0600 19 0654 19 0700   BP:       BP Location:       Pulse: 94 92  94   Resp: 14 14  14   Temp: 98 2 °F (36 8 °C) 98 2 °F (36 8 °C)  98 2 °F (36 8 °C)   TempSrc:    Probe   SpO2: 96% 96% 95% 96%   Height:         Arterial Line BP: 114/34  Arterial Line MAP (mmHg): 64 mmHg     Weights:   IBW: 47 8 kg    Body mass index is 15 83 kg/m²    Weight (last 2 days)     None        Height: 5' 1" (154 9 cm)  Hemodynamic Monitoring:  N/A     Noninvasive/Ventilator Settings:  Ventilator Settings:   Vent Mode: AC/VC    Settings  Resp Rate (BPM): 14 BPM  Vt (mL): 400 mL  FIO2 (%): 40 %  PEEP (cmH2O): 5 cmH2O  Flow (LPM): 50 L/min    Observed  Resp Rate (BPM): 14 BPM  VT (mL): 404  MV: 5 66  Peak Pressure (cmH2O): 16 cmH2O  Plateau Pressure (cm H2O): 16 cm H2O  I/E Ratio (Obs): 1/3 6   Static Compliance (mL/cmH20): 44 mL/cmH2O  Lab Results   Component Value Date    PHART 7 441 01/31/2019    VTP6QWY 39 0 01/31/2019    PO2ART 72 1 (L) 01/31/2019    GTL0GMX 26 0 01/31/2019    BEART 1 8 01/31/2019    SOURCE Line, Arterial 01/31/2019     SpO2: SpO2: 96 %  ______________________________________________________________________  Physical Exam:  Lott Agitation Sedation Scale (RASS): Restless  Physical Exam   Constitutional: She is cooperative  She is easily aroused  She is sedated and intubated  HENT:   Head: Normocephalic  Eyes: Pupils are equal, round, and reactive to light  EOM are normal    Neck: Normal range of motion  No tracheal tenderness present  No tracheal deviation present  No thyromegaly present  Cardiovascular: Normal rate, regular rhythm, S1 normal and S2 normal     Pulses:       Radial pulses are 1+ on the right side, and 1+ on the left side  Dorsalis pedis pulses are 1+ on the right side, and 1+ on the left side  Pulmonary/Chest: She is intubated  No respiratory distress  She has decreased breath sounds in the right lower field and the left lower field  Abdominal: Soft  She exhibits no distension  Bowel sounds are absent  There is tenderness in the left upper quadrant  There is no rigidity  Musculoskeletal:   Full ROM in all extremities, generalized weakness  Neurological: She is alert and easily aroused  GCS eye subscore is 3  GCS verbal subscore is 1  GCS motor subscore is 6  Skin: Skin is warm and dry  No cyanosis   Nails show no clubbing      ______________________________________________________________________  Intake and Outputs:  I/O       01/29 0701 - 01/30 0700 01/30 0701 - 01/31 0700    I V  3411 6 4758 6    Blood 700     IV Piggyback 1050 500    Total Intake 5161 6 5258 6    Urine 1300 1320    Emesis/NG output  600    Drains 400 650    Blood 100 25    Total Output 1800 2595    Net +3361 6 +2663 6              Nutrition:        Diet Orders            Start     Ordered    01/30/19 0320  Diet NPO  Diet effective now     Question Answer Comment   Diet Type NPO    RD to adjust diet per protocol? Yes        01/30/19 0319          Labs:     Results from last 7 days  Lab Units 01/31/19  0526 01/31/19  0031 01/30/19  0351 01/30/19  0219 01/29/19  1507   WBC Thousand/uL 10 78*  --  15 74*  --   --  19 14*   HEMOGLOBIN g/dL 9 8* 9 4* 9 5*  --   --  11 4*   I STAT HEMOGLOBIN g/dl  --   --   --  8 8*  < >  --    HEMATOCRIT % 31 3*  --  30 7*  --   --  39 3   HEMATOCRIT, ISTAT %  --   --   --  26*  < >  --    PLATELETS Thousands/uL 163  --  144*  --   --  458*   NEUTROS PCT %  --   --  87*  --   --  84*   MONOS PCT %  --   --  5  --   --  5   MONO PCT % 6  --   --   --   --   --    < > = values in this interval not displayed     Results from last 7 days  Lab Units 01/31/19  0526 01/31/19  0131 01/30/19  1939  01/30/19  0352 01/30/19  0219 01/30/19  0128 01/30/19  0040  01/29/19  1507   SODIUM mmol/L 149* 152* 152*  < > 157*  --   --   --   --  136   POTASSIUM mmol/L 4 0 4 1 4 1  < > 3 5  --   --   --   --  4 6   CHLORIDE mmol/L 110* 110* 111*  < > 113*  --   --   --   --  98*   CO2 mmol/L 25 26 24  < > 27  --   --   --   --  21   CO2, I-STAT mmol/L  --   --   --   --   --  20* 22 21  < >  --    BUN mg/dL 44* 41* 44*  < > 33*  --   --   --   --  29*   CREATININE mg/dL 1 55* 1 45* 1 55*  < > 1 17  --   --   --   --  1 42*   CALCIUM mg/dL 7 7* 7 4* 8 8  < > 7 7*  --   --   --   --  9 9   ALK PHOS U/L 62  --   --   --  49  --   --   --   --  115   ALT U/L 2,601*  --   --   --  2,584*  --   --   --   --  34   AST U/L 3,859*  --   --   --  5,031*  --   --   --   --  40   GLUCOSE, ISTAT mg/dl  --   --   --   --   --  144* 158* 259*  < >  --    < > = values in this interval not displayed  Results from last 7 days  Lab Units 01/31/19  0526 01/30/19  0352 01/29/19  1507   MAGNESIUM mg/dL 3 0* 1 7 2 1       Results from last 7 days  Lab Units 01/31/19  0526 01/30/19  0805 01/30/19  0352   PHOSPHORUS mg/dL 4 4* 5 4* 6 1*        Results from last 7 days  Lab Units 01/31/19  0131 01/30/19  0805 01/30/19  0351 01/29/19  1507   INR   --   --  4 09* 1 23*   PTT seconds 78* 84*  --  23*       Results from last 7 days  Lab Units 01/31/19  0526   LACTIC ACID mmol/L 7 1*       0  Lab Value Date/Time   TROPONINI 1 46 (H) 10/06/2018 1058   TROPONINI 1 59 (H) 10/06/2018 0813   TROPONINI 1 27 (H) 10/06/2018 0511   TROPONINI <0 02 06/11/2018 2330   TROPONINI <0 02 06/11/2018 2004   TROPONINI <0 02 06/11/2018 1452   TROPONINI <0 02 01/17/2017 1529     Imaging: Cxr - b/l pleural effussions  I have personally reviewed pertinent reports  EKG: NSR  Micro:  Lab Results   Component Value Date    BLOODCX No Growth After 5 Days  10/03/2018    BLOODCX No Growth After 5 Days  10/03/2018    BLOODCX No Growth After 5 Days  01/17/2017    BLOODCX No Growth After 5 Days  01/17/2017    WOUNDCULT (A) 01/17/2017     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     Allergies:    Allergies   Allergen Reactions    Amiodarone Other (See Comments)     Legs swollen and itching    Chlorthalidone Itching    Penicillins Itching     Tolerates zosyn    Sulfa Antibiotics Rash    Bacitracin Rash    Ciprofloxacin GI Intolerance     Medications:   Scheduled Meds:    Current Facility-Administered Medications:  albumin human        albumin human 12 5 g Intravenous Once Efrain Hannifin, CRNP    calcium gluconate 2 g Intravenous Once Efrain Hannifin, CRNP Last Rate: 2 g (01/31/19 0732)   chlorhexidine 15 mL Swish & Spit Q12H [facility] Kay Frazier MD    clopidogrel 75 mg Oral Daily Lisa Parker MD    fentaNYL 50 mcg/hr Intravenous Continuous Dennise CHELSEA Hardy PA-C Last Rate: 50 mcg/hr (01/31/19 0121)   heparin (porcine) 3-20 Units/kg/hr (Order-Specific) Intravenous Titrated Mao Canchola MD Last Rate: 12 Units/kg/hr (01/30/19 1937)   HYDROmorphone 0 2 mg Intravenous Q3H PRN Bee Chu PA-C    multi-electrolyte 100 mL/hr Intravenous Continuous Sonia Verde MD Last Rate: 100 mL/hr (01/31/19 0513)   multi-electrolyte 1,000 mL Intravenous Once STEVEN Gore    norepinephrine 1-30 mcg/min Intravenous Titrated Nadia Nielson DO Last Rate: Stopped (01/31/19 0715)   pantoprazole 40 mg Intravenous Q12H [facility] Sonia Verde MD    propofol 5-50 mcg/kg/min Intravenous Titrated Anatoly Grijalva MD Last Rate: Stopped (01/31/19 0705)   vasopressin (PITRESSIN) in 0 9 % sodium chloride 100 mL 0 04 Units/min Intravenous Continuous Sonia Verde MD Last Rate: Stopped (01/30/19 0400)     Continuous Infusions:    fentaNYL 50 mcg/hr Last Rate: 50 mcg/hr (01/31/19 0121)   heparin (porcine) 3-20 Units/kg/hr (Order-Specific) Last Rate: 12 Units/kg/hr (01/30/19 1937)   multi-electrolyte 100 mL/hr Last Rate: 100 mL/hr (01/31/19 0513)   norepinephrine 1-30 mcg/min Last Rate: Stopped (01/31/19 0715)   propofol 5-50 mcg/kg/min Last Rate: Stopped (01/31/19 0705)   vasopressin (PITRESSIN) in 0 9 % sodium chloride 100 mL 0 04 Units/min Last Rate: Stopped (01/30/19 0400)     PRN Meds:    HYDROmorphone 0 2 mg Q3H PRN       Invasive lines and devices:   Invasive Devices     Central Venous Catheter Line            CVC Central Lines 01/30/19 Triple 1 day          Peripheral Intravenous Line            Peripheral IV 01/29/19 Right Antecubital 1 day    Peripheral IV 01/29/19 Right;Dorsal (posterior) Forearm 1 day    Peripheral IV 01/30/19 Left Forearm 1 day          Arterial Line            Arterial Line 01/30/19 Right Brachial 1 day          Drain            Ureteral Drain/Stent Left ureter 6 Fr  120 days    NG/OG/Enteral Tube Orogastric 18 Fr Center mouth 1 day    Negative Pressure Wound Therapy (V A C ) Abdomen Mid 1 day    Urethral Catheter Latex 16 Fr  1 day          Airway            ETT  Hi-Lo; Cuffed;Oral 7 mm 1 day                   Counseling / Coordination of Care  Total Critical Care time spent 36 minutes excluding procedures, teaching and family updates  Code Status: Level 1 - Full Code    Portions of the record may have been created with voice recognition software  Occasional wrong word or "sound a like" substitutions may have occurred due to the inherent limitations of voice recognition software  Read the chart carefully and recognize, using context, where substitutions have occurred      STEVEN Mejias

## 2019-01-31 NOTE — ANESTHESIA POSTPROCEDURE EVALUATION
Post-Op Assessment Note      CV Status:  Stable    Hydration Status:  Euvolemic    PONV Controlled:  Controlled    Airway Patency:  Patent  Airway: intubated    Post Op Vitals Reviewed: Yes          Staff: CRNA, Anesthesiologist       Comments: P02 58 adjusted settings and P02 went up to 158          BP  133/60   Temp   97   Pulse  98   Resp   12   SpO2   97

## 2019-01-31 NOTE — PROGRESS NOTES
Progress Note - General Surgery   Delfina Segura Race 80 y o  female MRN: [id number]  [facility]: [id number]    Assessment:  80year old F with acute mesenteric ischemia s/p ex-lap, small bowel resection, mesenteric A-gram and SMA angioplasty/stenting, Abthera, and subsequent second look, small bowel resection and Abthera VAC    Plan:  OR today for third look  Continue heparin gtt  Continue pressors and ventilator support  Supportive care per ICU    Subjective/Objective     Subjective: Low pressures overnight and subsequently given 2L crystalloid and 12 5g albumin  Mild response, and resumed on low dose norepinephrine this morning  Objective:    Blood pressure (!) 126/45, pulse 92, temperature 98 2 °F (36 8 °C), resp  rate 14, height 5' 1" (1 549 m), SpO2 96 %  ,Body mass index is 15 83 kg/m²  Intake/Output Summary (Last 24 hours) at 01/31/19 0617  Last data filed at 01/31/19 3812   Gross per 24 hour   Intake          5308 64 ml   Output             2595 ml   Net          2713 64 ml       Invasive Devices     Central Venous Catheter Line            CVC Central Lines 01/30/19 Triple 1 day          Peripheral Intravenous Line            Peripheral IV 01/29/19 Right Antecubital 1 day    Peripheral IV 01/29/19 Right;Dorsal (posterior) Forearm 1 day    Peripheral IV 01/30/19 Left Forearm 1 day          Arterial Line            Arterial Line 01/30/19 Right Brachial 1 day          Drain            Ureteral Drain/Stent Left ureter 6 Fr  120 days    NG/OG/Enteral Tube Orogastric 18 Fr Center mouth 1 day    Negative Pressure Wound Therapy (V A C ) Abdomen Mid 1 day    Urethral Catheter Latex 16 Fr  1 day          Airway            ETT  Hi-Lo; Cuffed;Oral 7 mm 1 day                Physical Exam:   General: intubated and sedated, withdraws to pain  Eyes: open to pain  ENT: moist mucous membranes  ETT in place  OGT in place  Neck: supple  CV: regular rate  Chest: mechanically ventilated  Abdomen: soft, tender, non-distended, VAC in place  Extremities: Right groin line in place, atraumatic        Results from last 7 days  Lab Units 01/31/19  0526 01/31/19  0031 01/30/19  0351 01/30/19  0219  01/29/19  1507   WBC Thousand/uL 10 78*  --  15 74*  --   --  19 14*   HEMOGLOBIN g/dL 9 8* 9 4* 9 5*  --   --  11 4*   I STAT HEMOGLOBIN g/dl  --   --   --  8 8*  < >  --    HEMATOCRIT % 31 3*  --  30 7*  --   --  39 3   HEMATOCRIT, ISTAT %  --   --   --  26*  < >  --    PLATELETS Thousands/uL 163  --  144*  --   --  458*   < > = values in this interval not displayed  Results from last 7 days  Lab Units 01/31/19 0131 01/30/19  1939 01/30/19  1828  01/30/19  0219   POTASSIUM mmol/L 4 1 4 1 4 0  < >  --    CHLORIDE mmol/L 110* 111* 111*  < >  --    CO2 mmol/L 26 24 24  < >  --    CO2, I-STAT mmol/L  --   --   --   --  20*   BUN mg/dL 41* 44* 41*  < >  --    CREATININE mg/dL 1 45* 1 55* 1 54*  < >  --    GLUCOSE, ISTAT mg/dl  --   --   --   --  144*   CALCIUM mg/dL 7 4* 8 8 8 8  < >  --    < > = values in this interval not displayed      Results from last 7 days  Lab Units 01/31/19  0131 01/30/19  0805 01/30/19  0351 01/29/19  1507   INR   --   --  4 09* 1 23*   PTT seconds 78* 84*  --  23*

## 2019-02-01 NOTE — PROGRESS NOTES
Death Pronouncement Note    Called to bedside by RN  Patient unresponsive to stimuli  No spontaneous respirations  No palpable pulse or audible heart sounds  Pupils fixed bilaterally  Asystolic on two contiguous leads  Time of death: 26    Family present at bedside  Attending notified  [place] office called by RN

## 2019-02-01 NOTE — RESPIRATORY THERAPY NOTE
RT Ventilator Management Note  Saundra Espino Race 80 y o  female MRN: [id number]  Unit/Bed#: [facility] 03 Encounter: [id number]      Daily Screen       1/31/2019 0654             Spont breathing trial outcome[de-identified] Failed    Spont breathing trial reason failed: Tidal volume < 4ml/Kg of ideal body weight or VE <5 or  >15 LPM;RSBI > 100    Previous settings resumed: Yes            Physical Exam:   Assessment Type: (P) Assess only  General Appearance: (P) Sedated  Respiratory Pattern: (P) Assisted  Chest Assessment: Chest expansion symmetrical  Bilateral Breath Sounds: (P) Clear  Suction: (P) ET Tube (PRN)      Resp Comments: (P) Pt continues on full vent support  Terell well  Not breating over set rate  BS clear, slightly coarse RLL  SpO2 95%  WIll continue full vent support

## 2019-02-01 NOTE — PROGRESS NOTES
Notified Himanshu Stinson (resident on call for critical care surgery) of AG   30, K 5 5, pt now anuric, AST 6874   ALT 3360, Creatinine 2 09, PTT > 210, and heparin gtt on hold

## 2019-02-01 NOTE — CONSULTS
Cardiology   Stephan Brice Race 80 y o  female MRN: [id number]  [facility]: [id number]      Reason for Consult / Principal Problem: Abnormal EKG     Physician Requesting Consult:  Hermann Smith MD    Cardiologist: Dr Delaney More        Assessment:    >> STEMI: initially ANTONY disappeared on repeat EKG, but re-appeared on post CT EKG  Bedside echo showed inferior wall hypokinesis  >> Atrial fibrillation, pasoxysmal  >> Acute kidney injury; baseline normal   >> MR, moderate  >> PAD  >> Mesenteric ischemia  >> Transaminitis: likely from shock liver  >> Elevated INR: not on AC- likely 2/2 hepatic dysfunction  >> Hx of gastric AVM/duodenal ulcer      Plan:    She is at high risk of bleeding, other complications given her severe PAD  Per surgical team she can tolerate heparin, DAPT as needed  Currently MAPs in the 70s-80s on levophed  Primary team checking CT head, will add CAP w/o contrast to r/o bleeding  Check Hb  Venous sat in 80s  Monitor urine OP post procedure  Continue heparin  Will proceed with [facility]  High risk cath primary team has notified  and daughter who both wish to proceed with emergent catheterization          CC: Abdominal pain, hypotension    HPI: Stephan Brice Race 80y o  year old female who presents with abdominal pain, found to have mesenteric ischemia  Has severe PAD and vascular disease  Has a hx of atrial fibrillation, not on AC 2/2 gastric AVM and duodenal bleed  Hx of SMA stenting in the past  Former smoker  Called to bedside for hypotension and abnormal ECG  Patient is s/p ex-lap x 3 earlier today  She was admitted with abdominal pain and found to have mesenteric ischemia  Per surgical team she has been hypotesnive since this evening, but this worsened around 11 pm  Fluids were given, pressors started and labs checked showed troponin of 32 therefore ECG was obtained which showed 1mm ANTONY in III and AVF with STD in 1, avl  Repeat ECG showed resolution of ANTONY   Serial ECGs showed return of inferior wall ANTONY  Bedside echo revealed inferior wall akinesis  Patient unresponsive and sedated, unable to provide history  Concern for neurological event per primary team     Previous echo showed diffuse hypokinesis and mildly reduced EF, NM stress rx showed fixed inferior wall defect TID 1 1     Remainder of ROS UTO 2/2 patient condition      EKG: as above              Family History:   Family History   Problem Relation Age of Onset    Hypertension Mother     Other Father         PVD?  Heart disease Father         MI     Historical Information   Past Medical History:   Diagnosis Date    Acid reflux     Arthritis     Stewart's esophagus     [facility]ch[facility] obstructive pulmonary disease) [facility]     Duodenal ulcer     Hypertension     Kidney stone 10/01/2018    [facility] [address] [facility]     Periodontal disease     controlled    Peripheral vascular disease ([facility] [address] [facility]     Scoliosis     Wears glasses     for reading     Past Surgical History:   Procedure Laterality Date    ANAL FISSURECTOMY      AORTA - SUPERIOR MESENTERIC ARTERY BYPASS GRAFT Bilateral     ARTERIOGRAM N/A 1/29/2019    Procedure: abdominal aortagram, SMA arteriogram, SMA angioplasty and SMA stenting;  Surgeon: Evie Sheppard MD;  [facility];  Service: Interventional Radiology    BREAST BIOPSY Right     CATARACT EXTRACTION Right     COLONOSCOPY      ESOPHAGOGASTRODUODENOSCOPY N/A 10/5/2018    Procedure: ESOPHAGOGASTRODUODENOSCOPY (EGD);   Surgeon: Ama Lopez MD;  Location: [address] [place] [address];  Service: Gastroenterology    FEMORAL ARTERY STENT Bilateral     HEMORROIDECTOMY      HYSTERECTOMY      partial-ovaries remain    INSERTION STENT ARTERY Left 1/1/2017    Procedure:  mesenteric arteriogram, balloon angioplasty of SMA stent;  Surgeon: Anthony Mosher MD;  [facility];  Service:    Chris SILVA VISCERAL ANGIOGRAPHY / INTERVENTION  1/30/2019    LAPAROTOMY N/A 1/1/2017    Procedure: LAPAROTOMY EXPLORATORY, abdominal washout, repair of duodenal ulcer;  Surgeon: Wendy Tai MD;  [facility];  Service:     LAPAROTOMY N/A 1/29/2019    Procedure: LAPAROTOMY EXPLORATORY, resection of jejunum,;  Surgeon: Damon Warren MD;  [facility];  Service: General    LAPAROTOMY N/A 1/30/2019    Procedure: LAPAROTOMY EXPLORATORY 2nd look, resection of approx  40 cm of distal small bowel, abthera vac;  Surgeon: Hieu Ryan DO;  [facility];  Service: General    KS CYSTO/URETERO W/LITHOTRIPSY &INDWELL STENT INSRT Left 10/2/2018    Procedure: CYSTOSCOPY URETEROSCOPY WITH LITHOTRIPSY HOLMIUM LASER, RETROGRADE PYELOGRAM AND INSERTION STENT URETERAL;  Surgeon: Kamila Hendricks MD;  Location: [address] [place] [address];  Service: Urology    KS EGD TRANSORAL BIOPSY SINGLE/MULTIPLE N/A 11/28/2018    Procedure: ESOPHAGOGASTRODUODENOSCOPY (EGD); Surgeon: Anais Kingsley MD;  Location: [facility] GI LAB; Service: Gastroenterology     [facility] CATARACT EXTRACAP,INSERT LENS Right 1/11/2016    Procedure: EXTRACTION INTRACAPSULAR CATARACT PHACO INTRAOCULAR LENS (IOL); Surgeon: Venus Murillo MD;  Location: [facility] MAIN OR;  Service: Ophthalmology    TONSILECTOMY AND ADNOIDECTOMY      TONSILLECTOMY      VAC DRESSING APPLICATION N/A 5/29/1021    Procedure: APPLICATION VAC DRESSING ABDOMEN (ABTHERA); Surgeon: Damon Warren MD;  [facility];  Service: General     Social History   History   Alcohol Use    3 0 oz/week    5 Glasses of wine per week     History   Drug Use No     History   Smoking Status    Former Smoker    Packs/day: 1 00    Years: 25 00    Quit date: 1985   Smokeless Tobacco    Never Used     Comment: smoked for 25 years  Family History:   Family History   Problem Relation Age of Onset    Hypertension Mother     Other Father         PVD?     Heart disease Father         MI       Review of Systems:  Review of Systems        Scheduled Meds:  Current Facility-Administered Medications:  aspirin 81 mg Per NG Tube Daily Benny Vizcarra MD    chlorhexidine 15 mL Swish & Spit Q12H [address] Dana Knight MD    clopidogrel 75 mg Per NG Tube Daily Benny Vizcarra MD    fentaNYL 50 mcg/hr Intravenous Continuous [address] [place], [place] Last Rate: Stopped (02/01/19 0031)   heparin (porcine) 3-20 Units/kg/hr (Order-Specific) Intravenous Titrated Troy Ying MD    heparin (porcine) 1,200 Units Intravenous PRN Troy Ying MD    heparin (porcine) 2,400 Units Intravenous PRN Troy Ying MD    HYDROmorphone 0 2 mg Intravenous Q3H PRN Mathew Lynch PA-C    multi-electrolyte 100 mL/hr Intravenous Continuous Troy Ying MD Last Rate: 100 mL/hr (01/31/19 1600)   multi-electrolyte 500 mL Intravenous Once Troy Ying MD    norepinephrine 1-30 mcg/min Intravenous Titrated Randal Schafer DO Last Rate: 10 mcg/min (01/31/19 2356)   pantoprazole 40 mg Intravenous Q12H [address] Troy Ying MD    propofol 5-50 mcg/kg/min Intravenous Titrated Dana Knight MD Last Rate: Stopped (01/31/19 2014)     Continuous Infusions:  fentaNYL 50 mcg/hr Last Rate: Stopped (02/01/19 0031)   heparin (porcine) 3-20 Units/kg/hr (Order-Specific)    multi-electrolyte 100 mL/hr Last Rate: 100 mL/hr (01/31/19 1600)   norepinephrine 1-30 mcg/min Last Rate: 10 mcg/min (01/31/19 2356)   propofol 5-50 mcg/kg/min Last Rate: Stopped (01/31/19 2014)     PRN Meds: heparin (porcine)    heparin (porcine)    HYDROmorphone  current meds:   Current Facility-Administered Medications   Medication Dose Route Frequency    dextrose 50 % IV solution **ADS Override Pull**        aspirin chewable tablet 81 mg  81 mg Per NG Tube Daily    chlorhexidine (PERIDEX) 0 12 % oral rinse 15 mL  15 mL Swish & Spit Q12H [address]    clopidogrel (PLAVIX) tablet 75 mg  75 mg Per NG Tube Daily    fentaNYL 1250 mcg in sodium chloride 0 9% 125mL drip  50 mcg/hr Intravenous Continuous    heparin (porcine) 25,000 units in 250 mL infusion (premix)  3-20 Units/kg/hr (Order-Specific) Intravenous Titrated    heparin (porcine) injection 1,200 Units  1,200 Units Intravenous PRN    heparin (porcine) injection 2,400 Units  2,400 Units Intravenous PRN    HYDROmorphone (DILAUDID) injection 0 2 mg  0 2 mg Intravenous Q3H PRN    multi-electrolyte (ISOLYTE-S PH 7 4 equivalent) IV solution  100 mL/hr Intravenous Continuous    multi-electrolyte (ISOLYTE-S PH 7 4) bolus 500 mL  500 mL Intravenous Once    norepinephrine (LEVOPHED) 4 mg (STANDARD CONCENTRATION) IV in sodium chloride 0 9% 250 mL  1-30 mcg/min Intravenous Titrated    pantoprazole (PROTONIX) injection 40 mg  40 mg Intravenous Q12H [facility]    propofol (DIPRIVAN) 1000 mg in 100 mL infusion (premix)  5-50 mcg/kg/min Intravenous Titrated       Allergies   Allergen Reactions    Amiodarone Other (See Comments)     Legs swollen and itching    Chlorthalidone Itching    Penicillins Itching     Tolerates zosyn    Sulfa Antibiotics Rash    Bacitracin Rash    Ciprofloxacin GI Intolerance       Objective   Vitals: Blood pressure (!) 126/45, pulse 96, temperature (!) 95 °F (35 °C), resp  rate 14, height 5' 1" (1 549 m), SpO2 94 %  , Body mass index is 15 83 kg/m² , Orthostatic Blood Pressures      Most Recent Value   Blood Pressure   126/45 filed at 01/30/2019 0316   Patient Position - Orthostatic VS  Lying filed at 01/29/2019 2230            Intake/Output Summary (Last 24 hours) at 02/01/19 0211  Last data filed at 02/01/19 0209   Gross per 24 hour   Intake          4778 87 ml   Output             2415 ml   Net          2363 87 ml       Invasive Devices     Peripherally Inserted Central Catheter Line            PICC Line 00/74/83 Left Basilic less than 1 day          Arterial Line            Arterial Line 01/30/19 Right Brachial 2 days          Drain            Ureteral Drain/Stent Left ureter 6 Fr  121 days    Urethral Catheter Latex 16 Fr  2 days    NG/OG/Enteral Tube Nasogastric 18 Fr Right nares less than 1 day          Airway ETT  Hi-Lo; Cuffed;Oral 7 mm 2 days                Physical Exam:    General:  AO x 0 sedated and intubated  Cardiac:  S1-S2 heard JVP: unable to assess  Lungs:  Difficult to auscultate  Abdomen:  Non distented, incision noted  Extremities:  Extremities are cold  Neuro: unable to assess        Lab Results:   Recent Results (from the past 24 hour(s))   CBC and differential    Collection Time: 01/31/19  5:26 AM   Result Value Ref Range    WBC 10 78 (H) 4 31 - 10 16 Thousand/uL    RBC 3 87 3 81 - 5 12 Million/uL    Hemoglobin 9 8 (L) 11 5 - 15 4 g/dL    Hematocrit 31 3 (L) 34 8 - 46 1 %    MCV 81 (L) 82 - 98 fL    MCH 25 3 (L) 26 8 - 34 3 pg    MCHC 31 3 (L) 31 4 - 37 4 g/dL    RDW 19 6 (H) 11 6 - 15 1 %    MPV 10 1 8 9 - 12 7 fL    Platelets 281 661 - 039 Thousands/uL    nRBC 0 /100 WBCs   Comprehensive metabolic panel    Collection Time: 01/31/19  5:26 AM   Result Value Ref Range    Sodium 149 (H) 136 - 145 mmol/L    Potassium 4 0 3 5 - 5 3 mmol/L    Chloride 110 (H) 100 - 108 mmol/L    CO2 25 21 - 32 mmol/L    ANION GAP 14 (H) 4 - 13 mmol/L    BUN 44 (H) 5 - 25 mg/dL    Creatinine 1 55 (H) 0 60 - 1 30 mg/dL    Glucose 120 65 - 140 mg/dL    Calcium 7 7 (L) 8 3 - 10 1 mg/dL    AST 3,859 (H) 5 - 45 U/L    ALT 2,601 (H) 12 - 78 U/L    Alkaline Phosphatase 62 46 - 116 U/L    Total Protein 4 0 (L) 6 4 - 8 2 g/dL    Albumin 2 6 (L) 3 5 - 5 0 g/dL    Total Bilirubin 2 33 (H) 0 20 - 1 00 mg/dL    eGFR 30 ml/min/1 73sq m   Magnesium    Collection Time: 01/31/19  5:26 AM   Result Value Ref Range    Magnesium 3 0 (H) 1 6 - 2 6 mg/dL   Phosphorus    Collection Time: 01/31/19  5:26 AM   Result Value Ref Range    Phosphorus 4 4 (H) 2 3 - 4 1 mg/dL   Calcium, ionized    Collection Time: 01/31/19  5:26 AM   Result Value Ref Range    Calcium, Ionized 1 02 (L) 1 12 - 1 32 mmol/L   Lactic acid, plasma    Collection Time: 01/31/19  5:26 AM   Result Value Ref Range    LACTIC ACID 7 1 (HH) 0 5 - 2 0 mmol/L   Manual Differential(PHLEBS Do Not Order)    Collection Time: 01/31/19  5:26 AM   Result Value Ref Range    Segmented % 81 (H) 43 - 75 %    Bands % 2 0 - 8 %    Lymphocytes % 11 (L) 14 - 44 %    Monocytes % 6 4 - 12 %    Eosinophils, % 0 0 - 6 %    Basophils % 0 0 - 1 %    Absolute Neutrophils 8 95 (H) 1 85 - 7 62 Thousand/uL    Lymphocytes Absolute 1 19 0 60 - 4 47 Thousand/uL    Monocytes Absolute 0 65 0 00 - 1 22 Thousand/uL    Eosinophils Absolute 0 00 0 00 - 0 40 Thousand/uL    Basophils Absolute 0 00 0 00 - 0 10 Thousand/uL    Total Counted 100     Anisocytosis Present     Platelet Estimate Adequate Adequate   Blood gas, arterial    Collection Time: 01/31/19  5:27 AM   Result Value Ref Range    pH, Arterial 7 441 7 350 - 7 450    pCO2, Arterial 39 0 36 0 - 44 0 mm Hg    pO2, Arterial 72 1 (L) 75 0 - 129 0 mm Hg    HCO3, Arterial 26 0 22 0 - 28 0 mmol/L    Base Excess, Arterial 1 8 mmol/L    O2 Content, Arterial 13 8 (L) 16 0 - 23 0 mL/dL    O2 HGB,Arterial  91 8 (L) 94 0 - 97 0 %    SOURCE Line, Arterial     Vent Type- AC AC     AC Rate 14     Tidal Volume 400 ml    Inspired Air (FIO2) 40     PEEP 5    APTT    Collection Time: 01/31/19  7:35 AM   Result Value Ref Range    PTT 82 (H) 26 - 38 seconds   Protime-INR    Collection Time: 01/31/19  7:35 AM   Result Value Ref Range    Protime 27 6 (H) 11 8 - 14 2 seconds    INR 2 56 (H) 0 86 - 1 17   Lactic acid, plasma    Collection Time: 01/31/19 10:04 AM   Result Value Ref Range    LACTIC ACID 5 8 (HH) 0 5 - 2 0 mmol/L   Fingerstick Glucose (POCT)    Collection Time: 01/31/19 12:15 PM   Result Value Ref Range    POC Glucose 109 65 - 140 mg/dl   POCT Blood Gas (CG8+)    Collection Time: 01/31/19  2:14 PM   Result Value Ref Range    pH, Art i-STAT 7 415 7 350 - 7 450    pCO2, Art i-STAT 43 9 36 0 - 44 0 mm HG    pO2, ART i-STAT 158 0 (H) 75 0 - 129 0 mm HG    BE, i-STAT 3 -2 - 3 mmol/L    HCO3, Art i-STAT 28 2 (H) 22 0 - 28 0 mmol/L    CO2, i-STAT 29 21 - 32 mmol/L    O2 Sat, i-STAT 99 (H) 95 - 98 %    SODIUM, I-STAT 148 (H) 136 - 145 mmol/l    Potassium, i-STAT 3 7 3 5 - 5 3 mmol/L    Calcium, Ionized i-STAT 0 97 (L) 1 12 - 1 32 mmol/L    Hct, i-STAT 26 (L) 34 8 - 46 1 %    Hgb, i-STAT 8 8 (L) 11 5 - 15 4 g/dl    Glucose, i-STAT 111 65 - 140 mg/dl    Specimen Type ARTERIAL    POCT Blood Gas (CG8+)    Collection Time: 01/31/19  2:44 PM   Result Value Ref Range    pH, Art i-STAT 7 389 7 350 - 7 450    pCO2, Art i-STAT 44 9 (H) 36 0 - 44 0 mm HG    pO2, ART i-STAT 58 0 (LL) 75 0 - 129 0 mm HG    BE, i-STAT 2 -2 - 3 mmol/L    HCO3, Art i-STAT 27 1 22 0 - 28 0 mmol/L    CO2, i-STAT 28 21 - 32 mmol/L    O2 Sat, i-STAT 89 (L) 95 - 98 %    SODIUM, I-STAT 149 (H) 136 - 145 mmol/l    Potassium, i-STAT 3 8 3 5 - 5 3 mmol/L    Calcium, Ionized i-STAT 0 99 (L) 1 12 - 1 32 mmol/L    Hct, i-STAT 27 (L) 34 8 - 46 1 %    Hgb, i-STAT 9 2 (L) 11 5 - 15 4 g/dl    Glucose, i-STAT 108 65 - 140 mg/dl    Specimen Type ARTERIAL    POCT Blood Gas (CG8+)    Collection Time: 01/31/19  3:09 PM   Result Value Ref Range    ph, Gage ISTAT 7 420 (H) 7 300 - 7 400    pCO2, Gage i-STAT 41 6 (L) 42 0 - 50 0 mm HG    pO2, Gage i-STAT 172 0 (H) 35 0 - 45 0 mm HG    BE, i-STAT 2 -2 - 3 mmol/L    HCO3, Gage i-STAT 27 0 24 0 - 30 0 mmol/L    CO2, i-STAT 28 21 - 32 mmol/L    O2 Sat, i-STAT 100 (H) 95 - 98 %    SODIUM, I-STAT 149 (H) 136 - 145 mmol/l    Potassium, i-STAT 3 8 3 5 - 5 3 mmol/L    Calcium, Ionized i-STAT 0 98 (L) 1 12 - 1 32 mmol/L    Hct, i-STAT 26 (L) 34 8 - 46 1 %    Hgb, i-STAT 8 8 (L) 11 5 - 15 4 g/dl    Glucose, i-STAT 114 65 - 140 mg/dl    Specimen Type VENOUS    Blood gas, arterial    Collection Time: 01/31/19  4:00 PM   Result Value Ref Range    pH, Arterial 7 450 7 350 - 7 450    pCO2, Arterial 32 3 (L) 36 0 - 44 0 mm Hg    pO2, Arterial 101 3 75 0 - 129 0 mm Hg    HCO3, Arterial 21 9 (L) 22 0 - 28 0 mmol/L    Base Excess, Arterial -1 5 mmol/L    O2 Content, Arterial 13 6 (L) 16 0 - 23 0 mL/dL    O2 HGB,Arterial  95 5 94 0 - 97 0 %    SOURCE Line, Arterial     Vent Type- AC AC     AC Rate 14     Tidal Volume 450 ml    Inspired Air (FIO2) 40     PEEP 10    Fingerstick Glucose (POCT)    Collection Time: 01/31/19  6:08 PM   Result Value Ref Range    POC Glucose 66 65 - 140 mg/dl   Fingerstick Glucose (POCT)    Collection Time: 01/31/19  6:13 PM   Result Value Ref Range    POC Glucose 91 65 - 140 mg/dl   Lactic acid, plasma    Collection Time: 01/31/19 11:18 PM   Result Value Ref Range    LACTIC ACID 14 9 (HH) 0 5 - 2 0 mmol/L   Basic metabolic panel    Collection Time: 01/31/19 11:18 PM   Result Value Ref Range    Sodium 150 (H) 136 - 145 mmol/L    Potassium 4 6 3 5 - 5 3 mmol/L    Chloride 111 (H) 100 - 108 mmol/L    CO2 18 (L) 21 - 32 mmol/L    ANION GAP 21 (H) 4 - 13 mmol/L    BUN 47 (H) 5 - 25 mg/dL    Creatinine 1 69 (H) 0 60 - 1 30 mg/dL    Glucose 13 (LL) 65 - 140 mg/dL    Calcium 7 4 (L) 8 3 - 10 1 mg/dL    eGFR 27 ml/min/1 73sq m   Magnesium    Collection Time: 01/31/19 11:18 PM   Result Value Ref Range    Magnesium 3 2 (H) 1 6 - 2 6 mg/dL   Phosphorus    Collection Time: 01/31/19 11:18 PM   Result Value Ref Range    Phosphorus 4 8 (H) 2 3 - 4 1 mg/dL   Troponin I    Collection Time: 01/31/19 11:18 PM   Result Value Ref Range    Troponin I 32 60 (H) <=0 04 ng/mL   Blood gas, arterial    Collection Time: 01/31/19 11:19 PM   Result Value Ref Range    pH, Arterial 7 262 (L) 7 350 - 7 450    pCO2, Arterial 33 3 (L) 36 0 - 44 0 mm Hg    pO2, Arterial 76 8 75 0 - 129 0 mm Hg    HCO3, Arterial 14 7 (L) 22 0 - 28 0 mmol/L    Base Excess, Arterial -11 3 mmol/L    O2 Content, Arterial 13 2 (L) 16 0 - 23 0 mL/dL    O2 HGB,Arterial  90 2 (L) 94 0 - 97 0 %    SOURCE Line, Arterial     Temperature 96 3 Degrees Fehrenheit    Vent Type- AC AC     AC Rate 14     Tidal Volume 40 ml    Inspired Air (FIO2) 40     PEEP 5    Fingerstick Glucose (POCT)    Collection Time: 02/01/19 12:25 AM   Result Value Ref Range    POC Glucose <20 (LL) 65 - 140 mg/dl   CBC and differential    Collection Time: 02/01/19 12:55 AM   Result Value Ref Range    WBC 9 40 4 31 - 10 16 Thousand/uL    RBC 3 55 (L) 3 81 - 5 12 Million/uL    Hemoglobin 8 9 (L) 11 5 - 15 4 g/dL    Hematocrit 31 1 (L) 34 8 - 46 1 %    MCV 88 82 - 98 fL    MCH 25 1 (L) 26 8 - 34 3 pg    MCHC 28 6 (L) 31 4 - 37 4 g/dL    RDW 20 1 (H) 11 6 - 15 1 %    MPV 10 4 8 9 - 12 7 fL    Platelets 865 (L) 065 - 390 Thousands/uL    nRBC 1 /100 WBCs   Manual Differential(PHLEBS Do Not Order)    Collection Time: 02/01/19 12:55 AM   Result Value Ref Range    Segmented % 71 43 - 75 %    Bands % 4 0 - 8 %    Lymphocytes % 19 14 - 44 %    Monocytes % 0 (L) 4 - 12 %    Eosinophils, % 0 0 - 6 %    Basophils % 0 0 - 1 %    Atypical Lymphocytes % 6 (H) <=0 %    Absolute Neutrophils 7 05 1 85 - 7 62 Thousand/uL    Lymphocytes Absolute 1 79 0 60 - 4 47 Thousand/uL    Monocytes Absolute 0 00 0 00 - 1 22 Thousand/uL    Eosinophils Absolute 0 00 0 00 - 0 40 Thousand/uL    Basophils Absolute 0 00 0 00 - 0 10 Thousand/uL    Total Counted      RBC Morphology Present     Anisocytosis Present     Wilbur Cells Present     Hypochromia Present     Microcytes Present     Ovalocytes Present     Poikilocytes Present     Polychromasia Present     Platelet Estimate Adequate Adequate   Blood gas, venous    Collection Time: 02/01/19 12:56 AM   Result Value Ref Range    pH, Gage 7 187 (L) 7 300 - 7 400    pCO2, Gage 35 8 (L) 42 0 - 50 0 mm Hg    pO2, Gage 71 6 (H) 35 0 - 45 0 mm Hg    HCO3, Gage 13 3 (L) 24 - 30 mmol/L    Base Excess, Gage -13 9 mmol/L    O2 Content, Gage 12 0 ml/dL    O2 HGB, VENOUS 85 6 (H) 60 0 - 80 0 %    Vent Type- AC AC     AC Rate 14     Tidal Volume 40 ml    Inspired Air (FIO2) 40     PEEP 5      Imaging: I have personally reviewed pertinent reports

## 2019-02-01 NOTE — CONSULTS
Consultation - [address] Charmaine A Race 80 y o  female MRN: [id number]  Unit/Bed#: [facility]U 03 Encounter: [id number]      Assessment/Plan     Assessment:  Patient Active Problem List   Diagnosis    Acid reflux    Periodontal disease    Stewart's esophagus    [facility]AD [facility]peripheral artery disease) ([facility]    Perforated duodenal ulcer ([facility] )    Chronic mesenteric ischemia (HCC)    Pleural effusion, bilateral    Hypoalbuminemia due to protein-calorie malnutrition ([facility] )    Stenosis of right internal carotid artery    Essential hypertension    Left renal artery stenosis ([facility] )    Renovascular hypertension    Generalized abdominal pain    Weight loss, unintentional    Left ureteral stone    Chronic anemia    Leukocytosis    Shock liver    New onset atrial fibrillation (HCC)    Moderate to severe mitral regurgitation    Dilated cardiomyopathy (HCC)    GI bleed    Lactic acidosis    GROVER (acute kidney injury) ([facility] )    Acute mesenteric ischemia (HCC)    Mesenteric ischemia (HCC)    Severe protein-calorie malnutrition (HCC)    Transaminitis       Plan:  Goals- Long conversation with patient's spouse and children  Spent time reviewing clinical course and assessing for understanding of medical diagnoses and interventions done   was able to repeat back to me most of the big concerns- had some trouble when discussing MI last night but was supported by his children  He seems to grasp that she is very critically ill and likely won't survive with continued statements like, "I just want to look into her eyes one more time"  Long time discussing code status and ramifications of CPR in a debilitated, critically ill octogenarian  Ultimately decided to change her to a Level 2- DNAR  Furthermore, they agree with no further surgical interventions  Time spent providing supportive listening- will continue to follow closely       History of Present Illness   Physician Requesting Consult: Presley North MD  Reason for Consult / Principal Problem: goals  Hx and PE limited by: patient intubated/sedated  HPI: Mainor Jay is a 80y o  year old female who presented to [facility] for generalized abcominal pain and a GI bleed, stat imaging showed occlusion of SMA and rising lactic acid level and decision was made to transfer to [facility] for surgical management  She went to the OR on 1/30 for an exploratory laparotomy with resection of jejunum, SMA angioplasty and stenting  She returned later that day for a second look with another resection of 40 cm of distal small bowel  Returned to the OR on 1/31 for wound closure  Early this AM she developed worsening hypotension and EKG showed ST elevations in III and aVF and decision made to proceed for left heart catheterization which demonstrated 100% occlusion of the RCA  Family is at bedside at time of my examination   does appear mildly demented but was able to repeat back to me everything that was told to him by the surgical team except around the STEMI- he struggled with this  His children were with him and helped him  They all understand she is gravely ill and the children even asked me after our conversation if she would even survive the day  They want the  to make decisions but agree to support him with his understanding  He states that they have been  for 77 years and that her mind is sharp and he depends on her to take care of bills, etc  He is intermittently tearful throughout our conversation  Long conversation about goals and details noted above       Inpatient consult to Palliative Care  Consult performed by: Oneil Landa  Consult ordered by: Salvador Hernandez          Review of Systems   Unable to perform ROS: Patient unresponsive       Historical Information   Past Medical History:   Diagnosis Date    Acid reflux     Arthritis     Stewart's esophagus     COPD (chronic obstructive pulmonary disease) [facility]     Duodenal ulcer     Hypertension     Kidney stone 10/01/2018    [facility]peripheral [facility] disease) [facility]     Periodontal disease     controlled    Peripheral vascular disease [facility]     Scoliosis     Wears glasses     for reading     Past Surgical History:   Procedure Laterality Date    ANAL FISSURECTOMY      AORTA - SUPERIOR MESENTERIC ARTERY BYPASS GRAFT Bilateral     ARTERIOGRAM N/A 1/29/2019    Procedure: abdominal aortagram, SMA arteriogram, SMA angioplasty and SMA stenting;  Surgeon: Yannick Chin MD;  [facility]: [facility];  Service: Interventional Radiology    BREAST BIOPSY Right     CATARACT EXTRACTION Right     COLONOSCOPY      ESOPHAGOGASTRODUODENOSCOPY N/A 10/5/2018    Procedure: ESOPHAGOGASTRODUODENOSCOPY (EGD); Surgeon: Batsheva Coe MD;  Location: [address] [place] [address];  Service: Gastroenterology    FEMORAL ARTERY STENT Bilateral     HEMORROIDECTOMY      HYSTERECTOMY      partial-ovaries remain    INSERTION STENT ARTERY Left 1/1/2017    Procedure:  mesenteric arteriogram, balloon angioplasty of SMA stent;  Surgeon: Nishi Jenkins MD;  Location: [facility];  Service:    [facility] VISCERAL ANGIOGRAPHY / INTERVENTION  1/30/2019    LAPAROTOMY N/A 1/1/2017    Procedure: LAPAROTOMY EXPLORATORY,  abdominal washout, repair of duodenal ulcer;  Surgeon: Claudine Garrett MD;  Location: [facility];  Service:     LAPAROTOMY N/A 1/29/2019    Procedure: LAPAROTOMY EXPLORATORY, resection of jejunum,;  Surgeon: Cristhian Youssef MD;  Location: [facility];  Service: General    LAPAROTOMY N/A 1/30/2019    Procedure: LAPAROTOMY EXPLORATORY 2nd look, resection of approx   40 cm of distal small bowel, abthera vac;  Surgeon: Da Toribio DO;  Location: [facility];  Service: General    IN CYSTO/URETERO W/LITHOTRIPSY &INDWELL STENT INSRT Left 10/2/2018    Procedure: CYSTOSCOPY URETEROSCOPY WITH LITHOTRIPSY HOLMIUM LASER, RETROGRADE PYELOGRAM AND INSERTION STENT URETERAL;  Surgeon: Joshua Senior MD;  Location: [facility];  Service: Urology    IN EGD TRANSORAL BIOPSY SINGLE/MULTIPLE N/A 11/28/2018    Procedure: ESOPHAGOGASTRODUODENOSCOPY (EGD); Surgeon: Emili Ortega MD;  Location: [facility] GI LAB; Service: Gastroenterology     [address] CATARACT EXTRACAP,INSERT LENS Right 1/11/2016    Procedure: EXTRACTION INTRACAPSULAR CATARACT PHACO INTRAOCULAR LENS (IOL); Surgeon: Wes Stafford MD;  Location: [facility] MAIN OR;  Service: Ophthalmology    TONSILECTOMY AND ADNOIDECTOMY      TONSILLECTOMY      VAC DRESSING APPLICATION N/A 8/48/7986    Procedure: APPLICATION VAC DRESSING ABDOMEN (ABTHERA); Surgeon: Polina Mora MD;  Location:  MAIN OR;  Service: General     Social History     Social History    Marital status: /Civil Union     Spouse name: N/A    Number of children: N/A    Years of education: N/A     Social History Main Topics    Smoking status: Former Smoker     Packs/day: 1 00     Years: 25 00     Quit date: 1985    Smokeless tobacco: Never Used      Comment: smoked for 25 years   Alcohol use 3 0 oz/week     5 Glasses of wine per week    Drug use: No    Sexual activity: No     Other Topics Concern    None     Social History Narrative    Lives with   Steady on feet  Family History   Problem Relation Age of Onset    Hypertension Mother     Other Father         PVD?     Heart disease Father         MI       Meds/Allergies   all current active meds have been reviewed and current meds:   Current Facility-Administered Medications   Medication Dose Route Frequency    aspirin chewable tablet 81 mg  81 mg Per NG Tube Daily    chlorhexidine (PERIDEX) 0 12 % oral rinse 15 mL  15 mL Swish & Spit Q12H [facility]    clopidogrel (PLAVIX) tablet 75 mg  75 mg Per NG Tube Daily    dextrose 5 % and sodium chloride 0 45 % infusion  100 mL/hr Intravenous Continuous    fentaNYL 1250 mcg in sodium chloride 0 9% 125mL drip  25 mcg/hr Intravenous Continuous    heparin (porcine) 25,000 units in 250 mL infusion (premix)  3-20 Units/kg/hr (Order-Specific) Intravenous Titrated    heparin (porcine) injection 1,200 Units  1,200 Units Intravenous PRN    heparin (porcine) injection 2,400 Units  2,400 Units Intravenous PRN    hydrocortisone sodium succinate (PF) (Solu-CORTEF) injection 100 mg  100 mg Intravenous Once    hydrocortisone sodium succinate (PF) (Solu-CORTEF) injection 50 mg  50 mg Intravenous Q6H [facility]    HYDROmorphone (DILAUDID) injection 0 2 mg  0 2 mg Intravenous Q3H PRN    norepinephrine (LEVOPHED) 4 mg (STANDARD CONCENTRATION) IV in sodium chloride 0 9% 250 mL  1-30 mcg/min Intravenous Titrated    pantoprazole (PROTONIX) injection 40 mg  40 mg Intravenous Q12H [facility]    piperacillin-tazobactam (ZOSYN) 2 25 g in sodium chloride 0 9 % 50 mL IVPB  2 25 g Intravenous Q8H       Palliative Care Medications: fentanyl driop    Allergies   Allergen Reactions    Amiodarone Other (See Comments)     Legs swollen and itching    Chlorthalidone Itching    Penicillins Itching     Tolerates zosyn    Sulfa Antibiotics Rash    Bacitracin Rash    Ciprofloxacin GI Intolerance       Objective     Physical Exam   Constitutional:   Frail, elderly female currently intubated/sedated   HENT:   Head: Normocephalic and atraumatic  Nose: Nose normal    Eyes: Right eye exhibits no discharge  Left eye exhibits no discharge  Cardiovascular:   Tachycardic with occasional PVCs   Pulmonary/Chest:   intubated   Abdominal: Soft  She exhibits no distension  Midline incision c/d/i   Musculoskeletal: She exhibits no edema  Neurological:   sedated   Skin: Skin is warm and dry  There is pallor  Lab Results:   I have personally reviewed pertinent labs  , CBC:   Lab Results   Component Value Date    WBC 13 66 (H) 02/01/2019    HGB 8 6 (L) 02/01/2019    HCT 30 0 (L) 02/01/2019    MCV 89 02/01/2019     (L) 02/01/2019    MCH 25 5 (L) 02/01/2019    MCHC 28 7 (L) 02/01/2019    RDW 20 6 (H) 02/01/2019    MPV 11 0 02/01/2019    NRBC 1 02/01/2019   , CMP:   Lab Results   Component Value Date SODIUM 148 (H) 02/01/2019    K 4 5 02/01/2019     (H) 02/01/2019    CO2 15 (L) 02/01/2019    CO2 28 01/31/2019    BUN 47 (H) 02/01/2019    CREATININE 1 92 (H) 02/01/2019    GLUCOSE 114 01/31/2019    CALCIUM 7 2 (L) 02/01/2019    EGFR 23 02/01/2019     Imaging Studies: I have personally reviewed pertinent reports  EKG, Pathology, and Other Studies: I have personally reviewed pertinent reports  Code Status: Level 1 - Full Code  Advance Directive and Living Will:      Power of :    POLST:      Counseling / Coordination of Care  Total floor / unit time spent today 75+ minutes  Greater than 50% of total time was spent with the patient and / or family counseling and / or coordination of care   A description of the counseling / coordination of care: goals of care, supportive listening, discussion with Dr Samira Mojica and SICU team

## 2019-02-01 NOTE — PROGRESS NOTES
02/01/19 1700   Clinical Encounter Type   Visited With Patient and family together   Crisis Visit Critical Care;Patient actively dying   Referral From Physician   Latter-day Encounters   Latter-day Needs Prayer   Family Spiritual Encounters   Family Coping Anxiety; Fearful;Open/discussion; Sadness  ()   Family Participation in Care 5

## 2019-02-01 NOTE — PROGRESS NOTES
02/01/19 [address] Involvement Patient not active with Hindu   Spiritual Beliefs/Perceptions   Concept of God Uninvolved   Relationship with God Other (Comment)  (Pt and  believe in larger force )   Family Spiritual Assessment   Fear of Dying pt's  talked about death of their daughter from cancer  Asked questions about ultimate nature of death  Fear of Death/Unknown Pt's  coming to terms with pt's condition   feels God is present in life and life must come to an end, but feels deep grief   Plan of Care   Comments Referral from physician  Cultivated a relationship of care and support  Listened empathically  Explored spiritual needs and resources-pt and  not active in Hindu but do believe in a concept called God  But does not believe God has a direct hand in death  Explored relational needs and resources  Pt and  live together and have enjoyed 77 yrs of marriage  Brianna were present in support  Explored emotional needs and resources-pt's  appreciated story telling  Talked about grief and wish that he could look into wife's eyes one more time  He understands the situation  Provided anticipatory guidance  Emotional resources utilized    Tearfully processed emotions   Assessment Completed by: Unit visit

## 2019-02-01 NOTE — PROGRESS NOTES
02/01/19 1700   Spiritual Beliefs/Perceptions   Support Systems Spouse/significant other;Children;Family members   Coping Responses   Family Coping Anxiety; Fearful;Open/discussion; Sadness  ()   Family Spiritual Assessment   Feelings of Being Disconnected/Cut-off yes   Plan of Care   Care Plan Initiated Yes   Comments Pt actively dying  Spent time w , Krishna, two children and a grandchild  Krishna is tearful and afraid of losing his wife of more then 60 years  He is afraid of taking over bills, cooking, cleaning, etc  His wife "did it all," he said  His children are aware of this  Offered a caring, listening presence and prayer     Assessment Completed by: Unit visit

## 2019-02-01 NOTE — RESPIRATORY THERAPY NOTE
Respiratory Care      01/31/19 2022   Respiratory Assessment   Assessment Type Assess only   General Appearance Sedated   Respiratory Pattern Assisted   Bilateral Breath Sounds Clear   Suction ET Tube  (PRN)   Resp Comments Pt continues on AC mode  Not breathing over set rate  BS clear  SpO2 98%  Will continue full vent support for tonight     Additional Assessments   Pulse 104   Respirations 14   SpO2 99 %

## 2019-02-01 NOTE — SEPSIS NOTE
Sepsis Note   Charis Mcguire Race 80 y o  female MRN: [id number]  [facility] Encounter: [id number]     Complex patient presentation  Admitted to ICU initially for management of mesenteric ischemia  Had acute decompensation overnight, hypotension requiring levophed, found to have inferior wall STEMI with 100% RCA occlusion, requiring PCA  Also with hypothermia, hypoglycemia, persistent altered mental status with normal head CT  CT CAP with possible pneumonia, but indeterminate  SVO2 is 85  Differential is broad, including sepsis, but also mesenteric ischemia, cardiogenic component of shock  Echo pending  She received >30cc/kg since her decompensation overnight, and is currently maintained on 100/hr IVF  She received single dose of zosyn  Cultures pending  Initial Sepsis Screening     Row Name 02/01/19 0550                Is the patient's history suggestive of a new or worsening infection? (!)  Yes (Proceed)  -JERROD        Suspected source of infection suspect infection, source unknown  -JERROD        Are two or more of the following signs & symptoms of infection both present and new to the patient? (!)  Yes (Proceed)  -JERROD        Indicate SIRS criteria Hypothermia < 36C (96 8F); Altered mental status  -Marbin Chawla        If the answer is yes to both questions, suspicion of sepsis is present          If severe sepsis is present AND tissue hypoperfusion perists in the hour after fluid resuscitation or lactate > 4, the patient meets criteria for SEPTIC SHOCK          Are any of the following organ dysfunction criteria present within 6 hours of suspected infection and SIRS criteria that are NOT considered to be chronic conditions?  (!)  Yes  -JERROD        Organ dysfunction Lactate >/equal 4 0 mmol/L;MAP < 65 mmHg;INR > 1 5 or aPTT > 60 secs;Creatinine > 0 5 mg/dl ABOVE BASELINE  -JERROD        Date of presentation of severe sepsis 02/01/19  -JERROD        Time of presentation of severe sepsis 0500  -JERROD        Tissue hypoperfusion persists in the hour after crystalloid fluid administration, evidenced, by either: Mean arterial pressure < 65 mm/Hg ( ___ mm Hg in comment field);* OR * Lactate level is greater to or equal 4 mmol/dL ( ___ mmol/dL in comment field)  -Terrence Duncan        Was hypotension present within one hour of the conclusion of crystalloid fluid administration?  Yes  -Terrence Duncan        Date of presentation of septic shock 02/01/19  -JERROD        Time of presentation of septic shock 2964  -[address]  (r) = Recorded By, (t) = Taken By, (c) = Cosigned By    [id number]  Name Provider Brant Recinos MD Resident

## 2019-02-01 NOTE — PROGRESS NOTES
Progress Note - General Surgery   Fern Zapien Race 80 y o  female MRN: [id number]  [facility] Encounter: [id number]    Assessment:  6year old F with acute mesenteric ischemia secondary to thrombosed SMA stent, s/p exploratory laparotomy, bowel resection x2 and subsequent closure  - placed back on pressors overnight  - troponin and EKG suggestive of STEMI  - taken for emergent cardiac cath    Plan:  Supportive care per ICU  Continue NGT to suction  Trend labs  Will likely need re-exploration  DAPT  Okay to heparinize if needed    Subjective/Objective     Subjective: Hyoptensive overnight  Troponins and EKG suggestive of inferior STEMI  Taken to cardiac cath lab emergently    Objective:    Blood pressure (!) 126/45, pulse 88, temperature (!) 94 6 °F (34 8 °C), resp  rate 14, height 5' 1" (1 549 m), weight 48 kg (105 lb 13 1 oz), SpO2 93 %  ,Body mass index is 19 99 kg/m²  Intake/Output Summary (Last 24 hours) at 02/01/19 0557  Last data filed at 02/01/19 0209   Gross per 24 hour   Intake          4338 05 ml   Output             2190 ml   Net          2148 05 ml       Invasive Devices     Peripherally Inserted Central Catheter Line            PICC Line 32/47/74 Left Basilic less than 1 day          Arterial Line            Arterial Line 01/30/19 Right Brachial 2 days          Line            Arterial Sheath 5 Fr  Right Radial less than 1 day          Drain            Ureteral Drain/Stent Left ureter 6 Fr  121 days    Urethral Catheter Latex 16 Fr  2 days    NG/OG/Enteral Tube Nasogastric 18 Fr Right nares less than 1 day          Airway            ETT  Hi-Lo; Cuffed;Oral 7 mm 2 days                Physical Exam:   General: intubated and sedated  Eyes: open to pain  ENT: ETT in place  Neck: supple  CV: RRR +S1/S2  Chest: breath sounds bilaterally  Abdomen: soft, non-distended  Extremities: atraumatic        Results from last 7 days  Lab Units 02/01/19  0055 01/31/19  1509 01/31/19  1444  01/31/19  0545 01/30/19  0351   WBC Thousand/uL 9 40  --   --   --  10 78*  --  15 74*   HEMOGLOBIN g/dL 8 9*  --   --   --  9 8*  < > 9 5*   I STAT HEMOGLOBIN g/dl  --  8 8* 9 2*  < >  --   --   --    HEMATOCRIT % 31 1*  --   --   --  31 3*  --  30 7*   HEMATOCRIT, ISTAT %  --  26* 27*  < >  --   --   --    PLATELETS Thousands/uL 113*  --   --   --  163  --  144*   < > = values in this interval not displayed  Results from last 7 days  Lab Units 01/31/19  2318 01/31/19  1509 01/31/19  1444  01/31/19  0526 01/31/19 0131   POTASSIUM mmol/L 4 6  --   --   --  4 0 4 1   CHLORIDE mmol/L 111*  --   --   --  110* 110*   CO2 mmol/L 18*  --   --   --  25 26   CO2, I-STAT mmol/L  --  28 28  < >  --   --    BUN mg/dL 47*  --   --   --  44* 41*   CREATININE mg/dL 1 69*  --   --   --  1 55* 1 45*   GLUCOSE, ISTAT mg/dl  --  114 108  < >  --   --    CALCIUM mg/dL 7 4*  --   --   --  7 7* 7 4*   < > = values in this interval not displayed  Results from last 7 days  Lab Units 02/01/19  0213 01/31/19  0735 01/31/19  0131  01/30/19  0351   INR  3 55* 2 56*  --   --  4 09*   PTT seconds 55* 82* 78*  < >  --    < > = values in this interval not displayed

## 2019-02-01 NOTE — OP NOTE
OPERATIVE REPORT  PATIENT NAME: Myles Amador    :  9/10/1930  MRN: [id number]  Pt Location: [facility] OR ROOM 04    SURGERY DATE: 2019    Surgeon(s) and Role:     * Marisa Miller DO - Primary     * Rayo Mg MD - Assisting     * Gaudencio Castillo MD - Assisting    Preop Diagnosis:  Mesenteric ischemia ([address] ) [K55 9]    Post-Op Diagnosis Codes:     * Mesenteric ischemia ([address] ) [K55 9]    Procedure(s) (LRB):  LAPAROTOMY EXPLORATORY W/ BOWEL RESECTION, abthera VAC removal, abdominal wound closure, SPY angiography (N/A)    Specimen(s):  * No specimens in log *    Estimated Blood Loss:   Minimal    Drains:  NG/OG/Enteral Tube Nasogastric 18 Fr Right nares (Active)   Placement Reverification Auscultation 2019 12:31 AM   Site Assessment Clean;Dry; Intact 2019 12:31 AM   Status Suction-low continuous 2019 12:31 AM   Drainage Appearance Dark red 2019  7:37 PM   Output (mL) 125 mL 2019  8:33 PM   Number of days: 1       Urethral Catheter Latex 16 Fr  (Active)   Reasons to continue Urinary Catheter  Accurate I&O assessment in critically ill patients (48 hr  max) 2019  7:17 AM   Goal for Removal Remove POD#1 2019  7:17 AM   Site Assessment Clean;Skin intact; Patent 2019  5:16 AM   Collection Container Standard drainage bag 2019  5:16 AM   Securement Method Securing device (Describe) 2019  5:16 AM   Output (mL) 135 mL 2019  6:03 AM   Number of days: 2       Ureteral Drain/Stent Left ureter 6 Fr  (Active)   Number of days: 122       [REMOVED] Negative Pressure Wound Therapy (V A C ) Abdomen Mid (Removed)   Unit Type abthera 2019  8:00 AM   Blue foam- # applied 3 2019  2:26 PM   Cycle Continuous 2019  4:00 PM   Target Pressure (mmHg) 125 2019  4:00 PM   Canister Changed No 2019  4:00 PM   Dressing Status Clean;Dry; Intact 2019 12:00 PM   Blue foam- # removed 3 2019  1:48 PM   Output (mL) 400 mL 2019  4:00 PM   Number of days: 1       [REMOVED] NG/OG/Enteral Tube Orogastric 18 Fr Center mouth (Removed)   Placement Reverification Auscultation 1/31/2019  8:00 AM   Site Assessment Clean;Dry; Intact 1/31/2019  8:00 AM   Status Suction-low continuous 1/31/2019  8:00 AM   Drainage Appearance Dark red 1/31/2019  8:00 AM   Output (mL) 0 mL 1/31/2019  2:00 PM   Number of days: 1       Anesthesia Type:   General    Operative Indications:  Mesenteric ischemia ([address] ) [K55 9]      Operative Findings:  Small-bowel viable  Spy angiography revealed good blood flow to small bowel and large bowel    Complications:   None    Procedure and Technique:  Patient was identified in preoperative holding and then brought the operating suite and placed under general anesthesia by anesthesia department  Her existing ABThera wound VAC was removed  Her entire abdomen was then prepped and draped in the usual sterile fashion  At this time all members of the team stopped and a time-out was performed  Everyone was in agreement with the above plan  Entire abdomen was explored  All of bowel was viable  We did perform a spy angiography, and this confirmed that there was good blood flow to the bowel  This time we took the proximal distal transected ends of the small bowel and made a small enterotomy in each hand  One Half of the 75 mm EUGENIO stapler was placed in each segment of bowel and fired  An additional 75 mm EUGENIO stapler was placed across the transected ends of the small bowel and fired  This completed the anastomosis  The end staple line was reinforced with 3 0 silk sutures  The abdomen was then copiously irrigated with warm normal saline  The fascia was reapproximated with 1  PDS sutures  The skin was reapproximated with surgical staples  Patient tolerated procedure well  She remained intubated and went back to intensive care unit  All needle instrument sponge counts were correct at the end of the procedure    I was present for the entire procedure      Patient Disposition:  Critical Care Unit     SIGNATURE: Ruthann Lantigua DO  DATE: February 1, 2019  TIME: 8:00 AM

## 2019-02-01 NOTE — RESPIRATORY THERAPY NOTE
RT Ventilator Management Note  Gely Ortega Race 80 y o  female MRN: [id number]  [facility] En[facility]: [id number]      Daily Screen       1/31/2019 0654             Spont breathing trial outcome[de-identified] Failed    Spont breathing trial reason failed: Tidal volume < 4ml/Kg of ideal body weight or VE <5 or  >15 LPM;RSBI > 100    Previous settings resumed:  Yes            Physical Exam:   Assessment Type: Assess only  General Appearance: Sedated  Respiratory Pattern: Assisted  Chest Assessment: Chest expansion symmetrical  Bilateral Breath Sounds: Clear  Suction: ET Tube (PRN)  O2 Device: vent      Resp Comments: pt remains sedated after card cath at 3:30 this morning, no distress noted no other changes at this time will cont to monitor

## 2019-02-01 NOTE — DISCHARGE SUMMARY
Discharge Summary - Paddy Eisenmenger Race 80 y o  female MRN: [id number]    Unit/Bed#: MICU 03 Encounter: [id number] PCP: Jackie Gomez MD    Admission Date:   Admission Orders     Ordered        01/29/19 2130  Inpatient Admission  Once               Admitting Diagnosis: Chronic diastolic congestive heart failure ([facility] [address] ) [I50 32]  Mesenteric ischemia ([facility] [address] ) [K55 9]  Known medical problems [Z78 9]    HPI: Antoni Dixon is an 79 y/o female with history of PID, prior perforated duodenal ulcer, and chronic mesenteric ischemia, who initially presented with acute abdominal pain, as well as bright blood per rectum, found to have a lactic acid of 4 7  She was transferred from [facility] to [place] [address] for further evaluation  Upon arrival, her repeat lactic acid increased to 5 1 despite fluid resuscitation, and since her symptoms are similar to her prior SMA stent occlusion in 2017, she was started on heparin drip, admitted to the ICU, and brought to the OR on 01/30 for laparotomy  Procedures Performed:   Orders Placed This Encounter   Procedures    Cardiac catheterization       Summary of Hospital Course: The patient was taken to the operating room on 01/30 for laparotomy, and had a cm of small, dusky bowel resected  She was left open, and became clinically unstable immediately postoperatively, and was brought to the OR again on 01/30 for 2nd look laparotomy, with 40 cm of additional distal small bowel resected, as well as placement of an athera vac  She return to the OR on 01/31 for definitive skin closure, and reanastomosis of the bowel  Overnight on the night of 1/31, the patient had decreased blood pressures, decreased responsiveness, and was noted to have an inferior wall ST-elevation MI based on EKG  Bedside echocardiogram displayed inferior wall motion abnormalities  She did have cardiac catheterization, and balloon angioplasty of a 100% RCA lesion, and had 80% residual stenosis    On the day of 2/1, the patient had progressive worsening of her metabolic acidosis, and lactic acidosis  Her abdomen became progressively more distended  She was placed on Levophed, vasopressin, and given multiple fluid boluses, as well as transitioned to bicarb drip  It was thought that the patient would not be a good candidate for further operative interventions based on a decrease likelihood of successful operation  A family meeting occurred, and the patient's family agreed that they would not pursue further operative intervention of her disease  She was made DNR at that time  She continue to become progressively more hypotensive, bradycardic, and required several amps of calcium and bicarbonate  She was noted to be in acute renal failure, and her urine output became negligible  On  at 1726, the patient was noted to have asystole on the monitor  She was pulseless, with no spontaneous respirations, and had fixed dilated pupils  Time of death was called at that time  Family was at bedside for the last several hours of the patient's life      Significant Findings, Care, Treatment and Services Provided     - laparotomy and bowel resection   - laparotomy and bowel resection   - laparotomy with bowel reanastomosis, as well as skin closure   - cardiac catheterization with PCI to a 100% our ED lesion    Complications:  Lactic acidosis, metabolic acidosis    Disposition:      Final Diagnosis:     Ischemic colitis    Resolved Problems  Date Reviewed: 2019    None              Date, Time and Cause of Death    Date of Death:  19  Time of Death:   5:26 PM  Preliminary Cause of Death:  Ischemic bowel disease ([facility] [address] )  Entered by:  Martin Queen[JB1 1]     Attribution     JB1 1 Thomas Valera MD 19 17:30

## 2019-02-01 NOTE — PROGRESS NOTES
Progress Note - Critical Care   Heena Yuan Race 80 y o  female MRN: [id number]  Unit/Bed#: MICU 03 Encounter: [id number]          ______________________________________________________________________  Assessment and Plan:     1  Undifferentiated Shock - Likely Multifactorial 2/2 septic shock vs cardiogenic   2  Lactic Acidosis  3  Acute Mesenteric Ischemia POD1 s/p reanastomosis and skin closure  4  STEMI s/p YANG to 100% RCA lesion  5  Hypoxic Respiratory failure   6  GROVER  7  Protein-Calorie Malnutrition  8  Hypoglycemia  9  Anemia of Chronic Disease  10  Hypernatremia    Neuro:  Currently off all sedation based on change in mental status, and hypertension  GCS is 9T currently  CT head overnight unremarkable  -restart fentanyl GTT at 25/hour  Will hold other sedation this time  -delirium precautions  -[facility] ICU  -critical neuro checks    CV:   Undifferentiated shock - appears most likely secondary to septic shock, given SpO2 of 85, as well as physical exam findings including lack of JVD or signs of volume overload  The patient appears clinically volume depleted  Metabolic non anion gap acidosis  Base deficit 13 9 this morning, with lactic acid 19 8   -start IV Zosyn  -consider pulse hydrocortisone  -additional 500 cc bolus of LR  Patient prior require further fluid resuscitation  -Levophed at 5  Titrate for goal MAP >65  -recheck SvO2 this morning  -continue to trend endpoints, La, ABGs, BMP    STEMI 2/2 100% occluded RCA - s/p PCI w/ YANG on 2/1   80% residual stenosis noted  Bedside echocardiogram yesterday displayed moderately reduced systolic function, as well as inferior wall hypokinesis  ST segments improved on repeat EKG this morning  -appreciate Cardiology recommendations  -heparin GTT  -aspirin, Plavix  -f/u Echo today      Pulm:   Acute hypoxic respiratory failure - currently on 14/400/40/5  Blood gas this morning with 7 point V/34 7/85 9/13 1  Oxygen saturations have been in the low 90s    Spontaneous respirations noted, however persistently not over breathing the ventilator  -not a candidate for SBT  -goal O2 saturation >92%  -wean as tolerated  -respiratory protocol    Bilateral pleural effusions - likely secondary to anasarca/volume overload, however possible underlying pneumonia  -will start Zosyn as noted above  -sputum culture  -Legionella/strep pneumo urine antigen    GI:   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 - POD2 s/p 80cm small bowel resection and SMA angioplasty  POD2 s/p 2nd work exploratory laparotomy with 40 cm distal small bowel resection, abthera VAC placement  POD1 s/p reanastomosis and skin closure  Also with shock liver, and worsening lactic acidosis concerning for continued ischemia  -general surgery following - appreciate recommendations  -continue Protonix q 12 hours  -continue to hold tube feeds given critical illness, currently on pressors  -NGT to suction  -continue to trend LFTs  -trend lactic acid  -currently no plans for OR given critical illness secondary to other organ systems currently    :  Acute kidney injury - possibly secondary to ATN based on CT scan findings  Urine output has been down trending, 0 75cc/kg overnight  Patient currently appears volume depleted on exam  -D5 normal saline at 100/hour  -Jalloh catheter  -strict I/O  -continue to trend UOP and serum creatinine  -may require nephrology consultation of urine output dropped off for possible dialysis at that time    F/E/N:   Hypernatremia - likely secondary to volume depletion  -continue D5NS 100/hr  -currently receiving 500 cc LR bolus  Will repeat bolus as needed  -replete electrolytes as needed  -currently off tube feeds  Will restart once stabilized    ID:   Possible bilateral pneumonia    Patient has persistently been hypothermic  -Zosyn as above  -follow up cultures as above  -will continue to monitor WBC and fever curve     Heme:   -hemoglobin stable - transfuse for goal hemoglobin >8 0 given recent STEMI  -heparin GTT  -SCDs for DVT prophylaxis    Endo:   Possible adrenal insufficiency - patient has had persistent hypothermia, hypoglycemia, and refractory shock  Blood glucose was 44 this morning  -will given additional amp of D50  -check random cortisol  -consider hydrocortisone, pulse dose x5 days  -q2hr FS BS     Msk/Skin:   -continue midline incision dressing changes  -frequent turning, repositioning  -PT/OT once stable    Disposition:  Continue ICU care  Goals of care discussion later today  Family updated this morning    Code Status: Level 1 - Full Code    Counseling / Coordination of Care    ______________________________________________________________________    Chief Complaint:  None    24 Hour Events:  Patient became hypotensive overnight, also with change in mental status  Found to have inferior STEMI, with inferior wall motion abnormalities on bedside echocardiogram   Taken to cath lab, and had YANG placed to a 100% RCA lesion, with 80% residual stenosis  Has CT scan of the head which was negative, as well as CT scan of the chest abdomen pelvis which revealed bilateral pleural effusions with possible underlying pneumonia, diffuse bowel wall thickening, as well as findings concerning for possible ATN  Also found to be persistently hypoglycemic, hypothermia, and with hypertension refractory to IV fluids, so was started on Levophed      ______________________________________________________________________    Physical Exam:       Physical Exam   Constitutional:   Intubated  Lethargic, sluggishly responsive to voice, and following commands  Sick appearing  HENT:   Head: Normocephalic and atraumatic  Eyes: Pupils are equal, round, and reactive to light  No scleral icterus  Neck: Normal range of motion  No JVD present  Cardiovascular: Normal rate  No murmur heard  Pulmonary/Chest:   Coarse, equal breath sounds bilaterally  Spontaneous respirations are present    Not over breathing the ventilator  Abdominal:   Mildly distended  Midline incision clean, dry, intact  Nontender to palpation  Musculoskeletal:   2+ radial pulse bilaterally  1+ DP pulse bilaterally  Extremities warm to touch  No deformities noted   Neurological: GCS eye subscore is 3  GCS verbal subscore is 1  GCS motor subscore is 5  GCS 9 T  No focal deficits appreciated  Skin: Skin is warm and dry      ______________________________________________________________________  Vitals:    19 0515 19 0516 19 0532 19 0544   BP:       BP Location:       Pulse: 90  88 88   Resp: 13  14 14   Temp: (!) 94 6 °F (34 8 °C)  (!) 94 6 °F (34 8 °C) (!) 94 6 °F (34 8 °C)   TempSrc:       SpO2: 99%  99% 93%   Weight:  48 kg (105 lb 13 1 oz)     Height:         Arterial Line BP: 114/24  Arterial Line MAP (mmHg): 56 mmHg     Temperature:   Temp (24hrs), Av 6 °F (35 9 °C), Min:94 6 °F (34 8 °C), Max:98 2 °F (36 8 °C)    Current Temperature: (!) 94 6 °F (34 8 °C)  Weights:   IBW: 47 8 kg    Body mass index is 19 99 kg/m²    Weight (last 2 days)     Date/Time   Weight    19 0516  48 (105 82)            Hemodynamic Monitoring:  N/A     Non-Invasive/Invasive Ventilation Settings:  Respiratory    Lab Data (Last 4 hours)       05            pH, Arterial       (!)7 195           pCO2, Arterial       (!)34 7           pO2, Arterial       85 9           HCO3, Arterial       (!)13 1           Base Excess, Arterial       -13 9                O2/Vent Data           Most Recent         Vent Mode   AC/VC      Resp Rate (BPM) (BPM)   14      Vt (mL) (mL)   400      FIO2 (%) (%)   40      PEEP (cmH2O) (cmH2O)   5      MV   6 22                Lab Results   Component Value Date    PHART 7 195 (LL) 2019    [id number] 34 7 (L) 2019    PO2ART 85 9 2019    YMJ6TNA 13 1 (L) 2019    BEART -13 9 2019    SOURCE Line, Arterial 2019     SpO2: SpO2: 99 %  Intake and Outputs:  I/O       01/30 0701 - 01/31 0700 01/31 0701 - 02/01 0700    I V  (mL/kg) 4758 6 3976 (82 8)    IV Piggyback 500 400    Total Intake(mL/kg) 5258 6 4376 (91 2)    Urine (mL/kg/hr) 1320 925 (0 8)    Emesis/NG output 600 525    Drains 650 800    Blood 25     Total Output 2595 2250    Net +2663 6 +2126              UOP: 40/hour   Nutrition:        Diet Orders            Start     Ordered    01/30/19 0320  Diet NPO  Diet effective now     Question Answer Comment   Diet Type NPO    RD to adjust diet per protocol? Yes        01/30/19 0319        Tube feeds Currently held  Labs:     Results from last 7 days  Lab Units 02/01/19  0540 02/01/19  0055 01/31/19  1509  01/31/19  0526  01/30/19  0351  01/29/19  1507   WBC Thousand/uL 13 66* 9 40  --   --  10 78*  --  15 74*  --  19 14*   HEMOGLOBIN g/dL 8 6* 8 9*  --   --  9 8*  < > 9 5*  --  11 4*   I STAT HEMOGLOBIN g/dl  --   --  8 8*  < >  --   --   --   < >  --    HEMATOCRIT % 30 0* 31 1*  --   --  31 3*  --  30 7*  --  39 3   HEMATOCRIT, ISTAT %  --   --  26*  < >  --   --   --   < >  --    PLATELETS Thousands/uL 112* 113*  --   --  163  --  144*  --  458*   NEUTROS PCT %  --   --   --   --   --   --  87*  --  84*   MONOS PCT %  --   --   --   --   --   --  5  --  5   MONO PCT %  --  0*  --   --  6  --   --   --   --    < > = values in this interval not displayed      Results from last 7 days  Lab Units 02/01/19  0540 01/31/19  2318 01/31/19  1509 01/31/19  1444 01/31/19  1414 01/31/19  0526  01/30/19  0352  01/29/19  1507   POTASSIUM mmol/L 4 5 4 6  --   --   --  4 0  < > 3 5  --  4 6   CHLORIDE mmol/L 109* 111*  --   --   --  110*  < > 113*  --  98*   CO2 mmol/L 15* 18*  --   --   --  25  < > 27  --  21   CO2, I-STAT mmol/L  --   --  28 28 29  --   --   --   < >  --    BUN mg/dL 47* 47*  --   --   --  44*  < > 33*  --  29*   CREATININE mg/dL 1 92* 1 69*  --   --   --  1 55*  < > 1 17  --  1 42*   CALCIUM mg/dL 7 2* 7 4*  --   --   --  7 7*  < > 7 7*  --  9 9   ALK PHOS U/L  --   --   --   --   --  62  --  49  --  115   ALT U/L  --   --   --   --   --  2,601*  --  2,584*  --  34   AST U/L  --   --   --   --   --  3,859*  --  5,031*  --  40   GLUCOSE, ISTAT mg/dl  --   --  114 108 111  --   --   --   < >  --    < > = values in this interval not displayed  Results from last 7 days  Lab Units 02/01/19  0540 01/31/19  2318 01/31/19  0526   MAGNESIUM mg/dL 3 3* 3 2* 3 0*     Lab Results   Component Value Date    PHOS 5 8 (H) 02/01/2019    PHOS 4 8 (H) 01/31/2019    PHOS 4 4 (H) 01/31/2019        Results from last 7 days  Lab Units 02/01/19  0213 01/31/19  0735 01/31/19  0131  01/30/19  0351   INR  3 55* 2 56*  --   --  4 09*   PTT seconds 55* 82* 78*  < >  --    < > = values in this interval not displayed  0  Lab Value Date/Time   TROPONINI 34 30 (H) 02/01/2019 0213   TROPONINI 32 60 (H) 01/31/2019 2318   TROPONINI 1 46 (H) 10/06/2018 1058   TROPONINI 1 59 (H) 10/06/2018 0813   TROPONINI 1 27 (H) 10/06/2018 0511   TROPONINI <0 02 06/11/2018 2330   TROPONINI <0 02 06/11/2018 2004   TROPONINI <0 02 06/11/2018 1452   TROPONINI <0 02 01/17/2017 1529       Results from last 7 days  Lab Units 02/01/19  0540 01/31/19  2318 01/31/19  1004   LACTIC ACID mmol/L 19 8* 14 9* 5 8*     ABG:  Lab Results   Component Value Date    PHART 7 195 (LL) 02/01/2019    YPR4KVJ 34 7 (L) 02/01/2019    PO2ART 85 9 02/01/2019    ENS7YHF 13 1 (L) 02/01/2019    BEART -13 9 02/01/2019    SOURCE Line, Arterial 02/01/2019     Imaging:  I have personally reviewed pertinent reports  EKG:  See above  Micro:  Lab Results   Component Value Date    BLOODCX No Growth After 5 Days  10/03/2018    BLOODCX No Growth After 5 Days  10/03/2018    BLOODCX No Growth After 5 Days  01/17/2017    BLOODCX No Growth After 5 Days  01/17/2017    WOUNDCULT (A) 01/17/2017     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     Allergies:    Allergies   Allergen Reactions    Amiodarone Other (See Comments) Legs swollen and itching    Chlorthalidone Itching    Penicillins Itching     Tolerates zosyn    Sulfa Antibiotics Rash    Bacitracin Rash    Ciprofloxacin GI Intolerance     Medications:   Scheduled Meds:  Current Facility-Administered Medications:  aspirin 81 mg Per NG Tube Daily Wandy Espana MD    chlorhexidine 15 mL Swish & Spit Q12H [address] Abdiaziz Chavez MD    clopidogrel 75 mg Per NG Tube Daily Wandy Espana MD    dextrose 5 % and sodium chloride 0 45 % 100 mL/hr Intravenous Continuous Keli Taveras MD Last Rate: 100 mL/hr (02/01/19 0556)   fentaNYL 50 mcg/hr Intravenous Continuous Dennise Hardy PA-C Last Rate: Stopped (02/01/19 0031)   heparin (porcine) 3-20 Units/kg/hr (Order-Specific) Intravenous Titrated Keli Taveras MD Last Rate: 12 Units/kg/hr (02/01/19 0558)   heparin (porcine) 1,200 Units Intravenous PRN Keli Taveras MD    heparin (porcine) 2,400 Units Intravenous PRN Keli Taveras MD    HYDROmorphone 0 2 mg Intravenous Q3H PRN Nellie Bradford PA-C    lactated ringers 500 mL Intravenous Once Keli Taveras MD Last Rate: 500 mL (02/01/19 0468)   multi-electrolyte 75 mL/hr Intravenous Continuous Keli Taveras MD Last Rate: Stopped (02/01/19 0328)   norepinephrine 1-30 mcg/min Intravenous Titrated Blanchie Gails, DO Last Rate: 5 mcg/min (02/01/19 0544)   pantoprazole 40 mg Intravenous Q12H [address] Keli Taveras MD    piperacillin-tazobactam 2 25 g Intravenous Q8H Keli Taveras MD    propofol 5-50 mcg/kg/min Intravenous Titrated Abdiaziz Chavez MD Last Rate: Stopped (01/31/19 2014)     Continuous Infusions:  dextrose 5 % and sodium chloride 0 45 % 100 mL/hr Last Rate: 100 mL/hr (02/01/19 0556)   fentaNYL 50 mcg/hr Last Rate: Stopped (02/01/19 0031)   heparin (porcine) 3-20 Units/kg/hr (Order-Specific) Last Rate: 12 Units/kg/hr (02/01/19 0558)   multi-electrolyte 75 mL/hr Last Rate: Stopped (02/01/19 0328)   norepinephrine 1-30 mcg/min Last Rate: 5 mcg/min (02/01/19 0544)   propofol 5-50 mcg/kg/min Last Rate: Stopped (01/31/19 2014)     PRN Meds:    heparin (porcine) 1,200 Units PRN   heparin (porcine) 2,400 Units PRN   HYDROmorphone 0 2 mg Q3H PRN     VTE Pharmacologic Prophylaxis: Sequential compression device (Venodyne)  and Heparin  VTE Mechanical Prophylaxis: sequential compression device  Invasive lines and devices: Invasive Devices     Peripherally Inserted Central Catheter Line            PICC Line 47/76/25 Left Basilic less than 1 day          Arterial Line            Arterial Line 01/30/19 Right Brachial 2 days          Line            Arterial Sheath 5 Fr  Right Radial less than 1 day          Drain            Ureteral Drain/Stent Left ureter 6 Fr  121 days    Urethral Catheter Latex 16 Fr  2 days    NG/OG/Enteral Tube Nasogastric 18 Fr Right nares less than 1 day          Airway            ETT  Hi-Lo; Cuffed;Oral 7 mm 2 days                     Portions of the record may have been created with voice recognition software  Occasional wrong word or "sound a like" substitutions may have occurred due to the inherent limitations of voice recognition software  Read the chart carefully and recognize, using context, where substitutions have occurred      Delmi Arias MD

## 2019-02-01 NOTE — PROGRESS NOTES
02/01/19 0300   Clinical Encounter Type   Visited With Health care provider   Crisis Visit Code     Attended MI alert

## 2019-02-01 NOTE — PROGRESS NOTES
Patient had previously had MAPs around 60-70s  Called by nurse because patient's MAPS had gone down to high 50s  Bolus started but then MAPs continued down to low 50s/high 40s  Levo started at 5, fentanyl and propofol held  Labs sent  Troponin returned at 32  Lactate 14 9 w/ /33 3/77/14 7/-11  3  EKG showing ST elevations in III and aVF w/ reciprocal changes in aVL and V2  Patient has been on ASA and plavix  Cardiology fellow called who is going to discuss w/ interventionalist  Patient also minimally responsive  GB on BMP was 13  Given amp of dextrose  Pupil on R>L although both are responsive  Will obtain CT head       Zenobia May, DO  General Surgery PGY3

## 2019-02-01 NOTE — OCCUPATIONAL THERAPY NOTE
OT CANCEL NOTE    OT orders received  Chart reviewed  Pt is currently intubated/sedated and not appropriate to engage in skilled OT services at this time  Will hold initial OT evaluation  Will continue to follow pt on caseload and see pt when medically stable and as clinically appropriate      Dione CHIANG, OTR/L

## 2019-02-02 LAB
ABO GROUP BLD BPU: NORMAL
ATRIAL RATE: 98 BPM
BPU ID: NORMAL
MRSA NOSE QL CULT: NORMAL
P AXIS: 83 DEGREES
PR INTERVAL: 172 MS
QRS AXIS: 70 DEGREES
QRSD INTERVAL: 98 MS
QT INTERVAL: 410 MS
QTC INTERVAL: 493 MS
T WAVE AXIS: -1 DEGREES
UNIT DISPENSE STATUS: NORMAL
UNIT PRODUCT CODE: NORMAL
UNIT RH: NORMAL
VENTRICULAR RATE: 87 BPM

## 2019-02-04 LAB
BACTERIA BLD CULT: ABNORMAL
BACTERIA BLD CULT: ABNORMAL
BACTERIA SPT RESP CULT: ABNORMAL
BACTERIA SPT RESP CULT: ABNORMAL
GRAM STN SPEC: ABNORMAL

## 2019-02-04 NOTE — UTILIZATION REVIEW
Notification of Discharge  This is a Notification of Discharge from our facility [address]  Please be advised that this patient has been discharge from our facility  Below you will find the admission and discharge date and time including the patients disposition  PRESENTATION DATE: 1/29/2019  8:52 PM  IP ADMISSION DATE: 1/29/19 2122  DISCHARGE DATE: 2/1/2019  6:47 PM  DISPOSITION: [id number] [facility] Utilization Review Department  Phone: [phone] [facility] [place]; Fax [phone]    Send all requests for admission clinical reviews, approved or denied determinations and any other requests to fax [phone]   All voicemails are confidential

## 2019-02-04 NOTE — CLINICAL RISK MANAGEMENT
Progress Note - Death in Restraints   Anthony Elliot Race 80 y o  female MRN: [id number]  Unit/Bed#: MICU 03 Encounter: [id number]      Patient  within 24 hours of restraint  with Soft restraint right wrist and Soft restraint left wrist  Death unrelated to use of restraints  This situation was tracked internally  CMS and PA-AMALIA  notification not required

## 2019-03-11 ENCOUNTER — TELEPHONE (OUTPATIENT)
Dept: SURGERY | Facility: CLINIC | Age: 84
End: 2019-03-11

## 2021-02-06 NOTE — PROGRESS NOTES
Progress Note - Andres Olson Race 80 y o  female MRN: [id number]    Unit/Bed#: [address] [place] [address] Encounter: [id number]      Subjective:   Patient is seen examined in bed  Over the weekend had new onset of atrial fibrillation with RVR she was transferred to ICU treated with IV metoprolol and IV Cardizem subsequently improved and was transferred back to floor  At this time denies any headache lightheadedness dizziness chest pain chest tightness shortness of breath abdominal pain gas bloating urinary urgency frequency dark urine new joint pain or itching or rashes  She remains on Levaquin was recently started on amiodarone  Objective:     Vitals: Blood pressure 164/78, pulse 81, temperature 97 8 °F (36 6 °C), temperature source Temporal, resp  rate 18, height 5' 2" (1 575 m), weight 39 7 kg (87 lb 8 4 oz), SpO2 97 %  ,Body mass index is 16 01 kg/m²        Intake/Output Summary (Last 24 hours) at 10/08/18 9190  Last data filed at 10/08/18 0501   Gross per 24 hour   Intake              150 ml   Output              500 ml   Net             -350 ml       Gastrointestinal ROS: no abdominal pain, change in bowel habits, or black or bloody stools      Physical:   General:  Thin frail elderly female no acute distress  Abdomen:  Soft nontender to touch bowel sounds are positive x4 quads there is no guarding or rebound tenderness                  Current Facility-Administered Medications:     amiodarone tablet 200 mg, 200 mg, Oral, TID With Meals, 200 mg at 10/08/18 0825 **FOLLOWED BY** [START ON 10/13/2018] amiodarone tablet 200 mg, 200 mg, Oral, BID With Meals **FOLLOWED BY** [START ON 10/21/2018] amiodarone tablet 200 mg, 200 mg, Oral, Daily With Breakfast, Adriana Shoemaker PA-C    clopidogrel (PLAVIX) tablet 75 mg, 75 mg, Oral, Daily, STEVEN Alcala, 75 mg at 10/07/18 0803    docusate sodium (COLACE) capsule 100 mg, 100 mg, Oral, Daily, STEVEN Marin, 100 mg at 10/07/18 0803    isosorbide mononitrate (IMDUR) 24 hr tablet 30 mg, 30 mg, Oral, Daily, STEVEN Latham, 30 mg at 10/07/18 0803    levofloxacin (LEVAQUIN) tablet 250 mg, 250 mg, Oral, Q24H, Adriana Shoemaker PA-C, 250 mg at 10/07/18 1110    losartan (COZAAR) tablet 50 mg, 50 mg, Oral, Daily, Adriana Shoemaker PA-C, 50 mg at 10/07/18 1109    metoprolol tartrate (LOPRESSOR) tablet 50 mg, 50 mg, Oral, Q12H [address], STEVEN Black, 50 mg at 10/07/18 2005    multivitamin-minerals (CENTRUM) tablet 1 tablet, 1 tablet, Oral, Daily, STEVEN Alcala, 1 tablet at 10/07/18 0802    oxyCODONE (ROXICODONE) IR tablet 2 5 mg, 2 5 mg, Oral, Q4H PRN, STEVEN Marin    oxyCODONE (ROXICODONE) IR tablet 5 mg, 5 mg, Oral, Q4H PRN, STEVEN Marin    pantoprazole (PROTONIX) injection 40 mg, 40 mg, Intravenous, Q12H [address], STEVEN Marin, 40 mg at 10/08/18 0826    polyethylene glycol (MIRALAX) packet 17 g, 17 g, Oral, Daily PRN, STEVEN Alcala    Lab, Imaging and other studies:  Lab Results   Component Value Date    WBC 8 76 10/08/2018    HGB 10 4 (L) 10/08/2018    HCT 34 1 (L) 10/08/2018    MCV 80 (L) 10/08/2018     10/08/2018                                                              Lab Results   Component Value Date    GLUCOSE 124 01/01/2017    CALCIUM 8 4 10/07/2018     10/07/2018    K 4 4 10/07/2018    CO2 22 10/07/2018     10/07/2018    BUN 33 (H) 10/07/2018    CREATININE 0 99 10/07/2018       Lab Results   Component Value Date     (H) 10/07/2018    AST 86 (H) 10/07/2018    ALKPHOS 57 10/07/2018           Assessment/Plan:  1  Abdominal pain, anemia- she has a history of perforated duodenal ulcer she is status post evaluation with EGD which revealed esophageal mucosa and distal esophagus, hiatal hernia, multiple gastric AVMs, antral ulcers  Stool is to be checked for H pylori antigen  She will need repeat EGD in the next couple weeks time preferably off Plavix if possible    Hemoglobin has improved  Colonoscopy can also be considered as well once more stable from a cardiac standpoint  2  Elevated liver function tests MRCP done for further evaluation there is no evidence of choledocholithiasis likely secondary to ischemic hepatopathy  3  History of chronic mesenteric ischemia status post stent placement in 2015  4  New onset atrial fibrillation she is being followed by Cardiology not a candidate for anticoagulation secondary to history of bleeding ulcer, and noted multiple AVMs on recent EGD    Above case discussed with  Dr Alida Nicholas all carrillo decisions made by him  36.7

## 2022-02-25 NOTE — ASSESSMENT & PLAN NOTE
· RESOLVED  · Sudden profound elevation of LFTs  Likely 2/2 relative hypotension post urological procedure  · SBP dropped to 100-120's from 170-180's post procedure  · AST 1129,,TB normal,direct bilirubin mild elevated  All indices downtrending  · RUQ US with no acute findings  · Tylenol level normal   · Antihypertensive med changes as noted  · Aim to keep SBP around 140-160 given her chronic mesenteric ischemia per Dr Maryann Rincon office note in 7/2018  Lazara Yang

## 2022-06-24 NOTE — MISCELLANEOUS
Message  Pt called per her dermatologist's request  She had sma stent placed 8/17/15 and is sched for mohs surgery 2/2/16  Derm gave her Keflex prophylactically for the bx they performed but are ? if it was necessary and does she need it again for the mohs surgery  Per Dr Ryan Crowe antibx are not necessary from a vascular standpoint  pt notified of same  Active Problems    1  Chronic mesenteric ischemia (557 1) (K55 1)   2  Edema (782 3) (R60 9)   3  Essential hypertension (401 9) (I10)   4  Hyponatremia (276 1) (E87 1)    Current Meds   1  Calcium TABS; TAKE 3 TABLET Twice daily; Therapy: (Recorded:23Jun2015) to Recorded   2  Chlorthalidone 25 MG Oral Tablet; 1/2 tablet daily; Therapy: [id number] to (Evaluate:19Nov2015)  Requested for: 96Guz4961; Last   Rx:[id number]; Status: ACTIVE - Retrospective Authorization Ordered   3  CloNIDine HCl - 0 3 MG/24HR Transdermal Patch Weekly; APPLY 1 PATCH WEEKLY AS   DIRECTED; Therapy: 64MDJ7576 to (Last Rx:07Oct2015)  Requested for: 13GBB9115 Ordered   4  Clopidogrel Bisulfate 75 MG Oral Tablet; TAKE 1 TABLET BY MOUTH DAILY; Therapy: 06Yud4687 to (Mary Araujo)  Requested for: 83Lwc6466; Last   Rx:[id number] Ordered   5  Magnesium TABS; Therapy: (Recorded:83Zya8206) to Recorded   6  Metoprolol Succinate ER 50 MG Oral Tablet Extended Release 24 Hour; TAKE ONE AND   ONE HALF TABLETS HS; Therapy: 28Apr2015 to Recorded   7  Multivitamins TABS; Therapy: (Recorded:11Jun2015) to Recorded   8  Vitamin D3 2000 UNIT Oral Capsule; PATIENT TAKES VITAMIN D DROPS DAILY; Therapy: ([phone]) to Recorded   9  Vitamin E CAPS; Therapy: (Recorded:11Jun2015) to Recorded   10  Vitamin K TABS; Therapy: (Recorded:11Jun2015) to Recorded    Allergies    1  Cipro TABS   2  Penicillins   3   Sulfa Drugs    Signatures   Electronically signed by : Ellis Jin, ; Jan 25 2016  3:24PM EST                       (Author) EENMT

## 2022-07-15 NOTE — PROGRESS NOTES
Called radiology with [facility] to get patient scheduled for a STAT CT Scan per Kylie Conde's request. Patient was scheduled for 07/18 at 3:00pm. Was told to have patient come in earlier to get labs done and complete prep for CT. Verbalized understanding.   ICU Progress Note - Cardiology   Analia Kyle Race 80 y o  female MRN: [id number]  Unit/Bed#: ICU 02 Encounter: [id number]    Assessment/Plan:  Atrial fibrillation with rapid ventricular rate- new onset secondary to recent blood transfusion/volume overload? Spontaneous conversion this morning  Given her age, history of multiple grafts strict AVMs/ulcers, inability to tolerate anticoagulation will load the patient with amiodarone to prevent further recurrences  Patient has a prior history of elevated transaminitis which has been improving  We will monitor with amiodarone usage  We will hold any anticoagulation    Acute likely ischemic systolic heart failure EF 40%- current volume overload likely from recent blood transfusion  Improve volume status  The patient declines invasive workup currently? She is anxious to go home and is willing to follow up as an outpatient  Given her history of gastric AVMs and antral ulcers currently she would be a poor candidate for PCI as she would not be able to tolerate anti-platelet agents  We will continue her beta-blocker, hydralazine, and nitrate    History of significant peripheral vascular disease/right carotid stenosis/left renal artery stenosis- continue statin therapy    Elevated LFTs post episode of hypotension    UTI with left kidney stone status post cystoscopy and placement of stent    Anemia most likely due to multiple gastric AVMs and antral ulcers- continue PPI    Subjective/Objective   Denies having active chest pain  Denies having currently shortness of breath  Currently in normal sinus rhythm      Objective:     Vitals: /62   Pulse 81   Temp 98 °F (36 7 °C) (Temporal)   Resp (!) 35   Ht 5' 1" (1 549 m)   Wt 39 kg (85 lb 15 7 oz)   SpO2 98%   BMI 15 73 kg/m²   Vitals:    10/05/18 0539 10/06/18 0600   Weight: 41 2 kg (90 lb 13 3 oz) 39 kg (85 lb 15 7 oz)     Orthostatic Blood Pressures      Most Recent Value   Blood Pressure  141/62 filed at 10/06/2018 1300   Patient Position - Orthostatic VS  Lying filed at 10/06/2018 0329            Intake/Output Summary (Last 24 hours) at 10/06/18 1711  Last data filed at 10/06/18 1349   Gross per 24 hour   Intake             3 49 ml   Output             1350 ml   Net         -1346 51 ml       Invasive Devices     Peripheral Intravenous Line            Peripheral IV 10/05/18 Left Arm 1 day    Peripheral IV 10/06/18 Right Antecubital less than 1 day          Drain            Ureteral Drain/Stent Left ureter 6 Fr  3 days                Review of Systems: reviewed    Physical Exam   Constitutional: She is oriented to person, place, and time  No distress  HENT:   Head: Normocephalic and atraumatic  Right Ear: External ear normal    Left Ear: External ear normal    Eyes: Pupils are equal, round, and reactive to light  Conjunctivae are normal  Right eye exhibits no discharge  Left eye exhibits no discharge  No scleral icterus  Neck: Normal range of motion  Neck supple  No JVD present  No tracheal deviation present  No thyromegaly present  Cardiovascular: Regular rhythm  Exam reveals gallop  Exam reveals no friction rub  Murmur heard  Pulmonary/Chest: Effort normal and breath sounds normal  No stridor  No respiratory distress  She has no wheezes  She has no rales  She exhibits no tenderness  Abdominal: Soft  Bowel sounds are normal  She exhibits distension  She exhibits no mass  There is no tenderness  There is no rebound and no guarding  Musculoskeletal: Normal range of motion  She exhibits no edema, tenderness or deformity  Neurological: She is alert and oriented to person, place, and time  She has normal reflexes  No cranial nerve deficit  She exhibits normal muscle tone  Coordination normal    Skin: Skin is warm and dry  No rash noted  She is not diaphoretic  No erythema  No pallor  Psychiatric: She has a normal mood and affect   Her behavior is normal  Judgment and thought content normal    Nursing note and vitals reviewed  Lab Results: I have personally reviewed pertinent lab results  Imaging: I have personally reviewed pertinent reports  EKG: reviewed  VTE Pharmacologic Prophylaxis: Sequential compression device (Venodyne)   VTE Mechanical Prophylaxis: sequential compression device    Counseling / Coordination of Care  Total time spent today 40 minutes  Greater than 50% of total time was spent with the patient and / or family counseling and / or coordination of care   A description of the counseling / coordination of care: afib with rvr

## 2023-04-27 NOTE — ASSESSMENT & PLAN NOTE
Patient on Omeprazole  Pepcid IV given in the ED    · Continue Chonodrocutaneous Helical Advancement Flap Text: The defect edges were debeveled with a #15 scalpel blade.  Given the location of the defect and the proximity to free margins a chondrocutaneous helical advancement flap was deemed most appropriate.  Using a sterile surgical marker, the appropriate advancement flap was drawn incorporating the defect and placing the expected incisions within the relaxed skin tension lines where possible.    The area thus outlined was incised deep to adipose tissue with a #15 scalpel blade.  The skin margins were undermined to an appropriate distance in all directions utilizing iris scissors.

## (undated) DEVICE — SCD SEQUENTIAL COMPRESSION COMFORT SLEEVE MEDIUM KNEE LENGTH: Brand: KENDALL SCD

## (undated) DEVICE — 3000CC GUARDIAN II: Brand: GUARDIAN

## (undated) DEVICE — CHLORAPREP HI-LITE 26ML ORANGE

## (undated) DEVICE — ENSEAL TISSUE SEALER G2 SUPER JAW CURVED FOR USE WITH GENERATOR G11 22CM SHAFT LENGTH: Brand: ENSEAL

## (undated) DEVICE — SNAP KOVER: Brand: UNBRANDED

## (undated) DEVICE — POOLE SUCTION HANDLE: Brand: CARDINAL HEALTH

## (undated) DEVICE — SUT PDS II 1 XLH 96 IN LOOPED Z881G

## (undated) DEVICE — PLUMEPEN PRO 10FT

## (undated) DEVICE — DRESSING ALLEVYN LIFE HEEL 25 X 25.2CM

## (undated) DEVICE — SUT SILK 3-0 SH CR/8 18 IN C013D

## (undated) DEVICE — INTENDED FOR TISSUE SEPARATION, AND OTHER PROCEDURES THAT REQUIRE A SHARP SURGICAL BLADE TO PUNCTURE OR CUT.: Brand: BARD-PARKER SAFETY BLADES SIZE 10, STERILE

## (undated) DEVICE — GLOVE INDICATOR PI UNDERGLOVE SZ 8.5 BLUE

## (undated) DEVICE — PROXIMATE RELOADABLE LINEAR CUTTER WITH SAFETY LOCK-OUT.  55MM LINEAR CUTTER.: Brand: PROXIMATE

## (undated) DEVICE — CYSTOSCOPY PACK: Brand: CONVERTORS

## (undated) DEVICE — CATH SIZING 5FR 100CM PERFORMA 2 BAND

## (undated) DEVICE — GLOVE SRG BIOGEL ORTHOPEDIC 7.5

## (undated) DEVICE — 4F TEMPO AQUA .038 INCHES 125CM VERT: Brand: TEMPO AQUA

## (undated) DEVICE — ASTOUND SURGICAL GOWN, XXX LARGE, X-LONG: Brand: CONVERTORS

## (undated) DEVICE — SUT VICRYL 3-0 SH 27 IN J416H

## (undated) DEVICE — GUIDEWIRE VASC .035 260CM 15CM TAP ST

## (undated) DEVICE — JP CHANNEL DRAIN 19FR, FULL FLUTES: Brand: JACKSON-PRATT

## (undated) DEVICE — STERILE MAJOR GENERAL PACK: Brand: CARDINAL HEALTH

## (undated) DEVICE — LASER HOLMIUM FIBER 365 MIC

## (undated) DEVICE — PROXIMATE RELOADABLE LINEAR CUTTER WITH SAFETY LOCK-OUT, 75MM: Brand: PROXIMATE

## (undated) DEVICE — ANGIOGRAPHIC CATHETER: Brand: IMAGER™ II

## (undated) DEVICE — GLOVE INDICATOR PI UNDERGLOVE SZ 7 BLUE

## (undated) DEVICE — STANDARD SURGICAL GOWN, L: Brand: CONVERTORS

## (undated) DEVICE — GUIDEWIRE THRUWAY 0.014 IN 300CM SHORT STR

## (undated) DEVICE — INTENDED FOR TISSUE SEPARATION, AND OTHER PROCEDURES THAT REQUIRE A SHARP SURGICAL BLADE TO PUNCTURE OR CUT.: Brand: BARD-PARKER SAFETY BLADES SIZE 15, STERILE

## (undated) DEVICE — NGAGE, NITINOL STONE EXTRACTOR MODIFIED BASKET: Brand: NGAGE

## (undated) DEVICE — GLOVE SRG BIOGEL 7.5

## (undated) DEVICE — GAUZE SPONGES,16 PLY: Brand: CURITY

## (undated) DEVICE — ABDOMINAL PAD: Brand: DERMACEA

## (undated) DEVICE — INFLATION DEVICE BASIX 30ATM

## (undated) DEVICE — SINGLE-USE BIOPSY FORCEPS: Brand: RADIAL JAW 4

## (undated) DEVICE — GUIDEWIRE STRGHT TIP 0.038 IN SOLO PLUS

## (undated) DEVICE — SI BRITE TIP F4 11CM: Brand: BRITE TIP

## (undated) DEVICE — 3M™ IOBAN™ 2 ANTIMICROBIAL INCISE DRAPE 6650EZ: Brand: IOBAN™ 2

## (undated) DEVICE — SUT VICRYL 2-0 REEL 54 IN J286G

## (undated) DEVICE — JACKSON-PRATT 100CC BULB RESERVOIR: Brand: CARDINAL HEALTH

## (undated) DEVICE — FABRIC REINFORCED, SURGICAL GOWN, XL: Brand: CONVERTORS

## (undated) DEVICE — PROXIMATE LINEAR CUTTER RELOAD, BLUE, 75MM: Brand: PROXIMATE

## (undated) DEVICE — ROSEBUD DISSECTORS: Brand: DEROYAL

## (undated) DEVICE — DRAPE SHEET X-LG

## (undated) DEVICE — ELECTRODE BLADE MOD E-Z CLEAN 2.5IN 6.4CM -0012M

## (undated) DEVICE — SUT SILK 2-0 SH 30 IN K833H

## (undated) DEVICE — CATH BAL CHARGER 5 X 40MM X 135CM

## (undated) DEVICE — BETHLEHEM MAJOR GENERAL PACK: Brand: CARDINAL HEALTH

## (undated) DEVICE — POUCH UR CATCHER STERILE

## (undated) DEVICE — 3M™ TEGADERM™ TRANSPARENT FILM DRESSING FRAME STYLE, 1624W, 2-3/8 IN X 2-3/4 IN (6 CM X 7 CM), 100/CT 4CT/CASE: Brand: 3M™ TEGADERM™

## (undated) DEVICE — Device

## (undated) DEVICE — ABTHERA OPEN ABDOMEN DRESSING: Brand: ABTHERA™

## (undated) DEVICE — RADIOLOGY STERILE LABELS: Brand: CENTURION

## (undated) DEVICE — PROXIMATE PLUS MD MULTI-DIRECTIONAL RELEASE SKIN STAPLERS CONTAINS 35 STAINLESS STEEL STAPLES APPROXIMATE CLOSED DIMENSIONS: 6.9MM X 3.9MM WIDE: Brand: PROXIMATE

## (undated) DEVICE — GLOVE SRG BIOGEL 7

## (undated) DEVICE — PTA SAVVY 3.00 X 4CM 150CM: Brand: SAVVY

## (undated) DEVICE — SUT VICRYL 0 CT-1 27 IN J260H

## (undated) DEVICE — URETERAL CATH 5 FR

## (undated) DEVICE — SPY ELITE SINGLE VIAL KIT

## (undated) DEVICE — ABTHERA OPEN ABDOMEN DRESSING WITH SENSATRAC PAD: Brand: ABTHERA™ SENSAT.R.A.C.™

## (undated) DEVICE — 3M™ TEGADERM™ TRANSPARENT FILM DRESSING FRAME STYLE, 1626W, 4 IN X 4-3/4 IN (10 CM X 12 CM), 50/CT 4CT/CASE: Brand: 3M™ TEGADERM™

## (undated) DEVICE — SYRINGE 10ML LL CONTROL TOP

## (undated) DEVICE — SUT VICRYL 3-0 REEL 54 IN J285G

## (undated) DEVICE — CYSTO TUBING TUR Y IRRIGATION

## (undated) DEVICE — GLOVE INDICATOR PI UNDERGLOVE SZ 6.5 BLUE

## (undated) DEVICE — GUIDEWIRE WITH ICE™ HYDROPHILIC COATING: Brand: V-18™ CONTROL WIRE™

## (undated) DEVICE — 3M™ TEGADERM™ TRANSPARENT FILM DRESSING FRAME STYLE, 1628, 6 IN X 8 IN (15 CM X 20 CM), 10/CT 8CT/CASE: Brand: 3M™ TEGADERM™

## (undated) DEVICE — MEDI-VAC YANK SUCT HNDL W/TPRD BULBOUS TIP: Brand: CARDINAL HEALTH

## (undated) DEVICE — LUBRICANT SURGILUBE TUBE 4 OZ  FLIP TOP

## (undated) DEVICE — RADIFOCUS GLIDEWIRE: Brand: GLIDEWIRE

## (undated) DEVICE — SPONGE STICK WITH PVP-I: Brand: KENDALL

## (undated) DEVICE — GLOVE INDICATOR PI UNDERGLOVE SZ 8 BLUE

## (undated) DEVICE — SUT SILK 2-0 TIES 144 IN LA55G

## (undated) DEVICE — SUT SILK 3-0 SH 30 IN K832H

## (undated) DEVICE — DRESSING ALLEVYN LIFE SACRAL 6.75 X 6.5 IN

## (undated) DEVICE — GLOVE INDICATOR PI UNDERGLOVE SZ 7.5 BLUE

## (undated) DEVICE — TRAY FOLEY 16FR URIMETER SURESTEP